# Patient Record
Sex: FEMALE | Race: WHITE | Employment: FULL TIME | URBAN - NONMETROPOLITAN AREA
[De-identification: names, ages, dates, MRNs, and addresses within clinical notes are randomized per-mention and may not be internally consistent; named-entity substitution may affect disease eponyms.]

---

## 2017-01-27 RX ORDER — RANITIDINE 300 MG/1
TABLET ORAL
Qty: 30 TABLET | Refills: 0 | Status: SHIPPED | OUTPATIENT
Start: 2017-01-27 | End: 2017-03-08 | Stop reason: SDUPTHER

## 2017-02-16 ENCOUNTER — INITIAL CONSULT (OUTPATIENT)
Dept: CARDIOLOGY CLINIC | Age: 42
End: 2017-02-16

## 2017-02-16 ENCOUNTER — NURSE ONLY (OUTPATIENT)
Dept: CARDIOLOGY CLINIC | Age: 42
End: 2017-02-16

## 2017-02-16 VITALS
HEIGHT: 64 IN | WEIGHT: 152 LBS | RESPIRATION RATE: 16 BRPM | HEART RATE: 84 BPM | SYSTOLIC BLOOD PRESSURE: 70 MMHG | BODY MASS INDEX: 25.95 KG/M2 | DIASTOLIC BLOOD PRESSURE: 60 MMHG

## 2017-02-16 DIAGNOSIS — R55 SYNCOPE, UNSPECIFIED SYNCOPE TYPE: Primary | ICD-10-CM

## 2017-02-16 DIAGNOSIS — R00.2 HEART PALPITATIONS: ICD-10-CM

## 2017-02-16 DIAGNOSIS — R55 SYNCOPE AND COLLAPSE: ICD-10-CM

## 2017-02-16 DIAGNOSIS — I95.1 ORTHOSTATIC HYPOTENSION: ICD-10-CM

## 2017-02-16 PROCEDURE — 93227 XTRNL ECG REC<48 HR R&I: CPT | Performed by: INTERNAL MEDICINE

## 2017-02-16 PROCEDURE — 93225 XTRNL ECG REC<48 HRS REC: CPT | Performed by: INTERNAL MEDICINE

## 2017-02-16 PROCEDURE — 99204 OFFICE O/P NEW MOD 45 MIN: CPT | Performed by: INTERNAL MEDICINE

## 2017-02-16 PROCEDURE — 93000 ELECTROCARDIOGRAM COMPLETE: CPT | Performed by: INTERNAL MEDICINE

## 2017-02-16 RX ORDER — ESCITALOPRAM OXALATE 10 MG/1
TABLET ORAL
COMMUNITY
Start: 2017-01-30 | End: 2018-01-05 | Stop reason: ALTCHOICE

## 2017-02-16 RX ORDER — MIDODRINE HYDROCHLORIDE 5 MG/1
5 TABLET ORAL 3 TIMES DAILY
Qty: 90 TABLET | Refills: 3 | Status: SHIPPED | OUTPATIENT
Start: 2017-02-16 | End: 2017-02-27 | Stop reason: SINTOL

## 2017-02-16 RX ORDER — ASCORBIC ACID 125 MG
TABLET,CHEWABLE ORAL
COMMUNITY
End: 2017-08-11 | Stop reason: ALTCHOICE

## 2017-02-17 ENCOUNTER — TELEPHONE (OUTPATIENT)
Dept: CARDIOLOGY CLINIC | Age: 42
End: 2017-02-17

## 2017-02-27 ENCOUNTER — TELEPHONE (OUTPATIENT)
Dept: CARDIOLOGY CLINIC | Age: 42
End: 2017-02-27

## 2017-02-27 RX ORDER — FLUDROCORTISONE ACETATE 0.1 MG/1
0.1 TABLET ORAL DAILY
Qty: 30 TABLET | Refills: 3 | Status: SHIPPED | OUTPATIENT
Start: 2017-02-27 | End: 2017-03-29

## 2017-03-01 ENCOUNTER — PROCEDURE VISIT (OUTPATIENT)
Dept: CARDIOLOGY CLINIC | Age: 42
End: 2017-03-01

## 2017-03-01 DIAGNOSIS — R55 SYNCOPE AND COLLAPSE: ICD-10-CM

## 2017-03-01 DIAGNOSIS — I95.1 ORTHOSTATIC HYPOTENSION: ICD-10-CM

## 2017-03-01 DIAGNOSIS — R55 SYNCOPE AND COLLAPSE: Primary | ICD-10-CM

## 2017-03-01 DIAGNOSIS — R00.2 HEART PALPITATIONS: ICD-10-CM

## 2017-03-01 DIAGNOSIS — R55 SYNCOPE, UNSPECIFIED SYNCOPE TYPE: ICD-10-CM

## 2017-03-01 LAB
LV EF: 58 %
LVEF MODALITY: NORMAL

## 2017-03-01 PROCEDURE — 93015 CV STRESS TEST SUPVJ I&R: CPT | Performed by: INTERNAL MEDICINE

## 2017-03-01 PROCEDURE — 93306 TTE W/DOPPLER COMPLETE: CPT | Performed by: INTERNAL MEDICINE

## 2017-03-02 ENCOUNTER — OFFICE VISIT (OUTPATIENT)
Dept: CARDIOLOGY CLINIC | Age: 42
End: 2017-03-02

## 2017-03-02 ENCOUNTER — TELEPHONE (OUTPATIENT)
Dept: CARDIOLOGY CLINIC | Age: 42
End: 2017-03-02

## 2017-03-02 VITALS
OXYGEN SATURATION: 97 % | WEIGHT: 151 LBS | BODY MASS INDEX: 26.75 KG/M2 | HEART RATE: 86 BPM | DIASTOLIC BLOOD PRESSURE: 40 MMHG | HEIGHT: 63 IN | SYSTOLIC BLOOD PRESSURE: 90 MMHG

## 2017-03-02 DIAGNOSIS — Z72.0 TOBACCO ABUSE: ICD-10-CM

## 2017-03-02 DIAGNOSIS — R55 SYNCOPE AND COLLAPSE: ICD-10-CM

## 2017-03-02 DIAGNOSIS — R00.2 HEART PALPITATIONS: ICD-10-CM

## 2017-03-02 DIAGNOSIS — I95.1 ORTHOSTATIC HYPOTENSION: Primary | ICD-10-CM

## 2017-03-02 DIAGNOSIS — Z86.010 HISTORY OF COLON POLYPS: ICD-10-CM

## 2017-03-02 PROCEDURE — 99214 OFFICE O/P EST MOD 30 MIN: CPT | Performed by: INTERNAL MEDICINE

## 2017-03-03 ENCOUNTER — TELEPHONE (OUTPATIENT)
Dept: CARDIOLOGY CLINIC | Age: 42
End: 2017-03-03

## 2017-03-09 RX ORDER — PROMETHAZINE HYDROCHLORIDE 25 MG/1
TABLET ORAL
Qty: 45 TABLET | Refills: 0 | Status: SHIPPED | OUTPATIENT
Start: 2017-03-09 | End: 2017-04-26 | Stop reason: SDUPTHER

## 2017-03-29 LAB
ALBUMIN SERPL-MCNC: 4.3 G/DL
ALP BLD-CCNC: 113 U/L
ALT SERPL-CCNC: 16 U/L
AST SERPL-CCNC: 21 U/L
BASOPHILS ABSOLUTE: NORMAL /ΜL
BASOPHILS RELATIVE PERCENT: NORMAL %
BILIRUB SERPL-MCNC: 0.2 MG/DL (ref 0.1–1.4)
BUN BLDV-MCNC: 11 MG/DL
CALCIUM SERPL-MCNC: NORMAL MG/DL
CHLORIDE BLD-SCNC: 101 MMOL/L
CO2: NORMAL MMOL/L
CREAT SERPL-MCNC: 0.9 MG/DL
EOSINOPHILS ABSOLUTE: NORMAL /ΜL
EOSINOPHILS RELATIVE PERCENT: NORMAL %
GFR CALCULATED: NORMAL
GLUCOSE BLD-MCNC: 87 MG/DL
HCT VFR BLD CALC: 41.1 % (ref 36–46)
HEMOGLOBIN: 13.7 G/DL (ref 12–16)
LYMPHOCYTES ABSOLUTE: NORMAL /ΜL
LYMPHOCYTES RELATIVE PERCENT: NORMAL %
MCH RBC QN AUTO: NORMAL PG
MCHC RBC AUTO-ENTMCNC: NORMAL G/DL
MCV RBC AUTO: NORMAL FL
MONOCYTES ABSOLUTE: NORMAL /ΜL
MONOCYTES RELATIVE PERCENT: NORMAL %
NEUTROPHILS ABSOLUTE: NORMAL /ΜL
NEUTROPHILS RELATIVE PERCENT: NORMAL %
PLATELET # BLD: 198 K/ΜL
PMV BLD AUTO: NORMAL FL
POTASSIUM SERPL-SCNC: 4.1 MMOL/L
RBC # BLD: 4.46 10^6/ΜL
SODIUM BLD-SCNC: 141 MMOL/L
TOTAL PROTEIN: NORMAL
TRIGL SERPL-MCNC: 656 MG/DL
TSH SERPL DL<=0.05 MIU/L-ACNC: 1.02 UIU/ML
WBC # BLD: 11.2 10^3/ML

## 2017-04-05 ENCOUNTER — OFFICE VISIT (OUTPATIENT)
Dept: CARDIOLOGY CLINIC | Age: 42
End: 2017-04-05

## 2017-04-05 VITALS
WEIGHT: 151 LBS | HEART RATE: 112 BPM | DIASTOLIC BLOOD PRESSURE: 60 MMHG | SYSTOLIC BLOOD PRESSURE: 88 MMHG | HEIGHT: 63 IN | BODY MASS INDEX: 26.75 KG/M2 | RESPIRATION RATE: 16 BRPM

## 2017-04-05 DIAGNOSIS — I95.1 ORTHOSTATIC HYPOTENSION: Primary | ICD-10-CM

## 2017-04-05 PROCEDURE — 99213 OFFICE O/P EST LOW 20 MIN: CPT | Performed by: INTERNAL MEDICINE

## 2017-04-05 RX ORDER — PRAVASTATIN SODIUM 40 MG
TABLET ORAL
COMMUNITY
Start: 2017-03-31 | End: 2018-01-05 | Stop reason: ALTCHOICE

## 2017-04-05 RX ORDER — FLUDROCORTISONE ACETATE 0.1 MG/1
0.2 TABLET ORAL DAILY
Qty: 180 TABLET | Refills: 3 | Status: SHIPPED | OUTPATIENT
Start: 2017-04-05 | End: 2019-04-04

## 2017-04-27 RX ORDER — PROMETHAZINE HYDROCHLORIDE 25 MG/1
TABLET ORAL
Qty: 45 TABLET | Refills: 0 | Status: SHIPPED | OUTPATIENT
Start: 2017-04-27 | End: 2017-07-31 | Stop reason: SDUPTHER

## 2017-06-05 RX ORDER — RANITIDINE 300 MG/1
TABLET ORAL
Qty: 30 TABLET | Refills: 1 | Status: SHIPPED | OUTPATIENT
Start: 2017-06-05 | End: 2017-08-21 | Stop reason: SDUPTHER

## 2017-06-05 RX ORDER — ESOMEPRAZOLE MAGNESIUM 40 MG/1
CAPSULE, DELAYED RELEASE ORAL
Qty: 30 CAPSULE | Refills: 3 | Status: SHIPPED | OUTPATIENT
Start: 2017-06-05 | End: 2017-08-15 | Stop reason: SDUPTHER

## 2017-07-31 RX ORDER — PROMETHAZINE HYDROCHLORIDE 25 MG/1
TABLET ORAL
Qty: 45 TABLET | Refills: 0 | Status: SHIPPED | OUTPATIENT
Start: 2017-07-31 | End: 2018-01-05 | Stop reason: ALTCHOICE

## 2017-08-15 ENCOUNTER — TELEPHONE (OUTPATIENT)
Dept: GASTROENTEROLOGY | Age: 42
End: 2017-08-15

## 2017-08-21 RX ORDER — RANITIDINE 300 MG/1
TABLET ORAL
Qty: 30 TABLET | Refills: 0 | Status: SHIPPED | OUTPATIENT
Start: 2017-08-21 | End: 2019-04-04

## 2017-09-14 ENCOUNTER — TELEPHONE (OUTPATIENT)
Dept: GASTROENTEROLOGY | Age: 42
End: 2017-09-14

## 2017-09-18 ENCOUNTER — TELEPHONE (OUTPATIENT)
Dept: GASTROENTEROLOGY | Age: 42
End: 2017-09-18

## 2017-12-06 ENCOUNTER — TELEPHONE (OUTPATIENT)
Dept: GASTROENTEROLOGY | Age: 42
End: 2017-12-06

## 2017-12-07 ENCOUNTER — TELEPHONE (OUTPATIENT)
Dept: GASTROENTEROLOGY | Age: 42
End: 2017-12-07

## 2017-12-07 NOTE — TELEPHONE ENCOUNTER
Called pharmacy and spoke with Damion. I advised her that if the rx is run as OTC ins will cover without PA. She states they had 2 scripts for the Nexium so the non OTC was the one that was requiring PA. She states they ran it as OTC and patient was able to get the medication without issue. No further action needed.

## 2018-01-11 ENCOUNTER — TELEPHONE (OUTPATIENT)
Dept: GASTROENTEROLOGY | Age: 43
End: 2018-01-11

## 2018-01-15 ENCOUNTER — TELEPHONE (OUTPATIENT)
Dept: GASTROENTEROLOGY | Age: 43
End: 2018-01-15

## 2018-01-15 NOTE — TELEPHONE ENCOUNTER
Called patient to reschedule appt that she missed with Tierra Potter this morning at 1040am. LM on  for patient to return call to office.

## 2018-03-02 ENCOUNTER — HOSPITAL ENCOUNTER (OUTPATIENT)
Dept: OTHER | Age: 43
Discharge: HOME | End: 2018-03-02
Attending: CLINIC/CENTER | Admitting: CLINIC/CENTER

## 2018-03-05 LAB
CULTURE: NORMAL
ORGANISM: NORMAL
REPORT STATUS: NORMAL
REQUEST PROBLEM: NORMAL
SPECIMEN: NORMAL
TOTAL COLONY COUNT: NORMAL

## 2019-04-04 ENCOUNTER — OFFICE VISIT (OUTPATIENT)
Dept: FAMILY MEDICINE CLINIC | Age: 44
End: 2019-04-04
Payer: COMMERCIAL

## 2019-04-04 VITALS
HEART RATE: 102 BPM | WEIGHT: 101.2 LBS | DIASTOLIC BLOOD PRESSURE: 60 MMHG | OXYGEN SATURATION: 99 % | SYSTOLIC BLOOD PRESSURE: 88 MMHG | RESPIRATION RATE: 20 BRPM | BODY MASS INDEX: 17.28 KG/M2 | HEIGHT: 64 IN

## 2019-04-04 DIAGNOSIS — Z13.31 POSITIVE DEPRESSION SCREENING: ICD-10-CM

## 2019-04-04 DIAGNOSIS — R44.1 VISUAL HALLUCINATIONS: ICD-10-CM

## 2019-04-04 DIAGNOSIS — R44.0 AUDITORY HALLUCINATION: Primary | ICD-10-CM

## 2019-04-04 DIAGNOSIS — R45.86 MOOD SWINGS: ICD-10-CM

## 2019-04-04 DIAGNOSIS — G25.81 RESTLESS LEGS: ICD-10-CM

## 2019-04-04 DIAGNOSIS — Z72.0 TOBACCO ABUSE: ICD-10-CM

## 2019-04-04 DIAGNOSIS — F41.9 ANXIETY: ICD-10-CM

## 2019-04-04 PROBLEM — I95.1 ORTHOSTATIC HYPOTENSION: Status: RESOLVED | Noted: 2017-02-16 | Resolved: 2019-04-04

## 2019-04-04 PROBLEM — R55 SYNCOPE AND COLLAPSE: Status: RESOLVED | Noted: 2017-02-16 | Resolved: 2019-04-04

## 2019-04-04 PROBLEM — R00.2 HEART PALPITATIONS: Status: RESOLVED | Noted: 2017-02-16 | Resolved: 2019-04-04

## 2019-04-04 PROCEDURE — 96160 PT-FOCUSED HLTH RISK ASSMT: CPT | Performed by: NURSE PRACTITIONER

## 2019-04-04 PROCEDURE — 99203 OFFICE O/P NEW LOW 30 MIN: CPT | Performed by: NURSE PRACTITIONER

## 2019-04-04 PROCEDURE — G8431 POS CLIN DEPRES SCRN F/U DOC: HCPCS | Performed by: NURSE PRACTITIONER

## 2019-04-04 PROCEDURE — G8419 CALC BMI OUT NRM PARAM NOF/U: HCPCS | Performed by: NURSE PRACTITIONER

## 2019-04-04 PROCEDURE — G8427 DOCREV CUR MEDS BY ELIG CLIN: HCPCS | Performed by: NURSE PRACTITIONER

## 2019-04-04 PROCEDURE — 4004F PT TOBACCO SCREEN RCVD TLK: CPT | Performed by: NURSE PRACTITIONER

## 2019-04-04 RX ORDER — MIRTAZAPINE 15 MG/1
15 TABLET, FILM COATED ORAL DAILY
Qty: 30 TABLET | Refills: 0 | Status: SHIPPED | OUTPATIENT
Start: 2019-04-04 | End: 2019-05-21 | Stop reason: SINTOL

## 2019-04-04 RX ORDER — QUETIAPINE 150 MG/1
150 TABLET, FILM COATED, EXTENDED RELEASE ORAL DAILY
Qty: 30 TABLET | Refills: 1 | Status: SHIPPED | OUTPATIENT
Start: 2019-04-04 | End: 2019-05-21 | Stop reason: ALTCHOICE

## 2019-04-04 ASSESSMENT — PATIENT HEALTH QUESTIONNAIRE - PHQ9
9. THOUGHTS THAT YOU WOULD BE BETTER OFF DEAD, OR OF HURTING YOURSELF: 2
2. FEELING DOWN, DEPRESSED OR HOPELESS: 3
SUM OF ALL RESPONSES TO PHQ QUESTIONS 1-9: 20
6. FEELING BAD ABOUT YOURSELF - OR THAT YOU ARE A FAILURE OR HAVE LET YOURSELF OR YOUR FAMILY DOWN: 3
8. MOVING OR SPEAKING SO SLOWLY THAT OTHER PEOPLE COULD HAVE NOTICED. OR THE OPPOSITE, BEING SO FIGETY OR RESTLESS THAT YOU HAVE BEEN MOVING AROUND A LOT MORE THAN USUAL: 3
1. LITTLE INTEREST OR PLEASURE IN DOING THINGS: 3
7. TROUBLE CONCENTRATING ON THINGS, SUCH AS READING THE NEWSPAPER OR WATCHING TELEVISION: 0
10. IF YOU CHECKED OFF ANY PROBLEMS, HOW DIFFICULT HAVE THESE PROBLEMS MADE IT FOR YOU TO DO YOUR WORK, TAKE CARE OF THINGS AT HOME, OR GET ALONG WITH OTHER PEOPLE: 2
SUM OF ALL RESPONSES TO PHQ9 QUESTIONS 1 & 2: 6
3. TROUBLE FALLING OR STAYING ASLEEP: 3
5. POOR APPETITE OR OVEREATING: 0
4. FEELING TIRED OR HAVING LITTLE ENERGY: 3
SUM OF ALL RESPONSES TO PHQ QUESTIONS 1-9: 20

## 2019-04-04 NOTE — PATIENT INSTRUCTIONS
Behavioral Health Resources  1. 11 [facility] ([facility])  [address] [facility]. Norma Witt [address]  [phone]    [address] [facility], [address]  [phone]    2. [facility]  [facility]  [address] [facility]  Harsha Ramsay, [address]  [phone]    3. [address] [facility], [address]  [address]  [facility]  Tish Witt [address]  [phone]      4. [facility]   [address] [facility], [address]      5. [address] [facility]. Taras Negrete [address] [phone]  Offers psychiatric services and counseling    6. [address] [facility]  [phone]  Offers medical & psychiatric services and counseling    7. Child, [address]  Harsha Ramsay, [address] [facility],[address]  Offers psychiatric services and counseling    8.  [address] [address] [place]  [phone]

## 2019-04-04 NOTE — PROGRESS NOTES
SUBJECTIVE:    Sarahann Mcburney  1975  41 y.o.  female      Chief Complaint   Patient presents with   [address] [facility] Patient     41year old in office to establish care    Other     Pt states she needs back on her psych meds and she was seeing Yasmeen Betancur at [facility] and Pt states that she will not go back to her. Pt states she was rude and disrespectful.  Menopause     Pt states she thinks she is going through menopause. HPI     Hearing voices, seeing spirits. States she was being told by therapist that she was making it up. Has been feeling like she was dying by going to the home of a  friend. Has been on seroquel, states prior provider took her off of it. Was taking 400xr  Was on zoloft - made her go crazy. States she tried for two weeks, stopped this. Has been on everything that you can think of. Has some PTSD, has not worked in years. Tobacco abuse  Counseled to stop smoking to improve health and limit risks. Patient advised to call if they wish to have intervention/help to stop smoking. Mood swings  Reports she has mood swings. States this can happen many times in a day. Has periods of down, depressed mood and then other times she is very angry or agitated. Auditory hallucination  Patient reports that she hears voices. She has heard friends talking with her even though they are . She says this is been going on for years. She was told by a prior therapist that they believed she was making it up so she just stopped talking about it. She has been treated for schizoaffective disorder in the past but does not believe that she is schizophrenic. She says she has PTSD. She has had psychiatric and parent normal investigators come to her house to investigate the things that she hears. Visual hallucinations  Recent says that she sees spirits.   She has had hair normal investigator's come to her home to investigate the spirits that she sees and she has been told that everything that she sees and hears is real.  She has seen a counselor and a prior psychiatrist at [facility] but \"just got fed up with them and don't want to go back\". Apparently that counselor told her that they did not believe that she was seeing and hearing the things that she sees. Anxiety  Patient reports that she has severe anxiety. She has been on Zoloft in the past but this \"made her go crazy\". She says that she has been \"on everything you can think of\". She reports that her anxiety is terrible. Is a long list of allergies and medication intolerances due to a history of tardive dyskinesia. Current Outpatient Medications on File Prior to Visit   Medication Sig Dispense Refill    rOPINIRole (REQUIP) 2 MG tablet Take 2 mg by mouth 2 times daily 1mg in the morning & 3mg nightly       No current facility-administered medications on file prior to visit. Review of PMH, PSH, Family Hx, Allergies and updates made as needed. Past Medical History:   Diagnosis Date    Arthritis     OSTEO ARTHITIS    Cholecystenteric fistula     COPD (chronic obstructive pulmonary disease) ([address]     Degenerative disc disease     Fibromyalgia     H/O 24 hour EKG monitoring 02/16/2017      Sinus tach events , no major arrhythmias , follow up in office as routine     H/O echocardiogram 03/01/2017    EF 55-60% Normal LV structure  and systolic function.  H/O exercise stress test 03/01/2017    Normal stress test. Patient has physical deconditioning as she achieved target HR in 2 mins. Recommend to increase PO fluids intake and exercise training.      Osteopenia     Osteoporosis     PTSD (post-traumatic stress disorder)     S/P cholecystectomy 11/22/11    Schizo-affective schizophrenia ([address]     Sjoegren syndrome ([address]     Syncope     Thyroid disease     Venous insufficiency of leg 2012    Vertigo      Past Surgical History:   Procedure Laterality Date    CHOLECYSTECTOMY  11/22/2011 laparoscopic    COLONOSCOPY      DENTAL SURGERY  8/9    ELBOW ARTHROSCOPY      bilateral elbows    ENDOSCOPY, COLON, DIAGNOSTIC      HYSTERECTOMY      OTHER SURGICAL HISTORY      venous ablation, bilateral legs    TUBAL LIGATION       Social History     Tobacco History     Smoking Status  Current Every Day Smoker Smoking Frequency  1 pack/day for 20 years (20 pk yrs) Smoking Tobacco Type  Cigarettes    Smokeless Tobacco Use  Never Used          Alcohol History     Alcohol Use Status  No          Drug Use     Drug Use Status  No Comment  clean for 11 years, past hx of crack use          Sexual Activity     Sexually Active  Not Currently              Allergies   Allergen Reactions    Codeine Itching and Nausea And Vomiting    Imitrex [Sumatriptan] Itching and Nausea And Vomiting     PASSED OUT    Botox [Onabotulinumtoxina] Other (See Comments)     Tardive dyskinesia    Compazine [Prochlorperazine Maleate] Other (See Comments)    Cephalexin Nausea And Vomiting and Other (See Comments)     ABDOMINAL PAIN FOR 2 WEEKS    Magnesium-Containing Compounds Other (See Comments)     Pt sts \"they called it the mags. I start twitching. \"     Meclizine Hcl Other (See Comments)     \"Makes me stutter and twitch. \"    Nsaids Other (See Comments)     Tardive dyskinisia    Pcn [Penicillins] Nausea And Vomiting and Other (See Comments)     HEADACHE    Reglan [Metoclopramide] Other (See Comments)     Tardive dyskinisia    Toradol [Ketorolac Tromethamine] Other (See Comments)     Tremors    Vistaril [Hydroxyzine Hcl] Other (See Comments)     dyskinsia      Zofran [Ondansetron Hcl] Other (See Comments)     Cramping and burning in stomach           PHQ Scores 4/4/2019   PHQ2 Score 6   PHQ9 Score 20     Interpretation of Total Score Depression Severity: 1-4 = Minimal depression, 5-9 = Mild depression, 10-14 = Moderate depression, 15-19 = Moderately severe depression, 20-27 = Severe depression    Review of Systems Constitutional: Negative. Negative for chills, diaphoresis and fever. Respiratory: Negative. Negative for cough, shortness of breath, wheezing and stridor. Cardiovascular: Negative. Negative for chest pain, palpitations and leg swelling. Gastrointestinal: Negative. Negative for diarrhea and vomiting. Endocrine: Negative. Neurological: Positive for tremors. Negative for weakness and headaches. Psychiatric/Behavioral: Positive for agitation, dysphoric mood, hallucinations and sleep disturbance. Negative for suicidal ideas. The patient is nervous/anxious. All other systems reviewed and are negative. OBJECTIVE:    BP 88/60 (Site: Right Upper Arm, Position: Sitting, Cuff Size: Medium Adult)   Pulse 102   Resp 20   Ht 5' 4\" (1.626 m)   Wt 101 lb 3.2 oz (45.9 kg)   LMP 11/18/2004   SpO2 99%   BMI 17.37 kg/m²     Physical Exam   Constitutional: She is oriented to person, place, and time. She appears well-developed and well-nourished. HENT:   Head: Normocephalic and atraumatic. Eyes: Pupils are equal, round, and reactive to light. Neck: Normal range of motion. Neck supple. Cardiovascular: Normal rate, regular rhythm, normal heart sounds and intact distal pulses. No murmur heard. Pulmonary/Chest: Effort normal and breath sounds normal. No respiratory distress. Neurological: She is alert and oriented to person, place, and time. Skin: Skin is warm and dry. Capillary refill takes less than 2 seconds. Psychiatric: She has a normal mood and affect. Her behavior is normal. Judgment and thought content normal.   Vitals reviewed. ASSESSMENT/PLAN:    Problem List     Anxiety     Patient reports that she has severe anxiety. She has been on Zoloft in the past but this \"made her go crazy\". She says that she has been \"on everything you can think of\". She reports that her anxiety is terrible.   Is a long list of allergies and medication intolerances due to a history of tardive dyskinesia. Relevant Medications    LORazepam (ATIVAN) tablet 1 mg (Completed)    LORazepam (ATIVAN) injection 2 mg (Completed)    diazepam (VALIUM) tablet 10 mg (Completed)    LORazepam (ATIVAN) tablet 1 mg (Completed)    LORazepam (ATIVAN) tablet 1 mg (Completed)    LORazepam (ATIVAN) tablet 1 mg (Completed)    QUEtiapine (SEROQUEL XR) 150 MG TB24 extended release tablet    mirtazapine (REMERON) 15 MG tablet    Auditory hallucination - Primary     Patient reports that she hears voices. She has heard friends talking with her even though they are . She says this is been going on for years. She was told by a prior therapist that they believed she was making it up so she just stopped talking about it. She has been treated for schizoaffective disorder in the past but does not believe that she is schizophrenic. She says she has PTSD. She has had psychiatric and parent normal investigators come to her house to investigate the things that she hears. Relevant Medications    QUEtiapine (SEROQUEL XR) 150 MG TB24 extended release tablet    mirtazapine (REMERON) 15 MG tablet    Mood swings     Reports she has mood swings. States this can happen many times in a day. Has periods of down, depressed mood and then other times she is very angry or agitated. Relevant Medications    QUEtiapine (SEROQUEL XR) 150 MG TB24 extended release tablet    mirtazapine (REMERON) 15 MG tablet    Restless legs    Tobacco abuse     Counseled to stop smoking to improve health and limit risks. Patient advised to call if they wish to have intervention/help to stop smoking. Visual hallucinations     Recent says that she sees spirits.   She has had hair normal investigator's come to her home to investigate the spirits that she sees and she has been told that everything that she sees and hears is real.  She has seen a counselor and a prior psychiatrist at [facility] but \"just got fed up with them Tobacco abuse  Counseled to stop smoking to improve health and decrease health risks    5. Positive depression screening  Patient did have a positive depression screening. She was encouraged to find a psychiatrist ASAP. We will get her stable as we can in this office while we are waiting for psychiatry appointment. - Positive Screen for Clinical Depression with a Documented Follow-up Plan     6. Visual hallucinations  As above    7. Restless legs  Patient uses Requip for restless legs. She has been encouraged to continue using this. She is also been encouraged to take a walk, eat a well-balanced diet, drink plenty of fluids and follow up for any concerns      Return in about 6 weeks (around 5/16/2019). Controlled substance monitoring (if applicable to pt. Visit)             (Please note that portions of this note may have beencompleted with a voice recognition program. Efforts were made to edit the dictations but occasionally words are mis-transcribed.)    On the basis of positive PHQ-9 screening (PHQ-9 Total Score: 20), the following plan was implemented: Start the patient back on her medication. She was advised to find a psychiatrist ASAP. Patient will follow-up in 6 week(s) with PCP.

## 2019-04-05 PROBLEM — R45.86 MOOD SWINGS: Status: ACTIVE | Noted: 2019-04-05

## 2019-04-05 PROBLEM — G25.81 RESTLESS LEGS: Status: ACTIVE | Noted: 2019-04-05

## 2019-04-05 PROBLEM — F41.9 ANXIETY: Status: ACTIVE | Noted: 2019-04-05

## 2019-04-05 PROBLEM — R44.0 AUDITORY HALLUCINATION: Status: ACTIVE | Noted: 2019-04-05

## 2019-04-05 PROBLEM — R44.1 VISUAL HALLUCINATIONS: Status: ACTIVE | Noted: 2019-04-05

## 2019-04-05 ASSESSMENT — ENCOUNTER SYMPTOMS
GASTROINTESTINAL NEGATIVE: 1
SHORTNESS OF BREATH: 0
COUGH: 0
DIARRHEA: 0
RESPIRATORY NEGATIVE: 1
VOMITING: 0
WHEEZING: 0
STRIDOR: 0

## 2019-04-05 NOTE — ASSESSMENT & PLAN NOTE
Recent says that she sees spirits. She has had hair normal investigator's come to her home to investigate the spirits that she sees and she has been told that everything that she sees and hears is real.  She has seen a counselor and a prior psychiatrist at [facility] but \"just got fed up with them and don't want to go back\". Apparently that counselor told her that they did not believe that she was seeing and hearing the things that she sees.

## 2019-04-05 NOTE — ASSESSMENT & PLAN NOTE
Patient reports that she hears voices. She has heard friends talking with her even though they are . She says this is been going on for years. She was told by a prior therapist that they believed she was making it up so she just stopped talking about it. She has been treated for schizoaffective disorder in the past but does not believe that she is schizophrenic. She says she has PTSD. She has had psychiatric and parent normal investigators come to her house to investigate the things that she hears.

## 2019-04-05 NOTE — ASSESSMENT & PLAN NOTE
Patient reports that she has severe anxiety. She has been on Zoloft in the past but this \"made her go crazy\". She says that she has been \"on everything you can think of\". She reports that her anxiety is terrible. Is a long list of allergies and medication intolerances due to a history of tardive dyskinesia.

## 2019-05-21 ENCOUNTER — OFFICE VISIT (OUTPATIENT)
Dept: FAMILY MEDICINE CLINIC | Age: 44
End: 2019-05-21
Payer: COMMERCIAL

## 2019-05-21 VITALS
BODY MASS INDEX: 17.54 KG/M2 | RESPIRATION RATE: 20 BRPM | HEART RATE: 85 BPM | WEIGHT: 102.2 LBS | DIASTOLIC BLOOD PRESSURE: 62 MMHG | SYSTOLIC BLOOD PRESSURE: 100 MMHG | OXYGEN SATURATION: 97 %

## 2019-05-21 DIAGNOSIS — F41.9 ANXIETY: Primary | ICD-10-CM

## 2019-05-21 DIAGNOSIS — R45.86 MOOD SWINGS: ICD-10-CM

## 2019-05-21 PROCEDURE — 4004F PT TOBACCO SCREEN RCVD TLK: CPT | Performed by: NURSE PRACTITIONER

## 2019-05-21 PROCEDURE — G8419 CALC BMI OUT NRM PARAM NOF/U: HCPCS | Performed by: NURSE PRACTITIONER

## 2019-05-21 PROCEDURE — 99214 OFFICE O/P EST MOD 30 MIN: CPT | Performed by: NURSE PRACTITIONER

## 2019-05-21 PROCEDURE — G8427 DOCREV CUR MEDS BY ELIG CLIN: HCPCS | Performed by: NURSE PRACTITIONER

## 2019-05-21 RX ORDER — DIVALPROEX SODIUM 125 MG/1
125 TABLET, DELAYED RELEASE ORAL 2 TIMES DAILY
Qty: 60 TABLET | Refills: 0 | Status: SHIPPED | OUTPATIENT
Start: 2019-05-21 | End: 2019-07-08

## 2019-05-21 ASSESSMENT — ENCOUNTER SYMPTOMS
GASTROINTESTINAL NEGATIVE: 1
COUGH: 0
WHEEZING: 0
RESPIRATORY NEGATIVE: 1
SHORTNESS OF BREATH: 0

## 2019-05-21 NOTE — PROGRESS NOTES
SUBJECTIVE:    Octavia Horvath  1975  41 y.o.  female      Chief Complaint   Patient presents with    Other     Pt here for 6 week follow up    Discuss Medications     Pt states she can not take the remeron due to it causing her migraines. Pt states the seroquel makes her sleepy and not want to get up     HPI   States she is 'overly excited' and 'overly' emotional.   States her child is on concerta and wonders if this will help. But then states \"I don't have ADD so I guess it wouldn't help me\". States she like the energy she has but feels like she is too emotional.  Patient states \"I know that I have the problems that I do but I just can't go back to see that person at [facility]\". She does report that she sees spirits and she hears spirits talking to her. She stopped taking the Remeron because it gave her a migraine and she stopped taking the Seroquel because it made her sleep too much. Have advised the patient again today that specialized psychiatric care is extremely important and that it would be to her benefit to find a psychiatrist and follow-up with them as I am not a psychiatric mental health nurse practitioner and she does need specialized care    No problem-specific Assessment & Plan notes found for this encounter. Current Outpatient Medications on File Prior to Visit   Medication Sig Dispense Refill    rOPINIRole (REQUIP) 2 MG tablet Take 2 mg by mouth 2 times daily 1mg in the morning & 3mg nightly       No current facility-administered medications on file prior to visit. Review of PMH, PSH, Family Hx, Allergies and updates made as needed. PHQ Scores 4/4/2019   PHQ2 Score 6   PHQ9 Score 20     Interpretation of Total Score Depression Severity: 1-4 = Minimal depression, 5-9 = Mild depression, 10-14 = Moderate depression, 15-19 = Moderately severe depression, 20-27 = Severe depression    Review of Systems   Constitutional: Negative.   Negative for chills, diaphoresis and

## 2019-06-23 ENCOUNTER — HOSPITAL ENCOUNTER (EMERGENCY)
Age: 44
Discharge: HOME | End: 2019-06-23
Attending: EMERGENCY MEDICINE
Payer: COMMERCIAL

## 2019-06-23 VITALS
DIASTOLIC BLOOD PRESSURE: 92 MMHG | HEART RATE: 130 BPM | WEIGHT: 104 LBS | RESPIRATION RATE: 24 BRPM | HEIGHT: 64 IN | BODY MASS INDEX: 17.75 KG/M2 | OXYGEN SATURATION: 96 % | SYSTOLIC BLOOD PRESSURE: 121 MMHG | TEMPERATURE: 99.3 F

## 2019-06-23 DIAGNOSIS — F41.1 ANXIETY STATE: Primary | ICD-10-CM

## 2019-06-23 PROCEDURE — 6360000002 HC RX W HCPCS: Performed by: EMERGENCY MEDICINE

## 2019-06-23 PROCEDURE — 99282 EMERGENCY DEPT VISIT SF MDM: CPT

## 2019-06-23 PROCEDURE — 96372 THER/PROPH/DIAG INJ SC/IM: CPT

## 2019-06-23 RX ORDER — LORAZEPAM 1 MG/1
1 TABLET ORAL EVERY 8 HOURS PRN
Qty: 10 TABLET | Refills: 0 | Status: SHIPPED | OUTPATIENT
Start: 2019-06-23 | End: 2019-07-03

## 2019-06-23 RX ORDER — LORAZEPAM 2 MG/ML
1 INJECTION INTRAMUSCULAR ONCE
Status: COMPLETED | OUTPATIENT
Start: 2019-06-23 | End: 2019-06-23

## 2019-06-23 RX ADMIN — LORAZEPAM 1 MG: 2 INJECTION INTRAMUSCULAR; INTRAVENOUS at 16:53

## 2019-06-23 ASSESSMENT — PAIN SCALES - GENERAL: PAINLEVEL_OUTOF10: 7

## 2019-06-23 ASSESSMENT — PAIN DESCRIPTION - DESCRIPTORS: DESCRIPTORS: POUNDING

## 2019-06-23 ASSESSMENT — PAIN DESCRIPTION - LOCATION: LOCATION: HEAD

## 2019-06-23 NOTE — ED PROVIDER NOTES
Emergency Department Encounter  Location: [facility] [address] [place] [address]    Patient: Dede Siegel  MRN: [id number]  : 1975  Date of evaluation: 2019  ED Provider: Elva Schmidt DO, FACEP    Chief Complaint:    Anxiety (Pt arrives ambulatory stating she is having a mental breakdown d/t children having medical problems and she feels like she cant handle life and she does not want to feel like this. Pt crying in triage stating she feels like she does not have anyone to support her. Pt states her boyfriend and daughters father left on Thursday and she does not know what she did to make him leave and her daughter wants him.  )    [place]:  Dede Siegel is a 41 y.o. female that presents to the emergency department complaints of \"mental breakdown\". Patient states she is having difficulty handling the situations in her life right now. She states her 59-year-old daughter had breast cancer surgery this week. She states her 66-year-old daughter has an ovarian cyst but she lives in [place]. She needs surgery as well but the patient is unable to get to [place] to support her. She states her fiancé is an  who is gone a lot and is not available for her to lean on. She states she is yelling and cursing at him and her mother and states \"I do not want to be like this\". Patient was under the care of a counselor and psychiatrist through [facility] but has quit going to those appointments. She states the psychiatrist kept adding medicines and adding medicines and adding medicines. She has been off of her medications for quite some time. The patient has been self-medicating with meth. She states she last used on Tuesday. She states this is causing her to feel very angry at herself and is causing her anxiety and itself. He states \"I do not want to feel like this anymore\". Patient denies suicidal ideation because of her kids who need her.     ROS:  At least 10 systems reviewed and otherwise acutely negative except as in the [address] Past Medical History:   Diagnosis Date    Arthritis     OSTEO ARTHITIS    Cholecystenteric fistula     COPD [facility] obstructive [facility] disease) [facility]     Degenerative disc disease     Fibromyalgia     H/O 24 hour EKG monitoring 02/16/2017      Sinus tach events , no major arrhythmias , follow up in office as routine     H/O echocardiogram 03/01/2017    EF 55-60% Normal LV structure  and systolic function.  H/O exercise stress test 03/01/2017    Normal stress test. Patient has physical deconditioning as she achieved target HR in 2 mins. Recommend to increase PO fluids intake and exercise training.  Osteopenia     Osteoporosis     PTSD (post-traumatic stress disorder)     S/P cholecystectomy 11/22/11    Schizo-affective schizophrenia ([facility]     Sjoegren syndrome     Syncope     Thyroid disease     Venous insufficiency of leg 2012    Vertigo      Past Surgical History:   Procedure Laterality Date    CHOLECYSTECTOMY  11/22/2011    laparoscopic    COLONOSCOPY      DENTAL SURGERY  8/9    ELBOW ARTHROSCOPY      bilateral elbows    ENDOSCOPY, COLON, DIAGNOSTIC      HYSTERECTOMY      OTHER SURGICAL HISTORY      venous ablation, bilateral legs    TUBAL LIGATION       History reviewed. No pertinent family history.   Social History     Socioeconomic History    Marital status: Single     Spouse name: Not on file    Number of children: Not on file    Years of education: Not on file    Highest education level: Not on file   Occupational History    Not on file   Social Needs    Financial resource strain: Not on file    Food insecurity:     Worry: Not on file     Inability: Not on file    Transportation needs:     Medical: Not on file     Non-medical: Not on file   Tobacco Use    Smoking status: Current Every Day Smoker     Packs/day: 1.00     Years: 20.00     Pack years: 20.00     Types: Cigarettes    Smokeless tobacco: Never Used   Substance and Sexual Activity    Alcohol use: No    Drug use: Yes     Types: Methamphetamines     Comment: clean for 11 years, past hx of crack use    Sexual activity: Not Currently   Lifestyle    Physical activity:     Days per week: Not on file     Minutes per session: Not on file    Stress: Not on file   Relationships    Social connections:     Talks on phone: Not on file     Gets together: Not on file     Attends Druze service: Not on file     Active member of club or organization: Not on file     Attends meetings of clubs or organizations: Not on file     Relationship status: Not on file    Intimate partner violence:     Fear of current or ex partner: Not on file     Emotionally abused: Not on file     Physically abused: Not on file     Forced sexual activity: Not on file   Other Topics Concern    Not on file   Social History Narrative    Not on file     No current facility-administered medications for this encounter. Current Outpatient Medications   Medication Sig Dispense Refill    LORazepam (ATIVAN) 1 MG tablet Take 1 tablet by mouth every 8 hours as needed for Anxiety for up to 10 days. 10 tablet 0    divalproex (DEPAKOTE) 125 MG DR tablet Take 1 tablet by mouth 2 times daily 60 tablet 0    rOPINIRole (REQUIP) 2 MG tablet Take 2 mg by mouth 2 times daily 1mg in the morning & 3mg nightly       Allergies   Allergen Reactions    Codeine Itching and Nausea And Vomiting    Imitrex [Sumatriptan] Itching and Nausea And Vomiting     PASSED OUT    Botox [Onabotulinumtoxina] Other (See Comments)     Tardive dyskinesia    Compazine [Prochlorperazine Maleate] Other (See Comments)    Cephalexin Nausea And Vomiting and Other (See Comments)     ABDOMINAL PAIN FOR 2 WEEKS    Magnesium-Containing Compounds Other (See Comments)     Pt sts \"they called it the mags. I start twitching. \"     Meclizine Hcl Other (See Comments)     \"Makes me stutter and twitch. \"    Nsaids Other (See Comments)     Tardive dyskinisia    Pcn [Penicillins] Nausea And Vomiting and Other (See Comments)     HEADACHE    Reglan [Metoclopramide] Other (See Comments)     Tardive dyskinisia    Toradol [Ketorolac Tromethamine] Other (See Comments)     Tremors    Vistaril [Hydroxyzine Hcl] Other (See Comments)     dyskinsia      Zofran [Ondansetron Hcl] Other (See Comments)     Cramping and burning in stomach       Nursing Notes Reviewed    Physical Exam:  ED Triage Vitals [06/23/19 1612]   Enc Vitals Group      BP (!) 121/92      Pulse 130      Resp 24      Temp 99.3 °F (37.4 °C)      Temp Source Oral      SpO2 96 %      Weight 104 lb (47.2 kg)      Height 5' 4\" (1.626 m)      Head Circumference       Peak Flow       Pain Score       Pain Loc       Pain Edu? Excl. in [address]? GENERAL APPEARANCE: Awake and alert. Cooperative. No acute distress. Nontoxic in appearance. Very anxious and tearful  HEAD: Normocephalic. Atraumatic. EYES: EOM's grossly intact. Sclera anicteric. ENT: Tolerates saliva. No trismus. NECK: Supple. Trachea midline. CARDIO: RRR. Radial pulse 2+. LUNGS: Respirations unlabored. CTAB. ABDOMEN: Soft. Non-distended. Non-tender. EXTREMITIES: No acute deformities. SKIN: Warm and dry. NEUROLOGICAL: No gross facial drooping. Moves all 4 extremities spontaneously. PSYCHIATRIC: Patient is very anxious and is very tearful and having what seems to be a depressive episode. Labs:  No results found for this visit on 06/23/19. Radiographs (if obtained):  [] The following radiograph was interpreted by myself in the absence of a radiologist:  [x] Radiologist's Report reviewed at time of ED visit:  No orders to display       ED Course and MDM:  Patient was given a milligram Ativan IM here in the emergency department. This is calmed her down slightly. She will be discharged with a prescription for 10 Ativan.   She is encouraged to establish counseling with either [facility] of the other counselors that she has been given contact information from her daughter's . She is discharged in stable condition at this time. Final Impression:  1. Anxiety state      DISPOSITION Discharge - Pending Orders Complete    Patient referred to:  HANNA Morris - CNP  [address] [facility]  [address] [place] [address]  [phone]    Schedule an appointment as soon as possible for a visit in 1 week  For follow up    Discharge medications:  New Prescriptions    LORAZEPAM (ATIVAN) 1 MG TABLET    Take 1 tablet by mouth every 8 hours as needed for Anxiety for up to 10 days.      (Please note that portions of this note may have been completed with a voice recognition program. Efforts were made to edit the dictations but occasionally words are mis-transcribed.)    Chloé Moraes DO, [address] [facility]  Board certified in [place], [address] [place] [address]  06/23/19 0483

## 2019-06-23 NOTE — ED NOTES
Discharge instructions reviewed; patient verbalized understanding; patient transferred from ED via private vehicle by family.       Kiara Spence RN  06/23/19 [id number]

## 2019-07-08 ENCOUNTER — HOSPITAL ENCOUNTER (EMERGENCY)
Age: 44
Discharge: HOME | End: 2019-07-08
Attending: EMERGENCY MEDICINE
Payer: COMMERCIAL

## 2019-07-08 VITALS
DIASTOLIC BLOOD PRESSURE: 52 MMHG | BODY MASS INDEX: 17.75 KG/M2 | TEMPERATURE: 98.5 F | HEIGHT: 64 IN | OXYGEN SATURATION: 98 % | HEART RATE: 85 BPM | SYSTOLIC BLOOD PRESSURE: 89 MMHG | WEIGHT: 104 LBS | RESPIRATION RATE: 16 BRPM

## 2019-07-08 DIAGNOSIS — F15.10 METHAMPHETAMINE ABUSE (HCC): Primary | ICD-10-CM

## 2019-07-08 PROCEDURE — 6360000002 HC RX W HCPCS: Performed by: EMERGENCY MEDICINE

## 2019-07-08 PROCEDURE — 96372 THER/PROPH/DIAG INJ SC/IM: CPT

## 2019-07-08 PROCEDURE — 99285 EMERGENCY DEPT VISIT HI MDM: CPT

## 2019-07-08 RX ORDER — LORAZEPAM 2 MG/ML
1 INJECTION INTRAMUSCULAR ONCE
Status: COMPLETED | OUTPATIENT
Start: 2019-07-08 | End: 2019-07-08

## 2019-07-08 RX ORDER — LORAZEPAM 2 MG/ML
1 INJECTION INTRAMUSCULAR ONCE
Status: DISCONTINUED | OUTPATIENT
Start: 2019-07-08 | End: 2019-07-08

## 2019-07-08 RX ADMIN — LORAZEPAM 1 MG: 2 INJECTION INTRAMUSCULAR at 15:07

## 2019-07-08 ASSESSMENT — PAIN DESCRIPTION - PAIN TYPE: TYPE: ACUTE PAIN

## 2019-07-08 ASSESSMENT — PAIN SCALES - GENERAL: PAINLEVEL_OUTOF10: 8

## 2019-07-08 ASSESSMENT — PAIN DESCRIPTION - LOCATION: LOCATION: HEAD

## 2019-07-08 NOTE — ED NOTES
Bed: 06  Expected date: 7/8/19  Expected time: 2:36 PM  Means of arrival: Martina Jamil  Comments:     Holly Mari RN  07/08/19 2237

## 2019-11-11 ENCOUNTER — OFFICE VISIT (OUTPATIENT)
Dept: FAMILY MEDICINE CLINIC | Age: 44
End: 2019-11-11
Payer: COMMERCIAL

## 2019-11-11 VITALS
RESPIRATION RATE: 18 BRPM | OXYGEN SATURATION: 100 % | SYSTOLIC BLOOD PRESSURE: 100 MMHG | WEIGHT: 107.6 LBS | DIASTOLIC BLOOD PRESSURE: 58 MMHG | TEMPERATURE: 98.4 F | HEART RATE: 88 BPM | BODY MASS INDEX: 18.47 KG/M2

## 2019-11-11 DIAGNOSIS — R45.86 MOOD SWINGS: ICD-10-CM

## 2019-11-11 DIAGNOSIS — J02.9 SORE THROAT: ICD-10-CM

## 2019-11-11 DIAGNOSIS — G25.81 RESTLESS LEGS: ICD-10-CM

## 2019-11-11 DIAGNOSIS — J20.9 ACUTE BRONCHITIS DUE TO INFECTION: Primary | ICD-10-CM

## 2019-11-11 DIAGNOSIS — Z86.69 HISTORY OF MIGRAINE: ICD-10-CM

## 2019-11-11 DIAGNOSIS — Z72.0 TOBACCO ABUSE: ICD-10-CM

## 2019-11-11 DIAGNOSIS — F41.9 ANXIETY: ICD-10-CM

## 2019-11-11 LAB — STREPTOCOCCUS A RNA: NORMAL

## 2019-11-11 PROCEDURE — G8484 FLU IMMUNIZE NO ADMIN: HCPCS | Performed by: PHYSICIAN ASSISTANT

## 2019-11-11 PROCEDURE — 87651 STREP A DNA AMP PROBE: CPT | Performed by: PHYSICIAN ASSISTANT

## 2019-11-11 PROCEDURE — G8419 CALC BMI OUT NRM PARAM NOF/U: HCPCS | Performed by: PHYSICIAN ASSISTANT

## 2019-11-11 PROCEDURE — 4004F PT TOBACCO SCREEN RCVD TLK: CPT | Performed by: PHYSICIAN ASSISTANT

## 2019-11-11 PROCEDURE — G8427 DOCREV CUR MEDS BY ELIG CLIN: HCPCS | Performed by: PHYSICIAN ASSISTANT

## 2019-11-11 PROCEDURE — 99214 OFFICE O/P EST MOD 30 MIN: CPT | Performed by: PHYSICIAN ASSISTANT

## 2019-11-11 RX ORDER — AZITHROMYCIN 250 MG/1
250 TABLET, FILM COATED ORAL SEE ADMIN INSTRUCTIONS
Qty: 6 TABLET | Refills: 0 | Status: SHIPPED | OUTPATIENT
Start: 2019-11-11 | End: 2019-11-16

## 2019-11-11 RX ORDER — METHYLPREDNISOLONE 4 MG/1
TABLET ORAL
Qty: 1 KIT | Refills: 0 | Status: SHIPPED | OUTPATIENT
Start: 2019-11-11 | End: 2019-11-17

## 2019-11-11 RX ORDER — BENZONATATE 100 MG/1
100 CAPSULE ORAL 3 TIMES DAILY PRN
Qty: 30 CAPSULE | Refills: 0 | Status: SHIPPED | OUTPATIENT
Start: 2019-11-11 | End: 2019-11-18

## 2019-11-11 RX ORDER — ROPINIROLE 1 MG/1
1 TABLET, FILM COATED ORAL NIGHTLY
Qty: 30 TABLET | Refills: 5 | Status: SHIPPED | OUTPATIENT
Start: 2019-11-11 | End: 2020-04-16 | Stop reason: SDUPTHER

## 2019-11-11 ASSESSMENT — ENCOUNTER SYMPTOMS
SINUS PRESSURE: 1
SORE THROAT: 1
VOICE CHANGE: 0
TROUBLE SWALLOWING: 0
DIARRHEA: 0
COUGH: 1
ABDOMINAL PAIN: 0
NAUSEA: 0
SHORTNESS OF BREATH: 0
VOMITING: 0
WHEEZING: 1

## 2019-11-11 ASSESSMENT — PATIENT HEALTH QUESTIONNAIRE - PHQ9
2. FEELING DOWN, DEPRESSED OR HOPELESS: 0
1. LITTLE INTEREST OR PLEASURE IN DOING THINGS: 0
SUM OF ALL RESPONSES TO PHQ9 QUESTIONS 1 & 2: 0
SUM OF ALL RESPONSES TO PHQ QUESTIONS 1-9: 0
SUM OF ALL RESPONSES TO PHQ QUESTIONS 1-9: 0

## 2019-12-11 PROBLEM — J02.9 SORE THROAT: Status: RESOLVED | Noted: 2019-11-11 | Resolved: 2019-12-11

## 2020-01-08 ENCOUNTER — OFFICE VISIT (OUTPATIENT)
Dept: FAMILY MEDICINE CLINIC | Age: 45
End: 2020-01-08
Payer: COMMERCIAL

## 2020-01-08 VITALS
HEART RATE: 102 BPM | OXYGEN SATURATION: 96 % | DIASTOLIC BLOOD PRESSURE: 72 MMHG | SYSTOLIC BLOOD PRESSURE: 112 MMHG | WEIGHT: 110.2 LBS | BODY MASS INDEX: 18.92 KG/M2 | TEMPERATURE: 98.9 F | RESPIRATION RATE: 16 BRPM

## 2020-01-08 PROBLEM — J20.9 ACUTE BRONCHITIS DUE TO INFECTION: Status: RESOLVED | Noted: 2019-11-11 | Resolved: 2020-01-08

## 2020-01-08 PROBLEM — F51.01 PRIMARY INSOMNIA: Status: ACTIVE | Noted: 2020-01-08

## 2020-01-08 PROBLEM — M79.7 FIBROMYALGIA: Status: ACTIVE | Noted: 2020-01-08

## 2020-01-08 PROBLEM — G43.109 MIGRAINE WITH AURA AND WITHOUT STATUS MIGRAINOSUS, NOT INTRACTABLE: Status: ACTIVE | Noted: 2020-01-08

## 2020-01-08 PROBLEM — J06.9 VIRAL URI: Status: ACTIVE | Noted: 2020-01-08

## 2020-01-08 LAB
AMPHETAMINE SCREEN, URINE: NORMAL
BARBITURATE SCREEN, URINE: NORMAL
BENZODIAZEPINE SCREEN, URINE: NORMAL
BUPRENORPHINE URINE: NORMAL
COCAINE METABOLITE SCREEN URINE: NORMAL
GABAPENTIN SCREEN, URINE: NORMAL
INFLUENZA A ANTIBODY: NORMAL
INFLUENZA B ANTIBODY: NORMAL
MDMA URINE: NORMAL
METHADONE SCREEN, URINE: NORMAL
METHAMPHETAMINE, URINE: NORMAL
OPIATE SCREEN URINE: NORMAL
OXYCODONE SCREEN URINE: NORMAL
PHENCYCLIDINE SCREEN URINE: NORMAL
PROPOXYPHENE SCREEN, URINE: NORMAL
THC SCREEN, URINE: NORMAL
TRICYCLIC ANTIDEPRESSANTS, UR: NORMAL

## 2020-01-08 PROCEDURE — G8484 FLU IMMUNIZE NO ADMIN: HCPCS | Performed by: PHYSICIAN ASSISTANT

## 2020-01-08 PROCEDURE — 80305 DRUG TEST PRSMV DIR OPT OBS: CPT | Performed by: PHYSICIAN ASSISTANT

## 2020-01-08 PROCEDURE — 4004F PT TOBACCO SCREEN RCVD TLK: CPT | Performed by: PHYSICIAN ASSISTANT

## 2020-01-08 PROCEDURE — 87804 INFLUENZA ASSAY W/OPTIC: CPT | Performed by: PHYSICIAN ASSISTANT

## 2020-01-08 PROCEDURE — 99214 OFFICE O/P EST MOD 30 MIN: CPT | Performed by: PHYSICIAN ASSISTANT

## 2020-01-08 PROCEDURE — G8420 CALC BMI NORM PARAMETERS: HCPCS | Performed by: PHYSICIAN ASSISTANT

## 2020-01-08 PROCEDURE — G8427 DOCREV CUR MEDS BY ELIG CLIN: HCPCS | Performed by: PHYSICIAN ASSISTANT

## 2020-01-08 RX ORDER — PREGABALIN 50 MG/1
50 CAPSULE ORAL 2 TIMES DAILY
Qty: 60 CAPSULE | Refills: 2 | Status: SHIPPED | OUTPATIENT
Start: 2020-01-08 | End: 2020-06-12 | Stop reason: SDUPTHER

## 2020-01-08 RX ORDER — DOXEPIN HYDROCHLORIDE 10 MG/1
10 CAPSULE ORAL NIGHTLY
Qty: 30 CAPSULE | Refills: 3 | Status: SHIPPED
Start: 2020-01-08 | End: 2020-03-24 | Stop reason: SINTOL

## 2020-01-08 RX ORDER — ELETRIPTAN HYDROBROMIDE 20 MG/1
20 TABLET, FILM COATED ORAL
Qty: 9 TABLET | Refills: 5 | Status: SHIPPED | OUTPATIENT
Start: 2020-01-08 | End: 2020-03-19 | Stop reason: SDUPTHER

## 2020-01-08 RX ORDER — DEXTROMETHORPHAN HYDROBROMIDE AND PROMETHAZINE HYDROCHLORIDE 15; 6.25 MG/5ML; MG/5ML
5 SYRUP ORAL 4 TIMES DAILY PRN
Qty: 120 ML | Refills: 0 | Status: SHIPPED | OUTPATIENT
Start: 2020-01-08 | End: 2020-01-13

## 2020-01-08 ASSESSMENT — PATIENT HEALTH QUESTIONNAIRE - PHQ9
1. LITTLE INTEREST OR PLEASURE IN DOING THINGS: 0
SUM OF ALL RESPONSES TO PHQ QUESTIONS 1-9: 0
SUM OF ALL RESPONSES TO PHQ QUESTIONS 1-9: 0
SUM OF ALL RESPONSES TO PHQ9 QUESTIONS 1 & 2: 0
2. FEELING DOWN, DEPRESSED OR HOPELESS: 0

## 2020-01-08 NOTE — LETTER
[facility] [address] [place] [id number]  Phone: [phone]  Fax: [phone]    Farshad Meris        January 8, 2020     Patient: Elizabeth Chen   YOB: 1975   Date of Visit: 1/8/2020       To Whom It May Concern: It is my medical opinion that Angel Ra may return to work on 1/10/2020. If you have any questions or concerns, please don't hesitate to call.     Sincerely,          Haydee Sigala PA-C

## 2020-01-09 ENCOUNTER — HOSPITAL ENCOUNTER (EMERGENCY)
Age: 45
Discharge: HOME | End: 2020-01-09
Attending: EMERGENCY MEDICINE
Payer: COMMERCIAL

## 2020-01-09 ENCOUNTER — HOSPITAL ENCOUNTER (OUTPATIENT)
Age: 45
Discharge: HOME | End: 2020-01-09
Payer: COMMERCIAL

## 2020-01-09 ENCOUNTER — OFFICE VISIT (OUTPATIENT)
Dept: FAMILY MEDICINE CLINIC | Age: 45
End: 2020-01-09
Payer: COMMERCIAL

## 2020-01-09 ENCOUNTER — APPOINTMENT (OUTPATIENT)
Dept: CT IMAGING | Age: 45
End: 2020-01-09
Payer: COMMERCIAL

## 2020-01-09 ENCOUNTER — HOSPITAL ENCOUNTER (OUTPATIENT)
Dept: GENERAL RADIOLOGY | Age: 45
Discharge: HOME | End: 2020-01-09
Payer: COMMERCIAL

## 2020-01-09 VITALS
DIASTOLIC BLOOD PRESSURE: 64 MMHG | HEIGHT: 64 IN | TEMPERATURE: 98.6 F | OXYGEN SATURATION: 96 % | RESPIRATION RATE: 18 BRPM | WEIGHT: 110 LBS | HEART RATE: 87 BPM | BODY MASS INDEX: 18.78 KG/M2 | SYSTOLIC BLOOD PRESSURE: 102 MMHG

## 2020-01-09 VITALS
HEART RATE: 96 BPM | SYSTOLIC BLOOD PRESSURE: 90 MMHG | BODY MASS INDEX: 18.92 KG/M2 | DIASTOLIC BLOOD PRESSURE: 60 MMHG | WEIGHT: 110.2 LBS | RESPIRATION RATE: 16 BRPM | TEMPERATURE: 98.6 F | OXYGEN SATURATION: 97 %

## 2020-01-09 LAB
ALBUMIN SERPL-MCNC: 4.4 GM/DL (ref 3.4–5)
ALP BLD-CCNC: 76 IU/L (ref 40–129)
ALT SERPL-CCNC: 15 U/L (ref 10–40)
ANION GAP SERPL CALCULATED.3IONS-SCNC: 6 MMOL/L (ref 4–16)
AST SERPL-CCNC: 17 IU/L (ref 15–37)
BASOPHILS ABSOLUTE: 0.1 K/CU MM
BASOPHILS RELATIVE PERCENT: 0.7 % (ref 0–1)
BILIRUB SERPL-MCNC: 0.2 MG/DL (ref 0–1)
BILIRUBIN DIRECT: 0.2 MG/DL (ref 0–0.3)
BILIRUBIN, INDIRECT: 0 MG/DL (ref 0–0.7)
BUN BLDV-MCNC: 11 MG/DL (ref 6–23)
CALCIUM SERPL-MCNC: 9.3 MG/DL (ref 8.3–10.6)
CHLORIDE BLD-SCNC: 102 MMOL/L (ref 99–110)
CO2: 34 MMOL/L (ref 21–32)
CREAT SERPL-MCNC: 0.9 MG/DL (ref 0.6–1.1)
DIFFERENTIAL TYPE: ABNORMAL
EKG ATRIAL RATE: 86 BPM
EKG DIAGNOSIS: NORMAL
EKG P AXIS: 77 DEGREES
EKG P-R INTERVAL: 134 MS
EKG Q-T INTERVAL: 380 MS
EKG QRS DURATION: 78 MS
EKG QTC CALCULATION (BAZETT): 454 MS
EKG R AXIS: 87 DEGREES
EKG T AXIS: 51 DEGREES
EKG VENTRICULAR RATE: 86 BPM
EOSINOPHILS ABSOLUTE: 0.2 K/CU MM
EOSINOPHILS RELATIVE PERCENT: 2.2 % (ref 0–3)
GFR AFRICAN AMERICAN: >60 ML/MIN/1.73M2
GFR NON-AFRICAN AMERICAN: >60 ML/MIN/1.73M2
GLUCOSE BLD-MCNC: 86 MG/DL (ref 70–99)
HCT VFR BLD CALC: 41.8 % (ref 37–47)
HEMOGLOBIN: 13.6 GM/DL (ref 12.5–16)
IMMATURE NEUTROPHIL %: 0.3 % (ref 0–0.43)
INFLUENZA VIRUS A RNA: NEGATIVE
INFLUENZA VIRUS B RNA: NEGATIVE
LYMPHOCYTES ABSOLUTE: 2.1 K/CU MM
LYMPHOCYTES RELATIVE PERCENT: 28 % (ref 24–44)
MCH RBC QN AUTO: 31.2 PG (ref 27–31)
MCHC RBC AUTO-ENTMCNC: 32.5 % (ref 32–36)
MCV RBC AUTO: 95.9 FL (ref 78–100)
MONOCYTES ABSOLUTE: 1 K/CU MM
MONOCYTES RELATIVE PERCENT: 13.7 % (ref 0–4)
PDW BLD-RTO: 12.6 % (ref 11.7–14.9)
PLATELET # BLD: 192 K/CU MM (ref 140–440)
PMV BLD AUTO: 11.1 FL (ref 7.5–11.1)
POTASSIUM SERPL-SCNC: 4.2 MMOL/L (ref 3.5–5.1)
RBC # BLD: 4.36 M/CU MM (ref 4.2–5.4)
SEGMENTED NEUTROPHILS ABSOLUTE COUNT: 4 K/CU MM
SEGMENTED NEUTROPHILS RELATIVE PERCENT: 55.1 % (ref 36–66)
SODIUM BLD-SCNC: 142 MMOL/L (ref 135–145)
TOTAL IMMATURE NEUTOROPHIL: 0.02 K/CU MM
TOTAL PROTEIN: 7 GM/DL (ref 6.4–8.2)
TROPONIN T: <0.01 NG/ML
WBC # BLD: 7.3 K/CU MM (ref 4–10.5)

## 2020-01-09 PROCEDURE — 80053 COMPREHEN METABOLIC PANEL: CPT

## 2020-01-09 PROCEDURE — 82248 BILIRUBIN DIRECT: CPT

## 2020-01-09 PROCEDURE — 99213 OFFICE O/P EST LOW 20 MIN: CPT | Performed by: NURSE PRACTITIONER

## 2020-01-09 PROCEDURE — 84484 ASSAY OF TROPONIN QUANT: CPT

## 2020-01-09 PROCEDURE — 6370000000 HC RX 637 (ALT 250 FOR IP): Performed by: EMERGENCY MEDICINE

## 2020-01-09 PROCEDURE — G8420 CALC BMI NORM PARAMETERS: HCPCS | Performed by: NURSE PRACTITIONER

## 2020-01-09 PROCEDURE — 85025 COMPLETE CBC W/AUTO DIFF WBC: CPT

## 2020-01-09 PROCEDURE — 6360000004 HC RX CONTRAST MEDICATION: Performed by: EMERGENCY MEDICINE

## 2020-01-09 PROCEDURE — 93005 ELECTROCARDIOGRAM TRACING: CPT | Performed by: EMERGENCY MEDICINE

## 2020-01-09 PROCEDURE — 99284 EMERGENCY DEPT VISIT MOD MDM: CPT

## 2020-01-09 PROCEDURE — 4004F PT TOBACCO SCREEN RCVD TLK: CPT | Performed by: NURSE PRACTITIONER

## 2020-01-09 PROCEDURE — 2580000003 HC RX 258: Performed by: EMERGENCY MEDICINE

## 2020-01-09 PROCEDURE — 87502 INFLUENZA DNA AMP PROBE: CPT | Performed by: NURSE PRACTITIONER

## 2020-01-09 PROCEDURE — 71046 X-RAY EXAM CHEST 2 VIEWS: CPT

## 2020-01-09 PROCEDURE — 93010 ELECTROCARDIOGRAM REPORT: CPT | Performed by: INTERNAL MEDICINE

## 2020-01-09 PROCEDURE — G8427 DOCREV CUR MEDS BY ELIG CLIN: HCPCS | Performed by: NURSE PRACTITIONER

## 2020-01-09 PROCEDURE — 71275 CT ANGIOGRAPHY CHEST: CPT

## 2020-01-09 PROCEDURE — G8484 FLU IMMUNIZE NO ADMIN: HCPCS | Performed by: NURSE PRACTITIONER

## 2020-01-09 RX ORDER — AZITHROMYCIN 250 MG/1
250 TABLET, FILM COATED ORAL SEE ADMIN INSTRUCTIONS
Qty: 6 TABLET | Refills: 0 | Status: SHIPPED | OUTPATIENT
Start: 2020-01-09 | End: 2020-01-14

## 2020-01-09 RX ORDER — ACETAMINOPHEN 325 MG/1
650 TABLET ORAL ONCE
Status: COMPLETED | OUTPATIENT
Start: 2020-01-09 | End: 2020-01-09

## 2020-01-09 RX ORDER — 0.9 % SODIUM CHLORIDE 0.9 %
1000 INTRAVENOUS SOLUTION INTRAVENOUS ONCE
Status: COMPLETED | OUTPATIENT
Start: 2020-01-09 | End: 2020-01-09

## 2020-01-09 RX ADMIN — SODIUM CHLORIDE 1000 ML: 9 INJECTION, SOLUTION INTRAVENOUS at 10:49

## 2020-01-09 RX ADMIN — ACETAMINOPHEN 650 MG: 325 TABLET ORAL at 10:38

## 2020-01-09 RX ADMIN — IOPAMIDOL 75 ML: 755 INJECTION, SOLUTION INTRAVENOUS at 12:48

## 2020-01-09 ASSESSMENT — ENCOUNTER SYMPTOMS
EYE REDNESS: 0
COUGH: 1
EYE DISCHARGE: 0
VOICE CHANGE: 0
ABDOMINAL PAIN: 0
SINUS PRESSURE: 0
COUGH: 1
VOMITING: 0
NAUSEA: 0
RHINORRHEA: 1
TROUBLE SWALLOWING: 0
SHORTNESS OF BREATH: 1
CHEST TIGHTNESS: 0
EYE PAIN: 0
ABDOMINAL PAIN: 0
RHINORRHEA: 1
VOMITING: 0
DIARRHEA: 0
EYE REDNESS: 0
SINUS PAIN: 0
SINUS PAIN: 1
SORE THROAT: 0
NAUSEA: 1
COUGH: 1
DIARRHEA: 0
VOMITING: 0
BACK PAIN: 0
EYE DISCHARGE: 0
ABDOMINAL PAIN: 0
NAUSEA: 1
EYE PAIN: 0
SORE THROAT: 1
APNEA: 0
SHORTNESS OF BREATH: 1
WHEEZING: 0
CHOKING: 0
CHEST TIGHTNESS: 0
SHORTNESS OF BREATH: 0
CHEST TIGHTNESS: 0
ABDOMINAL DISTENTION: 0
BACK PAIN: 0
RHINORRHEA: 0
SINUS PRESSURE: 1
PHOTOPHOBIA: 0
SINUS PRESSURE: 1
WHEEZING: 0
STRIDOR: 0

## 2020-01-09 ASSESSMENT — PAIN SCALES - GENERAL
PAINLEVEL_OUTOF10: 8
PAINLEVEL_OUTOF10: 7

## 2020-01-09 ASSESSMENT — PAIN DESCRIPTION - LOCATION: LOCATION: GENERALIZED

## 2020-01-09 ASSESSMENT — PAIN DESCRIPTION - PAIN TYPE: TYPE: ACUTE PAIN

## 2020-01-09 NOTE — PROGRESS NOTES
1/9/20    Chief Complaint   Patient presents with    Shortness of Breath     Pt c/o being sob    Generalized Body Aches     Pt c/o whole body hurting and being sore to touch    Shaking     Pt c/o shaking really bad. Kelsie Collins, (1975), is a 45 y.o. female, is here for evaluation of the following medical concerns:    HPI   Shortness of breath/Fever/Body Aches/Chills  She is here today for worsening illness after being seen yesterday in this office. States that her symptoms started Monday with sneezing and coughing, by Tuesday started feeling worse, and yesterday felt sob, body aches, fever, chills with shaking, nausea. States sob and body aches are worse today with upper back pain and patient is moaning during visit. Sinus congestion, blowing out green mucus, and productive cough of yellow/green sputum. Headache has improved since yesterday after taking Relpax at 0300. Last dose of Ibuprofen 0300, last dose of Tylenol 0600. States \"even my skin hurts to touch it\". Denies urinary frequency burning or abnormal vaginal discharge, abdominal pain, vomiting, or diarrhea. Sore throat has improved. Review of Systems   Constitutional: Negative for activity change, appetite change, chills, diaphoresis, fatigue, fever and unexpected weight change. HENT: Positive for sinus pressure, sinus pain, sneezing and sore throat. Negative for congestion, ear discharge, ear pain, postnasal drip and rhinorrhea. Eyes: Negative for photophobia, pain, discharge and redness. Respiratory: Positive for cough and shortness of breath. Negative for chest tightness and wheezing. Cardiovascular: Negative for chest pain, palpitations and leg swelling. Gastrointestinal: Positive for nausea. Negative for abdominal distention, abdominal pain, diarrhea and vomiting. Genitourinary: Negative for difficulty urinating, dysuria, flank pain, frequency, pelvic pain, urgency, vaginal discharge and vaginal pain. kg)       Wt Readings from Last 3 Encounters:   01/09/20 110 lb 3.2 oz (50 kg)   01/08/20 110 lb 3.2 oz (50 kg)   11/11/19 107 lb 9.6 oz (48.8 kg)     Temp Readings from Last 3 Encounters:   01/09/20 98.6 °F (37 °C) (Oral)   01/08/20 98.9 °F (37.2 °C) (Tympanic)   11/11/19 98.4 °F (36.9 °C) (Temporal)     BP Readings from Last 3 Encounters:   01/09/20 90/60   01/08/20 112/72   11/11/19 (!) 100/58     Pulse Readings from Last 3 Encounters:   01/09/20 96   01/08/20 102   11/11/19 88        Results for orders placed or performed in visit on 01/09/20   POCT Influenza A/B DNA   Result Value Ref Range    Influenza virus A RNA Negative     Influenza virus B RNA Negative        Physical Exam  Vitals signs and nursing note reviewed. Constitutional:       General: She is not in acute distress. Appearance: Normal appearance. She is well-developed. She is not ill-appearing, toxic-appearing or diaphoretic. HENT:      Head: Normocephalic and atraumatic. Right Ear: Tympanic membrane, ear canal and external ear normal.      Left Ear: Tympanic membrane, ear canal and external ear normal.      Nose: Congestion and rhinorrhea present. Mouth/Throat:      Mouth: Mucous membranes are moist.      Pharynx: Oropharynx is clear. No oropharyngeal exudate or posterior oropharyngeal erythema. Eyes:      Extraocular Movements: Extraocular movements intact. Conjunctiva/sclera: Conjunctivae normal.      Pupils: Pupils are equal, round, and reactive to light. Neck:      Musculoskeletal: Full passive range of motion without pain, normal range of motion and neck supple. No neck rigidity or muscular tenderness. Thyroid: No thyroid mass or thyromegaly. Trachea: Trachea normal.   Cardiovascular:      Rate and Rhythm: Normal rate and regular rhythm. Pulses: Normal pulses.       Heart sounds: Normal heart sounds, S1 normal and S2 normal.   Pulmonary:      Effort: Pulmonary effort is normal. No accessory muscle usage or respiratory distress. Breath sounds: Normal breath sounds. Decreased air movement present. No stridor. No wheezing, rhonchi or rales. Comments: Decreased air movement secondary to poor effort with inhalation during exam and moaning. Chest:      Chest wall: No tenderness. Abdominal:      General: Abdomen is flat. Bowel sounds are normal. There is no distension. Palpations: Abdomen is soft. There is no mass. Tenderness: There is no tenderness. There is no right CVA tenderness, left CVA tenderness or guarding. Hernia: No hernia is present. Musculoskeletal: Normal range of motion. General: No swelling or tenderness. Right shoulder: She exhibits normal range of motion, no tenderness, no bony tenderness, no swelling, no crepitus, no deformity and no pain. Right lower leg: No edema. Left lower leg: No edema. Lymphadenopathy:      Cervical: No cervical adenopathy. Skin:     General: Skin is warm and dry. Capillary Refill: Capillary refill takes less than 2 seconds. Coloration: Skin is not pale. Findings: No bruising, erythema, lesion or rash. Nails: There is no clubbing. Neurological:      General: No focal deficit present. Mental Status: She is alert and oriented to person, place, and time. Motor: No weakness. Coordination: Coordination normal.      Gait: Gait normal.   Psychiatric:         Mood and Affect: Mood normal.         Speech: Speech normal.         Behavior: Behavior normal.         Thought Content: Thought content normal.         Judgment: Judgment normal.         ASSESSMENT AND PLAN:  1. Bronchitis  No acute distress, resps easy and regular, O2 sats 97% ra. POC Flu swabs re-checked today and remain negative. She is curled in the fetal position in the chair in the exam room.  Due to patients stated worsening shortness of breath, upper back pain, and decreased lung sounds I recommended a chest xray to r/o pneumonia. Education provided on - Encourage clear fluids without caffeine to ensure hydration.  - Salt water gargles for sore throat. - Saline nasal spray, cool mist humidifier, prop head at night for congestion.   - May use spoonfuls of honey to coat throat. - Tylenol or ibuprofen as needed for fever, pain. - Counseled on signs of increased work of breathing.   - RTO if sxs increase or no improvement. - XR CHEST STANDARD (2 VW)  - azithromycin (ZITHROMAX) 250 MG tablet; Take 1 tablet by mouth See Admin Instructions for 5 days 500mg on day 1 followed by 250mg on days 2 - 5  Dispense: 6 tablet; Refill: 0    2. Shortness of breath  Same plan as above. - POCT Influenza A/B DNA  - XR CHEST STANDARD (2 VW)         Return if symptoms worsen or fail to improve.     HANNA Miranda - CNP

## 2020-01-10 LAB
EKG ATRIAL RATE: 66 BPM
EKG DIAGNOSIS: NORMAL
EKG P AXIS: 77 DEGREES
EKG P-R INTERVAL: 142 MS
EKG Q-T INTERVAL: 432 MS
EKG QRS DURATION: 80 MS
EKG QTC CALCULATION (BAZETT): 452 MS
EKG R AXIS: 86 DEGREES
EKG T AXIS: 62 DEGREES
EKG VENTRICULAR RATE: 66 BPM

## 2020-01-10 PROCEDURE — 93010 ELECTROCARDIOGRAM REPORT: CPT | Performed by: INTERNAL MEDICINE

## 2020-01-10 NOTE — ASSESSMENT & PLAN NOTE
Urine drug screen negative today, will resume lyrica but at low dose, Risks/benefits/SE reviewed, pt voices understanding

## 2020-01-10 NOTE — ASSESSMENT & PLAN NOTE
Pt would like to try relpax, Risks/benefits/SE reviewed, pt voices understanding  Pt advised she may have side effects due to previous issues with imitrex but pt states she was mostly nauseated after taking (no itching), after shared decision making utilized, pt will try  Refer to neurology as during discussion of treatment options, pt states she is either allergic to or failed all suggested meds, failed previous life style changes

## 2020-01-16 ENCOUNTER — OFFICE VISIT (OUTPATIENT)
Dept: FAMILY MEDICINE CLINIC | Age: 45
End: 2020-01-16
Payer: COMMERCIAL

## 2020-01-16 VITALS
WEIGHT: 109.6 LBS | HEART RATE: 64 BPM | RESPIRATION RATE: 16 BRPM | TEMPERATURE: 98.9 F | BODY MASS INDEX: 18.81 KG/M2 | SYSTOLIC BLOOD PRESSURE: 110 MMHG | DIASTOLIC BLOOD PRESSURE: 68 MMHG

## 2020-01-16 PROBLEM — J06.9 VIRAL URI: Status: RESOLVED | Noted: 2020-01-08 | Resolved: 2020-01-16

## 2020-01-16 PROCEDURE — G8427 DOCREV CUR MEDS BY ELIG CLIN: HCPCS | Performed by: PHYSICIAN ASSISTANT

## 2020-01-16 PROCEDURE — G8484 FLU IMMUNIZE NO ADMIN: HCPCS | Performed by: PHYSICIAN ASSISTANT

## 2020-01-16 PROCEDURE — 99214 OFFICE O/P EST MOD 30 MIN: CPT | Performed by: PHYSICIAN ASSISTANT

## 2020-01-16 PROCEDURE — 4004F PT TOBACCO SCREEN RCVD TLK: CPT | Performed by: PHYSICIAN ASSISTANT

## 2020-01-16 PROCEDURE — G8420 CALC BMI NORM PARAMETERS: HCPCS | Performed by: PHYSICIAN ASSISTANT

## 2020-01-16 RX ORDER — AZITHROMYCIN 250 MG/1
250 TABLET, FILM COATED ORAL SEE ADMIN INSTRUCTIONS
Qty: 6 TABLET | Refills: 0 | Status: SHIPPED | OUTPATIENT
Start: 2020-01-16 | End: 2020-01-21

## 2020-01-16 RX ORDER — BUTALBITAL, ACETAMINOPHEN AND CAFFEINE 50; 325; 40 MG/1; MG/1; MG/1
1 TABLET ORAL EVERY 6 HOURS PRN
Qty: 20 TABLET | Refills: 1 | Status: SHIPPED | OUTPATIENT
Start: 2020-01-16 | End: 2020-05-09

## 2020-01-16 RX ORDER — FLUTICASONE PROPIONATE 50 MCG
2 SPRAY, SUSPENSION (ML) NASAL DAILY
Qty: 1 BOTTLE | Refills: 2 | Status: SHIPPED | OUTPATIENT
Start: 2020-01-16 | End: 2020-06-12 | Stop reason: ALTCHOICE

## 2020-01-16 RX ORDER — GUAIFENESIN 600 MG/1
600 TABLET, EXTENDED RELEASE ORAL 2 TIMES DAILY
Qty: 30 TABLET | Refills: 0 | Status: SHIPPED | OUTPATIENT
Start: 2020-01-16 | End: 2020-01-31

## 2020-01-16 ASSESSMENT — PATIENT HEALTH QUESTIONNAIRE - PHQ9
SUM OF ALL RESPONSES TO PHQ9 QUESTIONS 1 & 2: 2
SUM OF ALL RESPONSES TO PHQ QUESTIONS 1-9: 2
1. LITTLE INTEREST OR PLEASURE IN DOING THINGS: 1
SUM OF ALL RESPONSES TO PHQ QUESTIONS 1-9: 2
2. FEELING DOWN, DEPRESSED OR HOPELESS: 1

## 2020-01-16 NOTE — PROGRESS NOTES
Genella Pallas  1975  45 y.o.  female    SUBJECTIVE:    Chief Complaint   Patient presents with    Nasal Congestion     x2 weeks    Cough     recently diagnosed with bronchitis, cough x2 weeks, productive of yellowish-green phlegm    Fatigue     x2 weeks    Insomnia     trouble sleeping since being ill    Migraine     migraines every day for last 2 months       HPI   Headaches-chronic migraines, relpax helped initially but has repeated med \"a few times and it's not doing anything now\". Has had current headache \"every day for the last two months, nothing is helping other than this pill I took from my sister-in-law\". Pt states med was fioricet. It helped greatly with pain and pt was able to get some sleep. Pt has had chronic insomnia and recently was  put on doxepin but has had to hold this med due to  ATB use for bronchitis. Pt states doxepin has helped \"a little\". Pt upset and crying today during visit stating she is worn out, tired of being sick, more anxious and depressed-states counselor would like her to start something for her anxiety but doesn't want her on an antidepressant because \"it could make me worse\". Pt requesting xanax or ativan. cough-recurrent bronchitis over last several months. Pt states over last week she has increased cough/chest congestion/green sputum/chest tightness.     PHQ Scores 1/16/2020 1/8/2020 11/11/2019 4/4/2019   PHQ2 Score 2 0 0 6   PHQ9 Score 2 0 0 20     Interpretation of Total Score Depression Severity: 1-4 = Minimal depression, 5-9 = Mild depression, 10-14 = Moderate depression, 15-19 = Moderately severe depression, 20-27 = Severe depression      Current Outpatient Medications on File Prior to Visit   Medication Sig Dispense Refill    eletriptan (RELPAX) 20 MG tablet Take 1 tablet by mouth once as needed for Migraine may repeat in 2 hours if necessary 9 tablet 5    doxepin (SINEQUAN) 10 MG capsule Take 1 capsule by mouth nightly 30 capsule 3    pregabalin on file     Emotionally abused: Not on file     Physically abused: Not on file     Forced sexual activity: Not on file   Other Topics Concern    Not on file   Social History Narrative    Not on file       Review of Systems   Constitutional: Positive for appetite change and chills. Negative for activity change, fever and unexpected weight change. HENT: Positive for congestion, ear pain, sinus pressure and sore throat. Negative for sinus pain. Respiratory: Positive for cough and chest tightness. Negative for shortness of breath and wheezing. Cardiovascular: Negative for chest pain. Gastrointestinal: Negative for abdominal pain, diarrhea, nausea and vomiting. Endocrine: Negative for cold intolerance and heat intolerance. Skin: Negative for rash. Neurological: Positive for tremors and headaches. Negative for dizziness and light-headedness. Hematological: Negative for adenopathy. Psychiatric/Behavioral: Positive for dysphoric mood and sleep disturbance. Negative for suicidal ideas. The patient is nervous/anxious. OBJECTIVE:    /68 (Site: Right Upper Arm, Position: Sitting, Cuff Size: Small Adult)   Pulse 64   Temp 98.9 °F (37.2 °C) (Temporal)   Resp 16   Wt 109 lb 9.6 oz (49.7 kg)   LMP 11/18/2004   BMI 18.81 kg/m²     Physical Exam    ASSESSMENT/PLAN:    Problem List        Circulatory    Migraine with aura and without status migrainosus, not intractable - Primary     Pt did take sister-in-law's fioricet with good relief. Pt advised to never take anyone else's medication but since med helped, PCP will give script for small amount fioricet to use prn only, Risks/benefits/SE reviewed, pt voices understanding  Pt advised PCP most likely will not continue this med long term as it can be abused and pt is requesting multiple controlled meds today during visit.           Relevant Medications    ketorolac (TORADOL) injection 30 mg (Completed)    valproate (DEPACON) 500 mg in dextrose 5 % 100 mL IVPB (Completed)    butorphanol (STADOL) injection 2 mg (Completed)    morphine (PF) injection 4 mg (Completed)    butorphanol (STADOL) injection 1 mg (Completed)    ketorolac (TORADOL) injection 30 mg (Completed)    HYDROcodone-acetaminophen (NORCO) 5-325 MG per tablet 1 tablet (Completed)    ketorolac (TORADOL) injection 60 mg (Completed)    HYDROcodone-acetaminophen (NORCO) 5-325 MG per tablet 2 tablet (Completed)    nalbuphine (NUBAIN) injection 10 mg (Completed)    valproate (DEPACON) 1,000 mg in dextrose 5 % 100 mL IVPB (Completed)    valproate (DEPACON) 1,000 mg in dextrose 5 % 100 mL IVPB (Completed)    acetaminophen (TYLENOL) tablet 650 mg (Completed)    valproate (DEPACON) 1,000 mg in dextrose 5 % 100 mL IVPB (Completed)    acetaminophen (TYLENOL) tablet 650 mg (Completed)    morphine (PF) injection 4 mg (Completed)    oxyCODONE-acetaminophen (PERCOCET) 5-325 MG per tablet 2 tablet (Completed)    oxyCODONE-acetaminophen (PERCOCET) 5-325 MG per tablet 2 tablet (Completed)    acetaminophen (TYLENOL) tablet 1,000 mg (Completed)    nalbuphine (NUBAIN) injection 10 mg (Completed)    nalbuphine (NUBAIN) injection 10 mg (Completed)    acetaminophen (TYLENOL) tablet 1,000 mg (Completed)    nalbuphine (NUBAIN) injection 10 mg (Completed)    butalbital-acetaminophen-caffeine (FIORICET, ESGIC) per tablet 1 tablet (Completed)    pregabalin (LYRICA) capsule 300 mg (Completed)    acetaminophen (TYLENOL) tablet 1,000 mg (Completed)    butalbital-acetaminophen-caffeine (FIORICET, ESGIC) per tablet 2 tablet (Completed)    cyclobenzaprine (FLEXERIL) tablet 10 mg (Completed)    doxepin (SINEQUAN) 10 MG capsule    pregabalin (LYRICA) 50 MG capsule    acetaminophen (TYLENOL) tablet 650 mg (Completed)    butalbital-acetaminophen-caffeine (FIORICET, ESGIC) -40 MG per tablet       Respiratory    Acute bronchitis due to infection     zpack  Rest, fluids, healthy diet.  Follow up if not improving in next few days, sooner if worse              Other    Anxiety     Pt requesting benzodiazepine, declines all other meds. Inst to see if fioricet helps with headache allowing her to get some rest/better sleep, f/u with counselor, PCP willing to start antidepressant but due to risks/SE of chronic benzodiazepine use, it will not be prescribed today         Relevant Medications    LORazepam (ATIVAN) tablet 1 mg (Completed)    LORazepam (ATIVAN) injection 2 mg (Completed)    diazepam (VALIUM) tablet 10 mg (Completed)    LORazepam (ATIVAN) tablet 1 mg (Completed)    LORazepam (ATIVAN) tablet 1 mg (Completed)    LORazepam (ATIVAN) tablet 1 mg (Completed)    LORazepam (ATIVAN) injection 1 mg (Completed)    LORazepam (ATIVAN) injection 1 mg (Completed)    doxepin (SINEQUAN) 10 MG capsule    Fibromyalgia     Improved slightly with lyrica, pt requesting increase in med-will hold for now as she just started this medication and pt is requesting multiple controlled meds today during visit         Relevant Medications    pregabalin (LYRICA) 50 MG capsule    Primary insomnia     Hold doxepin until finished with zpack, then can increase if needed. Pt will call for adjustment if needed                    Return in about 4 months (around 5/16/2020).

## 2020-01-17 ASSESSMENT — ENCOUNTER SYMPTOMS
SHORTNESS OF BREATH: 0
SINUS PRESSURE: 1
NAUSEA: 0
CHEST TIGHTNESS: 1
COUGH: 1
VOMITING: 0
WHEEZING: 0
SINUS PAIN: 0
DIARRHEA: 0
ABDOMINAL PAIN: 0
SORE THROAT: 1

## 2020-01-17 NOTE — ASSESSMENT & PLAN NOTE
Pt did take sister-in-law's fioricet with good relief. Pt advised to never take anyone else's medication but since med helped, PCP will give script for small amount fioricet to use prn only, Risks/benefits/SE reviewed, pt voices understanding  Pt advised PCP most likely will not continue this med long term as it can be abused and pt is requesting multiple controlled meds today during visit.

## 2020-01-17 NOTE — ASSESSMENT & PLAN NOTE
Hold doxepin until finished with zpack, then can increase if needed.  Pt will call for adjustment if needed

## 2020-01-30 ENCOUNTER — TELEPHONE (OUTPATIENT)
Dept: FAMILY MEDICINE CLINIC | Age: 45
End: 2020-01-30

## 2020-01-30 NOTE — TELEPHONE ENCOUNTER
Called and spoke with patient she stated you were going to send her to a new neuro dr in [place] that is next to the hospital. She stated she does not know who her insurance covers I advised patient to call the number on the back of her card or get online and pull up her insurance information.  Patient is suppose to call back tomorrow with the name of the doctors

## 2020-03-19 RX ORDER — ELETRIPTAN HYDROBROMIDE 20 MG/1
20 TABLET, FILM COATED ORAL
Qty: 9 TABLET | Refills: 5 | Status: SHIPPED | OUTPATIENT
Start: 2020-03-19 | End: 2020-05-09

## 2020-03-24 ENCOUNTER — TELEPHONE (OUTPATIENT)
Dept: FAMILY MEDICINE CLINIC | Age: 45
End: 2020-03-24

## 2020-03-24 RX ORDER — ESCITALOPRAM OXALATE 10 MG/1
10 TABLET ORAL DAILY
Qty: 30 TABLET | Refills: 3 | Status: SHIPPED
Start: 2020-03-24 | End: 2020-05-07 | Stop reason: ALTCHOICE

## 2020-03-24 RX ORDER — BUSPIRONE HYDROCHLORIDE 5 MG/1
5 TABLET ORAL 2 TIMES DAILY
Qty: 60 TABLET | Refills: 1 | Status: SHIPPED | OUTPATIENT
Start: 2020-03-24 | End: 2020-04-23

## 2020-04-09 ENCOUNTER — TELEPHONE (OUTPATIENT)
Dept: FAMILY MEDICINE CLINIC | Age: 45
End: 2020-04-09

## 2020-04-09 NOTE — TELEPHONE ENCOUNTER
Called and spoke to patient advised that [facility] does not prescribe pain medications or narcotics over the phone they are not generally indicated for migraine headache. [facility] advised to try excedrin migraine with benadryl as long as she does not have an allergies  to any of the meds. Advised patient if this is the worst headache she has had in her life [facility] recommends she goes to the ER. Patient wanted to come in the office I advised patient I would need to speak with [facility] before I could make her an appointment. [facility] advised me to inform the patient we don't have anything to offer in the office other than toradol she recommends she goes to the ER. Patient declined going to the ER multiple times she wanted to come to the office she stated she needed something to relieve her headache. I advised the patient that the only medication that we had in the office was the [address] stated if she wanted to come in the office she could make an appointment we could try the injection but if her headache is as severe has she is stating she would be better off to go to the er where they can administer other medications that would relieve her headache patient agreed to go to the ER.

## 2020-04-09 NOTE — TELEPHONE ENCOUNTER
Patient called and stated she has had a migraine for 3 days and nothing is helping she believes that the Buspar and Lexapro is what is causing her migraine. She stated she is crying due to the pain.  Please advise

## 2020-04-16 ENCOUNTER — TELEPHONE (OUTPATIENT)
Dept: FAMILY MEDICINE CLINIC | Age: 45
End: 2020-04-16

## 2020-04-16 RX ORDER — ROPINIROLE 2 MG/1
2 TABLET, FILM COATED ORAL NIGHTLY
Qty: 30 TABLET | Refills: 2 | Status: SHIPPED | OUTPATIENT
Start: 2020-04-16 | End: 2020-07-07 | Stop reason: SDUPTHER

## 2020-05-07 ENCOUNTER — VIRTUAL VISIT (OUTPATIENT)
Dept: FAMILY MEDICINE CLINIC | Age: 45
End: 2020-05-07
Payer: COMMERCIAL

## 2020-05-07 ENCOUNTER — TELEPHONE (OUTPATIENT)
Dept: FAMILY MEDICINE CLINIC | Age: 45
End: 2020-05-07

## 2020-05-07 PROBLEM — J20.9 ACUTE BRONCHITIS DUE TO INFECTION: Status: RESOLVED | Noted: 2019-11-11 | Resolved: 2020-05-07

## 2020-05-07 PROCEDURE — G8420 CALC BMI NORM PARAMETERS: HCPCS | Performed by: PHYSICIAN ASSISTANT

## 2020-05-07 PROCEDURE — G8427 DOCREV CUR MEDS BY ELIG CLIN: HCPCS | Performed by: PHYSICIAN ASSISTANT

## 2020-05-07 PROCEDURE — 99214 OFFICE O/P EST MOD 30 MIN: CPT | Performed by: PHYSICIAN ASSISTANT

## 2020-05-07 PROCEDURE — 4004F PT TOBACCO SCREEN RCVD TLK: CPT | Performed by: PHYSICIAN ASSISTANT

## 2020-05-07 RX ORDER — RAMELTEON 8 MG/1
8 TABLET ORAL NIGHTLY PRN
Qty: 30 TABLET | Refills: 2 | Status: SHIPPED
Start: 2020-05-07 | End: 2020-05-09

## 2020-05-07 RX ORDER — LAMOTRIGINE 25 MG/1
TABLET ORAL
Qty: 84 TABLET | Refills: 1 | Status: SHIPPED | OUTPATIENT
Start: 2020-05-07 | End: 2020-05-12 | Stop reason: ALTCHOICE

## 2020-05-07 ASSESSMENT — ENCOUNTER SYMPTOMS
COUGH: 0
ABDOMINAL PAIN: 0

## 2020-05-07 NOTE — PROGRESS NOTES
Nausea And Vomiting     PASSED OUT    Amitriptyline     Botox [Onabotulinumtoxina] Other (See Comments)     Tardive dyskinesia    Compazine [Prochlorperazine Maleate] Other (See Comments)    Meclizine     Cephalexin Nausea And Vomiting and Other (See Comments)     ABDOMINAL PAIN FOR 2 WEEKS    Magnesium-Containing Compounds Other (See Comments)     Pt sts \"they called it the mags. I start twitching. \"     Meclizine Hcl Other (See Comments)     \"Makes me stutter and twitch. \"    Nsaids Other (See Comments)     Tardive dyskinisia    Pcn [Penicillins] Nausea And Vomiting and Other (See Comments)     HEADACHE    Reglan [Metoclopramide] Other (See Comments)     Tardive dyskinisia    Toradol [Ketorolac Tromethamine] Other (See Comments)     Tremors    Vistaril [Hydroxyzine Hcl] Other (See Comments)     dyskinsia      Zofran [Ondansetron Hcl] Other (See Comments)     Cramping and burning in stomach       Past Medical History:   Diagnosis Date    Arthritis     OSTEO ARTHITIS    Cholecystenteric fistula     COPD (chronic obstructive pulmonary disease) ([address]     Degenerative disc disease     Fibromyalgia     H/O 24 hour EKG monitoring 02/16/2017      Sinus tach events , no major arrhythmias , follow up in office as routine     H/O echocardiogram 03/01/2017    EF 55-60% Normal LV structure  and systolic function.  H/O exercise stress test 03/01/2017    Normal stress test. Patient has physical deconditioning as she achieved target HR in 2 mins. Recommend to increase PO fluids intake and exercise training.      Osteopenia     Osteoporosis     PTSD (post-traumatic stress disorder)     S/P cholecystectomy 11/22/11    Schizo-affective schizophrenia ([facility] [address]     Sjoegren syndrome     Syncope     Thyroid disease     Venous insufficiency of leg 2012    Vertigo        Past Surgical History:   Procedure Laterality Date    CHOLECYSTECTOMY  11/22/2011    laparoscopic    COLONOSCOPY     [facility] DENTAL SURGERY  8/9

## 2020-05-09 ENCOUNTER — HOSPITAL ENCOUNTER (EMERGENCY)
Age: 45
Discharge: HOME | End: 2020-05-09
Attending: EMERGENCY MEDICINE
Payer: COMMERCIAL

## 2020-05-09 VITALS
TEMPERATURE: 98.3 F | WEIGHT: 116 LBS | SYSTOLIC BLOOD PRESSURE: 90 MMHG | RESPIRATION RATE: 16 BRPM | BODY MASS INDEX: 19.81 KG/M2 | DIASTOLIC BLOOD PRESSURE: 58 MMHG | OXYGEN SATURATION: 95 % | HEART RATE: 73 BPM | HEIGHT: 64 IN

## 2020-05-09 PROCEDURE — 99282 EMERGENCY DEPT VISIT SF MDM: CPT

## 2020-05-09 PROCEDURE — 6360000002 HC RX W HCPCS: Performed by: EMERGENCY MEDICINE

## 2020-05-09 PROCEDURE — 96372 THER/PROPH/DIAG INJ SC/IM: CPT

## 2020-05-09 RX ORDER — LORAZEPAM 2 MG/ML
2 INJECTION INTRAMUSCULAR ONCE
Status: COMPLETED | OUTPATIENT
Start: 2020-05-09 | End: 2020-05-09

## 2020-05-09 RX ADMIN — LORAZEPAM 2 MG: 2 INJECTION INTRAMUSCULAR; INTRAVENOUS at 11:47

## 2020-05-09 ASSESSMENT — PAIN DESCRIPTION - DESCRIPTORS: DESCRIPTORS: DISCOMFORT

## 2020-05-09 ASSESSMENT — PAIN DESCRIPTION - LOCATION: LOCATION: LEG

## 2020-05-09 ASSESSMENT — PAIN SCALES - GENERAL
PAINLEVEL_OUTOF10: 0
PAINLEVEL_OUTOF10: 2

## 2020-05-09 ASSESSMENT — PAIN DESCRIPTION - ORIENTATION: ORIENTATION: LEFT

## 2020-05-09 ASSESSMENT — PAIN DESCRIPTION - PAIN TYPE: TYPE: ACUTE PAIN

## 2020-05-09 NOTE — ED PROVIDER NOTES
She states she feels well enough to go home. She is instructed not to use the medication that she was started on for sleep and to contact her primary caregiver first thing Monday morning for recheck she is discharged stable condition at this time. Final Impression:  1.  Medication reaction, initial encounter      DISPOSITION Decision To Discharge    Patient referred to:  [facility] [id number]  [phone]    In 2 days  For follow up    Discharge medications:  Discharge Medication List as of 5/9/2020 12:35 PM        (Please note that portions of this note may have been completed with a voice recognition program. Efforts were made to edit the dictations but occasionally words are mis-transcribed.)    Ingrid Juan DO, [facility]  Board certified in [address] On license of [facility] [place], [place]  05/09/20 1819

## 2020-05-11 RX ORDER — TRAZODONE HYDROCHLORIDE 50 MG/1
50 TABLET ORAL NIGHTLY PRN
Qty: 30 TABLET | Refills: 5 | Status: SHIPPED
Start: 2020-05-11 | End: 2020-07-07 | Stop reason: SINTOL

## 2020-05-12 ENCOUNTER — OFFICE VISIT (OUTPATIENT)
Dept: FAMILY MEDICINE CLINIC | Age: 45
End: 2020-05-12
Payer: COMMERCIAL

## 2020-05-12 VITALS
OXYGEN SATURATION: 98 % | WEIGHT: 117 LBS | HEART RATE: 88 BPM | TEMPERATURE: 98.3 F | SYSTOLIC BLOOD PRESSURE: 98 MMHG | RESPIRATION RATE: 20 BRPM | DIASTOLIC BLOOD PRESSURE: 62 MMHG | BODY MASS INDEX: 20.08 KG/M2

## 2020-05-12 PROBLEM — G24.01 TARDIVE DYSKINESIA: Status: ACTIVE | Noted: 2020-05-12

## 2020-05-12 LAB
AMPHETAMINE SCREEN, URINE: NEGATIVE
BARBITURATE SCREEN, URINE: NEGATIVE
BENZODIAZEPINE SCREEN, URINE: NEGATIVE
BUPRENORPHINE URINE: NEGATIVE
COCAINE METABOLITE SCREEN URINE: NEGATIVE
GABAPENTIN SCREEN, URINE: NORMAL
MDMA URINE: NEGATIVE
METHADONE SCREEN, URINE: NEGATIVE
METHAMPHETAMINE, URINE: NEGATIVE
OPIATE SCREEN URINE: NEGATIVE
OXYCODONE SCREEN URINE: NEGATIVE
PHENCYCLIDINE SCREEN URINE: NEGATIVE
PROPOXYPHENE SCREEN, URINE: NORMAL
THC SCREEN, URINE: NEGATIVE
TRICYCLIC ANTIDEPRESSANTS, UR: NORMAL

## 2020-05-12 PROCEDURE — G8427 DOCREV CUR MEDS BY ELIG CLIN: HCPCS | Performed by: NURSE PRACTITIONER

## 2020-05-12 PROCEDURE — G8420 CALC BMI NORM PARAMETERS: HCPCS | Performed by: NURSE PRACTITIONER

## 2020-05-12 PROCEDURE — 4004F PT TOBACCO SCREEN RCVD TLK: CPT | Performed by: NURSE PRACTITIONER

## 2020-05-12 PROCEDURE — 80305 DRUG TEST PRSMV DIR OPT OBS: CPT | Performed by: NURSE PRACTITIONER

## 2020-05-12 PROCEDURE — 99213 OFFICE O/P EST LOW 20 MIN: CPT | Performed by: NURSE PRACTITIONER

## 2020-05-12 RX ORDER — CLONAZEPAM 1 MG/1
1 TABLET ORAL DAILY
Qty: 4 TABLET | Refills: 0 | Status: SHIPPED | OUTPATIENT
Start: 2020-05-12 | End: 2020-05-15 | Stop reason: SDUPTHER

## 2020-05-12 NOTE — PROGRESS NOTES
Post-Discharge Transitional Care Management Services or Hospital Follow Up      [address] [facility] Maria Ines   YOB: 1975    Date of Office Visit:  5/12/2020  Date of Hospital Admission: 5/9/20  Date of Hospital Discharge: 5/9/20  Risk of hospital readmission (high >=14%. Medium >=10%) :Readmission Risk Score: 0      Care management risk score Rising risk (score 2-5) and Complex Care (Scores >=6): 1     Non face to face  following discharge, date last encounter closed (first attempt may have been earlier): *No documented post hospital discharge outreach found in the last 14 days    Call initiated 2 business days of discharge: *No response recorded in the last 14 days    Patient Active Problem List   Diagnosis    History of colon polyps    Tobacco abuse    Auditory hallucination    Anxiety    Mood swings    Visual hallucinations    Restless legs    History of migraine    Migraine with aura and without status migrainosus, not intractable    Primary insomnia    Fibromyalgia    Tardive dyskinesia       Allergies   Allergen Reactions    Codeine Itching and Nausea And Vomiting    Imitrex [Sumatriptan] Itching and Nausea And Vomiting     PASSED OUT    Amitriptyline     Botox [Onabotulinumtoxina] Other (See Comments)     Tardive dyskinesia    Compazine [Prochlorperazine Maleate] Other (See Comments)    Meclizine     Cephalexin Nausea And Vomiting and Other (See Comments)     ABDOMINAL PAIN FOR 2 WEEKS    Magnesium-Containing Compounds Other (See Comments)     Pt sts \"they called it the mags. I start twitching. \"     Meclizine Hcl Other (See Comments)     \"Makes me stutter and twitch. \"    Nsaids Other (See Comments)     Tardive dyskinisia    Pcn [Penicillins] Nausea And Vomiting and Other (See Comments)     HEADACHE    Reglan [Metoclopramide] Other (See Comments)     Tardive dyskinisia    Toradol [Ketorolac Tromethamine] Other (See Comments)     Tremors    Vistaril [Hydroxyzine Hcl] Other (See Comments)     dyskinsia      Zofran [Ondansetron Hcl] Other (See Comments)     Cramping and burning in stomach       Medications listed as ordered at the time of discharge from hospital   Tyson Bennett 14 Medication Instructions BRIANNA:    Printed on:05/12/20 7164   Medication Information                      clonazePAM (KLONOPIN) 1 MG tablet  Take 1 tablet by mouth daily for 4 days. fluticasone (FLONASE) 50 MCG/ACT nasal spray  2 sprays by Each Nostril route daily             pregabalin (LYRICA) 50 MG capsule  Take 1 capsule by mouth 2 times daily for 90 days. rOPINIRole (REQUIP) 2 MG tablet  Take 1 tablet by mouth nightly             traZODone (DESYREL) 50 MG tablet  Take 1 tablet by mouth nightly as needed for Sleep                   Medications marked \"taking\" at this time  Outpatient Medications Marked as Taking for the 5/12/20 encounter (Office Visit) with HANNA Turner CNP   Medication Sig Dispense Refill    clonazePAM (KLONOPIN) 1 MG tablet Take 1 tablet by mouth daily for 4 days. 4 tablet 0    traZODone (DESYREL) 50 MG tablet Take 1 tablet by mouth nightly as needed for Sleep 30 tablet 5    [DISCONTINUED] lamoTRIgine (LAMICTAL) 25 MG tablet One tablet twice a day for two weeks then two tablets bid 84 tablet 1    rOPINIRole (REQUIP) 2 MG tablet Take 1 tablet by mouth nightly 30 tablet 2    fluticasone (FLONASE) 50 MCG/ACT nasal spray 2 sprays by Each Nostril route daily 1 Bottle 2    pregabalin (LYRICA) 50 MG capsule Take 1 capsule by mouth 2 times daily for 90 days.  60 capsule 2        Medications patient taking as of now reconciled against medications ordered at time of hospital discharge: Yes    Chief Complaint   Patient presents with    Follow-Up from Hospital     Pt here for ER follow up for allergic reaction to rozerem       History of Present illness - Follow up of Hospital diagnosis(es): Medication reaction    Inpatient course: Discharge summary

## 2020-05-15 ENCOUNTER — OFFICE VISIT (OUTPATIENT)
Dept: FAMILY MEDICINE CLINIC | Age: 45
End: 2020-05-15
Payer: COMMERCIAL

## 2020-05-15 VITALS
RESPIRATION RATE: 14 BRPM | OXYGEN SATURATION: 97 % | BODY MASS INDEX: 20.01 KG/M2 | TEMPERATURE: 97.8 F | DIASTOLIC BLOOD PRESSURE: 60 MMHG | SYSTOLIC BLOOD PRESSURE: 100 MMHG | WEIGHT: 116.6 LBS | HEART RATE: 89 BPM

## 2020-05-15 PROCEDURE — 96160 PT-FOCUSED HLTH RISK ASSMT: CPT | Performed by: NURSE PRACTITIONER

## 2020-05-15 PROCEDURE — 4004F PT TOBACCO SCREEN RCVD TLK: CPT | Performed by: NURSE PRACTITIONER

## 2020-05-15 PROCEDURE — G8420 CALC BMI NORM PARAMETERS: HCPCS | Performed by: NURSE PRACTITIONER

## 2020-05-15 PROCEDURE — G8431 POS CLIN DEPRES SCRN F/U DOC: HCPCS | Performed by: NURSE PRACTITIONER

## 2020-05-15 PROCEDURE — 99213 OFFICE O/P EST LOW 20 MIN: CPT | Performed by: NURSE PRACTITIONER

## 2020-05-15 PROCEDURE — G8427 DOCREV CUR MEDS BY ELIG CLIN: HCPCS | Performed by: NURSE PRACTITIONER

## 2020-05-15 RX ORDER — CLONAZEPAM 1 MG/1
1 TABLET ORAL DAILY
Qty: 4 TABLET | Refills: 0 | Status: SHIPPED | OUTPATIENT
Start: 2020-05-15 | End: 2020-05-19 | Stop reason: SDUPTHER

## 2020-05-15 ASSESSMENT — PATIENT HEALTH QUESTIONNAIRE - PHQ9
1. LITTLE INTEREST OR PLEASURE IN DOING THINGS: 0
SUM OF ALL RESPONSES TO PHQ9 QUESTIONS 1 & 2: 3
6. FEELING BAD ABOUT YOURSELF - OR THAT YOU ARE A FAILURE OR HAVE LET YOURSELF OR YOUR FAMILY DOWN: 3
SUM OF ALL RESPONSES TO PHQ QUESTIONS 1-9: 12
5. POOR APPETITE OR OVEREATING: 0
8. MOVING OR SPEAKING SO SLOWLY THAT OTHER PEOPLE COULD HAVE NOTICED. OR THE OPPOSITE, BEING SO FIGETY OR RESTLESS THAT YOU HAVE BEEN MOVING AROUND A LOT MORE THAN USUAL: 3
7. TROUBLE CONCENTRATING ON THINGS, SUCH AS READING THE NEWSPAPER OR WATCHING TELEVISION: 3
SUM OF ALL RESPONSES TO PHQ QUESTIONS 1-9: 12
9. THOUGHTS THAT YOU WOULD BE BETTER OFF DEAD, OR OF HURTING YOURSELF: 0
4. FEELING TIRED OR HAVING LITTLE ENERGY: 0
3. TROUBLE FALLING OR STAYING ASLEEP: 0
2. FEELING DOWN, DEPRESSED OR HOPELESS: 3
10. IF YOU CHECKED OFF ANY PROBLEMS, HOW DIFFICULT HAVE THESE PROBLEMS MADE IT FOR YOU TO DO YOUR WORK, TAKE CARE OF THINGS AT HOME, OR GET ALONG WITH OTHER PEOPLE: 1

## 2020-05-15 ASSESSMENT — ENCOUNTER SYMPTOMS: RESPIRATORY NEGATIVE: 1

## 2020-05-15 NOTE — PROGRESS NOTES
Subjective:      Chief Complaint   Patient presents with    Follow-up     patient is here for a 3 day follow up she is feeling a little better       HPI:  Lady Dickinson is a 45 y.o. female who presents today for tardive dyskinesia. She reports improvement in symptoms but is still experiencing involuntary smacking of her lips, uncontrolled tongue movement, stuttering, and spontaneous movement of her trunk and legs. She is planning on going to work today but feels self conscious. Past Medical History:   Diagnosis Date    Arthritis     OSTEO ARTHITIS    Cholecystenteric fistula     COPD ([facility] obstructive [facility] disease) ([facility] [facility]     Degenerative disc disease     Fibromyalgia     H/O 24 hour EKG monitoring 02/16/2017      Sinus tach events , no major arrhythmias , follow up in office as routine     H/O echocardiogram 03/01/2017    EF 55-60% Normal LV structure  and systolic function.  H/O exercise stress test 03/01/2017    Normal stress test. Patient has physical deconditioning as she achieved target HR in 2 mins. Recommend to increase PO fluids intake and exercise training.  Osteopenia     Osteoporosis     PTSD (post-traumatic stress disorder)     S/P cholecystectomy 11/22/11    Schizo-affective schizophrenia ([facility] [address] [facility]     Sjoegren syndrome     Syncope     Thyroid disease     Venous insufficiency of leg 2012    Vertigo         Social History     Tobacco Use    Smoking status: Current Every Day Smoker     Packs/day: 0.50     Years: 20.00     Pack years: 10.00     Types: Cigarettes    Smokeless tobacco: Never Used   Substance Use Topics    Alcohol use: No        Review of Systems   Constitutional: Negative. Respiratory: Negative. Cardiovascular: Negative. Musculoskeletal: Negative.             Objective:      /60 (Site: Right Upper Arm, Position: Sitting, Cuff Size: Medium Adult)   Pulse 89   Temp 97.8 °F (36.6 °C) (Temporal)   Resp 14   Wt 116 lb 9.6 oz (52.9 kg) LMP 11/18/2004   SpO2 97%   BMI 20.01 kg/m²      Physical Exam  Vitals signs reviewed. Constitutional:       Appearance: Normal appearance. HENT:      Head: Normocephalic and atraumatic. Mouth/Throat:      Mouth: Mucous membranes are moist.      Pharynx: Oropharynx is clear. Eyes:      Extraocular Movements: Extraocular movements intact. Conjunctiva/sclera: Conjunctivae normal.      Pupils: Pupils are equal, round, and reactive to light. Neck:      Musculoskeletal: Normal range of motion and neck supple. No neck rigidity or muscular tenderness. Cardiovascular:      Rate and Rhythm: Normal rate and regular rhythm. Heart sounds: Normal heart sounds. Pulmonary:      Effort: Pulmonary effort is normal.      Breath sounds: Normal breath sounds. Musculoskeletal: Normal range of motion. Skin:     General: Skin is warm and dry. Neurological:      Mental Status: She is alert and oriented to person, place, and time. Comments: No facial drooping. Equal movement in all four extremities. Twisting movement of the tongue, smacking movements of the lips, rotary trunk movement, and repeated leg crossing and bouncing. Symptoms are less severe than they were on Monday            Assessment / Plan:      1. Tardive dyskinesia  Will continue Klonopin x4 days. Patient states that she is supposed to start seeing Psychiatry at [address] [place] [address] in [place].   - clonazePAM (KLONOPIN) 1 MG tablet; Take 1 tablet by mouth daily for 4 days. Dispense: 4 tablet; Refill: 0    2.  Positive depression screening  - Positive Screen for Clinical Depression with a Documented Follow-up Plan           HANNA Trevino - CNP

## 2020-05-19 ENCOUNTER — OFFICE VISIT (OUTPATIENT)
Dept: FAMILY MEDICINE CLINIC | Age: 45
End: 2020-05-19
Payer: COMMERCIAL

## 2020-05-19 VITALS
DIASTOLIC BLOOD PRESSURE: 62 MMHG | TEMPERATURE: 96.9 F | WEIGHT: 119.4 LBS | RESPIRATION RATE: 20 BRPM | BODY MASS INDEX: 20.49 KG/M2 | OXYGEN SATURATION: 97 % | SYSTOLIC BLOOD PRESSURE: 90 MMHG | HEART RATE: 85 BPM

## 2020-05-19 PROCEDURE — 99214 OFFICE O/P EST MOD 30 MIN: CPT | Performed by: PHYSICIAN ASSISTANT

## 2020-05-19 PROCEDURE — G8420 CALC BMI NORM PARAMETERS: HCPCS | Performed by: PHYSICIAN ASSISTANT

## 2020-05-19 PROCEDURE — 4004F PT TOBACCO SCREEN RCVD TLK: CPT | Performed by: PHYSICIAN ASSISTANT

## 2020-05-19 PROCEDURE — G8427 DOCREV CUR MEDS BY ELIG CLIN: HCPCS | Performed by: PHYSICIAN ASSISTANT

## 2020-05-19 RX ORDER — CLONAZEPAM 1 MG/1
1 TABLET ORAL DAILY
Qty: 30 TABLET | Refills: 0 | Status: SHIPPED | OUTPATIENT
Start: 2020-05-19 | End: 2020-08-04 | Stop reason: SDUPTHER

## 2020-05-19 ASSESSMENT — ENCOUNTER SYMPTOMS
COUGH: 0
SHORTNESS OF BREATH: 0
ABDOMINAL PAIN: 0

## 2020-06-12 ENCOUNTER — OFFICE VISIT (OUTPATIENT)
Dept: FAMILY MEDICINE CLINIC | Age: 45
End: 2020-06-12
Payer: COMMERCIAL

## 2020-06-12 VITALS
DIASTOLIC BLOOD PRESSURE: 60 MMHG | HEART RATE: 87 BPM | OXYGEN SATURATION: 97 % | WEIGHT: 114 LBS | RESPIRATION RATE: 16 BRPM | SYSTOLIC BLOOD PRESSURE: 90 MMHG | TEMPERATURE: 98.6 F | BODY MASS INDEX: 19.57 KG/M2

## 2020-06-12 PROBLEM — F33.1 MODERATE EPISODE OF RECURRENT MAJOR DEPRESSIVE DISORDER (HCC): Status: ACTIVE | Noted: 2020-06-12

## 2020-06-12 PROCEDURE — G8427 DOCREV CUR MEDS BY ELIG CLIN: HCPCS | Performed by: NURSE PRACTITIONER

## 2020-06-12 PROCEDURE — 99213 OFFICE O/P EST LOW 20 MIN: CPT | Performed by: NURSE PRACTITIONER

## 2020-06-12 PROCEDURE — 4004F PT TOBACCO SCREEN RCVD TLK: CPT | Performed by: NURSE PRACTITIONER

## 2020-06-12 PROCEDURE — G8431 POS CLIN DEPRES SCRN F/U DOC: HCPCS | Performed by: NURSE PRACTITIONER

## 2020-06-12 PROCEDURE — 96160 PT-FOCUSED HLTH RISK ASSMT: CPT | Performed by: NURSE PRACTITIONER

## 2020-06-12 PROCEDURE — G8420 CALC BMI NORM PARAMETERS: HCPCS | Performed by: NURSE PRACTITIONER

## 2020-06-12 RX ORDER — ARIPIPRAZOLE 2 MG/1
2 TABLET ORAL DAILY
Qty: 30 TABLET | Refills: 0 | Status: SHIPPED
Start: 2020-06-12 | End: 2020-06-16 | Stop reason: SINTOL

## 2020-06-12 RX ORDER — PREGABALIN 50 MG/1
50 CAPSULE ORAL 2 TIMES DAILY
Qty: 60 CAPSULE | Refills: 2 | Status: SHIPPED
Start: 2020-06-12 | End: 2020-08-11 | Stop reason: SINTOL

## 2020-06-12 ASSESSMENT — PATIENT HEALTH QUESTIONNAIRE - PHQ9
2. FEELING DOWN, DEPRESSED OR HOPELESS: 3
3. TROUBLE FALLING OR STAYING ASLEEP: 0
7. TROUBLE CONCENTRATING ON THINGS, SUCH AS READING THE NEWSPAPER OR WATCHING TELEVISION: 0
9. THOUGHTS THAT YOU WOULD BE BETTER OFF DEAD, OR OF HURTING YOURSELF: 0
5. POOR APPETITE OR OVEREATING: 0
1. LITTLE INTEREST OR PLEASURE IN DOING THINGS: 3
4. FEELING TIRED OR HAVING LITTLE ENERGY: 0
8. MOVING OR SPEAKING SO SLOWLY THAT OTHER PEOPLE COULD HAVE NOTICED. OR THE OPPOSITE, BEING SO FIGETY OR RESTLESS THAT YOU HAVE BEEN MOVING AROUND A LOT MORE THAN USUAL: 3
10. IF YOU CHECKED OFF ANY PROBLEMS, HOW DIFFICULT HAVE THESE PROBLEMS MADE IT FOR YOU TO DO YOUR WORK, TAKE CARE OF THINGS AT HOME, OR GET ALONG WITH OTHER PEOPLE: 2
SUM OF ALL RESPONSES TO PHQ QUESTIONS 1-9: 12
SUM OF ALL RESPONSES TO PHQ QUESTIONS 1-9: 12
SUM OF ALL RESPONSES TO PHQ9 QUESTIONS 1 & 2: 6
6. FEELING BAD ABOUT YOURSELF - OR THAT YOU ARE A FAILURE OR HAVE LET YOURSELF OR YOUR FAMILY DOWN: 3

## 2020-06-16 ENCOUNTER — VIRTUAL VISIT (OUTPATIENT)
Dept: FAMILY MEDICINE CLINIC | Age: 45
End: 2020-06-16
Payer: COMMERCIAL

## 2020-06-16 ENCOUNTER — TELEPHONE (OUTPATIENT)
Dept: FAMILY MEDICINE CLINIC | Age: 45
End: 2020-06-16

## 2020-06-16 PROBLEM — T50.905A MEDICATION REACTION: Status: ACTIVE | Noted: 2020-06-16

## 2020-06-16 PROBLEM — R11.0 NAUSEA: Status: ACTIVE | Noted: 2020-06-16

## 2020-06-16 LAB
A/G RATIO: 1.9 (ref 1.1–2.2)
ALBUMIN SERPL-MCNC: 4.4 G/DL (ref 3.4–5)
ALP BLD-CCNC: 79 U/L (ref 40–129)
ALT SERPL-CCNC: 11 U/L (ref 10–40)
ANION GAP SERPL CALCULATED.3IONS-SCNC: 11 MMOL/L (ref 3–16)
AST SERPL-CCNC: 18 U/L (ref 15–37)
BASOPHILS ABSOLUTE: 0.1 K/UL (ref 0–0.2)
BASOPHILS RELATIVE PERCENT: 0.8 %
BILIRUB SERPL-MCNC: <0.2 MG/DL (ref 0–1)
BUN BLDV-MCNC: 10 MG/DL (ref 7–20)
CALCIUM SERPL-MCNC: 9.2 MG/DL (ref 8.3–10.6)
CHLORIDE BLD-SCNC: 102 MMOL/L (ref 99–110)
CO2: 26 MMOL/L (ref 21–32)
CREAT SERPL-MCNC: 0.8 MG/DL (ref 0.6–1.1)
EOSINOPHILS ABSOLUTE: 0.1 K/UL (ref 0–0.6)
EOSINOPHILS RELATIVE PERCENT: 1.4 %
GFR AFRICAN AMERICAN: >60
GFR NON-AFRICAN AMERICAN: >60
GLOBULIN: 2.3 G/DL
GLUCOSE BLD-MCNC: 89 MG/DL (ref 70–99)
HCT VFR BLD CALC: 39.9 % (ref 36–48)
HEMOGLOBIN: 13.7 G/DL (ref 12–16)
LYMPHOCYTES ABSOLUTE: 2.9 K/UL (ref 1–5.1)
LYMPHOCYTES RELATIVE PERCENT: 33.8 %
MAGNESIUM: 1.9 MG/DL (ref 1.8–2.4)
MCH RBC QN AUTO: 31.9 PG (ref 26–34)
MCHC RBC AUTO-ENTMCNC: 34.3 G/DL (ref 31–36)
MCV RBC AUTO: 93.1 FL (ref 80–100)
MONOCYTES ABSOLUTE: 0.6 K/UL (ref 0–1.3)
MONOCYTES RELATIVE PERCENT: 7.1 %
NEUTROPHILS ABSOLUTE: 5 K/UL (ref 1.7–7.7)
NEUTROPHILS RELATIVE PERCENT: 56.9 %
PDW BLD-RTO: 13.3 % (ref 12.4–15.4)
PLATELET # BLD: 173 K/UL (ref 135–450)
PMV BLD AUTO: 11 FL (ref 5–10.5)
POTASSIUM SERPL-SCNC: 3.7 MMOL/L (ref 3.5–5.1)
RBC # BLD: 4.29 M/UL (ref 4–5.2)
SODIUM BLD-SCNC: 139 MMOL/L (ref 136–145)
TOTAL CK: 136 U/L (ref 26–192)
TOTAL PROTEIN: 6.7 G/DL (ref 6.4–8.2)
WBC # BLD: 8.7 K/UL (ref 4–11)

## 2020-06-16 PROCEDURE — 4004F PT TOBACCO SCREEN RCVD TLK: CPT | Performed by: NURSE PRACTITIONER

## 2020-06-16 PROCEDURE — G8420 CALC BMI NORM PARAMETERS: HCPCS | Performed by: NURSE PRACTITIONER

## 2020-06-16 PROCEDURE — 99213 OFFICE O/P EST LOW 20 MIN: CPT | Performed by: NURSE PRACTITIONER

## 2020-06-16 PROCEDURE — G8427 DOCREV CUR MEDS BY ELIG CLIN: HCPCS | Performed by: NURSE PRACTITIONER

## 2020-06-16 RX ORDER — PROMETHAZINE HYDROCHLORIDE 12.5 MG/1
12.5 TABLET ORAL 4 TIMES DAILY PRN
Qty: 20 TABLET | Refills: 0 | Status: SHIPPED | OUTPATIENT
Start: 2020-06-16 | End: 2020-06-23

## 2020-06-16 ASSESSMENT — ENCOUNTER SYMPTOMS
ABDOMINAL PAIN: 0
SHORTNESS OF BREATH: 0
COUGH: 0

## 2020-06-16 NOTE — TELEPHONE ENCOUNTER
Patient called and stated the abilify is causing nausea and shaking she stopped the medication and would like to know what she should do next please advise

## 2020-06-16 NOTE — PROGRESS NOTES
03/01/2017    Normal stress test. Patient has physical deconditioning as she achieved target HR in 2 mins. Recommend to increase PO fluids intake and exercise training.  Osteopenia     Osteoporosis     PTSD (post-traumatic stress disorder)     S/P cholecystectomy 11/22/11    [facility]ffective [facility] [address]     Sjoegren syndrome     Syncope     Thyroid disease     Venous insufficiency of leg 2012    Vertigo        PHYSICAL EXAMINATION:  [ INSTRUCTIONS:  \"[x]\" Indicates a positive item  \"[]\" Indicates a negative item  -- DELETE ALL ITEMS NOT EXAMINED]       Constitutional: [x] Appears well-developed and well-nourished [] No apparent distress      [] Abnormal- Pt is tearful  Mental status  [x] Alert and awake  [x] Oriented to person/place/time [x]Able to follow commands      Eyes:  EOM    [x]  Normal  [] Abnormal-  Sclera  [x]  Normal  [] Abnormal -         Discharge []  None visible  [] Abnormal -    HENT:   [x] Normocephalic, atraumatic. [] Abnormal   [x] Mouth/Throat: Mucous membranes are moist.     External Ears [x] Normal  [] Abnormal-     Neck: [x] No visualized mass     Pulmonary/Chest: [x] Respiratory effort normal.  [x] No visualized signs of difficulty breathing or respiratory distress        [] Abnormal-      Musculoskeletal:   [] Normal gait with no signs of ataxia         [x] Normal range of motion of neck        [] Abnormal-       Neurological:        [x] No Facial Asymmetry (Cranial nerve 7 motor function) (limited exam to video visit)          [x] No gaze palsy        [x] Abnormal- rotary trunk movement, and repeated leg crossing and bouncing. Skin:        [x] No significant exanthematous lesions or discoloration noted on facial skin         [] Abnormal-                 ASSESSMENT/PLAN:  1. Adverse effect of drug, initial encounter  Stop Abilify. Will check blood work. Patient has klonopin at home and will resume 1 mg daily to treat symptoms associated with TD.   She is to go

## 2020-06-17 ENCOUNTER — OFFICE VISIT (OUTPATIENT)
Dept: FAMILY MEDICINE CLINIC | Age: 45
End: 2020-06-17
Payer: COMMERCIAL

## 2020-06-17 VITALS
HEART RATE: 87 BPM | BODY MASS INDEX: 19.81 KG/M2 | WEIGHT: 115.4 LBS | RESPIRATION RATE: 14 BRPM | SYSTOLIC BLOOD PRESSURE: 100 MMHG | TEMPERATURE: 98.1 F | DIASTOLIC BLOOD PRESSURE: 80 MMHG | OXYGEN SATURATION: 97 %

## 2020-06-17 PROBLEM — E88.89: Status: ACTIVE | Noted: 2020-06-17

## 2020-06-17 PROBLEM — F60.3 BORDERLINE PERSONALITY DISORDER (HCC): Status: ACTIVE | Noted: 2020-06-17

## 2020-06-17 PROCEDURE — 96160 PT-FOCUSED HLTH RISK ASSMT: CPT | Performed by: NURSE PRACTITIONER

## 2020-06-17 PROCEDURE — 4004F PT TOBACCO SCREEN RCVD TLK: CPT | Performed by: NURSE PRACTITIONER

## 2020-06-17 PROCEDURE — 99213 OFFICE O/P EST LOW 20 MIN: CPT | Performed by: NURSE PRACTITIONER

## 2020-06-17 PROCEDURE — G8420 CALC BMI NORM PARAMETERS: HCPCS | Performed by: NURSE PRACTITIONER

## 2020-06-17 PROCEDURE — G8431 POS CLIN DEPRES SCRN F/U DOC: HCPCS | Performed by: NURSE PRACTITIONER

## 2020-06-17 PROCEDURE — G8427 DOCREV CUR MEDS BY ELIG CLIN: HCPCS | Performed by: NURSE PRACTITIONER

## 2020-06-17 RX ORDER — VILAZODONE HYDROCHLORIDE 10 MG/1
10 TABLET ORAL DAILY
Qty: 30 TABLET | Refills: 0 | Status: SHIPPED | OUTPATIENT
Start: 2020-06-17 | End: 2020-07-07 | Stop reason: ALTCHOICE

## 2020-06-17 ASSESSMENT — PATIENT HEALTH QUESTIONNAIRE - PHQ9
SUM OF ALL RESPONSES TO PHQ9 QUESTIONS 1 & 2: 6
6. FEELING BAD ABOUT YOURSELF - OR THAT YOU ARE A FAILURE OR HAVE LET YOURSELF OR YOUR FAMILY DOWN: 3
SUM OF ALL RESPONSES TO PHQ QUESTIONS 1-9: 12
SUM OF ALL RESPONSES TO PHQ QUESTIONS 1-9: 12
2. FEELING DOWN, DEPRESSED OR HOPELESS: 3
4. FEELING TIRED OR HAVING LITTLE ENERGY: 0
1. LITTLE INTEREST OR PLEASURE IN DOING THINGS: 3
8. MOVING OR SPEAKING SO SLOWLY THAT OTHER PEOPLE COULD HAVE NOTICED. OR THE OPPOSITE, BEING SO FIGETY OR RESTLESS THAT YOU HAVE BEEN MOVING AROUND A LOT MORE THAN USUAL: 3
3. TROUBLE FALLING OR STAYING ASLEEP: 0
10. IF YOU CHECKED OFF ANY PROBLEMS, HOW DIFFICULT HAVE THESE PROBLEMS MADE IT FOR YOU TO DO YOUR WORK, TAKE CARE OF THINGS AT HOME, OR GET ALONG WITH OTHER PEOPLE: 1
9. THOUGHTS THAT YOU WOULD BE BETTER OFF DEAD, OR OF HURTING YOURSELF: 0
5. POOR APPETITE OR OVEREATING: 0
7. TROUBLE CONCENTRATING ON THINGS, SUCH AS READING THE NEWSPAPER OR WATCHING TELEVISION: 0

## 2020-06-17 ASSESSMENT — ENCOUNTER SYMPTOMS
RESPIRATORY NEGATIVE: 1
SHORTNESS OF BREATH: 0
ABDOMINAL PAIN: 0
COUGH: 0

## 2020-06-17 NOTE — PROGRESS NOTES
Documented Follow-up Plan           HANNA Bautista CNP      On the basis of positive PHQ-9 screening (PHQ-9 Total Score: 12), the following plan was implemented: medication prescribed: Viibryd- 10 mg- patient will call for any significant medication side effects or worsening symptoms of depression. Patient will follow-up in 3 week(s) with PCP.

## 2020-06-17 NOTE — PATIENT INSTRUCTIONS
([phone] and [phone] ([phone]). If you or someone you know talks about suicide or about feeling hopeless, get help right away. When should you call for help? [id number] anytime you think you may need emergency care. For example, call if:  · You feel you cannot stop from hurting yourself or someone else. Call your doctor now or seek immediate medical care if:  · You feel much more depressed. · You hear voices. Watch closely for changes in your health, and be sure to contact your doctor if:  · You have a new crisis you can't handle. Follow-up care is a key part of your treatment and safety. Be sure to make and go to all appointments, and call your doctor if you are having problems. It's also a good idea to know your test results and keep a list of the medicines you take. Where can you learn more? Go to https://World EnergypeVigilant Technology.Pearl.com. org and sign in to your Web[employer]oz account. Enter I622 in the UBEnX.com box to learn more about \"Learning About Borderline Personality Disorder. \"     If you do not have an account, please click on the \"Sign Up Now\" link. Current as of: January 31, 2020               Content Version: 12.5  © 3642-7414 Healthwise, Incorporated. Care instructions adapted under license by [facility] ([facility]). If you have questions about a medical condition or this instruction, always ask your healthcare professional. [place] [address] any warranty or liability for your use of this information. Patient Education        Recovering From Depression: Care Instructions  Your Care Instructions     Taking good care of yourself is important as you recover from depression. In time, your symptoms will fade as your treatment takes hold. Do not give up. Instead, focus your energy on getting better. Your mood will improve. It just takes some time.  Focus on things that can help you feel better, such as being with friends and family, eating well, and getting you may be taking for depression, or they may make your depression worse. · Take your medicines exactly as they are prescribed. You may start to feel better within 1 to 3 weeks of taking antidepressant medicine. But it can take as many as 6 to 8 weeks to see more improvement. If you have questions or concerns about your medicines, or if you do not notice any improvement by 3 weeks, talk to your doctor. · If you have any side effects from your medicine, tell your doctor. Antidepressants can make you feel tired, dizzy, or nervous. Some people have dry mouth, constipation, headaches, sexual problems, or diarrhea. Many of these side effects are mild and will go away on their own after you have been taking the medicine for a few weeks. Some may last longer. Talk to your doctor if side effects are bothering you too much. You might be able to try a different medicine. · Get enough sleep. If you have problems sleeping:  ? Go to bed at the same time every night, and get up at the same time every morning. ? Keep your bedroom dark and quiet. ? Do not exercise after 5:00 p.m.  ? Avoid drinks with caffeine after 5:00 p.m. · Avoid sleeping pills unless they are prescribed by the doctor treating your depression. Sleeping pills may make you groggy during the day, and they may interact with other medicine you are taking. · If you have any other illnesses, such as diabetes, heart disease, or high blood pressure, make sure to continue with your treatment. Tell your doctor about all of the medicines you take, including those with or without a prescription. · Keep the numbers for these national suicide hotlines: 0-273-475-TALK (0-631.674.8871) and 6-626-QODXRJI (1-888.545.1969). If you or someone you know talks about suicide or feeling hopeless, get help right away. When should you call for help? [id number] anytime you think you may need emergency care.  For example, call if:  · You feel like hurting yourself or someone else.  · Someone you know has depression and is about to attempt or is attempting suicide. Call your doctor now or seek immediate medical care if:  · You hear voices. · Someone you know has depression and:  ? Starts to give away his or her possessions. ? Uses illegal drugs or drinks alcohol heavily. ? Talks or writes about death, including writing suicide notes or talking about guns, knives, or pills. ? Starts to spend a lot of time alone. ? Acts very aggressively or suddenly appears calm. Watch closely for changes in your health, and be sure to contact your doctor if:  · You do not get better as expected. Where can you learn more? Go to https://Book Buyback.ComputeNext. org and sign in to your [employer] account. Enter L952 in the Jiemai.com box to learn more about \"Recovering From Depression: Care Instructions. \"     If you do not have an account, please click on the \"Sign Up Now\" link. Current as of: January 31, 2020               Content Version: 12.5  © 2006-2020 Healthwise, Incorporated. Care instructions adapted under license by [facility] ([facility]). If you have questions about a medical condition or this instruction, always ask your healthcare professional. [place] [address] any warranty or liability for your use of this information.

## 2020-07-07 ENCOUNTER — OFFICE VISIT (OUTPATIENT)
Dept: FAMILY MEDICINE CLINIC | Age: 45
End: 2020-07-07
Payer: COMMERCIAL

## 2020-07-07 VITALS
BODY MASS INDEX: 19.16 KG/M2 | OXYGEN SATURATION: 97 % | DIASTOLIC BLOOD PRESSURE: 80 MMHG | SYSTOLIC BLOOD PRESSURE: 110 MMHG | RESPIRATION RATE: 16 BRPM | WEIGHT: 111.6 LBS | HEART RATE: 92 BPM | TEMPERATURE: 96.8 F

## 2020-07-07 PROCEDURE — 99213 OFFICE O/P EST LOW 20 MIN: CPT | Performed by: NURSE PRACTITIONER

## 2020-07-07 PROCEDURE — G8427 DOCREV CUR MEDS BY ELIG CLIN: HCPCS | Performed by: NURSE PRACTITIONER

## 2020-07-07 PROCEDURE — 4004F PT TOBACCO SCREEN RCVD TLK: CPT | Performed by: NURSE PRACTITIONER

## 2020-07-07 PROCEDURE — G8420 CALC BMI NORM PARAMETERS: HCPCS | Performed by: NURSE PRACTITIONER

## 2020-07-07 PROCEDURE — 96160 PT-FOCUSED HLTH RISK ASSMT: CPT | Performed by: NURSE PRACTITIONER

## 2020-07-07 RX ORDER — ROPINIROLE 2 MG/1
2 TABLET, FILM COATED ORAL NIGHTLY
Qty: 30 TABLET | Refills: 2 | Status: SHIPPED | OUTPATIENT
Start: 2020-07-07 | End: 2020-10-15 | Stop reason: SDUPTHER

## 2020-07-07 RX ORDER — PRAZOSIN HYDROCHLORIDE 1 MG/1
1 CAPSULE ORAL NIGHTLY
Qty: 30 CAPSULE | Refills: 2 | Status: SHIPPED
Start: 2020-07-07 | End: 2020-08-04 | Stop reason: SINTOL

## 2020-07-07 ASSESSMENT — PATIENT HEALTH QUESTIONNAIRE - PHQ9
3. TROUBLE FALLING OR STAYING ASLEEP: 0
4. FEELING TIRED OR HAVING LITTLE ENERGY: 3
2. FEELING DOWN, DEPRESSED OR HOPELESS: 3
1. LITTLE INTEREST OR PLEASURE IN DOING THINGS: 3
8. MOVING OR SPEAKING SO SLOWLY THAT OTHER PEOPLE COULD HAVE NOTICED. OR THE OPPOSITE, BEING SO FIGETY OR RESTLESS THAT YOU HAVE BEEN MOVING AROUND A LOT MORE THAN USUAL: 3
SUM OF ALL RESPONSES TO PHQ QUESTIONS 1-9: 15
5. POOR APPETITE OR OVEREATING: 0
10. IF YOU CHECKED OFF ANY PROBLEMS, HOW DIFFICULT HAVE THESE PROBLEMS MADE IT FOR YOU TO DO YOUR WORK, TAKE CARE OF THINGS AT HOME, OR GET ALONG WITH OTHER PEOPLE: 2
7. TROUBLE CONCENTRATING ON THINGS, SUCH AS READING THE NEWSPAPER OR WATCHING TELEVISION: 0
6. FEELING BAD ABOUT YOURSELF - OR THAT YOU ARE A FAILURE OR HAVE LET YOURSELF OR YOUR FAMILY DOWN: 3
SUM OF ALL RESPONSES TO PHQ9 QUESTIONS 1 & 2: 6
9. THOUGHTS THAT YOU WOULD BE BETTER OFF DEAD, OR OF HURTING YOURSELF: 0
SUM OF ALL RESPONSES TO PHQ QUESTIONS 1-9: 15

## 2020-07-07 NOTE — PROGRESS NOTES
S/P cholecystectomy 11/22/11    Sch[facility]affective schizophrenia ([facility] [address]     Sjoegren syndrome     Syncope     Thyroid disease     Venous insufficiency of leg 2012    Vertigo         Social History     Tobacco Use    Smoking status: Current Every Day Smoker     Packs/day: 0.50     Years: 20.00     Pack years: 10.00     Types: Cigarettes    Smokeless tobacco: Never Used   Substance Use Topics    Alcohol use: No        Review of Systems   Constitutional: Negative. Psychiatric/Behavioral: Positive for decreased concentration, dysphoric mood and sleep disturbance. Negative for agitation, behavioral problems, confusion, hallucinations, self-injury and suicidal ideas. The patient is nervous/anxious. The patient is not hyperactive. Objective:      /80 (Site: Right Upper Arm, Position: Sitting, Cuff Size: Medium Adult)   Pulse 92   Temp 96.8 °F (36 °C) (Temporal)   Resp 16   Wt 111 lb 9.6 oz (50.6 kg)   LMP 11/18/2004   SpO2 97%   BMI 19.16 kg/m²      Physical Exam  Vitals signs reviewed. Constitutional:       General: She is not in acute distress. Appearance: Normal appearance. Neurological:      General: No focal deficit present. Mental Status: She is alert and oriented to person, place, and time. Deep Tendon Reflexes: Reflexes are normal and symmetric. Psychiatric:         Attention and Perception: Attention and perception normal.         Mood and Affect: Mood is anxious and depressed. Speech: Speech normal.         Behavior: Behavior normal. Behavior is cooperative. Thought Content: Thought content normal.            Assessment / Plan:      1. PTSD (post-traumatic stress disorder)  Recommend that you continue counseling as scheduled. Will stop Trazodone and start prazosin. - prazosin (MINIPRESS) 1 MG capsule; Take 1 capsule by mouth nightly  Dispense: 30 capsule; Refill: 2    2. Moderate episode of recurrent major depressive disorder (HCC)  Stable. Due to SE associated with multiple psychiatric medications will not prescribe any further medications today with the exception of prazosin. Recommended referral to Psychiatry - patient refused. Call if any concerns before your follow up appointment. If you have suicidal thoughts call our office. If you have suicidal thoughts with a plan, go to emergency room immediately. 3. Restless legs  - rOPINIRole (REQUIP) 2 MG tablet; Take 1 tablet by mouth nightly  Dispense: 30 tablet;  Refill: 2          HANNA Longo - CNP

## 2020-07-30 ENCOUNTER — VIRTUAL VISIT (OUTPATIENT)
Dept: FAMILY MEDICINE CLINIC | Age: 45
End: 2020-07-30
Payer: COMMERCIAL

## 2020-07-30 PROCEDURE — G8427 DOCREV CUR MEDS BY ELIG CLIN: HCPCS | Performed by: NURSE PRACTITIONER

## 2020-07-30 PROCEDURE — 99213 OFFICE O/P EST LOW 20 MIN: CPT | Performed by: NURSE PRACTITIONER

## 2020-07-30 RX ORDER — AZITHROMYCIN 250 MG/1
250 TABLET, FILM COATED ORAL SEE ADMIN INSTRUCTIONS
Qty: 6 TABLET | Refills: 0 | Status: SHIPPED | OUTPATIENT
Start: 2020-07-30 | End: 2020-08-04

## 2020-07-30 ASSESSMENT — ENCOUNTER SYMPTOMS
WHEEZING: 0
SINUS PAIN: 0
COUGH: 0
RHINORRHEA: 0
VOMITING: 0
SINUS PRESSURE: 0
DIARRHEA: 0
CONSTIPATION: 0
CHEST TIGHTNESS: 0
SORE THROAT: 1
SHORTNESS OF BREATH: 0
NAUSEA: 0
ABDOMINAL PAIN: 0
TROUBLE SWALLOWING: 0

## 2020-07-30 NOTE — PROGRESS NOTES
2020    TELEHEALTH EVALUATION -- Audio/Visual (During MXNVO-28 public health emergency)    Due to COVID-19 related state of emergency restrictions , as an alternative to an in-person session, the clinical decision was made to utilize a virtual visit to provide services for this patient's visit. These services were provided via [employer] me with the patient in their home while I was located at [address]. Identity was confirmed via patient name and . Verbal consent for use of telehealth was provided to and completed by the patient. HPI:    Lora Cortez (:  1975) has requested an audio/video evaluation for the following concern(s):    Chief Complaint   Patient presents with    Pharyngitis    Fever       Symptom onset one day ago. Complains of sore throat, fever, fatigue, otalgia. Fever tmax 101.0. She feels like her lymph nodes are swollen. Feels she has some white exudate on tonsils. Denies cough, SOB, wheezing, rhinorrhea, congestion, myalgia, headache, n/v/d. No difficulty swallowing. She went back to work in the last week. She has been taking tylenol and ibuprofen with mild relief. [email or web address]    No problem-specific Assessment & Plan notes found for this encounter. Review of Systems   Constitutional: Positive for fatigue and fever. Negative for appetite change, chills and unexpected weight change. HENT: Positive for sore throat. Negative for congestion, postnasal drip, rhinorrhea, sinus pressure, sinus pain and trouble swallowing. Respiratory: Negative for cough, chest tightness, shortness of breath and wheezing. Cardiovascular: Negative for chest pain, palpitations and leg swelling. Gastrointestinal: Negative for abdominal pain, constipation, diarrhea, nausea and vomiting. Musculoskeletal: Negative for arthralgias. Neurological: Negative for weakness, numbness and headaches. Hematological: Negative for adenopathy.        Prior to Visit Medications    Medication Sig Taking? Authorizing Provider   azithromycin (ZITHROMAX) 250 MG tablet Take 1 tablet by mouth See Admin Instructions for 5 days 500mg on day 1 followed by 250mg on days 2 - 5 Yes HANNA Archer CNP   rOPINIRole (REQUIP) 2 MG tablet Take 1 tablet by mouth nightly  HANNA Norton - CNP   prazosin (MINIPRESS) 1 MG capsule Take 1 capsule by mouth nightly  Patient not taking: Reported on 7/30/2020  HANNA Ruiz - CNP   pregabalin (LYRICA) 50 MG capsule Take 1 capsule by mouth 2 times daily for 90 days. HANNA Ruiz - CNP   clonazePAM (KLONOPIN) 1 MG tablet Take 1 tablet by mouth daily for 30 days. Pako Mendoza PA-C       Allergies   Allergen Reactions    Rozerem [Ramelteon] Other (See Comments)     Increased symptoms of TD    Codeine Itching and Nausea And Vomiting    Imitrex [Sumatriptan] Itching and Nausea And Vomiting     PASSED OUT    Abilify [Aripiprazole] Other (See Comments)     Patient reports symptoms of TD    Amitriptyline     Botox [Onabotulinumtoxina] Other (See Comments)     Tardive dyskinesia    Compazine [Prochlorperazine Maleate] Other (See Comments)    Lamictal [Lamotrigine]      Constant movement    Meclizine     Cephalexin Nausea And Vomiting and Other (See Comments)     ABDOMINAL PAIN FOR 2 WEEKS    Magnesium-Containing Compounds Other (See Comments)     Pt sts \"they called it the mags. I start twitching. \"     Meclizine Hcl Other (See Comments)     \"Makes me stutter and twitch. \"    Nsaids Other (See Comments)     Tardive dyskinisia    Pcn [Penicillins] Nausea And Vomiting and Other (See Comments)     HEADACHE    Reglan [Metoclopramide] Other (See Comments)     Tardive dyskinisia    Toradol [Ketorolac Tromethamine] Other (See Comments)     Tremors    Vistaril [Hydroxyzine Hcl] Other (See Comments)     dyskinsia      Zofran [Ondansetron Hcl] Other (See Comments)     Cramping and burning in stomach       Social History     Tobacco Use    Smoking status: Current Every Day Smoker     Packs/day: 0.50     Years: 20.00     Pack years: 10.00     Types: Cigarettes    Smokeless tobacco: Never Used   Substance Use Topics    Alcohol use: No    Drug use: Not Currently     Types: Methamphetamines     Comment: clean for 11 years, past hx of crack use      Past Medical History:   Diagnosis Date    Arthritis     OSTEO ARTHITIS    Cholecystenteric fistula     COPD (chronic obstructive pulmonary disease) (HCC)     Degenerative disc disease     Fibromyalgia     H/O 24 hour EKG monitoring 02/16/2017      Sinus tach events , no major arrhythmias , follow up in office as routine     H/O echocardiogram 03/01/2017    EF 55-60% Normal LV structure  and systolic function.  H/O exercise stress test 03/01/2017    Normal stress test. Patient has physical deconditioning as she achieved target HR in 2 mins. Recommend to increase PO fluids intake and exercise training.  Osteopenia     Osteoporosis     PTSD (post-traumatic stress disorder)     S/P cholecystectomy 11/22/11    Schizo-affective schizophrenia ([facility] [address]     Sjoegren syndrome     Syncope     Thyroid disease     Venous insufficiency of leg 2012    Vertigo      Past Surgical History:   Procedure Laterality Date    CHOLECYSTECTOMY  11/22/2011    laparoscopic    COLONOSCOPY      DENTAL SURGERY  8/9    ELBOW ARTHROSCOPY      bilateral elbows    ENDOSCOPY, COLON, DIAGNOSTIC      HYSTERECTOMY      OTHER SURGICAL HISTORY      venous ablation, bilateral legs    TUBAL LIGATION           PHYSICAL EXAMINATION:    Vital Signs: (As obtained by patient/caregiver or practitioner observation)    Blood pressure-  Heart rate-    Respiratory rate-    Temperature-  Pulse oximetry-     Physical Exam  Constitutional:       Appearance: Normal appearance. She is not ill-appearing. HENT:      Head: Normocephalic and atraumatic.       Right Ear: Hearing and external ear normal.      Left Ear: Hearing and external ear normal.      Mouth/Throat:      Mouth: Mucous membranes are moist.   Eyes:      Extraocular Movements: Extraocular movements intact. Neck:      Musculoskeletal: Normal range of motion. Pulmonary:      Effort: Pulmonary effort is normal.      Comments: No visualized signs of difficulty breathing  Skin:     Comments: No significant exanthematous lesions or discoloration noted on facial skin   Neurological:      Mental Status: She is alert and oriented to person, place, and time. Cranial Nerves: No facial asymmetry. Psychiatric:         Mood and Affect: Mood and affect normal.         Speech: Speech normal.         Behavior: Behavior is cooperative. Thought Content: Thought content normal.         Other pertinent observable physical exam findings-     Due to this being a TeleHealth encounter, evaluation of the following organ systems is limited: Vitals/Constitutional/EENT/Resp/CV/GI//MS/Neuro/Skin/Heme-Lymph-Imm. ASSESSMENT/PLAN:  1. Acute pharyngitis, unspecified etiology  Antibiotic as prescribed. Plenty of rest and fluids. Tylenol or ibuprofen PRN. Warm salt water gargles. Low suspicion for covid, but will keep out of work under [employer] guidelines. Patient verbalized understanding.   - azithromycin (ZITHROMAX) 250 MG tablet; Take 1 tablet by mouth See Admin Instructions for 5 days 500mg on day 1 followed by 250mg on days 2 - 5  Dispense: 6 tablet; Refill: 0      No follow-ups on file. An  electronic signature was used to authenticate this note.     --HANNA Michaels - CNP on 7/30/2020 at 3:10 PM             Pursuant to the emergency declaration under the [facility], [facility] waiver authority and the [employer] Act, this Virtual  Visit was conducted, with patient's consent, to reduce the patient's risk of exposure to COVID-19 and provide continuity of care for an established patient. Services were provided through a video synchronous discussion virtually to substitute for in-person clinic visit.         (Please note that portions of this note may have been completed with a voice recognition program. Efforts were made to edit the dictations but occasionally words aremis-transcribed.)

## 2020-07-30 NOTE — LETTER
[facility] [address] [id number]  Phone: [phone]  Fax: [phone]    HANNA Bryant CNP        July 30, 2020     Patient: Ji Arango   YOB: 1975   Date of Visit: 7/30/2020       To Whom It May Concern: It is my medical opinion that Amos Villagomez may return to work on 8/8/20 if fever free for 24 hours, symptoms improving. .    If you have any questions or concerns, please don't hesitate to call.     Sincerely,          HANNA Bryant CNP

## 2020-08-04 ENCOUNTER — VIRTUAL VISIT (OUTPATIENT)
Dept: FAMILY MEDICINE CLINIC | Age: 45
End: 2020-08-04
Payer: COMMERCIAL

## 2020-08-04 PROCEDURE — G8427 DOCREV CUR MEDS BY ELIG CLIN: HCPCS | Performed by: NURSE PRACTITIONER

## 2020-08-04 PROCEDURE — 99213 OFFICE O/P EST LOW 20 MIN: CPT | Performed by: NURSE PRACTITIONER

## 2020-08-04 RX ORDER — PROMETHAZINE HYDROCHLORIDE 12.5 MG/1
12.5 TABLET ORAL NIGHTLY PRN
Qty: 30 TABLET | Refills: 0 | Status: SHIPPED | OUTPATIENT
Start: 2020-08-04 | End: 2020-09-03

## 2020-08-04 RX ORDER — CLONAZEPAM 1 MG/1
1 TABLET ORAL DAILY
Qty: 30 TABLET | Refills: 0 | Status: SHIPPED | OUTPATIENT
Start: 2020-08-04 | End: 2020-10-09 | Stop reason: ALTCHOICE

## 2020-08-04 NOTE — PROGRESS NOTES
2020    TELEHEALTH EVALUATION -- Audio/Visual (During YZNTS-48 public health emergency)    HPI:    Larry Sheppard (:  1975) has requested an audio/video evaluation for the following concern(s): She never got work letter from [employer] after being seen on 2020. She would like letter emailed to [email or web address]. She is still having difficulty with her employer. She is requesting a letter to permanently take her off of work because symptoms of anxiety and depression are exacerbated by stressors at work. She is also concerned that she may have ADHD. Her mother and daughter have both been recently diagnosed. She endorses difficulty concentrating and staying on task. She thinks Adderall or Concerta would help manage her symptoms. Review of Systems   Constitutional: Negative. HENT: Negative. Cardiovascular: Negative. Skin: Negative. Neurological: Negative. Psychiatric/Behavioral: Positive for decreased concentration and dysphoric mood. Negative for agitation, behavioral problems, confusion, hallucinations, self-injury, sleep disturbance and suicidal ideas. The patient is nervous/anxious. The patient is not hyperactive. Prior to Visit Medications    Medication Sig Taking? Authorizing Provider   clonazePAM (KLONOPIN) 1 MG tablet Take 1 tablet by mouth daily for 30 days. Yes HANNA Cornelius CNP   promethazine (PHENERGAN) 12.5 MG tablet Take 1 tablet by mouth nightly as needed for Nausea Yes HANNA Angeles CNP   rOPINIRole (REQUIP) 2 MG tablet Take 1 tablet by mouth nightly Yes HANNA Cornelius CNP   pregabalin (LYRICA) 50 MG capsule Take 1 capsule by mouth 2 times daily for 90 days.  Yes HANNA Cornelius CNP       Social History     Tobacco Use    Smoking status: Current Every Day Smoker     Packs/day: 0.50     Years: 20.00     Pack years: 10.00     Types: Cigarettes    Smokeless tobacco: Never Used   Substance Use Topics    increase PO fluids intake and exercise training.  Osteopenia     Osteoporosis     PTSD (post-traumatic stress disorder)     S/P cholecystectomy 11/22/11    Schizo-affective schizophrenia ([facility] [address]     Sjoegren syndrome     Syncope     Thyroid disease     Venous insufficiency of leg 2012    Vertigo        PHYSICAL EXAMINATION:  [ INSTRUCTIONS:  \"[x]\" Indicates a positive item  \"[]\" Indicates a negative item  -- DELETE ALL ITEMS NOT EXAMINED]    Constitutional: [x] Appears well-developed and well-nourished [x] No apparent distress      [] Abnormal-   Mental status  [x] Alert and awake  [x] Oriented to person/place/time [x]Able to follow commands      Eyes:  EOM    [x]  Normal  [] Abnormal-  Sclera  [x]  Normal  [] Abnormal -         Discharge [x]  None visible  [] Abnormal -    HENT:   [x] Normocephalic, atraumatic. [] Abnormal   [x] Mouth/Throat: Mucous membranes are moist.     External Ears [x] Normal  [] Abnormal-     Neck: [x] No visualized mass     Pulmonary/Chest: [x] Respiratory effort normal.  [x] No visualized signs of difficulty breathing or respiratory distress        [] Abnormal-      Musculoskeletal:   [] Normal gait with no signs of ataxia         [x] Normal range of motion of neck        [] Abnormal-       Neurological:        [x] No Facial Asymmetry (Cranial nerve 7 motor function) (limited exam to video visit)          [x] No gaze palsy        [] Abnormal-         Skin:        [x] No significant exanthematous lesions or discoloration noted on facial skin         [] Abnormal-            Psychiatric:       [] Normal Affect [x] No Hallucinations        [x] Abnormal- Patient appears anxious and is tearful. No s/s of TD noted today      ASSESSMENT/PLAN:  1. Fatigue, unspecified type  - CBC Auto Differential  - T4, Free  - TSH without Reflex    2. Borderline personality disorder ([facility] [address]  Recommend continuing with therapist as scheduled.      3. Anxiety  Patient aware that I will not be providing her minutes    Services were provided through a video synchronous discussion virtually to substitute for in-person clinic visit. Patient and provider were located at their individual homes. --HANNA Salgado CNP on 8/5/2020 at 8:00 AM    An electronic signature was used to authenticate this note.

## 2020-08-05 LAB
BASOPHILS ABSOLUTE: 0.1 K/UL (ref 0–0.2)
BASOPHILS RELATIVE PERCENT: 0.8 %
EOSINOPHILS ABSOLUTE: 0.3 K/UL (ref 0–0.6)
EOSINOPHILS RELATIVE PERCENT: 2.9 %
HCT VFR BLD CALC: 41.2 % (ref 36–48)
HEMOGLOBIN: 13.6 G/DL (ref 12–16)
LYMPHOCYTES ABSOLUTE: 3.6 K/UL (ref 1–5.1)
LYMPHOCYTES RELATIVE PERCENT: 40.4 %
MCH RBC QN AUTO: 31.2 PG (ref 26–34)
MCHC RBC AUTO-ENTMCNC: 33.1 G/DL (ref 31–36)
MCV RBC AUTO: 94.2 FL (ref 80–100)
MONOCYTES ABSOLUTE: 0.5 K/UL (ref 0–1.3)
MONOCYTES RELATIVE PERCENT: 6.2 %
NEUTROPHILS ABSOLUTE: 4.4 K/UL (ref 1.7–7.7)
NEUTROPHILS RELATIVE PERCENT: 49.7 %
PDW BLD-RTO: 13.1 % (ref 12.4–15.4)
PLATELET # BLD: 183 K/UL (ref 135–450)
PMV BLD AUTO: 10.6 FL (ref 5–10.5)
RBC # BLD: 4.37 M/UL (ref 4–5.2)
WBC # BLD: 8.9 K/UL (ref 4–11)

## 2020-08-06 LAB
T4 FREE: 1.1 NG/DL (ref 0.9–1.8)
TSH SERPL DL<=0.05 MIU/L-ACNC: 1.3 UIU/ML (ref 0.27–4.2)

## 2020-08-11 ENCOUNTER — HOSPITAL ENCOUNTER (OUTPATIENT)
Dept: GENERAL RADIOLOGY | Age: 45
Discharge: HOME | End: 2020-08-11
Payer: COMMERCIAL

## 2020-08-11 ENCOUNTER — OFFICE VISIT (OUTPATIENT)
Dept: FAMILY MEDICINE CLINIC | Age: 45
End: 2020-08-11
Payer: COMMERCIAL

## 2020-08-11 ENCOUNTER — HOSPITAL ENCOUNTER (OUTPATIENT)
Age: 45
Discharge: HOME | End: 2020-08-11
Payer: COMMERCIAL

## 2020-08-11 VITALS
TEMPERATURE: 97.9 F | WEIGHT: 112.8 LBS | SYSTOLIC BLOOD PRESSURE: 80 MMHG | BODY MASS INDEX: 19.36 KG/M2 | DIASTOLIC BLOOD PRESSURE: 40 MMHG | OXYGEN SATURATION: 98 % | RESPIRATION RATE: 16 BRPM | HEART RATE: 78 BPM

## 2020-08-11 PROCEDURE — G8431 POS CLIN DEPRES SCRN F/U DOC: HCPCS | Performed by: NURSE PRACTITIONER

## 2020-08-11 PROCEDURE — G8420 CALC BMI NORM PARAMETERS: HCPCS | Performed by: NURSE PRACTITIONER

## 2020-08-11 PROCEDURE — 71101 X-RAY EXAM UNILAT RIBS/CHEST: CPT

## 2020-08-11 PROCEDURE — G8427 DOCREV CUR MEDS BY ELIG CLIN: HCPCS | Performed by: NURSE PRACTITIONER

## 2020-08-11 PROCEDURE — 4004F PT TOBACCO SCREEN RCVD TLK: CPT | Performed by: NURSE PRACTITIONER

## 2020-08-11 PROCEDURE — 96160 PT-FOCUSED HLTH RISK ASSMT: CPT | Performed by: NURSE PRACTITIONER

## 2020-08-11 PROCEDURE — 99213 OFFICE O/P EST LOW 20 MIN: CPT | Performed by: NURSE PRACTITIONER

## 2020-08-11 ASSESSMENT — PATIENT HEALTH QUESTIONNAIRE - PHQ9
1. LITTLE INTEREST OR PLEASURE IN DOING THINGS: 3
SUM OF ALL RESPONSES TO PHQ QUESTIONS 1-9: 20
SUM OF ALL RESPONSES TO PHQ QUESTIONS 1-9: 20
8. MOVING OR SPEAKING SO SLOWLY THAT OTHER PEOPLE COULD HAVE NOTICED. OR THE OPPOSITE, BEING SO FIGETY OR RESTLESS THAT YOU HAVE BEEN MOVING AROUND A LOT MORE THAN USUAL: 2
7. TROUBLE CONCENTRATING ON THINGS, SUCH AS READING THE NEWSPAPER OR WATCHING TELEVISION: 3
2. FEELING DOWN, DEPRESSED OR HOPELESS: 3
6. FEELING BAD ABOUT YOURSELF - OR THAT YOU ARE A FAILURE OR HAVE LET YOURSELF OR YOUR FAMILY DOWN: 3
4. FEELING TIRED OR HAVING LITTLE ENERGY: 3
3. TROUBLE FALLING OR STAYING ASLEEP: 0
5. POOR APPETITE OR OVEREATING: 3
SUM OF ALL RESPONSES TO PHQ9 QUESTIONS 1 & 2: 6
10. IF YOU CHECKED OFF ANY PROBLEMS, HOW DIFFICULT HAVE THESE PROBLEMS MADE IT FOR YOU TO DO YOUR WORK, TAKE CARE OF THINGS AT HOME, OR GET ALONG WITH OTHER PEOPLE: 2
9. THOUGHTS THAT YOU WOULD BE BETTER OFF DEAD, OR OF HURTING YOURSELF: 0

## 2020-08-11 NOTE — PROGRESS NOTES
Subjective:      Chief Complaint   Patient presents with    Discuss Medications     patient wants to discuss changing her medication    Chest Pain     she believes she bruised her rib pushing a refig.     Cyst     she thinks she has a cyst on the side of her left foot       HPI:  Maria Elena Sparks is a 45 y.o. female who presents today for the following:     Right sided rib pain. She was moving a refrigerator this weekend and felt a pop followed by severe pain. Pain is worse when she takes a deep breath and is located over the 4th and 5th ribs. She denies shortness of breath or cough. She has been taking Tylenol and Ibuprofen with almost no relief. She also has a cyst located over her left MCP joint. Has increased in size over the last two weeks and is painful when she wears shoes. No redness, warmth, numbness or tingling. She would like a referral to psychiatry for medication management of BPD, STEPHANIA and MDD. She is requesting evaluation for ADHD and states that she took her daughter's Concerta over the weekend and felt \"like herself. \"  She denies worsening symptoms of TD. No AVH/SIB/SI/HI. Past Medical History:   Diagnosis Date    Arthritis     OSTEO ARTHITIS    Cholecystenteric fistula     COPD (chronic obstructive pulmonary disease) ([address]     Degenerative disc disease     Fibromyalgia     H/O 24 hour EKG monitoring 02/16/2017      Sinus tach events , no major arrhythmias , follow up in office as routine     H/O echocardiogram 03/01/2017    EF 55-60% Normal LV structure  and systolic function.  H/O exercise stress test 03/01/2017    Normal stress test. Patient has physical deconditioning as she achieved target HR in 2 mins. Recommend to increase PO fluids intake and exercise training.      Osteopenia     Osteoporosis     PTSD (post-traumatic stress disorder)     S/P cholecystectomy 11/22/11    Schizo-affective schizophrenia ([address]     Sjoegren syndrome     Syncope     Thyroid disease     Venous insufficiency of leg 2012    Vertigo         Social History     Tobacco Use    Smoking status: Current Every Day Smoker     Packs/day: 0.50     Years: 20.00     Pack years: 10.00     Types: Cigarettes    Smokeless tobacco: Never Used   Substance Use Topics    Alcohol use: No        Review of Systems   Constitutional: Negative. Musculoskeletal: Positive for arthralgias, joint swelling and myalgias. Neurological: Negative. Psychiatric/Behavioral: Positive for decreased concentration, dysphoric mood and sleep disturbance. Negative for agitation, behavioral problems, confusion, hallucinations, self-injury and suicidal ideas. The patient is nervous/anxious. The patient is not hyperactive. Objective:      BP (!) 80/40 (Site: Right Upper Arm, Position: Sitting, Cuff Size: Medium Adult)   Pulse 78   Temp 97.9 °F (36.6 °C) (Temporal)   Resp 16   Wt 112 lb 12.8 oz (51.2 kg)   LMP 11/18/2004   SpO2 98%   BMI 19.36 kg/m²      Physical Exam  Vitals signs reviewed. Constitutional:       General: She is not in acute distress. Appearance: Normal appearance. She is not ill-appearing. HENT:      Mouth/Throat:      Mouth: Mucous membranes are moist.      Pharynx: Oropharynx is clear. Eyes:      Extraocular Movements: Extraocular movements intact. Conjunctiva/sclera: Conjunctivae normal.      Pupils: Pupils are equal, round, and reactive to light. Neck:      Musculoskeletal: Normal range of motion and neck supple. Cardiovascular:      Rate and Rhythm: Normal rate and regular rhythm. Heart sounds: Normal heart sounds. Pulmonary:      Effort: Pulmonary effort is normal.      Breath sounds: Normal breath sounds. Chest:      Chest wall: Tenderness (tenderness over 4th and 5th right ribs with palpation) present. No mass, deformity or crepitus. Musculoskeletal: Normal range of motion. Feet:    Skin:     General: Skin is warm and dry.    Neurological: Mental Status: She is alert and oriented to person, place, and time. Sensory: No sensory deficit. Motor: No weakness. Deep Tendon Reflexes: Reflexes normal.   Psychiatric:         Attention and Perception: Attention and perception normal. She is attentive. Mood and Affect: Mood and affect normal.         Speech: Speech normal.         Behavior: Behavior normal. Behavior is cooperative. Assessment / Plan:      1. Rib pain on right side  Tylenol or Ibuprofen as needed for pain. May apply heat or ice for 20 minutes several times a day. - XR RIBS RIGHT INCLUDE CHEST (MIN 3 VIEWS); Future    2. Subcutaneous mass of left foot  Recommend loose fitting shoes that do not rub or cause irritation to the cyst.   - Amb External Referral To Podiatry    3. Borderline personality disorder [facility])  Continue counseling as scheduled  - External Referral To Psychiatry    4. Anxiety  Will refer to psychiatry after no response or SE associated with multiple medications. - External Referral To Psychiatry    5. Moderate episode of recurrent major depressive disorder (HCC)  Stable. Call if any concerns before your follow up appointment. If you have suicidal thoughts call our office. If you have suicidal thoughts with a plan, go to emergency room immediately. - External Referral To Psychiatry    6. Tardive dyskinesia  - External Referral To Psychiatry    7. Positive depression screening  - Positive Screen for Clinical Depression with a Documented Follow-up Plan           HANNA Sifuentes - CNP    On the basis of positive PHQ-9 screening (PHQ-9 Total Score: 20), the following plan was implemented: referral to psychiatry provided.

## 2020-09-10 ENCOUNTER — NURSE ONLY (OUTPATIENT)
Dept: CARDIOLOGY CLINIC | Age: 45
End: 2020-09-10
Payer: COMMERCIAL

## 2020-09-10 ENCOUNTER — OFFICE VISIT (OUTPATIENT)
Dept: CARDIOLOGY CLINIC | Age: 45
End: 2020-09-10
Payer: COMMERCIAL

## 2020-09-10 VITALS
HEART RATE: 77 BPM | BODY MASS INDEX: 18.19 KG/M2 | DIASTOLIC BLOOD PRESSURE: 60 MMHG | TEMPERATURE: 97.4 F | RESPIRATION RATE: 16 BRPM | WEIGHT: 106 LBS | SYSTOLIC BLOOD PRESSURE: 94 MMHG

## 2020-09-10 PROCEDURE — G8427 DOCREV CUR MEDS BY ELIG CLIN: HCPCS | Performed by: INTERNAL MEDICINE

## 2020-09-10 PROCEDURE — 99204 OFFICE O/P NEW MOD 45 MIN: CPT | Performed by: INTERNAL MEDICINE

## 2020-09-10 PROCEDURE — 4004F PT TOBACCO SCREEN RCVD TLK: CPT | Performed by: INTERNAL MEDICINE

## 2020-09-10 PROCEDURE — 93000 ELECTROCARDIOGRAM COMPLETE: CPT | Performed by: INTERNAL MEDICINE

## 2020-09-10 PROCEDURE — G8419 CALC BMI OUT NRM PARAM NOF/U: HCPCS | Performed by: INTERNAL MEDICINE

## 2020-09-10 PROCEDURE — 93270 REMOTE 30 DAY ECG REV/REPORT: CPT | Performed by: INTERNAL MEDICINE

## 2020-09-10 RX ORDER — ATOMOXETINE 25 MG/1
25 CAPSULE ORAL DAILY
COMMUNITY
End: 2020-09-24

## 2020-09-10 RX ORDER — TRAZODONE HYDROCHLORIDE 50 MG/1
50 TABLET ORAL NIGHTLY
COMMUNITY
End: 2020-11-23

## 2020-09-10 RX ORDER — MIDODRINE HYDROCHLORIDE 5 MG/1
5 TABLET ORAL 2 TIMES DAILY
Qty: 90 TABLET | Refills: 3 | Status: SHIPPED | OUTPATIENT
Start: 2020-09-10 | End: 2021-06-04

## 2020-09-10 RX ORDER — PROMETHAZINE HYDROCHLORIDE 12.5 MG/1
12.5 TABLET ORAL DAILY PRN
COMMUNITY
End: 2020-10-12 | Stop reason: SDUPTHER

## 2020-09-10 NOTE — PATIENT INSTRUCTIONS
30 days e-cardio monitor placed. SN # . Instructed patient on how to record the event and to call monitoring center at [phone] if any problems arise. Instructed patient to disconnect the lead wires from the electrodes before bathing or showering and reattach them afterwards. Instructed patient that the electrodes should be changed every 3 days or if they no longer adhere to the skin. Patient to mail package after the monitor has ended. Patient verbalized understanding.

## 2020-09-10 NOTE — PROGRESS NOTES
pulmonary disease) ([facility]     Degenerative disc disease     Fibromyalgia     H/O 24 hour EKG monitoring 02/16/2017      Sinus tach events , no major arrhythmias , follow up in office as routine     H/O echocardiogram 03/01/2017    EF 55-60% Normal LV structure  and systolic function.  H/O exercise stress test 03/01/2017    Normal stress test. Patient has physical deconditioning as she achieved target HR in 2 mins. Recommend to increase PO fluids intake and exercise training.  Osteopenia     Osteoporosis     PTSD (post-traumatic stress disorder)     S/P cholecystectomy 11/22/11    Schizo-affective schizophrenia ([facility]     Sjoegren syndrome     Syncope     Thyroid disease     Venous insufficiency of leg 2012    Vertigo      Past Surgical History:   Procedure Laterality Date    CHOLECYSTECTOMY  11/22/2011    laparoscopic    COLONOSCOPY      DENTAL SURGERY  8/9    ELBOW ARTHROSCOPY      bilateral elbows    ENDOSCOPY, COLON, DIAGNOSTIC      HYSTERECTOMY      OTHER SURGICAL HISTORY      venous ablation, bilateral legs    TUBAL LIGATION       Family History   Problem Relation Age of Onset    Other Mother         Neurocardiogenic syncope    Heart Surgery Maternal Grandmother      Social History     Tobacco Use    Smoking status: Current Every Day Smoker     Packs/day: 0.50     Years: 20.00     Pack years: 10.00     Types: Cigarettes    Smokeless tobacco: Never Used   Substance Use Topics    Alcohol use: No        Review of Systems:   · Constitutional: No Fever or Weight Loss   · Eyes: No Decreased Vision  · ENT: No Headaches, Hearing Loss or Vertigo  · Cardiovascular: as per note above   · Respiratory: No cough or wheezing and as per note above.    · Gastrointestinal: No abdominal pain, appetite loss, blood in stools, constipation, diarrhea or heartburn  · Genitourinary: No dysuria, trouble voiding, or hematuria  · Musculoskeletal:  None  · Integumentary: No rash or pruritis  · Neurological: No TIA or stroke symptoms  · Psychiatric: No anxiety or depression  · Endocrine: No malaise, fatigue or temperature intolerance  · Hematologic/Lymphatic: No bleeding problems, blood clots or swollen lymph nodes  · Allergic/Immunologic: No nasal congestion or hives    Objective:      Physical Exam:  BP 94/60 (Site: Left Upper Arm, Position: Sitting, Cuff Size: Medium Adult)   Pulse 77   Temp 97.4 °F (36.3 °C)   Resp 16   Wt 106 lb (48.1 kg)   LMP 11/18/2004   BMI 18.19 kg/m²   Wt Readings from Last 3 Encounters:   09/10/20 106 lb (48.1 kg)   08/11/20 112 lb 12.8 oz (51.2 kg)   07/07/20 111 lb 9.6 oz (50.6 kg)     Body mass index is 18.19 kg/m². Vitals:    09/10/20 1308   BP: 94/60   Pulse: 77   Resp: 16   Temp: 97.4 °F (36.3 °C)        General Appearance:  No distress, conversant  Constitutional:  Well developed, Well nourished, No acute distress, Non-toxic appearance. HENT:  Normocephalic, Atraumatic, Bilateral external ears normal, Oropharynx moist, No oral exudates, Nose normal. Neck- Normal range of motion, No tenderness, Supple, No stridor,no apical-carotid delay  Eyes:  PERRL, EOMI, Conjunctiva normal, No discharge. Respiratory:  Normal breath sounds, No respiratory distress, No wheezing, No chest tenderness. ,no use of accessory muscles,   Cardiovascular: (PMI) apex non displaced,no lifts no thrills,S1 and S2 audible, No added heart sounds, No signs of ankle edema, or volume overload, No evidence of JVD  GI:  Bowel sounds normal, Soft, No tenderness, No masses, No gross visceromegaly   :  No costovertebral angle tenderness   Musculoskeletal:  No edema, no tenderness, no deformities.  Back- no tenderness  Integument:  Well hydrated, no rash   Lymphatic:  No lymphadenopathy noted   Neurologic:  Alert & oriented x 3, CN 2-12 normal, normal motor function, normal sensory function, no focal deficits noted   Psychiatric:  Speech and behavior appropriate       Medical decision making and Data review:  DATA:  Lab Results   Component Value Date    TROPONINT <0.010 01/09/2020     BNP:  No results found for: PROBNP  PT/INR:  No results found for: PTINR  No results found for: LABA1C  Lab Results   Component Value Date    CHOL 355 (H) 03/23/2011    TRIG 656 03/29/2017    HDL 39 (L) 03/23/2011    LDLDIRECT 149 (H) 03/23/2011     Lab Results   Component Value Date    ALT 11 06/16/2020    AST 18 06/16/2020      Echo   Summary   Ejection fraction is visually estimated at 55-60%. No significant valvular abnormalities. Normal LV structure and systolic function. Stress treadmill:   Summary   Normal stress test, patient has phsical deconditioning as she achieved   target heart rate in 2 mins and felt dizzy and felt like passing, will   recommend to increase PO fluids intake and exercise training, avoid cofee   and soda      All labs, medications and tests reviewed by myself including data and history from outside source , patient and available family . Assessment & Plan:      1. Palpitations    2. Tobacco abuse    3. Tardive dyskinesia    4. Fibromyalgia    5. Anxiety         No problem-specific Assessment & Plan notes found for this encounter.     Orthostatic hypotension  She says she feels dizzy when she stands up SBP dropped from 90 to 78 on standing up. She refuses to wear compression stockings says they feel tight  She is failed midodrine in the past but will try it at higher dose now. Heart palpitations  No PVC or PAC on holter nin 2017  . No arrhythmias on Holter. she did not get TSH checked echo shows no wall motion abnormality or valvular disease will place 30-day event monitor    Tobacco abuse  She is trying to cut down and debating vaping. I have encouraged her to quit      Dyslipidemia :  Mirlande will get labwork., check fasting lipid, she did not get labwork , need to repeat lipids    Counseled extensively and medication compliance urged. Various goals were discussed and questions answered. Continue current medications. Appropriate prescriptions are addressed and refills ordered. Questions answered and patient verbalizes understanding. Call for any problems, questions, or concerns. Continue all other medications of all above medical condition listed as is. Return in about 6 weeks (around 10/22/2020).

## 2020-09-24 ENCOUNTER — HOSPITAL ENCOUNTER (EMERGENCY)
Age: 45
Discharge: HOME | End: 2020-09-24
Attending: EMERGENCY MEDICINE
Payer: COMMERCIAL

## 2020-09-24 ENCOUNTER — APPOINTMENT (OUTPATIENT)
Dept: GENERAL RADIOLOGY | Age: 45
End: 2020-09-24
Payer: COMMERCIAL

## 2020-09-24 VITALS
DIASTOLIC BLOOD PRESSURE: 76 MMHG | OXYGEN SATURATION: 99 % | RESPIRATION RATE: 18 BRPM | TEMPERATURE: 98 F | SYSTOLIC BLOOD PRESSURE: 115 MMHG | HEART RATE: 78 BPM | HEIGHT: 64 IN | BODY MASS INDEX: 18.78 KG/M2 | WEIGHT: 110 LBS

## 2020-09-24 PROCEDURE — 96372 THER/PROPH/DIAG INJ SC/IM: CPT

## 2020-09-24 PROCEDURE — 72040 X-RAY EXAM NECK SPINE 2-3 VW: CPT

## 2020-09-24 PROCEDURE — 6370000000 HC RX 637 (ALT 250 FOR IP): Performed by: EMERGENCY MEDICINE

## 2020-09-24 PROCEDURE — 99283 EMERGENCY DEPT VISIT LOW MDM: CPT

## 2020-09-24 PROCEDURE — 6360000002 HC RX W HCPCS: Performed by: EMERGENCY MEDICINE

## 2020-09-24 RX ORDER — DIAZEPAM 5 MG/1
10 TABLET ORAL ONCE
Status: COMPLETED | OUTPATIENT
Start: 2020-09-24 | End: 2020-09-24

## 2020-09-24 RX ORDER — HYDROCODONE BITARTRATE AND ACETAMINOPHEN 5; 325 MG/1; MG/1
1-2 TABLET ORAL EVERY 8 HOURS PRN
Qty: 9 TABLET | Refills: 0 | Status: SHIPPED | OUTPATIENT
Start: 2020-09-24 | End: 2020-09-27

## 2020-09-24 RX ORDER — METHYLPHENIDATE HYDROCHLORIDE 18 MG/1
18 TABLET ORAL EVERY MORNING
COMMUNITY
End: 2021-01-21

## 2020-09-24 RX ORDER — PROMETHAZINE HYDROCHLORIDE 12.5 MG/1
25 TABLET ORAL ONCE
Status: COMPLETED | OUTPATIENT
Start: 2020-09-24 | End: 2020-09-24

## 2020-09-24 RX ORDER — CYCLOBENZAPRINE HCL 10 MG
10 TABLET ORAL 3 TIMES DAILY PRN
Qty: 9 TABLET | Refills: 0 | Status: SHIPPED | OUTPATIENT
Start: 2020-09-24 | End: 2020-09-27

## 2020-09-24 RX ORDER — DEXAMETHASONE SODIUM PHOSPHATE 4 MG/ML
4 INJECTION, SOLUTION INTRA-ARTICULAR; INTRALESIONAL; INTRAMUSCULAR; INTRAVENOUS; SOFT TISSUE ONCE
Status: COMPLETED | OUTPATIENT
Start: 2020-09-24 | End: 2020-09-24

## 2020-09-24 RX ORDER — METHYLPREDNISOLONE 4 MG/1
TABLET ORAL
Qty: 1 KIT | Refills: 0 | Status: SHIPPED | OUTPATIENT
Start: 2020-09-24 | End: 2020-10-09 | Stop reason: ALTCHOICE

## 2020-09-24 RX ADMIN — DEXAMETHASONE SODIUM PHOSPHATE 4 MG: 4 INJECTION, SOLUTION INTRAMUSCULAR; INTRAVENOUS at 19:30

## 2020-09-24 RX ADMIN — PROMETHAZINE HYDROCHLORIDE 25 MG: 12.5 TABLET ORAL at 20:31

## 2020-09-24 RX ADMIN — DIAZEPAM 10 MG: 5 TABLET ORAL at 19:30

## 2020-09-24 RX ADMIN — BUTORPHANOL TARTRATE 1 MG: 2 INJECTION, SOLUTION INTRAMUSCULAR; INTRAVENOUS at 19:30

## 2020-09-24 ASSESSMENT — ENCOUNTER SYMPTOMS
GASTROINTESTINAL NEGATIVE: 1
PHOTOPHOBIA: 0
EYES NEGATIVE: 1
BOWEL INCONTINENCE: 0
VISUAL CHANGE: 0
RESPIRATORY NEGATIVE: 1

## 2020-09-24 ASSESSMENT — PAIN DESCRIPTION - LOCATION: LOCATION: BACK;NECK

## 2020-09-24 ASSESSMENT — PAIN DESCRIPTION - PAIN TYPE: TYPE: ACUTE PAIN

## 2020-09-24 ASSESSMENT — PAIN SCALES - GENERAL: PAINLEVEL_OUTOF10: 10

## 2020-09-24 ASSESSMENT — PAIN DESCRIPTION - ORIENTATION: ORIENTATION: UPPER

## 2020-09-24 ASSESSMENT — PAIN DESCRIPTION - DESCRIPTORS: DESCRIPTORS: ACHING;BURNING;SHARP;STABBING;THROBBING

## 2020-09-25 NOTE — ED PROVIDER NOTES
The history is provided by the patient. Neck Pain   Pain location: neck and back pain which started at 0400 this am when she rolled over in bed. states pain is to her upper back and up into her neck. Quality:  Stiffness and cramping  Pain radiates to:  L shoulder  Pain severity:  Moderate  Pain is:  Same all the time  Onset quality:  Gradual  Timing:  Constant  Progression:  Unchanged  Chronicity:  Recurrent  Context: not fall, not jumping from heights, not lifting a heavy object, not pedestrian accident and not recent injury    Relieved by:  Nothing  Worsened by:  Twisting, position and bending  Ineffective treatments:  NSAIDs  Associated symptoms: no bladder incontinence, no bowel incontinence, no headaches, no photophobia, no tingling, no visual change and no weight loss        Review of Systems   Constitutional: Negative. Negative for weight loss. HENT: Negative. Eyes: Negative. Negative for photophobia. Respiratory: Negative. Cardiovascular: Negative. Gastrointestinal: Negative. Negative for bowel incontinence. Genitourinary: Negative. Negative for bladder incontinence. Musculoskeletal: Positive for neck pain. Skin: Negative. Neurological: Negative. Negative for tingling and headaches. All other systems reviewed and are negative.       Family History   Problem Relation Age of Onset    Other Mother         Neurocardiogenic syncope    Heart Surgery Maternal Grandmother      Social History     Socioeconomic History    Marital status: Single     Spouse name: Not on file    Number of children: Not on file    Years of education: Not on file    Highest education level: Not on file   Occupational History    Not on file   Social Needs    Financial resource strain: Not on file    Food insecurity     Worry: Not on file     Inability: Not on file    Transportation needs     Medical: Not on file     Non-medical: Not on file   Tobacco Use    Smoking status: Current Every Day Smoker Packs/day: 0.50     Years: 20.00     Pack years: 10.00     Types: Cigarettes    Smokeless tobacco: Never Used   Substance and Sexual Activity    Alcohol use: No    Drug use: Not Currently     Types: Methamphetamines     Comment: clean for 11 years, past hx of crack use    Sexual activity: Yes     Partners: Male   Lifestyle    Physical activity     Days per week: Not on file     Minutes per session: Not on file    Stress: Not on file   Relationships    Social connections     Talks on phone: Not on file     Gets together: Not on file     Attends Spiritism service: Not on file     Active member of club or organization: Not on file     Attends meetings of clubs or organizations: Not on file     Relationship status: Not on file    Intimate partner violence     Fear of current or ex partner: Not on file     Emotionally abused: Not on file     Physically abused: Not on file     Forced sexual activity: Not on file   Other Topics Concern    Not on file   Social History Narrative    Not on file     Past Surgical History:   Procedure Laterality Date    CHOLECYSTECTOMY  11/22/2011    laparoscopic    COLONOSCOPY      DENTAL SURGERY  8/9    ELBOW ARTHROSCOPY      bilateral elbows    ENDOSCOPY, COLON, DIAGNOSTIC      HYSTERECTOMY      OTHER SURGICAL HISTORY      venous ablation, bilateral legs    TUBAL LIGATION       Past Medical History:   Diagnosis Date    ADHD     Arthritis     OSTEO ARTHITIS    Cholecystenteric fistula     COPD (chronic obstructive pulmonary disease) ([facility]     Degenerative disc disease     Fibromyalgia     H/O 24 hour EKG monitoring 02/16/2017      Sinus tach events , no major arrhythmias , follow up in office as routine     H/O echocardiogram 03/01/2017    EF 55-60% Normal LV structure  and systolic function.  H/O exercise stress test 03/01/2017    Normal stress test. Patient has physical deconditioning as she achieved target HR in 2 mins.  Recommend to increase PO fluids intake and exercise training.  Osteopenia     Osteoporosis     PTSD (post-traumatic stress disorder)     S/P cholecystectomy 11/22/11    Schizo-affective schizophrenia ([facility]     Sjoegren syndrome     Syncope     Thyroid disease     Venous insufficiency of leg 2012    Vertigo      Allergies   Allergen Reactions    Rozerem [Ramelteon] Other (See Comments)     Increased symptoms of TD    Codeine Itching and Nausea And Vomiting    Imitrex [Sumatriptan] Itching and Nausea And Vomiting     PASSED OUT    Abilify [Aripiprazole] Other (See Comments)     Patient reports symptoms of TD    Amitriptyline     Botox [Onabotulinumtoxina] Other (See Comments)     Tardive dyskinesia    Compazine [Prochlorperazine Maleate] Other (See Comments)    Lamictal [Lamotrigine]      Constant movement    Meclizine     Cephalexin Nausea And Vomiting and Other (See Comments)     ABDOMINAL PAIN FOR 2 WEEKS    Magnesium-Containing Compounds Other (See Comments)     Pt sts \"they called it the mags. I start twitching. \"     Meclizine Hcl Other (See Comments)     \"Makes me stutter and twitch. \"    Nsaids Other (See Comments)     Tardive dyskinisia    Pcn [Penicillins] Nausea And Vomiting and Other (See Comments)     HEADACHE    Reglan [Metoclopramide] Other (See Comments)     Tardive dyskinisia    Toradol [Ketorolac Tromethamine] Other (See Comments)     Tremors    Vistaril [Hydroxyzine Hcl] Other (See Comments)     dyskinsia      Zofran [Ondansetron Hcl] Other (See Comments)     Cramping and burning in stomach     Prior to Admission medications    Medication Sig Start Date End Date Taking? Authorizing Provider   methylphenidate (CONCERTA) 18 MG extended release tablet Take 18 mg by mouth every morning. Yes Historical Provider, MD   HYDROcodone-acetaminophen (NORCO) 5-325 MG per tablet Take 1-2 tablets by mouth every 8 hours as needed for Pain for up to 3 days.  9/24/20 9/27/20 Yes Diya Pierce, DO   cyclobenzaprine (FLEXERIL) 10 MG tablet Take 1 tablet by mouth 3 times daily as needed for Muscle spasms 9/24/20 9/27/20 Yes Vicki Pierce,    methylPREDNISolone (MEDROL, FREDI,) 4 MG tablet Take by mouth. 9/24/20  Yes Radha Montero,    traZODone (DESYREL) 50 MG tablet Take 50 mg by mouth nightly   Yes Historical Provider, MD   midodrine (PROAMATINE) 5 MG tablet Take 1 tablet by mouth 2 times daily 9/10/20  Yes Milana Ortiz MD   clonazePAM (KLONOPIN) 1 MG tablet Take 1 tablet by mouth daily for 30 days. 8/4/20 9/24/20 Yes HANNA Luis CNP   rOPINIRole (REQUIP) 2 MG tablet Take 1 tablet by mouth nightly 7/7/20  Yes HANNA Luis CNP   promethazine (PHENERGAN) 12.5 MG tablet Take 12.5 mg by mouth daily as needed for Nausea    Historical Provider, MD       /76   Pulse 78   Temp 98 °F (36.7 °C) (Oral)   Resp 18   Ht 5' 4\" (1.626 m)   Wt 110 lb (49.9 kg)   LMP 11/18/2004   SpO2 99%   BMI 18.88 kg/m²     Physical Exam  Vitals signs and nursing note reviewed. Constitutional:       Appearance: She is well-developed. HENT:      Head: Normocephalic and atraumatic. Right Ear: External ear normal.      Left Ear: External ear normal.      Nose: Nose normal.   Eyes:      Conjunctiva/sclera: Conjunctivae normal.      Pupils: Pupils are equal, round, and reactive to light. Neck:      Musculoskeletal: Decreased range of motion. Muscular tenderness present. Cardiovascular:      Rate and Rhythm: Normal rate and regular rhythm. Heart sounds: Normal heart sounds. Pulmonary:      Effort: Pulmonary effort is normal.      Breath sounds: Normal breath sounds. Abdominal:      General: Bowel sounds are normal.      Palpations: Abdomen is soft. Skin:     General: Skin is warm and dry. Neurological:      Mental Status: She is alert and oriented to person, place, and time. GCS: GCS eye subscore is 4. GCS verbal subscore is 5. GCS motor subscore is 6.    Psychiatric: Behavior: Behavior normal.         Thought Content: Thought content normal.         Judgment: Judgment normal.         MDM:    No results found for this visit on 09/24/20. XR CERVICAL SPINE (2-3 VIEWS)   Final Result   No acute osseous abnormality on radiographs of the cervical spine. Minimal degenerative sequela mid and lower cervical spine. If pain persists or worsens, then additional evaluation with CT or MRI may be   indicated. Note that CT cervical spine is the study of choice for evaluation   of acute trauma. Supportive care and IM medication in the ER. Follow up PCM  My typical dicussion, presentation,and considerations for this patients' chief complaint, diagnosis, differential diagnosis, medications, medication use,  medication safety and medication interactions have been explained and outlined to this patient for thispatient encounter. I have stressed need for follow up and reexamination for this encounter and or return to the emergency department if any changes or any concern. Final Impression    1.  Acute strain of neck muscle, initial encounter              Radu Thomas DO  09/24/20 [id number]

## 2020-09-29 ENCOUNTER — HOSPITAL ENCOUNTER (EMERGENCY)
Age: 45
Discharge: HOME | End: 2020-09-29
Attending: EMERGENCY MEDICINE
Payer: COMMERCIAL

## 2020-09-29 VITALS
OXYGEN SATURATION: 94 % | DIASTOLIC BLOOD PRESSURE: 67 MMHG | HEIGHT: 64 IN | HEART RATE: 88 BPM | BODY MASS INDEX: 18.78 KG/M2 | WEIGHT: 110 LBS | TEMPERATURE: 98.3 F | RESPIRATION RATE: 1 BRPM | SYSTOLIC BLOOD PRESSURE: 138 MMHG

## 2020-09-29 PROCEDURE — 99283 EMERGENCY DEPT VISIT LOW MDM: CPT

## 2020-09-29 PROCEDURE — 6370000000 HC RX 637 (ALT 250 FOR IP): Performed by: EMERGENCY MEDICINE

## 2020-09-29 RX ORDER — LIDOCAINE 4 G/G
2 PATCH TOPICAL DAILY
Status: DISCONTINUED | OUTPATIENT
Start: 2020-09-29 | End: 2020-09-29 | Stop reason: HOSPADM

## 2020-09-29 RX ORDER — LIDOCAINE 50 MG/G
1 PATCH TOPICAL DAILY
Qty: 10 PATCH | Refills: 0 | Status: SHIPPED | OUTPATIENT
Start: 2020-09-29 | End: 2020-10-09 | Stop reason: ALTCHOICE

## 2020-09-29 RX ORDER — TIZANIDINE 4 MG/1
4 TABLET ORAL 3 TIMES DAILY PRN
Qty: 30 TABLET | Refills: 0 | Status: SHIPPED | OUTPATIENT
Start: 2020-09-29 | End: 2020-10-06

## 2020-09-29 RX ORDER — TIZANIDINE 4 MG/1
4 TABLET ORAL ONCE
Status: COMPLETED | OUTPATIENT
Start: 2020-09-29 | End: 2020-09-29

## 2020-09-29 RX ORDER — ACETAMINOPHEN 500 MG
1000 TABLET ORAL EVERY 6 HOURS PRN
Status: DISCONTINUED | OUTPATIENT
Start: 2020-09-29 | End: 2020-09-29 | Stop reason: HOSPADM

## 2020-09-29 RX ADMIN — ACETAMINOPHEN 1000 MG: 500 TABLET ORAL at 13:01

## 2020-09-29 RX ADMIN — TIZANIDINE 4 MG: 4 TABLET ORAL at 13:01

## 2020-09-29 ASSESSMENT — PAIN DESCRIPTION - FREQUENCY: FREQUENCY: CONTINUOUS

## 2020-09-29 ASSESSMENT — PAIN DESCRIPTION - LOCATION: LOCATION: BACK

## 2020-09-29 ASSESSMENT — PAIN DESCRIPTION - ORIENTATION: ORIENTATION: UPPER;MID

## 2020-09-29 ASSESSMENT — PAIN DESCRIPTION - PAIN TYPE: TYPE: ACUTE PAIN

## 2020-09-29 ASSESSMENT — PAIN SCALES - GENERAL: PAINLEVEL_OUTOF10: 10

## 2020-09-29 NOTE — ED PROVIDER NOTES
COLON, DIAGNOSTIC      HYSTERECTOMY      OTHER SURGICAL HISTORY      venous ablation, bilateral legs    TUBAL LIGATION       Family History   Problem Relation Age of Onset    Other Mother         Neurocardiogenic syncope    Heart Surgery Maternal Grandmother      Social History     Socioeconomic History    Marital status: Single     Spouse name: Not on file    Number of children: Not on file    Years of education: Not on file    Highest education level: Not on file   Occupational History    Not on file   Social Needs    Financial resource strain: Not on file    Food insecurity     Worry: Not on file     Inability: Not on file    Transportation needs     Medical: Not on file     Non-medical: Not on file   Tobacco Use    Smoking status: Current Every Day Smoker     Packs/day: 0.50     Years: 20.00     Pack years: 10.00     Types: Cigarettes    Smokeless tobacco: Never Used   Substance and Sexual Activity    Alcohol use: No    Drug use: Not Currently     Types: Methamphetamines     Comment: clean for 11 years, past hx of crack use    Sexual activity: Yes     Partners: Male   Lifestyle    Physical activity     Days per week: Not on file     Minutes per session: Not on file    Stress: Not on file   Relationships    Social connections     Talks on phone: Not on file     Gets together: Not on file     Attends Latter-day service: Not on file     Active member of club or organization: Not on file     Attends meetings of clubs or organizations: Not on file     Relationship status: Not on file    Intimate partner violence     Fear of current or ex partner: Not on file     Emotionally abused: Not on file     Physically abused: Not on file     Forced sexual activity: Not on file   Other Topics Concern    Not on file   Social History Narrative    Not on file     Current Facility-Administered Medications   Medication Dose Route Frequency Provider Last Rate Last Dose    acetaminophen (TYLENOL) tablet 1,000 Tardive dyskinisia    Pcn [Penicillins] Nausea And Vomiting and Other (See Comments)     HEADACHE    Reglan [Metoclopramide] Other (See Comments)     Tardive dyskinisia    Toradol [Ketorolac Tromethamine] Other (See Comments)     Tremors    Vistaril [Hydroxyzine Hcl] Other (See Comments)     dyskinsia      Zofran [Ondansetron Hcl] Other (See Comments)     Cramping and burning in stomach         ROS:    Review of Systems   Musculoskeletal: Positive for neck pain. All other systems reviewed and are negative. Nursing Notes Reviewed    Physical Exam:  ED Triage Vitals [09/29/20 1240]   Enc Vitals Group      /67      Pulse 88      Resp (!) 1      Temp 98.3 °F (36.8 °C)      Temp Source Oral      SpO2 94 %      Weight 110 lb (49.9 kg)      Height 5' 4\" (1.626 m)      Head Circumference       Peak Flow       Pain Score       Pain Loc       Pain Edu? Excl. in [address]? Physical Exam  Vitals signs and nursing note reviewed. Constitutional:       Appearance: She is well-developed. HENT:      Head: Normocephalic and atraumatic. Eyes:      Pupils: Pupils are equal, round, and reactive to light. Neck:      Musculoskeletal: Neck supple. Decreased range of motion. Muscular tenderness present. No edema, erythema, neck rigidity, crepitus, injury or torticollis. Thyroid: No thyroid mass. Trachea: Trachea normal.   Lymphadenopathy:      Cervical: No cervical adenopathy. Right cervical: No superficial or deep cervical adenopathy. Skin:     General: Skin is warm and dry. Neurological:      Mental Status: She is alert and oriented to person, place, and time. Psychiatric:         Mood and Affect: Mood is anxious. Speech: Speech normal.         Behavior: Behavior is agitated. Thought Content:  Thought content normal.         Cognition and Memory: Cognition normal.         I have reviewed and interpreted all of the currently available lab results from this visit (ifapplicable):  No results found for this visit on 09/29/20. Radiographs (if obtained):  [] The following radiograph wasinterpreted by myself in the absence of a radiologist:   [] Radiologist's Report Reviewed:  No orders to display         EKG (if obtained): (All EKG's are interpreted by myself in the absence of a cardiologist)    Chart review shows recent radiographs:  Xr Cervical Spine (2-3 Views)    Result Date: 9/24/2020  EXAMINATION: 3  XRAY VIEWS OF THE CERVICAL SPINE 9/24/2020 7:13 pm COMPARISON: Cervical spine series 09/20/2013 HISTORY: ORDERING SYSTEM PROVIDED HISTORY: pain. TECHNOLOGIST PROVIDED HISTORY: Reason for exam:-> pain. Reason for Exam: neck pain while sleeping, nki Acuity: Acute Type of Exam: Initial Relevant Medical/Surgical History: neck pain while sleeping, nki FINDINGS: 7 typical cervical vertebra present maintain normal height and alignment. Bony spinal canal and paravertebral soft tissues appear unremarkable. Minimal degenerative sequela is noted C4-5 and C5-6. The disc spaces and posterior elements appear otherwise well maintained throughout. The uncovertebral joints appear normally aligned on AP view. The atlantoaxial articulation appears normally aligned. No discrete odontoid fracture is evident. No acute osseous abnormality on radiographs of the cervical spine. Minimal degenerative sequela mid and lower cervical spine. If pain persists or worsens, then additional evaluation with CT or MRI may be indicated. Note that CT cervical spine is the study of choice for evaluation of acute trauma. MDM:      Patient has chronic myofascial pain to her cervical and upper T-spine region. I recommend continue massage chiropractic care heating pads for comfort.   She can try any of the topical over-the-counter medicines I recommended Lidoderm in the ED a short course of Zanaflex risk-benefit alternatives reviewed    My typical dicussion, presentation, and considerations for this patients' chief complaint, diagnosis, differential diagnosis, medications, medication use, medication safety and medication interactions have been explained and outlined to this patient for this patient encounter. I have stressed need for follow up and reexamination for this encounter and or return to the emergency department if any changes or any concern. I have discussed the findings of today's workup with the patient and present family members and have addressed their questions and concerns. Important warning signs as well as new or worsening symptoms which would necessitate immediate return to the ED were discussed. The plan is to discharge from the ED at this time, and the patient is in stable condition. The patient acknowledged understanding is agreeable with this plan. The patient and/or family and I have discussed the diagnosis and risks, and we agree with discharging home to follow-up with their primary care, specialist or referral doctor. Questions addressed. Disposition and follow-up agreed upon. Specific discharge instructions explained. We have discussed the symptoms which are most concerning that necessitate immediate return. We also discussed returning to the Emergency Department immediately if new or worsening symptoms occur. Clinical Impression:  1. Chronic neck pain      Disposition referral (if applicable):  HANNA Boudreaux - CNP  [address]  [phone]    Schedule an appointment as soon as possible for a visit   If symptoms worsen    Disposition medications (if applicable):  New Prescriptions    LIDOCAINE (LIDODERM) 5 %    Place 1 patch onto the skin daily for 10 days 12 hours on, 12 hours off.    TIZANIDINE (ZANAFLEX) 4 MG TABLET    Take 1 tablet by mouth 3 times daily as needed (neck pain / spasms)           Anaya Montague DO, FACEP      Comment: Please note this report has been produced using speech recognition software and [employer] errors related to that system including errors in grammar, punctuation, and spelling, as well as words and phrases that may be inappropriate. If there are any questions or concerns please feel free to contact thedictating provider for clarification.         Shahrzad Peterson,   09/29/20 [id number]

## 2020-09-29 NOTE — ED NOTES
Discharge instructions reviewed; patient verbalized understanding; patient transferred from ED via private vehicle by family.       Aysha Sood RN  09/29/20 1350

## 2020-09-30 ENCOUNTER — TELEPHONE (OUTPATIENT)
Dept: CARDIOLOGY CLINIC | Age: 45
End: 2020-09-30

## 2020-09-30 NOTE — TELEPHONE ENCOUNTER
PT CALLED STATING THE MONITOR TABS WHERE BREAKING HER OUT. SHE STATED THAT SHE HAD WORN IT FOR 3 1/2 DAYS OUT OF 30. SHE WANTED TO KNOW IF SHE SHOULD PUT IT BACK ON. I ADVISED HER THAT SHE SHOULD CALL [facility] AND GET THE TABS FOR SENSITIVE SKIN AND MAYBE PUT IT BACK ON AND TRY, BUT THAT IT WAS ENTIRELY UP TO HER.

## 2020-10-09 ENCOUNTER — OFFICE VISIT (OUTPATIENT)
Dept: FAMILY MEDICINE CLINIC | Age: 45
End: 2020-10-09
Payer: COMMERCIAL

## 2020-10-09 VITALS
WEIGHT: 102.8 LBS | TEMPERATURE: 97.2 F | SYSTOLIC BLOOD PRESSURE: 100 MMHG | RESPIRATION RATE: 16 BRPM | OXYGEN SATURATION: 97 % | DIASTOLIC BLOOD PRESSURE: 60 MMHG | HEART RATE: 84 BPM | BODY MASS INDEX: 17.65 KG/M2

## 2020-10-09 PROCEDURE — 99213 OFFICE O/P EST LOW 20 MIN: CPT | Performed by: NURSE PRACTITIONER

## 2020-10-09 PROCEDURE — G8484 FLU IMMUNIZE NO ADMIN: HCPCS | Performed by: NURSE PRACTITIONER

## 2020-10-09 PROCEDURE — G8419 CALC BMI OUT NRM PARAM NOF/U: HCPCS | Performed by: NURSE PRACTITIONER

## 2020-10-09 PROCEDURE — G8427 DOCREV CUR MEDS BY ELIG CLIN: HCPCS | Performed by: NURSE PRACTITIONER

## 2020-10-09 PROCEDURE — 4004F PT TOBACCO SCREEN RCVD TLK: CPT | Performed by: NURSE PRACTITIONER

## 2020-10-09 RX ORDER — MIDODRINE HYDROCHLORIDE 5 MG/1
5 TABLET ORAL 2 TIMES DAILY
Qty: 90 TABLET | Refills: 3 | Status: CANCELLED | OUTPATIENT
Start: 2020-10-09

## 2020-10-09 RX ORDER — METHOCARBAMOL 500 MG/1
500 TABLET, FILM COATED ORAL 4 TIMES DAILY
Qty: 28 TABLET | Refills: 0 | Status: SHIPPED | OUTPATIENT
Start: 2020-10-09 | End: 2020-10-16

## 2020-10-09 ASSESSMENT — PATIENT HEALTH QUESTIONNAIRE - PHQ9
SUM OF ALL RESPONSES TO PHQ QUESTIONS 1-9: 0
SUM OF ALL RESPONSES TO PHQ9 QUESTIONS 1 & 2: 0
SUM OF ALL RESPONSES TO PHQ QUESTIONS 1-9: 0
2. FEELING DOWN, DEPRESSED OR HOPELESS: 0
1. LITTLE INTEREST OR PLEASURE IN DOING THINGS: 0

## 2020-10-09 NOTE — PROGRESS NOTES
Subjective:      Chief Complaint   Patient presents with    Follow-Up from Hospital     patient is here to follow up after an ER visit on the 24 and 29 of september she is feeling better        HPI:  Eppie Goltz is a 39 y.o. female who presents today for ED follow up. She was seen on 09/24/2020 and 09/29/2020 with complaints of neck pain. Pain is chronic but she is experiencing acute symptoms after rolling over in bed. X-rays of c-spine were completed on 09/24/2020 and showed no acute osseous abnormality on radiographs of the cervical spine. Minimal degenerative sequela mid and lower cervical spine were noted. She was given for Lidoderm patches and Zanaflex prior to being discharged home    Pain has not improved. She continues to have pain and stiffness on the right side or her neck radiating down into her trapezius. She has reactions to multiple medications - she states that she isn't able to take NSAID's, steroids and states that applying heat, Flexeril and Zanaflex do not help. Going to the chiropractor helps but she isn't able to afford it right now. Past Medical History:   Diagnosis Date    ADHD     Arthritis     OSTEO ARTHITIS    Cholecystenteric fistula     COPD (chronic obstructive pulmonary disease) (HCC)     Degenerative disc disease     Fibromyalgia     H/O 24 hour EKG monitoring 02/16/2017      Sinus tach events , no major arrhythmias , follow up in office as routine     H/O echocardiogram 03/01/2017    EF 55-60% Normal LV structure  and systolic function.  H/O exercise stress test 03/01/2017    Normal stress test. Patient has physical deconditioning as she achieved target HR in 2 mins. Recommend to increase PO fluids intake and exercise training.      Osteopenia     Osteoporosis     PTSD (post-traumatic stress disorder)     S/P cholecystectomy 11/22/11    Schizo-affective schizophrenia ([facility] [address]     Sjoegren syndrome     Syncope     Thyroid disease     Venous insufficiency of leg 2012    Vertigo         Social History     Tobacco Use    Smoking status: Current Every Day Smoker     Packs/day: 0.50     Years: 20.00     Pack years: 10.00     Types: Cigarettes    Smokeless tobacco: Never Used   Substance Use Topics    Alcohol use: No        Review of Systems   Constitutional: Negative. Cardiovascular: Negative. Musculoskeletal: Positive for myalgias, neck pain and neck stiffness. Skin: Negative. Neurological: Negative. Objective:      /60 (Site: Left Upper Arm, Position: Sitting, Cuff Size: Medium Adult)   Pulse 84   Temp 97.2 °F (36.2 °C) (Temporal)   Resp 16   Wt 102 lb 12.8 oz (46.6 kg)   LMP 11/18/2004   SpO2 97%   BMI 17.65 kg/m²      Physical Exam  Vitals signs reviewed. Constitutional:       Appearance: Normal appearance. HENT:      Mouth/Throat:      Mouth: Mucous membranes are moist.      Pharynx: Oropharynx is clear. Eyes:      Extraocular Movements: Extraocular movements intact. Conjunctiva/sclera: Conjunctivae normal.      Pupils: Pupils are equal, round, and reactive to light. Neck:      Musculoskeletal: Neck supple. Decreased range of motion. Pain with movement and muscular tenderness present. Thyroid: No thyromegaly. Trachea: Trachea normal.        Comments: Muscle spasm  Cardiovascular:      Rate and Rhythm: Regular rhythm. Heart sounds: Normal heart sounds. Pulmonary:      Effort: Pulmonary effort is normal. No respiratory distress. Breath sounds: Normal breath sounds. No wheezing, rhonchi or rales. Skin:     General: Skin is warm and dry. Neurological:      Mental Status: She is oriented to person, place, and time. Sensory: No sensory deficit. Motor: No weakness. Deep Tendon Reflexes: Reflexes normal.            Assessment / Plan:      1. Neck pain  Apply heat for 20 minutes once an hour throughout the day, gentle stretches as discussed.    - methocarbamol (ROBAXIN) 500 MG tablet; Take 1 tablet by mouth 4 times daily for 7 days  Dispense: 28 tablet;  Refill: 0          HANNA Sanford - CNP

## 2020-10-12 RX ORDER — ROPINIROLE 2 MG/1
2 TABLET, FILM COATED ORAL NIGHTLY
Qty: 30 TABLET | Refills: 2 | Status: CANCELLED | OUTPATIENT
Start: 2020-10-12

## 2020-10-12 RX ORDER — PROMETHAZINE HYDROCHLORIDE 12.5 MG/1
12.5 TABLET ORAL EVERY 8 HOURS PRN
Qty: 21 TABLET | Refills: 0 | Status: SHIPPED | OUTPATIENT
Start: 2020-10-12 | End: 2020-10-19

## 2020-10-15 RX ORDER — ROPINIROLE 2 MG/1
2 TABLET, FILM COATED ORAL NIGHTLY
Qty: 30 TABLET | Refills: 2 | Status: SHIPPED | OUTPATIENT
Start: 2020-10-15 | End: 2021-01-18

## 2020-10-27 ENCOUNTER — TELEPHONE (OUTPATIENT)
Dept: CARDIOLOGY CLINIC | Age: 45
End: 2020-10-27

## 2020-10-27 PROCEDURE — 93272 ECG/REVIEW INTERPRET ONLY: CPT | Performed by: INTERNAL MEDICINE

## 2020-11-23 RX ORDER — TRAZODONE HYDROCHLORIDE 50 MG/1
TABLET ORAL
Qty: 30 TABLET | Refills: 4 | Status: SHIPPED | OUTPATIENT
Start: 2020-11-23 | End: 2021-04-27 | Stop reason: SDUPTHER

## 2021-01-15 DIAGNOSIS — G25.81 RESTLESS LEGS: ICD-10-CM

## 2021-01-18 RX ORDER — ROPINIROLE 2 MG/1
TABLET, FILM COATED ORAL
Qty: 30 TABLET | Refills: 1 | Status: SHIPPED | OUTPATIENT
Start: 2021-01-18 | End: 2021-03-18

## 2021-01-21 ENCOUNTER — VIRTUAL VISIT (OUTPATIENT)
Dept: FAMILY MEDICINE CLINIC | Age: 46
End: 2021-01-21
Payer: COMMERCIAL

## 2021-01-21 DIAGNOSIS — G43.711 INTRACTABLE CHRONIC MIGRAINE WITHOUT AURA AND WITH STATUS MIGRAINOSUS: Primary | ICD-10-CM

## 2021-01-21 DIAGNOSIS — G24.01 TARDIVE DYSKINESIA: ICD-10-CM

## 2021-01-21 PROCEDURE — 99213 OFFICE O/P EST LOW 20 MIN: CPT | Performed by: NURSE PRACTITIONER

## 2021-01-21 PROCEDURE — G8427 DOCREV CUR MEDS BY ELIG CLIN: HCPCS | Performed by: NURSE PRACTITIONER

## 2021-01-21 RX ORDER — CLONAZEPAM 0.5 MG/1
0.5 TABLET ORAL DAILY PRN
Qty: 5 TABLET | Refills: 0 | Status: SHIPPED | OUTPATIENT
Start: 2021-01-21 | End: 2021-06-15 | Stop reason: ALTCHOICE

## 2021-01-21 RX ORDER — FREMANEZUMAB-VFRM 225 MG/1.5ML
1.5 INJECTION SUBCUTANEOUS
Qty: 1 PEN | Refills: 5 | Status: SHIPPED | OUTPATIENT
Start: 2021-01-21 | End: 2021-02-20

## 2021-01-21 ASSESSMENT — ENCOUNTER SYMPTOMS
SINUS PRESSURE: 0
PHOTOPHOBIA: 1
CHEST TIGHTNESS: 0
ABDOMINAL PAIN: 0
SINUS PAIN: 0
SHORTNESS OF BREATH: 0
TROUBLE SWALLOWING: 0
SORE THROAT: 0
NAUSEA: 1

## 2021-01-21 NOTE — PROGRESS NOTES
2021    TELEHEALTH EVALUATION -- Audio/Visual (During WBZQR-48 public health emergency)    HPI:    Jaclyn Natarajan (:  1975) has requested an audio/video evaluation for the following concern(s):    The patient  presents with complaints of migraines x2 months. She has been taking Fioricet, Benadryl, Excedrin Migraine, Tylenol and Ibuprofen with no relief. She reports nausea, photophobia,  blurred vision, lightheaded/dizzy. She is extremely irritable. No weakness, numbness or tingling . States that headache starts frontally and radiates to her neck and shoulders. She states that this is similar to previous migraines. She reports that she was prescribed Imitrex 20 years ago and passed out. She reports allergies to amitriptyline, topamax, botox,  toradol and other NSAIDs, magnesium, compazine, zofran, reglan. She is concerned that valproate will worsen symptoms of TD. She reports due to the stress related to persistent migraine symptoms of TD are more pronounced. She is requesting Klonopin to help manage symptoms until her headaches are better controlled. Review of Systems   Constitutional: Positive for fatigue. Negative for activity change, appetite change, chills, diaphoresis, fever and unexpected weight change. HENT: Negative for congestion, sinus pressure, sinus pain, sore throat and trouble swallowing. Eyes: Positive for photophobia and visual disturbance. Respiratory: Negative for chest tightness and shortness of breath. Cardiovascular: Negative for chest pain, palpitations and leg swelling. Gastrointestinal: Positive for nausea. Negative for abdominal pain. Musculoskeletal: Positive for myalgias. Neurological: Positive for facial asymmetry and light-headedness. Psychiatric/Behavioral: Positive for agitation. The patient is nervous/anxious. Prior to Visit Medications    Medication Sig Taking?  Authorizing Provider Galcanezumab-gnlm 120 MG/ML SOAJ Inject 240 mg into the skin once for 1 dose Yes [facility] HANNA Oh CNP   clonazePAM (KLONOPIN) 0.5 MG tablet Take 1 tablet by mouth daily as needed (TD) for up to 5 days. Yes Hattie Domingo, APRN - CNP   Galcanezumab-gnlm 120 MG/ML SOAJ Inject 120 mg into the skin every 30 days Yes [facility] HANNA Oh CNP   rOPINIRole (REQUIP) 2 MG tablet TAKE ONE TABLET BY MOUTH ONCE NIGHTLY Yes [facility] HANNA Oh CNP   traZODone (DESYREL) 50 MG tablet TAKE ONE TABLET BY MOUTH ONCE NIGHTLY AS NEEDED FOR SLEEP Yes [facility] APR[facility] - CNP   midodrine (PROAMATINE) 5 MG tablet Take 1 tablet by mouth 2 times daily Yes Bibi Kaminski MD       Social History     Tobacco Use    Smoking status: Current Every Day Smoker     Packs/day: 0.50     Years: 20.00     Pack years: 10.00     Types: Cigarettes    Smokeless tobacco: Never Used   Substance Use Topics    Alcohol use: No    Drug use: Not Currently     Types: Methamphetamines     Comment: clean for 11 years, past hx of crack use        Allergies   Allergen Reactions    Rozerem [Ramelteon] Other (See Comments)     Increased symptoms of TD    Codeine Itching and Nausea And Vomiting    Imitrex [Sumatriptan] Itching and Nausea And Vomiting     PASSED OUT    Abilify [Aripiprazole] Other (See Comments)     Patient reports symptoms of TD    Amitriptyline     Botox [Onabotulinumtoxina] Other (See Comments)     Tardive dyskinesia    Compazine [Prochlorperazine Maleate] Other (See Comments)    Lamictal [Lamotrigine]      Constant movement    Meclizine     Topamax [Topiramate]      Made TD worse    Cephalexin Nausea And Vomiting and Other (See Comments)     ABDOMINAL PAIN FOR 2 WEEKS    Magnesium-Containing Compounds Other (See Comments)     Pt sts \"they called it the mags. I start twitching. \"     Meclizine Hcl Other (See Comments)     \"Makes me stutter and twitch. \"    Nsaids Other (See Comments)     Tardive dyskinisia  Pcn [Penicillins] Nausea And Vomiting and Other (See Comments)     HEADACHE    Reglan [Metoclopramide] Other (See Comments)     Tardive dyskinisia    Toradol [Ketorolac Tromethamine] Other (See Comments)     Tremors    Vistaril [Hydroxyzine Hcl] Other (See Comments)     dyskinsia      Zofran [Ondansetron Hcl] Other (See Comments)     Cramping and burning in stomach   ,   Past Medical History:   Diagnosis Date    ADHD     Arthritis     OSTEO ARTHITIS    Cholecystenteric fistula     COPD (chronic obstructive pulmonary disease) (HCC)     Degenerative disc disease     Fibromyalgia     H/O 24 hour EKG monitoring 02/16/2017      Sinus tach events , no major arrhythmias , follow up in office as routine     H/O echocardiogram 03/01/2017    EF 55-60% Normal LV structure  and systolic function.  H/O exercise stress test 03/01/2017    Normal stress test. Patient has physical deconditioning as she achieved target HR in 2 mins. Recommend to increase PO fluids intake and exercise training.  History of cardiac monitoring 09/21/2020    Normal 30-day event monitor with average heart rate of 63 lowest heart rate of 57 highest heart rate of 82 patient reported episodes of dizziness which did not correlate with any arrhythmia.   There were no episodes of tachycardia uneventful monitor however average heart rate and even the highest heart rate is on the lower side     Osteopenia     Osteoporosis     PTSD (post-traumatic stress disorder)     S/P cholecystectomy 11/22/11    Schizo-affective schizophrenia ([facility] [address]     Sjoegren syndrome     Syncope     Thyroid disease     Venous insufficiency of leg 2012    Vertigo        PHYSICAL EXAMINATION:  [ INSTRUCTIONS:  \"[x]\" Indicates a positive item  \"[]\" Indicates a negative item  -- DELETE ALL ITEMS NOT EXAMINED]  -   Constitutional: [x] Appears well-developed and well-nourished [x] No apparent distress      [] Abnormal-   Mental status [x] Alert and awake  [x] Oriented to person/place/time [x]Able to follow commands      Eyes:  EOM    [x]  Normal  [] Abnormal-  Sclera  [x]  Normal  [] Abnormal -         Discharge [x]  None visible  [] Abnormal -    HENT:   [x] Normocephalic, atraumatic. [] Abnormal   [x] Mouth/Throat: Mucous membranes are moist.     External Ears [x] Normal  [] Abnormal-     Neck: [x] No visualized mass     Pulmonary/Chest: [x] Respiratory effort normal.  [x] No visualized signs of difficulty breathing or respiratory distress        [] Abnormal-      Musculoskeletal:   [] Normal gait with no signs of ataxia         [x] Normal range of motion of neck        [] Abnormal-       Neurological:        [x] No Facial Asymmetry (Cranial nerve 7 motor function) (limited exam to video visit)          [x] No gaze palsy        [] Abnormal-         Skin:        [x] No significant exanthematous lesions or discoloration noted on facial skin         [] Abnormal-            Psychiatric:       [x] Normal Affect [x] No Hallucinations        [] Abnormal-         ASSESSMENT/PLAN:  1. Intractable chronic migraine without aura and with status migrainosus  Will prescribe Emgality as preventative migraine treatment due to allergies and potential SE associated with other migraine prevention medication. Some headaches are triggered by certain foods or things that you do. Recommend that patient keep a headache calendar and write down every time she has a headache and what she ate and did before it started. This may help us identify if there is anything she should avoid eating or doing. Some headache triggers include: stress, skipping meals or not eating enough, having too little or too much caffeine, not getting enough sleep, drinking alcohol, or eating certain foods such as red wine, aged cheese, and hot dogs. Patient encouraged to drink plenty of water and have her vision checked regularly. She was encouraged to go to the ED for new or worsening symptoms.   - Galcanezumab-gnlm 120 MG/ML SOAJ; Inject 240 mg into the skin once for 1 dose  Dispense: 1 pen; Refill: 0  - Galcanezumab-gnlm 120 MG/ML SOAJ; Inject 120 mg into the skin every 30 days  Dispense: 1 pen; Refill: 5    2. Tardive dyskinesia  - clonazePAM (KLONOPIN) 0.5 MG tablet; Take 1 tablet by mouth daily as needed (TD) for up to 5 days. Dispense: 5 tablet; Refill: 0    Controlled Substance Monitoring:    Acute and Chronic Pain Monitoring:   RX Monitoring 1/21/2021   Periodic Controlled Substance Monitoring Possible medication side effects, risk of tolerance/dependence & alternative treatments discussed. ;No signs of potential drug abuse or diversion identified. ;Assessed functional status. Return if symptoms worsen or fail to improve. Elmer Hester is a 39 y.o. female being evaluated by a Virtual Visit (video visit) encounter to address concerns as mentioned above. A caregiver was present when appropriate. Due to this being a TeleHealth encounter (During RJWED-44 public health emergency), evaluation of the following organ systems was limited: Vitals/Constitutional/EENT/Resp/CV/GI//MS/Neuro/Skin/Heme-Lymph-Imm. Pursuant to the emergency declaration under the [address] [place] [address], [address] [place] [address] authority and the [employer] Act, this Virtual Visit was conducted with patient's (and/or legal guardian's) consent, to reduce the patient's risk of exposure to COVID-19 and provide necessary medical care. The patient (and/or legal guardian) has also been advised to contact this office for worsening conditions or problems, and seek emergency medical treatment and/or call 911 if deemed necessary.      Patient identification was verified at the start of the visit: Yes    Total time spent on this encounter: 20 minutes Services were provided through a video synchronous discussion virtually to substitute for in-person clinic visit. Patient and provider were located at their individual homes. THIS VISIT WAS COMPLETED VIRTUALLY USING [employer]. ME    --HANNA Cerda CNP on 1/21/2021 at 1:54 PM    An electronic signature was used to authenticate this note.

## 2021-02-04 DIAGNOSIS — G43.711 INTRACTABLE CHRONIC MIGRAINE WITHOUT AURA AND WITH STATUS MIGRAINOSUS: Primary | ICD-10-CM

## 2021-02-05 ENCOUNTER — HOSPITAL ENCOUNTER (EMERGENCY)
Age: 46
Discharge: HOME | End: 2021-02-05
Attending: EMERGENCY MEDICINE
Payer: COMMERCIAL

## 2021-02-05 VITALS
HEIGHT: 64 IN | RESPIRATION RATE: 16 BRPM | OXYGEN SATURATION: 96 % | SYSTOLIC BLOOD PRESSURE: 98 MMHG | HEART RATE: 83 BPM | BODY MASS INDEX: 20.14 KG/M2 | TEMPERATURE: 99.1 F | WEIGHT: 118 LBS | DIASTOLIC BLOOD PRESSURE: 71 MMHG

## 2021-02-05 DIAGNOSIS — R19.7 NAUSEA VOMITING AND DIARRHEA: Primary | ICD-10-CM

## 2021-02-05 DIAGNOSIS — R11.2 NAUSEA VOMITING AND DIARRHEA: Primary | ICD-10-CM

## 2021-02-05 LAB
ALBUMIN SERPL-MCNC: 4.6 GM/DL (ref 3.4–5)
ALP BLD-CCNC: 122 IU/L (ref 40–129)
ALT SERPL-CCNC: 64 U/L (ref 10–40)
ANION GAP SERPL CALCULATED.3IONS-SCNC: 17 MMOL/L (ref 4–16)
AST SERPL-CCNC: 40 IU/L (ref 15–37)
BACTERIA: ABNORMAL /HPF
BASOPHILS ABSOLUTE: 0 K/CU MM
BASOPHILS RELATIVE PERCENT: 0.5 % (ref 0–1)
BILIRUB SERPL-MCNC: 0.4 MG/DL (ref 0–1)
BILIRUBIN URINE: NEGATIVE MG/DL
BLOOD, URINE: ABNORMAL
BUN BLDV-MCNC: 15 MG/DL (ref 6–23)
CALCIUM SERPL-MCNC: 9.7 MG/DL (ref 8.3–10.6)
CAST TYPE: ABNORMAL /HPF
CHLORIDE BLD-SCNC: 100 MMOL/L (ref 99–110)
CLARITY: CLEAR
CO2: 24 MMOL/L (ref 21–32)
COLOR: YELLOW
CREAT SERPL-MCNC: 0.9 MG/DL (ref 0.6–1.1)
CRYSTAL TYPE: NEGATIVE /HPF
DIFFERENTIAL TYPE: ABNORMAL
EOSINOPHILS ABSOLUTE: 0.1 K/CU MM
EOSINOPHILS RELATIVE PERCENT: 0.9 % (ref 0–3)
EPITHELIAL CELLS, UA: 4 /HPF
GFR AFRICAN AMERICAN: >60 ML/MIN/1.73M2
GFR NON-AFRICAN AMERICAN: >60 ML/MIN/1.73M2
GLUCOSE BLD-MCNC: 91 MG/DL (ref 70–99)
GLUCOSE, URINE: NEGATIVE MG/DL
HCT VFR BLD CALC: 47.6 % (ref 37–47)
HEMOGLOBIN: 15.5 GM/DL (ref 12.5–16)
IMMATURE NEUTROPHIL %: 0.3 % (ref 0–0.43)
KETONES, URINE: NEGATIVE MG/DL
LEUKOCYTE ESTERASE, URINE: NEGATIVE
LIPASE: 24 IU/L (ref 13–60)
LYMPHOCYTES ABSOLUTE: 1.6 K/CU MM
LYMPHOCYTES RELATIVE PERCENT: 24.5 % (ref 24–44)
MCH RBC QN AUTO: 30.5 PG (ref 27–31)
MCHC RBC AUTO-ENTMCNC: 32.6 % (ref 32–36)
MCV RBC AUTO: 93.7 FL (ref 78–100)
MONOCYTES ABSOLUTE: 0.7 K/CU MM
MONOCYTES RELATIVE PERCENT: 10.4 % (ref 0–4)
NITRITE URINE, QUANTITATIVE: NEGATIVE
PDW BLD-RTO: 12.1 % (ref 11.7–14.9)
PH, URINE: 6 (ref 5–8)
PLATELET # BLD: 185 K/CU MM (ref 140–440)
PMV BLD AUTO: 11.3 FL (ref 7.5–11.1)
POTASSIUM SERPL-SCNC: 3.7 MMOL/L (ref 3.5–5.1)
PROTEIN UA: NEGATIVE MG/DL
RBC # BLD: 5.08 M/CU MM (ref 4.2–5.4)
RBC URINE: 6 /HPF (ref 0–6)
SEGMENTED NEUTROPHILS ABSOLUTE COUNT: 4.2 K/CU MM
SEGMENTED NEUTROPHILS RELATIVE PERCENT: 63.4 % (ref 36–66)
SODIUM BLD-SCNC: 141 MMOL/L (ref 135–145)
SPECIFIC GRAVITY UA: 1.02 (ref 1–1.03)
TOTAL IMMATURE NEUTOROPHIL: 0.02 K/CU MM
TOTAL PROTEIN: 7.6 GM/DL (ref 6.4–8.2)
UROBILINOGEN, URINE: 0.2 MG/DL (ref 0.2–1)
WBC # BLD: 6.5 K/CU MM (ref 4–10.5)
WBC UA: 2 /HPF (ref 0–5)

## 2021-02-05 PROCEDURE — 85025 COMPLETE CBC W/AUTO DIFF WBC: CPT

## 2021-02-05 PROCEDURE — 80053 COMPREHEN METABOLIC PANEL: CPT

## 2021-02-05 PROCEDURE — 83690 ASSAY OF LIPASE: CPT

## 2021-02-05 PROCEDURE — 81001 URINALYSIS AUTO W/SCOPE: CPT

## 2021-02-05 PROCEDURE — 6360000002 HC RX W HCPCS: Performed by: EMERGENCY MEDICINE

## 2021-02-05 PROCEDURE — 96375 TX/PRO/DX INJ NEW DRUG ADDON: CPT

## 2021-02-05 PROCEDURE — 6370000000 HC RX 637 (ALT 250 FOR IP): Performed by: EMERGENCY MEDICINE

## 2021-02-05 PROCEDURE — 93010 ELECTROCARDIOGRAM REPORT: CPT | Performed by: INTERNAL MEDICINE

## 2021-02-05 PROCEDURE — C9113 INJ PANTOPRAZOLE SODIUM, VIA: HCPCS | Performed by: EMERGENCY MEDICINE

## 2021-02-05 PROCEDURE — 93005 ELECTROCARDIOGRAM TRACING: CPT | Performed by: EMERGENCY MEDICINE

## 2021-02-05 PROCEDURE — 96374 THER/PROPH/DIAG INJ IV PUSH: CPT

## 2021-02-05 PROCEDURE — 2580000003 HC RX 258: Performed by: EMERGENCY MEDICINE

## 2021-02-05 PROCEDURE — 99283 EMERGENCY DEPT VISIT LOW MDM: CPT

## 2021-02-05 RX ORDER — PANTOPRAZOLE SODIUM 40 MG/10ML
40 INJECTION, POWDER, LYOPHILIZED, FOR SOLUTION INTRAVENOUS ONCE
Status: COMPLETED | OUTPATIENT
Start: 2021-02-05 | End: 2021-02-05

## 2021-02-05 RX ORDER — DIPHENHYDRAMINE HYDROCHLORIDE 50 MG/ML
50 INJECTION INTRAMUSCULAR; INTRAVENOUS ONCE
Status: COMPLETED | OUTPATIENT
Start: 2021-02-05 | End: 2021-02-05

## 2021-02-05 RX ORDER — DEXAMETHASONE SODIUM PHOSPHATE 10 MG/ML
10 INJECTION, SOLUTION INTRAMUSCULAR; INTRAVENOUS ONCE
Status: COMPLETED | OUTPATIENT
Start: 2021-02-05 | End: 2021-02-05

## 2021-02-05 RX ORDER — DICYCLOMINE HCL 20 MG
20 TABLET ORAL 4 TIMES DAILY
Qty: 30 TABLET | Refills: 0 | Status: SHIPPED | OUTPATIENT
Start: 2021-02-05 | End: 2021-06-08

## 2021-02-05 RX ORDER — DICYCLOMINE HYDROCHLORIDE 10 MG/1
20 CAPSULE ORAL ONCE
Status: COMPLETED | OUTPATIENT
Start: 2021-02-05 | End: 2021-02-05

## 2021-02-05 RX ORDER — 0.9 % SODIUM CHLORIDE 0.9 %
1000 INTRAVENOUS SOLUTION INTRAVENOUS ONCE
Status: COMPLETED | OUTPATIENT
Start: 2021-02-05 | End: 2021-02-05

## 2021-02-05 RX ORDER — LOPERAMIDE HYDROCHLORIDE 2 MG/1
2 CAPSULE ORAL 4 TIMES DAILY PRN
Qty: 20 CAPSULE | Refills: 0 | Status: SHIPPED | OUTPATIENT
Start: 2021-02-05 | End: 2021-02-15

## 2021-02-05 RX ORDER — LOPERAMIDE HYDROCHLORIDE 2 MG/1
2 CAPSULE ORAL ONCE
Status: COMPLETED | OUTPATIENT
Start: 2021-02-05 | End: 2021-02-05

## 2021-02-05 RX ADMIN — DEXAMETHASONE SODIUM PHOSPHATE 10 MG: 10 INJECTION, SOLUTION INTRAMUSCULAR; INTRAVENOUS at 12:22

## 2021-02-05 RX ADMIN — LOPERAMIDE HYDROCHLORIDE 2 MG: 2 CAPSULE ORAL at 12:22

## 2021-02-05 RX ADMIN — SODIUM CHLORIDE 1000 ML: 9 INJECTION, SOLUTION INTRAVENOUS at 12:22

## 2021-02-05 RX ADMIN — DICYCLOMINE HYDROCHLORIDE 20 MG: 10 CAPSULE ORAL at 12:22

## 2021-02-05 RX ADMIN — DIPHENHYDRAMINE HYDROCHLORIDE 50 MG: 50 INJECTION, SOLUTION INTRAMUSCULAR; INTRAVENOUS at 12:22

## 2021-02-05 RX ADMIN — PANTOPRAZOLE SODIUM 40 MG: 40 INJECTION, POWDER, FOR SOLUTION INTRAVENOUS at 12:22

## 2021-02-05 ASSESSMENT — PAIN SCALES - GENERAL: PAINLEVEL_OUTOF10: 6

## 2021-02-05 ASSESSMENT — PAIN DESCRIPTION - PAIN TYPE: TYPE: ACUTE PAIN

## 2021-02-05 NOTE — ED PROVIDER NOTES
Triage Chief Complaint:   Diarrhea (states yesterday tried to lay down for a nap and body felt heavy and had pain to lower back. states then began with diarrhea and vomiting) and Emesis (states started yesterday)      [place]:  Domonique Carias is a 39 y.o. female that presents to the emergency department stating she took a nap yesterday when she woke up states she started having nausea, vomiting, diarrhea. Patient has a history of chronic neck pain and back pain and states these both her throat. Denies any fevers or chills. Denies any urinary symptoms. Denies any trauma. States she did eat as soon abundant prior to all of this starting. No sick contacts. States burning and cramping in her epigastrium. . States the vomiting has stopped early this morning but continues to have some diarrhea. Past Medical History:   Diagnosis Date    ADHD     Arthritis     OSTEO ARTHITIS    Cholecystenteric fistula     COPD (chronic obstructive pulmonary disease) (HCC)     Degenerative disc disease     Fibromyalgia     H/O 24 hour EKG monitoring 02/16/2017      Sinus tach events , no major arrhythmias , follow up in office as routine     H/O echocardiogram 03/01/2017    EF 55-60% Normal LV structure  and systolic function.  H/O exercise stress test 03/01/2017    Normal stress test. Patient has physical deconditioning as she achieved target HR in 2 mins. Recommend to increase PO fluids intake and exercise training.  Headache     History of cardiac monitoring 09/21/2020    Normal 30-day event monitor with average heart rate of 63 lowest heart rate of 57 highest heart rate of 82 patient reported episodes of dizziness which did not correlate with any arrhythmia.   There were no episodes of tachycardia uneventful monitor however average heart rate and even the highest heart rate is on the lower side     Osteopenia     Osteoporosis     PTSD (post-traumatic stress disorder)     S/P cholecystectomy 11/22/11    [facility]     Sjoegren syndrome     Syncope     Venous insufficiency of leg 2012    Vertigo      Past Surgical History:   Procedure Laterality Date    CHOLECYSTECTOMY  11/22/2011    laparoscopic    COLONOSCOPY      DENTAL SURGERY  8/9    ELBOW ARTHROSCOPY      bilateral elbows    ENDOSCOPY, COLON, DIAGNOSTIC      HYSTERECTOMY      OTHER SURGICAL HISTORY      venous ablation, bilateral legs    TUBAL LIGATION       Family History   Problem Relation Age of Onset    Other Mother         Neurocardiogenic syncope    Heart Surgery Maternal Grandmother      Social History     Socioeconomic History    Marital status: Single     Spouse name: Not on file    Number of children: Not on file    Years of education: Not on file    Highest education level: Not on file   Occupational History    Not on file   Social Needs    Financial resource strain: Not on file    Food insecurity     Worry: Not on file     Inability: Not on file    Transportation needs     Medical: Not on file     Non-medical: Not on file   Tobacco Use    Smoking status: Current Every Day Smoker     Packs/day: 0.50     Years: 20.00     Pack years: 10.00     Types: Cigarettes    Smokeless tobacco: Never Used   Substance and Sexual Activity    Alcohol use: No    Drug use: Not Currently     Types: Methamphetamines     Comment: clean for 11 years, past hx of crack use    Sexual activity: Yes     Partners: Male   Lifestyle    Physical activity     Days per week: Not on file     Minutes per session: Not on file    Stress: Not on file   Relationships    Social connections     Talks on phone: Not on file     Gets together: Not on file     Attends Nondenominational service: Not on file     Active member of club or organization: Not on file     Attends meetings of clubs or organizations: Not on file     Relationship status: Not on file    Intimate partner violence     Fear of current or ex partner: Not on file     Emotionally Nausea And Vomiting and Other (See Comments)     HEADACHE    Reglan [Metoclopramide] Other (See Comments)     Tardive dyskinisia    Toradol [Ketorolac Tromethamine] Other (See Comments)     Tremors    Vistaril [Hydroxyzine Hcl] Other (See Comments)     dyskinsia      Zofran [Ondansetron Hcl] Other (See Comments)     Cramping and burning in stomach     Nursing Notes Reviewed    ROS:  At least 10 systems reviewed and otherwise negative except as in the [address] Physical Exam:  ED Triage Vitals   Enc Vitals Group      BP       Pulse       Resp       Temp       Temp src       SpO2       Weight       Height       Head Circumference       Peak Flow       Pain Score       Pain Loc       Pain Edu? Excl. in [address]? My pulse oximetry interpretation is which is within the normal range    GENERAL APPEARANCE: Awake and alert. Cooperative. No acute distress. HEAD: Normocephalic. Atraumatic. EYES: EOM's grossly intact. Sclera anicteric. ENT: Mucous membranes are moist. Tolerates saliva. No trismus. NECK: Supple. No meningismus. Trachea midline. HEART: RRR. Radial pulses 2+. LUNGS: Respirations unlabored. CTAB. No wheezing or stridor. Speaks in full sentences. ABDOMEN: Soft. Non-tender. No guarding or rebound. Normal bowel sounds. EXTREMITIES: No acute deformities. SKIN: Warm and dry. NEUROLOGICAL: No gross facial drooping. Moves all 4 extremities spontaneously. Patient is awake, alert, oriented x4. Speaks in full sentences. PSYCHIATRIC: Normal mood.     I have reviewed and interpreted all of the currently available lab results from this visit (if applicable):  Results for orders placed or performed during the hospital encounter of 02/05/21   CBC auto diff   Result Value Ref Range    WBC 6.5 4.0 - 10.5 K/CU MM    RBC 5.08 4.2 - 5.4 M/CU MM    Hemoglobin 15.5 12.5 - 16.0 GM/DL    Hematocrit 47.6 (H) 37 - 47 %    MCV 93.7 78 - 100 FL    MCH 30.5 27 - 31 PG    MCHC 32.6 32.0 - 36.0 %    RDW 12.1 11.7 - 14.9 % Platelets 879 234 - 168 K/CU MM    MPV 11.3 (H) 7.5 - 11.1 FL    Differential Type AUTOMATED DIFFERENTIAL     Segs Relative 63.4 36 - 66 %    Lymphocytes % 24.5 24 - 44 %    Monocytes % 10.4 (H) 0 - 4 %    Eosinophils % 0.9 0 - 3 %    Basophils % 0.5 0 - 1 %    Segs Absolute 4.2 K/CU MM    Lymphocytes Absolute 1.6 K/CU MM    Monocytes Absolute 0.7 K/CU MM    Eosinophils Absolute 0.1 K/CU MM    Basophils Absolute 0.0 K/CU MM    Immature Neutrophil % 0.3 0 - 0.43 %    Total Immature Neutrophil 0.02 K/CU MM   Comprehensive Metabolic Panel w/ Reflex to MG   Result Value Ref Range    Sodium 141 135 - 145 MMOL/L    Potassium 3.7 3.5 - 5.1 MMOL/L    Chloride 100 99 - 110 mMol/L    CO2 24 21 - 32 MMOL/L    BUN 15 6 - 23 MG/DL    CREATININE 0.9 0.6 - 1.1 MG/DL    Glucose 91 70 - 99 MG/DL    Calcium 9.7 8.3 - 10.6 MG/DL    Albumin 4.6 3.4 - 5.0 GM/DL    Total Protein 7.6 6.4 - 8.2 GM/DL    Total Bilirubin 0.4 0.0 - 1.0 MG/DL    ALT 64 (H) 10 - 40 U/L    AST 40 (H) 15 - 37 IU/L    Alkaline Phosphatase 122 40 - 129 IU/L    GFR Non-African American >60 >60 mL/min/1.73m2    GFR African American >60 >60 mL/min/1.73m2    Anion Gap 17 (H) 4 - 16   Lipase   Result Value Ref Range    Lipase 24 13 - 60 IU/L   Urinalysis with microscopic   Result Value Ref Range    Color, UA YELLOW YELLOW    Clarity, UA CLEAR CLEAR    Glucose, Urine NEGATIVE NEGATIVE MG/DL    Bilirubin Urine NEGATIVE NEGATIVE MG/DL    Ketones, Urine NEGATIVE NEGATIVE MG/DL    Specific Gravity, UA 1.025 1.001 - 1.035    Blood, Urine MODERATE NUMBER OR AMOUNT OBSERVED (A) NEGATIVE    pH, Urine 6.0 5.0 - 8.0    Protein, UA NEGATIVE NEGATIVE MG/DL    Urobilinogen, Urine 0.2 0.2 - 1.0 MG/DL    Nitrite Urine, Quantitative NEGATIVE NEGATIVE    Leukocyte Esterase, Urine NEGATIVE NEGATIVE    RBC, UA 6 0 - 6 /HPF    WBC, UA 2 0 - 5 /HPF    Epithelial Cells, UA 4 /HPF    Cast Type NO CAST FORMS SEEN NO CAST FORMS SEEN /HPF    Bacteria, UA RARE (A) NEGATIVE /HPF    Crystal Type NEGATIVE NEGATIVE /HPF   EKG 12 Lead   Result Value Ref Range    Ventricular Rate 82 BPM    Atrial Rate 82 BPM    P-R Interval 136 ms    QRS Duration 80 ms    Q-T Interval 378 ms    QTc Calculation (Bazett) 441 ms    P Axis 79 degrees    R Axis 85 degrees    T Axis 33 degrees    Diagnosis       Normal sinus rhythm  Possible Left atrial enlargement  T wave abnormality, consider lateral ischemia  Abnormal ECG  When compared with ECG of 09-JAN-2020 13:12,  T wave inversion now evident in Inferior leads  T wave inversion now evident in Lateral leads          Radiographs:  [] Radiologist's Wet Read Report Reviewed:     [] Discussed with Radiologist:     [] The following radiograph was interpreted by myself in the absence of a radiologist:     EKG: (All EKG's are interpreted by myself in the absence of a cardiologist)  The Ekg interpreted by me shows  normal sinus rhythm with a rate of 82  Axis is   Normal  QTc is  normal  Intervals and Durations are unremarkable. ST Segments: depression in  v5, v6, II, III and aVf  No significant change from prior EKG dated 9-      MDM:  Vitals - BP is 98/71, afebrile, heart rate in 80s, sats normal..  Patient has a long list of allergies so started on Benadryl, fluids, Protonix, steroids IV as well as Imodium and Bentyl p.o. EKG shows no acute changes. CBC shows a white count of 6.5. Hemoglobin of 15.5. Electrolytes are normal.  Lipase normal. Urinalysis of nitrites, negative leuks, 6 RBCs, 2 WBCs. Patient's vital signs stable. Tolerating p.o. Will discharge with Bentyl and Imodium. Follow-up PCP. James Nagel Clinical Impression:  1.  Nausea vomiting and diarrhea        Disposition Vitals:  [unfilled], [unfilled], [unfilled], [unfilled]    Disposition referral (if applicable):  HANNA Casillas - KEN[facility] [address]  [phone]            Disposition medications (if applicable):  New Prescriptions    DICYCLOMINE (BENTYL) 20 MG TABLET

## 2021-02-05 NOTE — ED NOTES
Discharge instructions reviewed; patient verbalized understanding; patient transferred from ED via private vehicle by family.       Daniela Huston RN  02/05/21 [id number]

## 2021-02-08 LAB
EKG ATRIAL RATE: 82 BPM
EKG DIAGNOSIS: NORMAL
EKG P AXIS: 79 DEGREES
EKG P-R INTERVAL: 136 MS
EKG Q-T INTERVAL: 378 MS
EKG QRS DURATION: 80 MS
EKG QTC CALCULATION (BAZETT): 441 MS
EKG R AXIS: 85 DEGREES
EKG T AXIS: 33 DEGREES
EKG VENTRICULAR RATE: 82 BPM

## 2021-03-16 DIAGNOSIS — G25.81 RESTLESS LEGS: ICD-10-CM

## 2021-03-18 RX ORDER — ROPINIROLE 2 MG/1
TABLET, FILM COATED ORAL
Qty: 30 TABLET | Refills: 0 | Status: SHIPPED | OUTPATIENT
Start: 2021-03-18 | End: 2021-04-19 | Stop reason: SDUPTHER

## 2021-04-19 DIAGNOSIS — G25.81 RESTLESS LEGS: ICD-10-CM

## 2021-04-19 RX ORDER — ROPINIROLE 2 MG/1
2 TABLET, FILM COATED ORAL NIGHTLY
Qty: 30 TABLET | Refills: 2 | Status: SHIPPED | OUTPATIENT
Start: 2021-04-19 | End: 2021-07-20

## 2021-04-27 RX ORDER — TRAZODONE HYDROCHLORIDE 50 MG/1
50 TABLET ORAL NIGHTLY
Qty: 30 TABLET | Refills: 5 | Status: SHIPPED
Start: 2021-04-27 | End: 2021-07-27 | Stop reason: DRUGHIGH

## 2021-06-04 ENCOUNTER — HOSPITAL ENCOUNTER (EMERGENCY)
Age: 46
Discharge: HOME | End: 2021-06-04
Attending: EMERGENCY MEDICINE
Payer: COMMERCIAL

## 2021-06-04 VITALS
OXYGEN SATURATION: 97 % | BODY MASS INDEX: 19.12 KG/M2 | WEIGHT: 112 LBS | RESPIRATION RATE: 21 BRPM | TEMPERATURE: 98.4 F | DIASTOLIC BLOOD PRESSURE: 82 MMHG | SYSTOLIC BLOOD PRESSURE: 133 MMHG | HEIGHT: 64 IN | HEART RATE: 70 BPM

## 2021-06-04 DIAGNOSIS — S06.0X0A CONCUSSION WITHOUT LOSS OF CONSCIOUSNESS, INITIAL ENCOUNTER: Primary | ICD-10-CM

## 2021-06-04 DIAGNOSIS — S00.03XA CONTUSION OF SCALP, INITIAL ENCOUNTER: ICD-10-CM

## 2021-06-04 DIAGNOSIS — S16.1XXA NECK STRAIN, INITIAL ENCOUNTER: ICD-10-CM

## 2021-06-04 PROCEDURE — 99285 EMERGENCY DEPT VISIT HI MDM: CPT

## 2021-06-04 RX ORDER — LANOLIN ALCOHOL/MO/W.PET/CERES
1000 CREAM (GRAM) TOPICAL DAILY
COMMUNITY

## 2021-06-04 RX ORDER — PREGABALIN 50 MG/1
50 CAPSULE ORAL EVERY EVENING
COMMUNITY
End: 2021-06-29 | Stop reason: SDUPTHER

## 2021-06-04 RX ORDER — DIVALPROEX SODIUM 250 MG/1
250 TABLET, EXTENDED RELEASE ORAL NIGHTLY
COMMUNITY
End: 2021-06-08

## 2021-06-04 ASSESSMENT — PAIN DESCRIPTION - ORIENTATION: ORIENTATION: POSTERIOR

## 2021-06-04 ASSESSMENT — PAIN DESCRIPTION - ONSET: ONSET: SUDDEN

## 2021-06-04 ASSESSMENT — PAIN DESCRIPTION - DESCRIPTORS: DESCRIPTORS: HEADACHE

## 2021-06-04 ASSESSMENT — PAIN DESCRIPTION - PROGRESSION: CLINICAL_PROGRESSION: GRADUALLY WORSENING

## 2021-06-04 ASSESSMENT — PAIN DESCRIPTION - FREQUENCY: FREQUENCY: CONTINUOUS

## 2021-06-04 ASSESSMENT — PAIN - FUNCTIONAL ASSESSMENT: PAIN_FUNCTIONAL_ASSESSMENT: ACTIVITIES ARE NOT PREVENTED

## 2021-06-04 ASSESSMENT — PAIN SCALES - GENERAL: PAINLEVEL_OUTOF10: 8

## 2021-06-04 ASSESSMENT — PAIN DESCRIPTION - PAIN TYPE: TYPE: ACUTE PAIN

## 2021-06-04 ASSESSMENT — PAIN DESCRIPTION - LOCATION: LOCATION: HEAD;NECK

## 2021-06-05 NOTE — ED PROVIDER NOTES
Emergency Department Encounter    Patient: Pankaj West  MRN: [id number]  : 1975  Date of Evaluation: 2021  ED Provider:  Savanna De La Cruz MD    Triage Chief Complaint:   Fall and Head Injury    [place]:  Pankaj West is a 39 y.o. female that presents status post fall. Patient reports that her dog jumped up on her knocking her backwards. Patient reports she was standing up at the time. She fell backwards and hit her head on the couch. She reports headache and neck pain at this point in time. She denies any loss of consciousness. No nausea or vomiting. She is not on any blood thinners. No bleeding. No difficulty walking. She denies any other injuries. No upper or lower extremity pain. No visual changes, dizziness, lightheadedness. No chest pain or abdominal pain. Shortness of breath. ROS - see HPI, below listed is current ROS at time of my eval:  General:  no weakness  Eyes:  No recent vison changes  ENT:  No sore throat, no nasal congestion, no hearing changes  Cardiovascular:  No chest pain  Respiratory:  No shortness of breath  Gastrointestinal:  No pain, no nausea, no vomiting  Musculoskeletal:  No muscle pain, no joint pain  Skin:  No rash, No wounds  Neurologic:  No speech problems, no headache, no extremity numbness, no extremity tingling, no extremity weakness  Genitourinary: no hematuria  Extremities:  no edema, no pain    Past Medical History:   Diagnosis Date    ADHD     Arthritis     OSTEO ARTHITIS    Cholecystenteric fistula     COPD (chronic obstructive pulmonary disease) (HCC)     Degenerative disc disease     Fibromyalgia     H/O 24 hour EKG monitoring 2017      Sinus tach events , no major arrhythmias , follow up in office as routine     H/O echocardiogram 2017    EF 55-60% Normal LV structure  and systolic function.      H/O exercise stress test 2017    Normal stress test. Patient has physical deconditioning as she achieved target HR in 2 mins. Recommend to increase PO fluids intake and exercise training.  Headache     History of cardiac monitoring 09/21/2020    Normal 30-day event monitor with average heart rate of 63 lowest heart rate of 57 highest heart rate of 82 patient reported episodes of dizziness which did not correlate with any arrhythmia.   There were no episodes of tachycardia uneventful monitor however average heart rate and even the highest heart rate is on the lower side     Osteopenia     Osteoporosis     PTSD (post-traumatic stress disorder)     S/P cholecystectomy 11/22/11    Schizo-affective schizophrenia ([facility] [address]     Sjoegren syndrome     Syncope     Venous insufficiency of leg 2012    Vertigo      Past Surgical History:   Procedure Laterality Date    CHOLECYSTECTOMY  11/22/2011    laparoscopic    COLONOSCOPY      DENTAL SURGERY  8/9    ELBOW ARTHROSCOPY      bilateral elbows    ENDOSCOPY, COLON, DIAGNOSTIC      HYSTERECTOMY      OTHER SURGICAL HISTORY      venous ablation, bilateral legs    TUBAL LIGATION       Family History   Problem Relation Age of Onset    Other Mother         Neurocardiogenic syncope    Heart Surgery Maternal Grandmother      Social History     Socioeconomic History    Marital status: Single     Spouse name: Not on file    Number of children: Not on file    Years of education: Not on file    Highest education level: Not on file   Occupational History    Not on file   Tobacco Use    Smoking status: Current Every Day Smoker     Packs/day: 0.50     Years: 20.00     Pack years: 10.00     Types: Cigarettes    Smokeless tobacco: Never Used   Vaping Use    Vaping Use: Former    Substances: Always   Substance and Sexual Activity    Alcohol use: No    Drug use: Not Currently     Types: Methamphetamines     Comment: clean for 11 years, past hx of crack use    Sexual activity: Yes     Partners: Male   Other Topics Concern    Not on file   Social History Narrative    Not on file Social Determinants of Health     Financial Resource Strain:     Difficulty of Paying Living Expenses:    Food Insecurity:     Worried About Running Out of Food in the Last Year:     [address] in the Last Year:    Transportation Needs:     Lack of Transportation (Medical):  Lack of Transportation (Non-Medical):    Physical Activity:     Days of Exercise per Week:     Minutes of Exercise per Session:    Stress:     Feeling of Stress :    Social Connections:     Frequency of Communication with Friends and Family:     Frequency of Social Gatherings with Friends and Family:     Attends Baptism Services:     Active Member of Clubs or Organizations:     Attends Club or Organization Meetings:     Marital Status:    Intimate Partner Violence:     Fear of Current or Ex-Partner:     Emotionally Abused:     Physically Abused:     Sexually Abused:      No current facility-administered medications for this encounter. Current Outpatient Medications   Medication Sig Dispense Refill    pregabalin (LYRICA) 50 MG capsule Take 50 mg by mouth every evening.  divalproex (DEPAKOTE ER) 250 MG extended release tablet Take 250 mg by mouth nightly      vitamin B-12 (CYANOCOBALAMIN) 1000 MCG tablet Take 1,000 mcg by mouth daily      traZODone (DESYREL) 50 MG tablet Take 1 tablet by mouth nightly 30 tablet 5    rOPINIRole (REQUIP) 2 MG tablet Take 1 tablet by mouth nightly 30 tablet 2    dicyclomine (BENTYL) 20 MG tablet Take 1 tablet by mouth 4 times daily 30 tablet 0    clonazePAM (KLONOPIN) 0.5 MG tablet Take 1 tablet by mouth daily as needed (TD) for up to 5 days.  5 tablet 0     Allergies   Allergen Reactions    Rozerem [Ramelteon] Other (See Comments)     Increased symptoms of TD    Codeine Itching and Nausea And Vomiting    Imitrex [Sumatriptan] Itching and Nausea And Vomiting     PASSED OUT    Abilify [Aripiprazole] Other (See Comments)     Patient reports symptoms of TD    Amitriptyline     Botox [Onabotulinumtoxina] Other (See Comments)     Tardive dyskinesia    Compazine [Prochlorperazine Maleate] Other (See Comments)    Lamictal [Lamotrigine]      Constant movement    Meclizine     Topamax [Topiramate]      Made TD worse    Cephalexin Nausea And Vomiting and Other (See Comments)     ABDOMINAL PAIN FOR 2 WEEKS    Magnesium-Containing Compounds Other (See Comments)     Pt sts \"they called it the mags. I start twitching. \"     Meclizine Hcl Other (See Comments)     \"Makes me stutter and twitch. \"    Nsaids Other (See Comments)     Tardive dyskinisia    Pcn [Penicillins] Nausea And Vomiting and Other (See Comments)     HEADACHE    Reglan [Metoclopramide] Other (See Comments)     Tardive dyskinisia    Toradol [Ketorolac Tromethamine] Other (See Comments)     Tremors    Vistaril [Hydroxyzine Hcl] Other (See Comments)     dyskinsia      Zofran [Ondansetron Hcl] Other (See Comments)     Cramping and burning in stomach       Nursing Notes Reviewed    Physical Exam:  Triage VS:    ED Triage Vitals [06/04/21 2207]   Enc Vitals Group      /82      Pulse 70      Resp 21      Temp 98.4 °F (36.9 °C)      Temp Source Oral      SpO2 97 %      Weight 112 lb (50.8 kg)      Height 5' 4\" (1.626 m)      Head Circumference       Peak Flow       Pain Score       Pain Loc       Pain Edu? Excl. in [address]? My pulse ox interpretation is - normal    Primary exam:  Airway: Intact. Speaking in normal voice. Breathing: Spontaneous. Equal chest rise and breath sounds. Circulation: Heart RRR. Pulses 2+. Secondary exam:   GENERAL APPEARANCE: Awake and alert. Cooperative. No acute distress. HEAD: Normocephalic. Atraumatic. No depressed skull fractures. EYES: EOM's grossly intact. Sclera anicteric. No [place] Eyes. ENT: Oral mucosa moist, Tolerates saliva. No Peterson sign. NECK: Trachea midline, no obvious masses  CHEST/LUNGS: Non-tender. Lungs clear to auscultation bilaterally. Respirations nonlabored. HEART: Regular rate and rhythm. ABDOMEN: Soft. Non-distended. Non-tender. No guarding or rebound. Normal bowel sounds. BACK:  No cervical thoracic or lumbar midline tenderness to palpation, step-offs or deformities. Mild cervical paraspinal muscle tenderness to palpation from occiput to T2. Edema or ecchymoses. No overlying rash. EXTREMITIES: Upper and lower extremities have no acute deformities and they are non-tender to palpation. Good ROM. SKIN: Warm and dry. No acute wounds  NEUROLOGICAL: Alert and oriented. No gross facial drooping. Strength 5/5 in upper and lower extremities Light touch sensation intact throughout. Perfusion:  pulses intact and equal in all extremities      I have reviewed and interpreted all of the currently available lab results from this visit (if applicable):  No results found for this visit on 06/04/21. Radiographs (if obtained):  Radiologist's Report Reviewed:  No results found. MDM:  24-year-old female status post fall with signs and symptoms consistent with mild concussion, scalp contusion and neck strain. Patient has had a large amount of radiation of her lifetime. I do not feel that imaging would be beneficial at this point in time. Strict ED return precautions given. Discussed symptomatic care. SHe was discharged in good condition with stable vitals. Clinical Impression:  1. Concussion without loss of consciousness, initial encounter    2. Contusion of scalp, initial encounter    3. Neck strain, initial encounter      Disposition referral (if applicable):  No follow-up provider specified. Disposition medications (if applicable):  New Prescriptions    No medications on file     You were evaluated in the emergency department after a closed head injury/fall. You most likely do have a mild concussion.   I do not feel imaging would be beneficial at this point time especially given the fact that you have had such a large amount of

## 2021-06-05 NOTE — ED TRIAGE NOTES
Pt arrival via walk in. Pt reports her dog knocked her down tonight when it got excited, pt reports hitting head on back of couch, c/o headache and neck pain at this time. Pt denies LOC, is not on blood thinners. Pt alert and oriented x4, wheelchair to triage, skin pale warm dry, respirations even and unlabored.

## 2021-06-08 ENCOUNTER — OFFICE VISIT (OUTPATIENT)
Dept: FAMILY MEDICINE CLINIC | Age: 46
End: 2021-06-08
Payer: COMMERCIAL

## 2021-06-08 VITALS
BODY MASS INDEX: 19.4 KG/M2 | RESPIRATION RATE: 14 BRPM | WEIGHT: 113 LBS | OXYGEN SATURATION: 97 % | DIASTOLIC BLOOD PRESSURE: 58 MMHG | TEMPERATURE: 98.3 F | HEART RATE: 65 BPM | SYSTOLIC BLOOD PRESSURE: 100 MMHG

## 2021-06-08 DIAGNOSIS — S16.1XXD STRAIN OF NECK MUSCLE, SUBSEQUENT ENCOUNTER: Primary | ICD-10-CM

## 2021-06-08 PROCEDURE — 99213 OFFICE O/P EST LOW 20 MIN: CPT | Performed by: NURSE PRACTITIONER

## 2021-06-08 PROCEDURE — G8427 DOCREV CUR MEDS BY ELIG CLIN: HCPCS | Performed by: NURSE PRACTITIONER

## 2021-06-08 PROCEDURE — 4004F PT TOBACCO SCREEN RCVD TLK: CPT | Performed by: NURSE PRACTITIONER

## 2021-06-08 PROCEDURE — G8420 CALC BMI NORM PARAMETERS: HCPCS | Performed by: NURSE PRACTITIONER

## 2021-06-08 RX ORDER — CYCLOBENZAPRINE HCL 5 MG
5 TABLET ORAL 2 TIMES DAILY PRN
Qty: 10 TABLET | Refills: 0 | Status: SHIPPED | OUTPATIENT
Start: 2021-06-08 | End: 2021-06-18

## 2021-06-08 RX ORDER — DIVALPROEX SODIUM 250 MG/1
TABLET, DELAYED RELEASE ORAL
COMMUNITY
Start: 2021-04-08 | End: 2021-06-18

## 2021-06-08 SDOH — ECONOMIC STABILITY: FOOD INSECURITY: WITHIN THE PAST 12 MONTHS, THE FOOD YOU BOUGHT JUST DIDN'T LAST AND YOU DIDN'T HAVE MONEY TO GET MORE.: SOMETIMES TRUE

## 2021-06-08 SDOH — ECONOMIC STABILITY: FOOD INSECURITY: WITHIN THE PAST 12 MONTHS, YOU WORRIED THAT YOUR FOOD WOULD RUN OUT BEFORE YOU GOT MONEY TO BUY MORE.: SOMETIMES TRUE

## 2021-06-08 ASSESSMENT — PATIENT HEALTH QUESTIONNAIRE - PHQ9
SUM OF ALL RESPONSES TO PHQ QUESTIONS 1-9: 11
10. IF YOU CHECKED OFF ANY PROBLEMS, HOW DIFFICULT HAVE THESE PROBLEMS MADE IT FOR YOU TO DO YOUR WORK, TAKE CARE OF THINGS AT HOME, OR GET ALONG WITH OTHER PEOPLE: 1
SUM OF ALL RESPONSES TO PHQ QUESTIONS 1-9: 11
6. FEELING BAD ABOUT YOURSELF - OR THAT YOU ARE A FAILURE OR HAVE LET YOURSELF OR YOUR FAMILY DOWN: 0
9. THOUGHTS THAT YOU WOULD BE BETTER OFF DEAD, OR OF HURTING YOURSELF: 0
7. TROUBLE CONCENTRATING ON THINGS, SUCH AS READING THE NEWSPAPER OR WATCHING TELEVISION: 1
4. FEELING TIRED OR HAVING LITTLE ENERGY: 2
5. POOR APPETITE OR OVEREATING: 2
3. TROUBLE FALLING OR STAYING ASLEEP: 3
8. MOVING OR SPEAKING SO SLOWLY THAT OTHER PEOPLE COULD HAVE NOTICED. OR THE OPPOSITE, BEING SO FIGETY OR RESTLESS THAT YOU HAVE BEEN MOVING AROUND A LOT MORE THAN USUAL: 0
2. FEELING DOWN, DEPRESSED OR HOPELESS: 1
SUM OF ALL RESPONSES TO PHQ QUESTIONS 1-9: 11
1. LITTLE INTEREST OR PLEASURE IN DOING THINGS: 2
SUM OF ALL RESPONSES TO PHQ9 QUESTIONS 1 & 2: 3

## 2021-06-08 ASSESSMENT — COLUMBIA-SUICIDE SEVERITY RATING SCALE - C-SSRS
2. HAVE YOU ACTUALLY HAD ANY THOUGHTS OF KILLING YOURSELF?: NO
6. HAVE YOU EVER DONE ANYTHING, STARTED TO DO ANYTHING, OR PREPARED TO DO ANYTHING TO END YOUR LIFE?: NO
5. HAVE YOU STARTED TO WORK OUT OR WORKED OUT THE DETAILS OF HOW TO KILL YOURSELF? DO YOU INTEND TO CARRY OUT THIS PLAN?: NO
1. WITHIN THE PAST MONTH, HAVE YOU WISHED YOU WERE DEAD OR WISHED YOU COULD GO TO SLEEP AND NOT WAKE UP?: NO
4. HAVE YOU HAD THESE THOUGHTS AND HAD SOME INTENTION OF ACTING ON THEM?: NO
3. HAVE YOU BEEN THINKING ABOUT HOW YOU MIGHT KILL YOURSELF?: NO
7. DID THIS OCCUR IN THE LAST THREE MONTHS: NO

## 2021-06-08 ASSESSMENT — SOCIAL DETERMINANTS OF HEALTH (SDOH): HOW HARD IS IT FOR YOU TO PAY FOR THE VERY BASICS LIKE FOOD, HOUSING, MEDICAL CARE, AND HEATING?: NOT HARD AT ALL

## 2021-06-08 NOTE — PROGRESS NOTES
Subjective:      Chief Complaint   Patient presents with    Follow-up     Patient hit head on bottom of couch after sons dog jumped on her and she fell.  Back Pain     Patient is now having pain in her upper back       HPI:  Nini Coy is a 39 y.o. female who presents today for ED follow up. She presented to [facility] ED on 06/04/21 after being knocked over by her dog. She reports that she fell backwards and hit the back of her head on the couch. She did not lose consciousness. She was treated for a neck strain while in the ED and discharged home with instructions to take tylenol as needed for pain. She continues to have pain and tightness at the base of her neck and her upper back. She has some decreased ROM in her c-spine due to pain. She denies numbness, tingling or weakness of upper extremities. She has been taking Tylenol with no relief. Past Medical History:   Diagnosis Date    ADHD     Arthritis     OSTEO ARTHITIS    Cholecystenteric fistula     COPD (chronic obstructive pulmonary disease) (HCC)     Degenerative disc disease     Fibromyalgia     H/O 24 hour EKG monitoring 02/16/2017      Sinus tach events , no major arrhythmias , follow up in office as routine     H/O echocardiogram 03/01/2017    EF 55-60% Normal LV structure  and systolic function.  H/O exercise stress test 03/01/2017    Normal stress test. Patient has physical deconditioning as she achieved target HR in 2 mins. Recommend to increase PO fluids intake and exercise training.  Headache     History of cardiac monitoring 09/21/2020    Normal 30-day event monitor with average heart rate of 63 lowest heart rate of 57 highest heart rate of 82 patient reported episodes of dizziness which did not correlate with any arrhythmia.   There were no episodes of tachycardia uneventful monitor however average heart rate and even the highest heart rate is on the lower side     Osteopenia     Osteoporosis     PTSD (post-traumatic stress disorder)     S/P cholecystectomy 11/22/11    [facility]     Sjoegren syndrome     Syncope     Venous insufficiency of leg 2012    Vertigo         Social History     Tobacco Use    Smoking status: Current Every Day Smoker     Packs/day: 0.50     Years: 20.00     Pack years: 10.00     Types: Cigarettes    Smokeless tobacco: Never Used   Substance Use Topics    Alcohol use: No        Review of Systems   Constitutional: Negative for activity change, appetite change, chills, fever and unexpected weight change. Eyes: Negative for visual disturbance. Respiratory: Negative for chest tightness and shortness of breath. Cardiovascular: Negative for chest pain and leg swelling. Musculoskeletal: Positive for neck pain and neck stiffness. Neurological: Negative for dizziness, weakness, numbness and headaches. Objective:      BP (!) 100/58 (Site: Right Upper Arm, Position: Sitting, Cuff Size: Medium Adult)   Pulse 65   Temp 98.3 °F (36.8 °C) (Temporal)   Resp 14   Wt 113 lb (51.3 kg)   LMP 11/18/2004   SpO2 97%   BMI 19.40 kg/m²      Physical Exam  Vitals reviewed. Constitutional:       General: She is not in acute distress. Appearance: Normal appearance. HENT:      Head: Normocephalic and atraumatic. Right Ear: External ear normal.      Left Ear: External ear normal.      Mouth/Throat:      Mouth: Mucous membranes are moist.      Pharynx: Oropharynx is clear. Eyes:      Extraocular Movements: Extraocular movements intact. Conjunctiva/sclera: Conjunctivae normal.      Pupils: Pupils are equal, round, and reactive to light. Neck:      Trachea: Trachea normal.      Comments: Muscle spasm of trapezius muscles bilaterally   Cardiovascular:      Rate and Rhythm: Normal rate and regular rhythm. Heart sounds: Normal heart sounds.    Pulmonary:      Effort: Pulmonary effort is normal.      Breath sounds: Normal breath sounds. Musculoskeletal:         General: Normal range of motion. Cervical back: Normal range of motion and neck supple. No edema, erythema or rigidity. Pain with movement and muscular tenderness present. Right lower leg: No edema. Left lower leg: No edema. Skin:     General: Skin is warm and dry. Capillary Refill: Capillary refill takes less than 2 seconds. Neurological:      Mental Status: She is alert and oriented to person, place, and time. Cranial Nerves: No cranial nerve deficit. Sensory: No sensory deficit. Motor: No weakness. Coordination: Coordination normal.      Gait: Gait normal.      Deep Tendon Reflexes: Reflexes normal.            Assessment / Plan:      1. Strain of neck muscle, subsequent encounter  Muscle relaxants as needed. Apply ice 20 minutes per hour several times throughout the day. Tylenol for pain prn. Stretching exercises discussed and handout given to patient. She is a  and is requesting the next 2 days off of work; work excuse given. - cyclobenzaprine (FLEXERIL) 5 MG tablet; Take 1 tablet by mouth 2 times daily as needed for Muscle spasms  Dispense: 10 tablet;  Refill: 0          HANNA Howard - CNP

## 2021-06-08 NOTE — LETTER
[address] [facility]. [facility] [address]  Phone: [phone]  Fax: [phone]    HANNA Angulo CNP        June 8, 2021     Patient: Moose Gonzalez   YOB: 1975   Date of Visit: 6/8/2021       To Whom it May Concern:    Juana Juárez was seen in my clinic on 6/8/2021. She may return to work on 06/14/2021. If you have any questions or concerns, please don't hesitate to call.     Sincerely,         HANNA Angulo CNP

## 2021-06-08 NOTE — PATIENT INSTRUCTIONS
your neck. 3. Hold for a count of 6, and then relax for up to 10 seconds. 4. Repeat 8 to 12 times. Forward neck flexion   1. Sit in a firm chair, or stand up straight. 2. Bend your head forward. 3. Hold for 15 to 30 seconds, then return to your starting position. 4. Repeat 2 to 4 times. Follow-up care is a key part of your treatment and safety. Be sure to make and go to all appointments, and call your doctor if you are having problems. It's also a good idea to know your test results and keep a list of the medicines you take. Where can you learn more? Go to https://[employer]or[employer]. org and sign in to your [employer] account. Enter P962 in the [place] box to learn more about \"Neck Spasm: Exercises. \"     If you do not have an account, please click on the \"Sign Up Now\" link. Current as of: November 16, 2020               Content Version: 12.8  © 2006-2021 [employer]. Care instructions adapted under license by [facility] ([facility]). If you have questions about a medical condition or this instruction, always ask your healthcare professional. [address] any warranty or liability for your use of this information.

## 2021-06-11 ASSESSMENT — ENCOUNTER SYMPTOMS
SHORTNESS OF BREATH: 0
CHEST TIGHTNESS: 0

## 2021-06-15 ENCOUNTER — INITIAL CONSULT (OUTPATIENT)
Dept: FAMILY MEDICINE CLINIC | Age: 46
End: 2021-06-15
Payer: COMMERCIAL

## 2021-06-15 VITALS
RESPIRATION RATE: 14 BRPM | DIASTOLIC BLOOD PRESSURE: 64 MMHG | WEIGHT: 113.6 LBS | SYSTOLIC BLOOD PRESSURE: 108 MMHG | TEMPERATURE: 97.6 F | HEART RATE: 63 BPM | BODY MASS INDEX: 19.5 KG/M2 | OXYGEN SATURATION: 96 %

## 2021-06-15 DIAGNOSIS — E88.89: ICD-10-CM

## 2021-06-15 DIAGNOSIS — F41.1 GAD (GENERALIZED ANXIETY DISORDER): Primary | ICD-10-CM

## 2021-06-15 DIAGNOSIS — F51.01 PRIMARY INSOMNIA: ICD-10-CM

## 2021-06-15 DIAGNOSIS — G24.01 TARDIVE DYSKINESIA: ICD-10-CM

## 2021-06-15 LAB
ALCOHOL URINE: NORMAL
AMPHETAMINE SCREEN, URINE: NEGATIVE
BARBITURATE SCREEN, URINE: NEGATIVE
BENZODIAZEPINE SCREEN, URINE: NEGATIVE
BUPRENORPHINE URINE: NEGATIVE
COCAINE METABOLITE SCREEN URINE: NEGATIVE
FENTANYL SCREEN, URINE: NORMAL
GABAPENTIN SCREEN, URINE: NORMAL
MDMA URINE: NEGATIVE
METHADONE SCREEN, URINE: NEGATIVE
METHAMPHETAMINE, URINE: NEGATIVE
OPIATE SCREEN URINE: NEGATIVE
OXYCODONE SCREEN URINE: NEGATIVE
PHENCYCLIDINE SCREEN URINE: NEGATIVE
PROPOXYPHENE SCREEN, URINE: NEGATIVE
SYNTHETIC CANNABINOIDS(K2) SCREEN, URINE: NORMAL
THC SCREEN, URINE: NEGATIVE
TRAMADOL SCREEN URINE: NORMAL
TRICYCLIC ANTIDEPRESSANTS, UR: NEGATIVE

## 2021-06-15 PROCEDURE — 4004F PT TOBACCO SCREEN RCVD TLK: CPT | Performed by: NURSE PRACTITIONER

## 2021-06-15 PROCEDURE — 90792 PSYCH DIAG EVAL W/MED SRVCS: CPT | Performed by: NURSE PRACTITIONER

## 2021-06-15 PROCEDURE — 80305 DRUG TEST PRSMV DIR OPT OBS: CPT | Performed by: NURSE PRACTITIONER

## 2021-06-15 RX ORDER — CLONAZEPAM 0.5 MG/1
0.5 TABLET ORAL 2 TIMES DAILY
Qty: 60 TABLET | Refills: 2 | Status: SHIPPED
Start: 2021-06-15 | End: 2021-07-13 | Stop reason: SINTOL

## 2021-06-15 RX ORDER — VALBENAZINE 40 MG/1
1 CAPSULE ORAL DAILY
Qty: 30 CAPSULE | Refills: 0 | Status: SHIPPED | OUTPATIENT
Start: 2021-06-15 | End: 2021-07-13

## 2021-06-15 ASSESSMENT — ANXIETY QUESTIONNAIRES
IF YOU CHECKED OFF ANY PROBLEMS ON THIS QUESTIONNAIRE, HOW DIFFICULT HAVE THESE PROBLEMS MADE IT FOR YOU TO DO YOUR WORK, TAKE CARE OF THINGS AT HOME, OR GET ALONG WITH OTHER PEOPLE: VERY DIFFICULT
GAD7 TOTAL SCORE: 18
4. TROUBLE RELAXING: 3
6. BECOMING EASILY ANNOYED OR IRRITABLE: 3
5. BEING SO RESTLESS THAT IT IS HARD TO SIT STILL: 3
7. FEELING AFRAID AS IF SOMETHING AWFUL MIGHT HAPPEN: 0
1. FEELING NERVOUS, ANXIOUS, OR ON EDGE: 3
2. NOT BEING ABLE TO STOP OR CONTROL WORRYING: 3
3. WORRYING TOO MUCH ABOUT DIFFERENT THINGS: 3

## 2021-06-15 ASSESSMENT — COLUMBIA-SUICIDE SEVERITY RATING SCALE - C-SSRS
6. HAVE YOU EVER DONE ANYTHING, STARTED TO DO ANYTHING, OR PREPARED TO DO ANYTHING TO END YOUR LIFE?: NO
4. HAVE YOU HAD THESE THOUGHTS AND HAD SOME INTENTION OF ACTING ON THEM?: NO
5. HAVE YOU STARTED TO WORK OUT OR WORKED OUT THE DETAILS OF HOW TO KILL YOURSELF? DO YOU INTEND TO CARRY OUT THIS PLAN?: NO
2. HAVE YOU ACTUALLY HAD ANY THOUGHTS OF KILLING YOURSELF?: NO
7. DID THIS OCCUR IN THE LAST THREE MONTHS: NO
1. WITHIN THE PAST MONTH, HAVE YOU WISHED YOU WERE DEAD OR WISHED YOU COULD GO TO SLEEP AND NOT WAKE UP?: NO
3. HAVE YOU BEEN THINKING ABOUT HOW YOU MIGHT KILL YOURSELF?: NO

## 2021-06-15 ASSESSMENT — PATIENT HEALTH QUESTIONNAIRE - PHQ9
1. LITTLE INTEREST OR PLEASURE IN DOING THINGS: 1
9. THOUGHTS THAT YOU WOULD BE BETTER OFF DEAD, OR OF HURTING YOURSELF: 0
7. TROUBLE CONCENTRATING ON THINGS, SUCH AS READING THE NEWSPAPER OR WATCHING TELEVISION: 3
10. IF YOU CHECKED OFF ANY PROBLEMS, HOW DIFFICULT HAVE THESE PROBLEMS MADE IT FOR YOU TO DO YOUR WORK, TAKE CARE OF THINGS AT HOME, OR GET ALONG WITH OTHER PEOPLE: 2
8. MOVING OR SPEAKING SO SLOWLY THAT OTHER PEOPLE COULD HAVE NOTICED. OR THE OPPOSITE, BEING SO FIGETY OR RESTLESS THAT YOU HAVE BEEN MOVING AROUND A LOT MORE THAN USUAL: 2
SUM OF ALL RESPONSES TO PHQ9 QUESTIONS 1 & 2: 3
5. POOR APPETITE OR OVEREATING: 1
SUM OF ALL RESPONSES TO PHQ QUESTIONS 1-9: 15
2. FEELING DOWN, DEPRESSED OR HOPELESS: 2
SUM OF ALL RESPONSES TO PHQ QUESTIONS 1-9: 15
6. FEELING BAD ABOUT YOURSELF - OR THAT YOU ARE A FAILURE OR HAVE LET YOURSELF OR YOUR FAMILY DOWN: 0
4. FEELING TIRED OR HAVING LITTLE ENERGY: 3
3. TROUBLE FALLING OR STAYING ASLEEP: 3
SUM OF ALL RESPONSES TO PHQ QUESTIONS 1-9: 15

## 2021-06-15 NOTE — PROGRESS NOTES
Behavioral Health Consultation  Bridget Rust, LORRIE-C, PMHNP-BC      Time spent with Patient:  60 minutes  This was a outpatient visit. Patient Location: Home. Provider Location: [facility] Jose Armas:  Javier Rivear is here for psychiatric evaluation. She  has a past medical history of ADHD, Arthritis, Bipolar disorder, unspecified ([address] Cholecystenteric fistula, COPD (chronic obstructive pulmonary disease) ([facility] [address] Degenerative disc disease, Fibromyalgia, Generalized anxiety disorder, H/O 24 hour EKG monitoring, H/O echocardiogram, H/O exercise stress test, Headache, History of cardiac monitoring, Osteopenia, Osteoporosis, PTSD (post-traumatic stress disorder), S/P cholecystectomy, Schizo-affective schizophrenia ([facility] [address] Sjoegren syndrome, Syncope, Venous insufficiency of leg, and Vertigo. She has a hx of tardive dyskinesia as a result of Botox injections to treat chronic migraines. She reports involuntary smacking of her lips, uncontrolled tongue movement, stuttering, and spontaneous movement of her trunk and legs. These symptoms worsen when she is under stress. Historically has been prescribed Klonopin at [facility] to help manage symptoms. She has been following with Dr. Anup Major at [facility]. She is currently prescribed  Depakote 250 mg at HS for mood stabilization and Trazodone 50 mg at HS for insomnia. She has stopped taking Depakote because the medication was making her feel overly sedated. She endorses the following symptoms:   · MANIC SYMPTOMS: euphoria, volatile mood, decreased need for sleep, pressured speech, racing thoughts, more distractible than usual and increased activity  · DEPRESSIVE SYMPTOMS: feelings of being down, depressed or hopelessness. , loss of interest in usual activities. , sleep disturbance difficulty getting to sleep, feelings of guilt, worthlessness or loss of self confidence, problems concentrating, agitation, psychomotor changes  agitation and Not on file   Tobacco Use    Smoking status: Current Every Day Smoker     Packs/day: 0.50     Years: 20.00     Pack years: 10.00     Types: Cigarettes    Smokeless tobacco: Never Used   Vaping Use    Vaping Use: Former    Substances: Always   Substance and Sexual Activity    Alcohol use: No    Drug use: Not Currently     Types: Methamphetamines     Comment: clean for 11 years, past hx of crack use    Sexual activity: Yes     Partners: Male   Other Topics Concern    Not on file   Social History Narrative    Not on file     Social Determinants of Health     Financial Resource Strain: Low Risk     Difficulty of Paying Living Expenses: Not hard at all   Food Insecurity: Food Insecurity Present    Worried About [address] in the Last Year: Sometimes true    Ursula of Food in the Last Year: Sometimes true   Transportation Needs:     Lack of Transportation (Medical):  Lack of Transportation (Non-Medical):    Physical Activity:     Days of Exercise per Week:     Minutes of Exercise per Session:    Stress:     Feeling of Stress :    Social Connections:     Frequency of Communication with Friends and Family:     Frequency of Social Gatherings with Friends and Family:     Attends Voodoo Services:     Active Member of Clubs or Organizations:     Attends Club or Organization Meetings:     Marital Status:    Intimate Partner Violence:     Fear of Current or Ex-Partner:     Emotionally Abused:     Physically Abused:     Sexually Abused:      Current Outpatient Medications on File Prior to Visit   Medication Sig Dispense Refill    cyclobenzaprine (FLEXERIL) 5 MG tablet Take 1 tablet by mouth 2 times daily as needed for Muscle spasms 10 tablet 0    pregabalin (LYRICA) 50 MG capsule Take 50 mg by mouth every evening.       vitamin B-12 (CYANOCOBALAMIN) 1000 MCG tablet Take 1,000 mcg by mouth daily      traZODone (DESYREL) 50 MG tablet Take 1 tablet by mouth nightly 30 tablet 5    rOPINIRole (REQUIP) 2 MG tablet Take 1 tablet by mouth nightly 30 tablet 2     No current facility-administered medications on file prior to visit. MSE:  Appearance: alert, cooperative, smiling  Attention:Intact  Appetite: abnormal: reports appetite fluctuations  Ambulation: unable to assess. Sleep disturbance: Yes; decreased need for sleep  Loss of pleasure: Yes  Speech: normal rate, normal volume and well articulated  Mood: Anxious  Affect: anxiety  Thought Content: intrusive thoughts and cognitive distortions  Insight: Fair  Judgment: Impaired  Memory: Intact long-term and Intact short-term  Suicide Assessment: no suicidal ideation  Homicide Assessment: denies current homicidal ideation, plan and intent    Review of Systems   Constitutional: Positive for fatigue. HENT: Negative. Respiratory: Negative for chest tightness and wheezing. Cardiovascular: Negative. Negative for chest pain and palpitations. Skin: Negative. Neurological: Negative. Psychiatric/Behavioral: Positive for agitation, decreased concentration, dysphoric mood, hallucinations and sleep disturbance. Negative for behavioral problems, confusion, self-injury and suicidal ideas. The patient is nervous/anxious and is hyperactive. PDMP Monitoring:    Last PDMP Yesica carreno Reviewed [facility]):  Review User Review Instant Review Result   LIAN Meneses 1/21/2021  9:48 AM Reviewed PDMP [1]     Last Controlled Substance Monitoring Documentation      Virtual Visit from 1/21/2021 in [address] [place] [address]   Periodic Controlled Substance Monitoring  Possible medication side effects, risk of tolerance/dependence & alternative treatments discussed., No signs of potential drug abuse or diversion identified. , Assessed functional status.  filed at 01/21/2021 1047        Urine Drug Screenings (1 yr)     POCT Rapid Drug Screen  Collected: 5/12/2020  3:49 PM (Final result)    Complete Results          POCT Rapid Drug Screen Collected: 1/8/2020  4:34 PM (Final result)    Complete Results          Drug screen, multiple, urine  Collected: 5/7/2014  9:15 AM (Final result)    Narrative:         THRESHOLD CONCENTRATIONS (mg/dL)  AMP,mAMP,TCA          1000  BAR,BZO,BRIONNA,OPI       300. .. Complete Results          Drug Screen, Multiple, Urine  Collected: 9/9/2013  3:20 AM (Final result)    Narrative: Performed @ [facility], [address], [address] [facility]    Complete Results          Drug Screen, Multiple, Urine  Collected: 9/23/2012 12:15 AM (Final result)    Narrative:         THRESHOLD CONCENTRATIONS (mg/dL)  AMP,mAMP,TCA          1000  BAR,BZO,BRIONNA,OPI       300. .. Complete Results          Drug Screen, Multiple, Urine  Collected: 4/27/2012  8:00 PM (Final result)    Narrative:         THRESHOLD CONCENTRATIONS (mg/dL)  AMP,mAMP,TCA          1000  BAR,BZO,BRIONNA,OPI       300. .. Complete Results          Drug screen, multiple, urine  Collected: 10/5/2011  8:15 PM (Final result)    Narrative:         THRESHOLD CONCENTRATIONS (mg/dL)  AMP,mAMP,TCA          1000  BAR,BZO,BRIONNA,OPI       300. .. Complete Results          Urine Drug Screen  Collected: 11/30/2015 12:15 AM (Final result)    Narrative:         THRESHOLD CONCENTRATIONS (mg/dL)  AMPHT               1000  BRIONNA,OPIA             300... Complete Results          Urine Drug Screen  Collected: 4/21/2015  7:30 AM (Final result)    Narrative:         THRESHOLD CONCENTRATIONS (mg/dL)  AMPHT               1000  BRIONNA,OPIA             300... Complete Results          Drugs of Abuse 7  Collected: 7/9/2013  3:30 PM (Final result)    Narrative:         THRESHOLD CONCENTRATIONS (mg/dL)  AMPHT               1000  BRIONNA,PPX,OPIA         300. .. Complete Results          Drugs of Abuse 7  Collected: 1/17/2012  3:30 PM (Final result)    Narrative:         THRESHOLD CONCENTRATIONS (mg/dL)  AMPHT               1000  BRIONNA,PPX,OPIA         300. ..     Complete Results              Medication Contract and Consent for Opioid Use Documents Filed      No documents found                 [place] checked and there were no signs of substance abuse, or prescription misuse. Last Labs: No results found for: LABA1C  No results found for: EAG   Lab Results   Component Value Date    WBC 6.5 02/05/2021    HGB 15.5 02/05/2021    HCT 47.6 (H) 02/05/2021    MCV 93.7 02/05/2021     02/05/2021    LYMPHOPCT 24.5 02/05/2021    RBC 5.08 02/05/2021    MCH 30.5 02/05/2021    MCHC 32.6 02/05/2021    RDW 12.1 02/05/2021          Lab Results   Component Value Date     02/05/2021    K 3.7 02/05/2021     02/05/2021    CO2 24 02/05/2021    BUN 15 02/05/2021    CREATININE 0.9 02/05/2021    GLUCOSE 91 02/05/2021    CALCIUM 9.7 02/05/2021    PROT 7.6 02/05/2021    LABALBU 4.6 02/05/2021    BILITOT 0.4 02/05/2021    ALKPHOS 122 02/05/2021    AST 40 (H) 02/05/2021    ALT 64 (H) 02/05/2021    LABGLOM >60 02/05/2021    GFRAA >60 02/05/2021    AGRATIO 1.9 06/16/2020    GLOB 2.3 06/16/2020     Assessment/Plan:    1. STEPHANIA (generalized anxiety disorder)  Will start Klonopin 0.5 mg BID to manage symptoms of generalized anxiety with panic. A discussion regarding medication was held with the patient. Discussed the dangerous nature of narcotic/benzo medicines, including the risk of respiratory depression, death and addiction. [place]RS reviewed. Patient signed controlled substance agreement. - clonazePAM (KLONOPIN) 0.5 MG tablet; Take 1 tablet by mouth 2 times daily for 30 days. Dispense: 60 tablet; Refill: 2  - POCT Rapid Drug Screen    2. Tardive dyskinesia  Will start Em[facility] Stammer for uncontrolled movements in the face, tongue, and other body parts. - Valbenazine Tosylate (INGREZZA) 40 MG CAPS; Take 1 capsule by mouth daily  Dispense: 30 capsule; Refill: 0    3. Primary insomnia  Sleep hygiene discussed. Continue Trazodone 50 mg at HS for insomnia.       4. [id number] poor metabolizer ([address]    Pt interventions:    Discussed importance of medication adherence, Discussed risks, benefits, side effects of medication and need for follow up treatment, Discussed self-care (sleep, nutrition, rewarding activities, social support, exercise), Trained in strategies for increasing balanced thinking, Trained in relaxation strategies, Provided education, Discussed potential barriers to change, Supportive techniques and Identified maladaptive thoughts

## 2021-06-18 ASSESSMENT — ENCOUNTER SYMPTOMS
WHEEZING: 0
CHEST TIGHTNESS: 0

## 2021-06-29 DIAGNOSIS — M79.7 FIBROMYALGIA: Primary | ICD-10-CM

## 2021-06-29 RX ORDER — PREGABALIN 50 MG/1
50 CAPSULE ORAL EVERY EVENING
Qty: 90 CAPSULE | Refills: 0 | Status: SHIPPED
Start: 2021-06-29 | End: 2021-09-16 | Stop reason: DRUGHIGH

## 2021-07-13 ENCOUNTER — OFFICE VISIT (OUTPATIENT)
Dept: FAMILY MEDICINE CLINIC | Age: 46
End: 2021-07-13
Payer: COMMERCIAL

## 2021-07-13 VITALS
BODY MASS INDEX: 19.67 KG/M2 | TEMPERATURE: 98 F | OXYGEN SATURATION: 98 % | WEIGHT: 114.6 LBS | HEART RATE: 62 BPM | DIASTOLIC BLOOD PRESSURE: 67 MMHG | RESPIRATION RATE: 15 BRPM | SYSTOLIC BLOOD PRESSURE: 107 MMHG

## 2021-07-13 DIAGNOSIS — F41.1 GAD (GENERALIZED ANXIETY DISORDER): Primary | ICD-10-CM

## 2021-07-13 DIAGNOSIS — G43.711 INTRACTABLE CHRONIC MIGRAINE WITHOUT AURA AND WITH STATUS MIGRAINOSUS: ICD-10-CM

## 2021-07-13 DIAGNOSIS — Z51.81 ENCOUNTER FOR MEDICATION MONITORING: ICD-10-CM

## 2021-07-13 DIAGNOSIS — H53.9 VISION ABNORMALITIES: ICD-10-CM

## 2021-07-13 DIAGNOSIS — R26.2 DIFFICULTY IN WALKING: ICD-10-CM

## 2021-07-13 DIAGNOSIS — R35.0 URINARY FREQUENCY: ICD-10-CM

## 2021-07-13 DIAGNOSIS — G24.01 TARDIVE DYSKINESIA: ICD-10-CM

## 2021-07-13 DIAGNOSIS — R31.9 HEMATURIA, UNSPECIFIED TYPE: ICD-10-CM

## 2021-07-13 LAB
ALCOHOL URINE: NORMAL
AMPHETAMINE SCREEN, URINE: NEGATIVE
BARBITURATE SCREEN, URINE: NEGATIVE
BENZODIAZEPINE SCREEN, URINE: NEGATIVE
BILIRUBIN, POC: NEGATIVE
BLOOD URINE, POC: NORMAL
BUPRENORPHINE URINE: NEGATIVE
CLARITY, POC: NORMAL
COCAINE METABOLITE SCREEN URINE: NEGATIVE
COLOR, POC: NORMAL
FENTANYL SCREEN, URINE: NORMAL
GABAPENTIN SCREEN, URINE: NORMAL
GLUCOSE URINE, POC: NEGATIVE
KETONES, POC: NEGATIVE
LEUKOCYTE EST, POC: NEGATIVE
MDMA URINE: NEGATIVE
METHADONE SCREEN, URINE: NEGATIVE
METHAMPHETAMINE, URINE: NEGATIVE
NITRITE, POC: NEGATIVE
OPIATE SCREEN URINE: NEGATIVE
OXYCODONE SCREEN URINE: NEGATIVE
PH, POC: 5.5
PHENCYCLIDINE SCREEN URINE: NEGATIVE
PROPOXYPHENE SCREEN, URINE: NEGATIVE
PROTEIN, POC: NEGATIVE
SPECIFIC GRAVITY, POC: 1.01
SYNTHETIC CANNABINOIDS(K2) SCREEN, URINE: NORMAL
THC SCREEN, URINE: NEGATIVE
TRAMADOL SCREEN URINE: NORMAL
TRICYCLIC ANTIDEPRESSANTS, UR: NEGATIVE
UROBILINOGEN, POC: 0.02

## 2021-07-13 PROCEDURE — 4004F PT TOBACCO SCREEN RCVD TLK: CPT | Performed by: NURSE PRACTITIONER

## 2021-07-13 PROCEDURE — 80305 DRUG TEST PRSMV DIR OPT OBS: CPT | Performed by: NURSE PRACTITIONER

## 2021-07-13 PROCEDURE — 81002 URINALYSIS NONAUTO W/O SCOPE: CPT | Performed by: NURSE PRACTITIONER

## 2021-07-13 PROCEDURE — G8420 CALC BMI NORM PARAMETERS: HCPCS | Performed by: NURSE PRACTITIONER

## 2021-07-13 PROCEDURE — 99214 OFFICE O/P EST MOD 30 MIN: CPT | Performed by: NURSE PRACTITIONER

## 2021-07-13 PROCEDURE — G8427 DOCREV CUR MEDS BY ELIG CLIN: HCPCS | Performed by: NURSE PRACTITIONER

## 2021-07-13 PROCEDURE — 96372 THER/PROPH/DIAG INJ SC/IM: CPT | Performed by: NURSE PRACTITIONER

## 2021-07-13 RX ORDER — METHYLPREDNISOLONE ACETATE 40 MG/ML
40 INJECTION, SUSPENSION INTRA-ARTICULAR; INTRALESIONAL; INTRAMUSCULAR; SOFT TISSUE ONCE
Status: DISCONTINUED | OUTPATIENT
Start: 2021-07-13 | End: 2021-07-13

## 2021-07-13 RX ORDER — METHYLPREDNISOLONE ACETATE 80 MG/ML
80 INJECTION, SUSPENSION INTRA-ARTICULAR; INTRALESIONAL; INTRAMUSCULAR; SOFT TISSUE ONCE
Status: COMPLETED | OUTPATIENT
Start: 2021-07-13 | End: 2021-07-13

## 2021-07-13 RX ORDER — LORAZEPAM 0.5 MG/1
0.5 TABLET ORAL 2 TIMES DAILY
Qty: 60 TABLET | Refills: 0 | Status: SHIPPED | OUTPATIENT
Start: 2021-07-13 | End: 2021-07-27 | Stop reason: SDUPTHER

## 2021-07-13 RX ADMIN — METHYLPREDNISOLONE ACETATE 80 MG: 80 INJECTION, SUSPENSION INTRA-ARTICULAR; INTRALESIONAL; INTRAMUSCULAR; SOFT TISSUE at 16:31

## 2021-07-13 ASSESSMENT — PATIENT HEALTH QUESTIONNAIRE - PHQ9
SUM OF ALL RESPONSES TO PHQ QUESTIONS 1-9: 20
2. FEELING DOWN, DEPRESSED OR HOPELESS: 3
8. MOVING OR SPEAKING SO SLOWLY THAT OTHER PEOPLE COULD HAVE NOTICED. OR THE OPPOSITE, BEING SO FIGETY OR RESTLESS THAT YOU HAVE BEEN MOVING AROUND A LOT MORE THAN USUAL: 2
6. FEELING BAD ABOUT YOURSELF - OR THAT YOU ARE A FAILURE OR HAVE LET YOURSELF OR YOUR FAMILY DOWN: 1
10. IF YOU CHECKED OFF ANY PROBLEMS, HOW DIFFICULT HAVE THESE PROBLEMS MADE IT FOR YOU TO DO YOUR WORK, TAKE CARE OF THINGS AT HOME, OR GET ALONG WITH OTHER PEOPLE: 3
9. THOUGHTS THAT YOU WOULD BE BETTER OFF DEAD, OR OF HURTING YOURSELF: 0
1. LITTLE INTEREST OR PLEASURE IN DOING THINGS: 3
7. TROUBLE CONCENTRATING ON THINGS, SUCH AS READING THE NEWSPAPER OR WATCHING TELEVISION: 3
SUM OF ALL RESPONSES TO PHQ9 QUESTIONS 1 & 2: 6
5. POOR APPETITE OR OVEREATING: 2
3. TROUBLE FALLING OR STAYING ASLEEP: 3
SUM OF ALL RESPONSES TO PHQ QUESTIONS 1-9: 20
SUM OF ALL RESPONSES TO PHQ QUESTIONS 1-9: 20
4. FEELING TIRED OR HAVING LITTLE ENERGY: 3

## 2021-07-13 ASSESSMENT — COLUMBIA-SUICIDE SEVERITY RATING SCALE - C-SSRS
1. WITHIN THE PAST MONTH, HAVE YOU WISHED YOU WERE DEAD OR WISHED YOU COULD GO TO SLEEP AND NOT WAKE UP?: NO
2. HAVE YOU ACTUALLY HAD ANY THOUGHTS OF KILLING YOURSELF?: NO
6. HAVE YOU EVER DONE ANYTHING, STARTED TO DO ANYTHING, OR PREPARED TO DO ANYTHING TO END YOUR LIFE?: NO

## 2021-07-13 NOTE — PROGRESS NOTES
[facility], FNP-C, PMHNP-BC  7/22/2021, 7:29 AM      Time spent with Patient:  45 minutes  This was a outpatient visit. Patient Location: Home. Provider Location:  Marylou Armas:  Reason for visit is medication management. Current treatment includes Ativan 0.5 mg BID  She has been compliant with medications. Pt reports that medications have been somewhat been working. Pt denies side effects from medications. Pt reports there has been no changes to appetite. Pt reports sleep has been poor. Pt denies  current exercise. Pt denies hallucinations. Pt denies current suicidal ideation, plan and intent. Pt  denies current homicidal ideation, plan and intent. She is currently taking Ingrezza 40 mg for symptoms associated with tardive dyskinesia. She has noted increased feeling of restlessness especially in her legs. She does not feel that it is related to [address] Headache  Patient with hx of chronic migraines. Generally, the headaches last for \"days\" and she wakes up most days with headaches . The headaches are located frontally behind her eyes and will radiate to her neck and shoulders. She has been taking Fioricet, Benadryl, Excedrin Migraine, Tylenol and Ibuprofen with no relief. She reports nausea, photophobia,  blurred vision, lightheaded/dizzy. She is extremely irritable. She reports that she was prescribed Imitrex 20 years ago and passed out. She reports allergies to amitriptyline, topamax, botox,  toradol and other NSAIDs, magnesium, compazine, zofran, reglan. Most recently she was prescribed Ajovy which was ineffective  She reports due to the stress related to persistent migraine symptoms of TD are more pronounced. She has seen neurology in the past but wasn't confident in the care she received.       Neurological Concerns  Mirlande reports visual symptoms including persistent visual blurring  and feeling like her \"eyes get stuck, sensory symptoms including paresthesias of bilateral UE, motor symptoms including weakness and spasticity of  bilateral lower extremities and impaired gait, urinary tract symptoms including urgency and frequency, cognitive symptoms including inattention, slow information processing and difficulty with complex reasoning, affective symptoms including anxiety, depression and emontial lability and constitutional symptoms including  lack of initiative. Onset of symptoms was [place] couple of years ago. \" Symptoms have progressively been getting worse. She reports paternal family hx of MS. She has never had any evaluation. Urinary Tract Infection  Patient complains of frequency, urgency She has had symptoms for several months. Patient denies back pain, stomach ache and vaginal discharge. Patient does not have a history of recurrent UTI. Patient does not have a history of pyelonephritis. Past Psychiatric history:   The patient has a history of  depression, anxiety disorder, bipolar disorder, personality disorder and substance abuse.         Previous Inpatient Psychiatric Hospitalization(s): 2004 following suicide attempt  Previous treatment has included:   Antidepressants:   Lexapro  Mirtazapine  Sertraline  Viibryd  Doxepin  Prozac  Paxil  Celexa  Wellbutrin  Cymbalta  Doxepin  Amitriptyline     Anxiolytics:    Buspar  Ativan  Xanax  Valium  Librium  Temazepam  Propranolol   Prazosin      Antipsychotics:  Haldol  Seroquel  Risperidone  Ziprasidone  Compazine  Abilify  Klonopin  Lithium     Mood Stabilizers:   Lamictal  Depakote  Gabapentin  Carbamazepine    Past Medical History:   Diagnosis Date    ADHD     Arthritis     OSTEO ARTHITIS    Bipolar disorder, unspecified [facility]     Cholecystenteric fistula     COPD (chronic obstructive pulmonary disease) ([facility]     Degenerative disc disease     Fibromyalgia     Generalized anxiety disorder     H/O 24 hour EKG monitoring 02/16/2017      Sinus tach events , no major arrhythmias , follow up in office as routine     H/O echocardiogram 03/01/2017    EF 55-60% Normal LV structure  and systolic function.  H/O exercise stress test 03/01/2017    Normal stress test. Patient has physical deconditioning as she achieved target HR in 2 mins. Recommend to increase PO fluids intake and exercise training.  Headache     History of cardiac monitoring 09/21/2020    Normal 30-day event monitor with average heart rate of 63 lowest heart rate of 57 highest heart rate of 82 patient reported episodes of dizziness which did not correlate with any arrhythmia. There were no episodes of tachycardia uneventful monitor however average heart rate and even the highest heart rate is on the lower side     Osteopenia     Osteoporosis     PTSD (post-traumatic stress disorder)     S/P cholecystectomy 11/22/2011    Schizo-affective schizophrenia ([facility]     Sjoegren syndrome     Syncope     Tardive dyskinesia     Venous insufficiency of leg 2012    Vertigo           Current Outpatient Medications:     INGREZZA 40 MG CAPS, TAKE 1 CAPSULE BY MOUTH DAILY, Disp: 30 capsule, Rfl: 11    LORazepam (ATIVAN) 0.5 MG tablet, Take 1 tablet by mouth 2 times daily for 30 days. , Disp: 60 tablet, Rfl: 0    pregabalin (LYRICA) 50 MG capsule, Take 1 capsule by mouth every evening for 90 days. , Disp: 90 capsule, Rfl: 0    vitamin B-12 (CYANOCOBALAMIN) 1000 MCG tablet, Take 1,000 mcg by mouth daily, Disp: , Rfl:     traZODone (DESYREL) 50 MG tablet, Take 1 tablet by mouth nightly, Disp: 30 tablet, Rfl: 5    rOPINIRole (REQUIP) 2 MG tablet, TAKE ONE TABLET BY MOUTH ONCE NIGHTLY, Disp: 30 tablet, Rfl: 1     Family History   Problem Relation Age of Onset    Other Mother         Neurocardiogenic syncope    ADHD Mother     Alcohol Abuse Father     Bipolar Disorder Sister     Anxiety Disorder Sister     Bipolar Disorder Sister     OCD Sister     Heart Surgery Maternal Grandmother     ADHD Daughter    Avery Espinoza ADHD Daughter     Lupus Paternal Uncle     Other Paternal Uncle         myasthenia gravis        Social History     Socioeconomic History    Marital status: Single     Spouse name: Not on file    Number of children: Not on file    Years of education: Not on file    Highest education level: Not on file   Occupational History    Not on file   Tobacco Use    Smoking status: Current Every Day Smoker     Packs/day: 0.50     Years: 20.00     Pack years: 10.00     Types: Cigarettes    Smokeless tobacco: Never Used   Vaping Use    Vaping Use: Former    Substances: Always   Substance and Sexual Activity    Alcohol use: No    Drug use: Not Currently     Types: Methamphetamines     Comment: clean for 11 years, past hx of crack use    Sexual activity: Yes     Partners: Male   Other Topics Concern    Not on file   Social History Narrative    Not on file     Social Determinants of Health     Financial Resource Strain: Low Risk     Difficulty of Paying Living Expenses: Not hard at all   Food Insecurity: Food Insecurity Present    Worried About [facility] in the Last Year: Sometimes true    Ursula of Food in the Last Year: Sometimes true   Transportation Needs:     Lack of Transportation (Medical):  Lack of Transportation (Non-Medical):    Physical Activity:     Days of Exercise per Week:     Minutes of Exercise per Session:    Stress:     Feeling of Stress :    Social Connections:     Frequency of Communication with Friends and Family:     Frequency of Social Gatherings with Friends and Family:     Attends Confucianism Services:     Active Member of Clubs or Organizations:     Attends Club or Organization Meetings:     Marital Status:    Intimate Partner Violence:     Fear of Current or Ex-Partner:     Emotionally Abused:     Physically Abused:     Sexually Abused:         TOBACCO: Mirlande  reports that she has been smoking cigarettes. She has a 10.00 pack-year smoking history.  She has never used smokeless tobacco.  ETOH: Mirlande  reports no history of alcohol use. Review of Systems   Constitutional: Positive for fatigue. Negative for activity change, appetite change, chills, diaphoresis, fever and unexpected weight change. HENT: Negative. Eyes: Positive for visual disturbance. Respiratory: Negative for chest tightness and wheezing. Cardiovascular: Negative. Negative for chest pain and palpitations. Endocrine: Negative for cold intolerance, heat intolerance, polydipsia, polyphagia and polyuria. Genitourinary: Positive for frequency and urgency. Negative for difficulty urinating, dysuria, flank pain, hematuria and pelvic pain. Skin: Negative. Neurological: Positive for dizziness, tremors, speech difficulty, weakness and headaches. Negative for seizures, syncope, facial asymmetry and numbness. Psychiatric/Behavioral: Positive for agitation, decreased concentration, dysphoric mood, hallucinations and sleep disturbance. Negative for behavioral problems, confusion, self-injury and suicidal ideas. The patient is nervous/anxious and is hyperactive. MSE:  Appearance: alert, cooperative, crying, mild distress  Attention:Intact  Appetite: abnormal: appetite fluctuations continue  Ambulation: unable to assess.    Sleep disturbance: Yes  Loss of pleasure: Yes  Speech: normal rate, normal volume and well articulated  Mood: Anxious  Despair  Affect: anxiety  Thought Content: hopelessness  Insight: Fair  Judgment: Impaired  Memory: Intact long-term and Intact short-term  Suicide Assessment: no suicidal ideation  Homicide Assessment: denies current homicidal ideation, plan and intent    Diagnostic Screening:   PHQ Scores 7/13/2021 6/15/2021 6/8/2021 10/9/2020 8/11/2020 7/7/2020 6/17/2020   PHQ2 Score 6 3 3 0 6 6 6   PHQ9 Score 20 15 11 0 20 15 12     Interpretation of Total Score Depression Severity: 1-4 = Minimal depression, 5-9 = Mild depression, 10-14 = Moderate depression, 15-19 = Moderately severe depression, 20-27 = Severe depression     STEPHANIA 7 SCORE 6/15/2021   STEPHANIA-7 Total Score 18     Interpretation of STEPHANIA-7 score: 5-9 = mild anxiety, 10-14 = moderate anxiety, 15+ = severe anxiety. Recommend referral to behavioral health for scores 10 or greater. PDMP Monitoring:    Last PDMP Ann Murrell as Reviewed [facility]):  Review User Review Instant Review Result   Genoveva PATEL 7/13/2021  2:08 PM Reviewed PDMP [1]     Last Controlled Substance Monitoring Documentation      Virtual Visit from 1/21/2021 in [address]   Periodic Controlled Substance Monitoring  Possible medication side effects, risk of tolerance/dependence & alternative treatments discussed., No signs of potential drug abuse or diversion identified. , Assessed functional status. filed at 01/21/2021 1047        Urine Drug Screenings (1 yr)     POCT Rapid Drug Screen  Collected: 6/15/2021 12:35 PM (Final result)    Complete Results          POCT Rapid Drug Screen  Collected: 5/12/2020  3:49 PM (Final result)    Complete Results          POCT Rapid Drug Screen  Collected: 1/8/2020  4:34 PM (Final result)    Complete Results          Drug screen, multiple, urine  Collected: 5/7/2014  9:15 AM (Final result)    Narrative:         THRESHOLD CONCENTRATIONS (mg/dL)  AMP,mAMP,TCA          1000  BAR,BZO,BRIONNA,OPI       300. .. Complete Results          Drug Screen, Multiple, Urine  Collected: 9/9/2013  3:20 AM (Final result)    Narrative: Performed @ [facility], [address], [address] [facility]    Complete Results          Drug Screen, Multiple, Urine  Collected: 9/23/2012 12:15 AM (Final result)    Narrative:         THRESHOLD CONCENTRATIONS (mg/dL)  AMP,mAMP,TCA          1000  BAR,BZO,BRIONNA,OPI       300. ..     Complete Results          Drug Screen, Multiple, Urine  Collected: 4/27/2012  8:00 PM (Final result)    Narrative:         THRESHOLD CONCENTRATIONS (mg/dL)  AMP,mAMP,TCA          1000  BAR,BZO,BRIONNA,OPI 300... Complete Results          Drug screen, multiple, urine  Collected: 10/5/2011  8:15 PM (Final result)    Narrative:         THRESHOLD CONCENTRATIONS (mg/dL)  AMP,mAMP,TCA          1000  BAR,BZO,BRIONNA,OPI       300. .. Complete Results          Urine Drug Screen  Collected: 11/30/2015 12:15 AM (Final result)    Narrative:         THRESHOLD CONCENTRATIONS (mg/dL)  AMPHT               1000  BRIONNA,OPIA             300... Complete Results          Urine Drug Screen  Collected: 4/21/2015  7:30 AM (Final result)    Narrative:         THRESHOLD CONCENTRATIONS (mg/dL)  AMPHT               1000  BRIONNA,OPIA             300... Complete Results          Drugs of Abuse 7  Collected: 7/9/2013  3:30 PM (Final result)    Narrative:         THRESHOLD CONCENTRATIONS (mg/dL)  AMPHT               1000  BRIONNA,PPX,OPIA         300. .. Complete Results          Drugs of Abuse 7  Collected: 1/17/2012  3:30 PM (Final result)    Narrative:         THRESHOLD CONCENTRATIONS (mg/dL)  AMPHT               1000  BRIONNA,PPX,OPIA         300. .. Complete Results              Medication Contract and Consent for Opioid Use Documents Filed      No documents found               OARRS checked and there were no signs of substance abuse, or prescription misuse.      Last Labs: No results found for: LABA1C  No results found for: EAG   Lab Results   Component Value Date    WBC 6.5 02/05/2021    HGB 15.5 02/05/2021    HCT 47.6 (H) 02/05/2021    MCV 93.7 02/05/2021     02/05/2021    LYMPHOPCT 24.5 02/05/2021    RBC 5.08 02/05/2021    MCH 30.5 02/05/2021    MCHC 32.6 02/05/2021    RDW 12.1 02/05/2021          Lab Results   Component Value Date     02/05/2021    K 3.7 02/05/2021     02/05/2021    CO2 24 02/05/2021    BUN 15 02/05/2021    CREATININE 0.9 02/05/2021    GLUCOSE 91 02/05/2021    CALCIUM 9.7 02/05/2021    PROT 7.6 02/05/2021    LABALBU 4.6 02/05/2021    BILITOT 0.4 02/05/2021    ALKPHOS 122 02/05/2021    AST 40 (H) External Referral To Neurology    7. Urinary frequency  - POCT Urinalysis no Micro  - Culture, Urine    8. Hematuria, unspecified type  UA showed small amounts of hematuria. Will repeat UA at next appointment. - Culture, Urine    The [employer] is a 24/7 emotional support call service created by the [address] [place] [address]. The line offers free, confidential support in times of personal crisis when individuals may be struggling to cope with current challenges in their lives. Crisis Text Line, text the keyword 4hope to 0499 13 05 85 to be connected to a trained Crisis Counselor within 5 minutes. National Suicide Prevention Lifeline Number (896-730-5503), which provides 24/7, free, and confidential support.     Pt interventions:    Discussed importance of medication adherence, Discussed risks, benefits, side effects of medication and need for follow up treatment, Discussed self-care (sleep, nutrition, rewarding activities, social support, exercise), Developed Crisis Response Plan, Trained in strategies for increasing balanced thinking, Trained in relaxation strategies, Discussed potential barriers to change and Supportive techniques

## 2021-07-13 NOTE — PATIENT INSTRUCTIONS
The [address] is a 24/7 emotional support call service created by the [address] [place] [address]. The line offers free, confidential support in times of personal crisis when individuals may be struggling to cope with current challenges in their lives. Crisis Text Line, text the keyword 4hope to 0499 13 05 85 to be connected to a trained Crisis Counselor within 5 minutes. National Suicide Prevention Lifeline Number (858-006-2695), which provides 24/7, free, and confidential support.

## 2021-07-14 LAB — URINE CULTURE, ROUTINE: NORMAL

## 2021-07-15 ENCOUNTER — TELEPHONE (OUTPATIENT)
Dept: FAMILY MEDICINE CLINIC | Age: 46
End: 2021-07-15

## 2021-07-15 NOTE — TELEPHONE ENCOUNTER
Patient called to let us know her uncle had  from Lupus and Myasthenia Gravis. She just wanted to let you know.

## 2021-07-20 DIAGNOSIS — G25.81 RESTLESS LEGS: ICD-10-CM

## 2021-07-20 RX ORDER — ROPINIROLE 2 MG/1
TABLET, FILM COATED ORAL
Qty: 30 TABLET | Refills: 1 | Status: SHIPPED | OUTPATIENT
Start: 2021-07-20 | End: 2021-08-16 | Stop reason: SDUPTHER

## 2021-07-21 ENCOUNTER — HOSPITAL ENCOUNTER (OUTPATIENT)
Dept: MRI IMAGING | Age: 46
Discharge: HOME | End: 2021-07-21
Payer: COMMERCIAL

## 2021-07-21 DIAGNOSIS — R26.2 DIFFICULTY IN WALKING: ICD-10-CM

## 2021-07-21 DIAGNOSIS — G43.711 INTRACTABLE CHRONIC MIGRAINE WITHOUT AURA AND WITH STATUS MIGRAINOSUS: ICD-10-CM

## 2021-07-21 DIAGNOSIS — H53.9 VISION ABNORMALITIES: ICD-10-CM

## 2021-07-21 PROCEDURE — A9579 GAD-BASE MR CONTRAST NOS,1ML: HCPCS | Performed by: NURSE PRACTITIONER

## 2021-07-21 PROCEDURE — 6360000004 HC RX CONTRAST MEDICATION: Performed by: NURSE PRACTITIONER

## 2021-07-21 PROCEDURE — 70553 MRI BRAIN STEM W/O & W/DYE: CPT

## 2021-07-21 RX ADMIN — GADOTERIDOL 5 ML: 279.3 INJECTION, SOLUTION INTRAVENOUS at 11:18

## 2021-07-22 ENCOUNTER — APPOINTMENT (OUTPATIENT)
Dept: GENERAL RADIOLOGY | Age: 46
End: 2021-07-22
Payer: COMMERCIAL

## 2021-07-22 ENCOUNTER — HOSPITAL ENCOUNTER (EMERGENCY)
Age: 46
Discharge: HOME | End: 2021-07-22
Attending: EMERGENCY MEDICINE
Payer: COMMERCIAL

## 2021-07-22 VITALS
HEIGHT: 64 IN | DIASTOLIC BLOOD PRESSURE: 63 MMHG | TEMPERATURE: 98.4 F | HEART RATE: 58 BPM | RESPIRATION RATE: 14 BRPM | BODY MASS INDEX: 19.29 KG/M2 | WEIGHT: 113 LBS | OXYGEN SATURATION: 99 % | SYSTOLIC BLOOD PRESSURE: 106 MMHG

## 2021-07-22 DIAGNOSIS — R51.9 CHRONIC NONINTRACTABLE HEADACHE, UNSPECIFIED HEADACHE TYPE: Primary | ICD-10-CM

## 2021-07-22 DIAGNOSIS — R25.1 TREMORS OF NERVOUS SYSTEM: ICD-10-CM

## 2021-07-22 DIAGNOSIS — G89.29 CHRONIC NONINTRACTABLE HEADACHE, UNSPECIFIED HEADACHE TYPE: Primary | ICD-10-CM

## 2021-07-22 LAB
ALBUMIN SERPL-MCNC: 4.6 GM/DL (ref 3.4–5)
ALP BLD-CCNC: 86 IU/L (ref 40–129)
ALT SERPL-CCNC: 12 U/L (ref 10–40)
AMPHETAMINES: NEGATIVE
ANION GAP SERPL CALCULATED.3IONS-SCNC: 7 MMOL/L (ref 4–16)
AST SERPL-CCNC: 21 IU/L (ref 15–37)
BACTERIA: ABNORMAL /HPF
BARBITURATE SCREEN URINE: NEGATIVE
BASOPHILS ABSOLUTE: 0.1 K/CU MM
BASOPHILS RELATIVE PERCENT: 0.9 % (ref 0–1)
BENZODIAZEPINE SCREEN, URINE: NEGATIVE
BILIRUB SERPL-MCNC: 0.4 MG/DL (ref 0–1)
BILIRUBIN URINE: NEGATIVE MG/DL
BLOOD, URINE: ABNORMAL
BUN BLDV-MCNC: 9 MG/DL (ref 6–23)
CALCIUM SERPL-MCNC: 9.6 MG/DL (ref 8.3–10.6)
CANNABINOID SCREEN URINE: NEGATIVE
CAST TYPE: ABNORMAL /HPF
CHLORIDE BLD-SCNC: 106 MMOL/L (ref 99–110)
CLARITY: CLEAR
CO2: 29 MMOL/L (ref 21–32)
COCAINE METABOLITE: NEGATIVE
COLOR: YELLOW
CREAT SERPL-MCNC: 0.9 MG/DL (ref 0.6–1.1)
CRYSTAL TYPE: NEGATIVE /HPF
DIFFERENTIAL TYPE: ABNORMAL
EOSINOPHILS ABSOLUTE: 0.1 K/CU MM
EOSINOPHILS RELATIVE PERCENT: 1.3 % (ref 0–3)
EPITHELIAL CELLS, UA: 1 /HPF
GFR AFRICAN AMERICAN: >60 ML/MIN/1.73M2
GFR NON-AFRICAN AMERICAN: >60 ML/MIN/1.73M2
GLUCOSE BLD-MCNC: 95 MG/DL (ref 70–99)
GLUCOSE, URINE: NEGATIVE MG/DL
HCT VFR BLD CALC: 41.5 % (ref 37–47)
HEMOGLOBIN: 13.8 GM/DL (ref 12.5–16)
IMMATURE NEUTROPHIL %: 0.2 % (ref 0–0.43)
KETONES, URINE: NEGATIVE MG/DL
LEUKOCYTE ESTERASE, URINE: NEGATIVE
LYMPHOCYTES ABSOLUTE: 2.5 K/CU MM
LYMPHOCYTES RELATIVE PERCENT: 30.9 % (ref 24–44)
MCH RBC QN AUTO: 30.9 PG (ref 27–31)
MCHC RBC AUTO-ENTMCNC: 33.3 % (ref 32–36)
MCV RBC AUTO: 93 FL (ref 78–100)
MONOCYTES ABSOLUTE: 0.5 K/CU MM
MONOCYTES RELATIVE PERCENT: 6.5 % (ref 0–4)
NITRITE URINE, QUANTITATIVE: NEGATIVE
OPIATES, URINE: NEGATIVE
OXYCODONE: NEGATIVE
PDW BLD-RTO: 12.6 % (ref 11.7–14.9)
PH, URINE: 7 (ref 5–8)
PHENCYCLIDINE, URINE: NEGATIVE
PLATELET # BLD: 180 K/CU MM (ref 140–440)
PMV BLD AUTO: 11.4 FL (ref 7.5–11.1)
POTASSIUM SERPL-SCNC: 3.7 MMOL/L (ref 3.5–5.1)
PROTEIN UA: NEGATIVE MG/DL
RBC # BLD: 4.46 M/CU MM (ref 4.2–5.4)
RBC URINE: 1 /HPF (ref 0–6)
SEGMENTED NEUTROPHILS ABSOLUTE COUNT: 4.9 K/CU MM
SEGMENTED NEUTROPHILS RELATIVE PERCENT: 60.2 % (ref 36–66)
SODIUM BLD-SCNC: 142 MMOL/L (ref 135–145)
SPECIFIC GRAVITY UA: 1.01 (ref 1–1.03)
TOTAL IMMATURE NEUTOROPHIL: 0.02 K/CU MM
TOTAL PROTEIN: 7 GM/DL (ref 6.4–8.2)
UROBILINOGEN, URINE: 0.2 MG/DL (ref 0.2–1)
WBC # BLD: 8.2 K/CU MM (ref 4–10.5)
WBC UA: 1 /HPF (ref 0–5)

## 2021-07-22 PROCEDURE — 6360000002 HC RX W HCPCS: Performed by: EMERGENCY MEDICINE

## 2021-07-22 PROCEDURE — 2580000003 HC RX 258: Performed by: EMERGENCY MEDICINE

## 2021-07-22 PROCEDURE — 71045 X-RAY EXAM CHEST 1 VIEW: CPT

## 2021-07-22 PROCEDURE — 81001 URINALYSIS AUTO W/SCOPE: CPT

## 2021-07-22 PROCEDURE — 99283 EMERGENCY DEPT VISIT LOW MDM: CPT

## 2021-07-22 PROCEDURE — 96374 THER/PROPH/DIAG INJ IV PUSH: CPT

## 2021-07-22 PROCEDURE — 80307 DRUG TEST PRSMV CHEM ANLYZR: CPT

## 2021-07-22 PROCEDURE — 36415 COLL VENOUS BLD VENIPUNCTURE: CPT

## 2021-07-22 PROCEDURE — 85025 COMPLETE CBC W/AUTO DIFF WBC: CPT

## 2021-07-22 PROCEDURE — 93005 ELECTROCARDIOGRAM TRACING: CPT | Performed by: EMERGENCY MEDICINE

## 2021-07-22 PROCEDURE — 80053 COMPREHEN METABOLIC PANEL: CPT

## 2021-07-22 PROCEDURE — 6370000000 HC RX 637 (ALT 250 FOR IP): Performed by: EMERGENCY MEDICINE

## 2021-07-22 PROCEDURE — 96375 TX/PRO/DX INJ NEW DRUG ADDON: CPT

## 2021-07-22 RX ORDER — 0.9 % SODIUM CHLORIDE 0.9 %
1000 INTRAVENOUS SOLUTION INTRAVENOUS ONCE
Status: COMPLETED | OUTPATIENT
Start: 2021-07-22 | End: 2021-07-22

## 2021-07-22 RX ORDER — DIPHENHYDRAMINE HYDROCHLORIDE 50 MG/ML
25 INJECTION INTRAMUSCULAR; INTRAVENOUS ONCE
Status: COMPLETED | OUTPATIENT
Start: 2021-07-22 | End: 2021-07-22

## 2021-07-22 RX ORDER — ACETAMINOPHEN 500 MG
1000 TABLET ORAL ONCE
Status: COMPLETED | OUTPATIENT
Start: 2021-07-22 | End: 2021-07-22

## 2021-07-22 RX ADMIN — SODIUM CHLORIDE 1000 ML: 9 INJECTION, SOLUTION INTRAVENOUS at 15:30

## 2021-07-22 RX ADMIN — DIPHENHYDRAMINE HYDROCHLORIDE 25 MG: 50 INJECTION INTRAMUSCULAR; INTRAVENOUS at 15:29

## 2021-07-22 RX ADMIN — BUTORPHANOL TARTRATE 1 MG: 2 INJECTION, SOLUTION INTRAMUSCULAR; INTRAVENOUS at 17:01

## 2021-07-22 RX ADMIN — ACETAMINOPHEN 1000 MG: 500 TABLET ORAL at 15:21

## 2021-07-22 ASSESSMENT — PAIN DESCRIPTION - LOCATION: LOCATION: GENERALIZED

## 2021-07-22 ASSESSMENT — PAIN SCALES - GENERAL
PAINLEVEL_OUTOF10: 8
PAINLEVEL_OUTOF10: 8

## 2021-07-22 ASSESSMENT — ENCOUNTER SYMPTOMS
WHEEZING: 0
CHEST TIGHTNESS: 0

## 2021-07-22 NOTE — ED TRIAGE NOTES
Patient arrived to the ED vis private vehicle by herself with multiple complaints. This nurse escorted patient to room 1 for triage, patient needed assistance getting out of the wheelchair and into bed \"because Im too weak. \" Patient c/o back pain, migraine, dizziness, \"passing out feeling,\" \"loosing feeling in my legs,\" diarrhea and inability to eat for the past year. Patient stated \"I just cant tell you everything. I dont feel right and I havent felt right for awhile. \" Throughout triage patient has further complaints as in chills and cough. Patient had MRI of the brain completed this week (results negative), was offered a follow up appointment with PCP but declined \"because 0720 is too early. \" Patient laying on left side in bed with eyes closed during triage but appropriately answering questions.

## 2021-07-22 NOTE — ED NOTES
Discharge instructions reviewed; patient verbalized understanding; patient transferred from ED via private vehicle by family.       Shun Rowell RN  07/22/21 [id number]

## 2021-07-23 ENCOUNTER — TELEPHONE (OUTPATIENT)
Dept: FAMILY MEDICINE CLINIC | Age: 46
End: 2021-07-23

## 2021-07-23 DIAGNOSIS — R31.21 ASYMPTOMATIC MICROSCOPIC HEMATURIA: Primary | ICD-10-CM

## 2021-07-23 LAB
EKG ATRIAL RATE: 62 BPM
EKG DIAGNOSIS: NORMAL
EKG P AXIS: 82 DEGREES
EKG P-R INTERVAL: 146 MS
EKG Q-T INTERVAL: 410 MS
EKG QRS DURATION: 86 MS
EKG QTC CALCULATION (BAZETT): 416 MS
EKG R AXIS: 81 DEGREES
EKG T AXIS: 60 DEGREES
EKG VENTRICULAR RATE: 62 BPM

## 2021-07-23 PROCEDURE — 93010 ELECTROCARDIOGRAM REPORT: CPT | Performed by: INTERNAL MEDICINE

## 2021-07-23 NOTE — TELEPHONE ENCOUNTER
She has trace blood in her urine when she was here for her last appointment also. I was planning on repeating her UA on Tuesday when she was here for her appointment. I will go ahead and place a referral to Urology today for further evaluation.

## 2021-07-27 ENCOUNTER — OFFICE VISIT (OUTPATIENT)
Dept: FAMILY MEDICINE CLINIC | Age: 46
End: 2021-07-27
Payer: COMMERCIAL

## 2021-07-27 VITALS
SYSTOLIC BLOOD PRESSURE: 94 MMHG | BODY MASS INDEX: 19.47 KG/M2 | WEIGHT: 113.4 LBS | DIASTOLIC BLOOD PRESSURE: 55 MMHG | OXYGEN SATURATION: 98 % | RESPIRATION RATE: 15 BRPM | TEMPERATURE: 97.3 F | HEART RATE: 66 BPM

## 2021-07-27 DIAGNOSIS — F51.01 PRIMARY INSOMNIA: ICD-10-CM

## 2021-07-27 DIAGNOSIS — F41.1 GAD (GENERALIZED ANXIETY DISORDER): Primary | ICD-10-CM

## 2021-07-27 DIAGNOSIS — G24.01 TARDIVE DYSKINESIA: ICD-10-CM

## 2021-07-27 DIAGNOSIS — R31.9 HEMATURIA, UNSPECIFIED TYPE: ICD-10-CM

## 2021-07-27 LAB
BILIRUBIN, POC: NEGATIVE
BLOOD URINE, POC: NORMAL
CLARITY, POC: NORMAL
COLOR, POC: NORMAL
GLUCOSE URINE, POC: NEGATIVE
KETONES, POC: NEGATIVE
LEUKOCYTE EST, POC: NEGATIVE
NITRITE, POC: NEGATIVE
PH, POC: 5.5
PROTEIN, POC: NEGATIVE
SPECIFIC GRAVITY, POC: 1.01
UROBILINOGEN, POC: 0.2

## 2021-07-27 PROCEDURE — G8427 DOCREV CUR MEDS BY ELIG CLIN: HCPCS | Performed by: NURSE PRACTITIONER

## 2021-07-27 PROCEDURE — 81002 URINALYSIS NONAUTO W/O SCOPE: CPT | Performed by: NURSE PRACTITIONER

## 2021-07-27 PROCEDURE — 99214 OFFICE O/P EST MOD 30 MIN: CPT | Performed by: NURSE PRACTITIONER

## 2021-07-27 PROCEDURE — 4004F PT TOBACCO SCREEN RCVD TLK: CPT | Performed by: NURSE PRACTITIONER

## 2021-07-27 PROCEDURE — G8420 CALC BMI NORM PARAMETERS: HCPCS | Performed by: NURSE PRACTITIONER

## 2021-07-27 RX ORDER — VALBENAZINE 80 MG/1
80 CAPSULE ORAL DAILY
Qty: 30 CAPSULE | Refills: 0 | Status: SHIPPED | OUTPATIENT
Start: 2021-07-27 | End: 2021-08-16 | Stop reason: SDUPTHER

## 2021-07-27 RX ORDER — TRAZODONE HYDROCHLORIDE 100 MG/1
100 TABLET ORAL NIGHTLY PRN
Qty: 30 TABLET | Refills: 0 | Status: SHIPPED | OUTPATIENT
Start: 2021-07-27 | End: 2021-08-16 | Stop reason: SDUPTHER

## 2021-07-27 RX ORDER — LORAZEPAM 0.5 MG/1
0.5 TABLET ORAL 2 TIMES DAILY
Qty: 14 TABLET | Refills: 0 | Status: SHIPPED | OUTPATIENT
Start: 2021-08-13 | End: 2021-08-16 | Stop reason: SDUPTHER

## 2021-07-27 ASSESSMENT — ANXIETY QUESTIONNAIRES
4. TROUBLE RELAXING: 3
IF YOU CHECKED OFF ANY PROBLEMS ON THIS QUESTIONNAIRE, HOW DIFFICULT HAVE THESE PROBLEMS MADE IT FOR YOU TO DO YOUR WORK, TAKE CARE OF THINGS AT HOME, OR GET ALONG WITH OTHER PEOPLE: VERY DIFFICULT
1. FEELING NERVOUS, ANXIOUS, OR ON EDGE: 1
GAD7 TOTAL SCORE: 14
6. BECOMING EASILY ANNOYED OR IRRITABLE: 3
7. FEELING AFRAID AS IF SOMETHING AWFUL MIGHT HAPPEN: 1
2. NOT BEING ABLE TO STOP OR CONTROL WORRYING: 1
5. BEING SO RESTLESS THAT IT IS HARD TO SIT STILL: 3
3. WORRYING TOO MUCH ABOUT DIFFERENT THINGS: 2

## 2021-07-27 ASSESSMENT — PATIENT HEALTH QUESTIONNAIRE - PHQ9
3. TROUBLE FALLING OR STAYING ASLEEP: 2
10. IF YOU CHECKED OFF ANY PROBLEMS, HOW DIFFICULT HAVE THESE PROBLEMS MADE IT FOR YOU TO DO YOUR WORK, TAKE CARE OF THINGS AT HOME, OR GET ALONG WITH OTHER PEOPLE: 2
7. TROUBLE CONCENTRATING ON THINGS, SUCH AS READING THE NEWSPAPER OR WATCHING TELEVISION: 2
9. THOUGHTS THAT YOU WOULD BE BETTER OFF DEAD, OR OF HURTING YOURSELF: 0
5. POOR APPETITE OR OVEREATING: 1
8. MOVING OR SPEAKING SO SLOWLY THAT OTHER PEOPLE COULD HAVE NOTICED. OR THE OPPOSITE, BEING SO FIGETY OR RESTLESS THAT YOU HAVE BEEN MOVING AROUND A LOT MORE THAN USUAL: 2
2. FEELING DOWN, DEPRESSED OR HOPELESS: 2
SUM OF ALL RESPONSES TO PHQ9 QUESTIONS 1 & 2: 4
SUM OF ALL RESPONSES TO PHQ QUESTIONS 1-9: 14
SUM OF ALL RESPONSES TO PHQ QUESTIONS 1-9: 14
1. LITTLE INTEREST OR PLEASURE IN DOING THINGS: 2
SUM OF ALL RESPONSES TO PHQ QUESTIONS 1-9: 14
4. FEELING TIRED OR HAVING LITTLE ENERGY: 2
6. FEELING BAD ABOUT YOURSELF - OR THAT YOU ARE A FAILURE OR HAVE LET YOURSELF OR YOUR FAMILY DOWN: 1

## 2021-07-27 NOTE — PROGRESS NOTES
Behavioral Health Follow-Up  Rustam Allen, FNP-C, PMHNP-BC      Time spent with Patient:  30 minutes  This was a outpatient visit. Patient Location: Home. Provider Location:  Marylou Armas:  Reason for visit is medication management. Current treatment includes Ativan 0.5 mg BID for anxiety and Ingrezza 40 mg daily for tardive dyskinesia. She has been compliant with medications. Pt reports that medications have somewhat been been working. Pt denies side effects from medications. Pt reports there has been no changes to appetite. Pt reports sleep has been \"shitty. \"  Pt denies  current exercise. Pt denies hallucinations. Pt denies current suicidal ideation, plan and intent. Pt  denies current homicidal ideation, plan and intent. Past Psychiatric history:   The patient has a history of bipolar disorder, unspecified, depression, anxiety disorder and personality disorder. Current treatment includes Anti-depressant: Trazodone 50 mg at HS. Patient denies SE from current treatment. She has a hx of suicide attempt in 2004 after overdosing on pills. She reports inpatient hospitalization for 7 days following the attempt and was diagnosed with bipolar disorder. She was prescribed lithium for 1 year but stopped taking the medication because it made her feel worse. She then started seeing Dr. Danial Stovall at [facility] and was diagnosed with schizoaffective disorder. She was tried on multiple medications with no improvement in symptoms.     Previous treatment has included:     Antidepressants:   Lexapro  Mirtazapine  Sertraline  Viibryd  Doxepin  Prozac  Paxil  Celexa  Wellbutrin  Cymbalta  Doxepin  Amitriptyline     Anxiolytics:    Buspar  Ativan  Xanax  Valium  Librium  Temazepam  Propranolol   Prazosin      Antipsychotics:  Haldol  Seroquel  Risperidone  Ziprasidone  Compazine  Abilify  Klonopin  Lithium     Mood Stabilizers: Lamictal  Depakote  Gabapentin  Carbamazepine    Asking about Nurtec  Dr. Paris Valdez - chart review    Past Medical History:   Diagnosis Date    ADHD     Arthritis     OSTEO ARTHITIS    B[facility] disorder, unspecified ([facility]     Cholecystenteric fistula     [facility]     Degenerative disc disease     Fibromyalgia     Generalized anxiety disorder     H/O 24 hour EKG monitoring 02/16/2017      Sinus tach events , no major arrhythmias , follow up in office as routine     H/O echocardiogram 03/01/2017    EF 55-60% Normal LV structure  and systolic function.  H/O exercise stress test 03/01/2017    Normal stress test. Patient has physical deconditioning as she achieved target HR in 2 mins. Recommend to increase PO fluids intake and exercise training.  Headache     History of cardiac monitoring 09/21/2020    Normal 30-day event monitor with average heart rate of 63 lowest heart rate of 57 highest heart rate of 82 patient reported episodes of dizziness which did not correlate with any arrhythmia. There were no episodes of tachycardia uneventful monitor however average heart rate and even the highest heart rate is on the lower side     Osteopenia     Osteoporosis     PTSD (post-traumatic stress disorder)     S/P cholecystectomy 11/22/2011    [facility]     Sjoegren syndrome     Syncope     Tardive dyskinesia     Venous insufficiency of leg 2012    Vertigo           Current Outpatient Medications:     Valbenazine Tosylate (INGREZZA) 80 MG CAPS, Take 80 mg by mouth daily, Disp: 30 capsule, Rfl: 0    traZODone (DESYREL) 100 MG tablet, Take 1 tablet by mouth nightly as needed for Sleep, Disp: 30 tablet, Rfl: 0    [START ON 8/13/2021] LORazepam (ATIVAN) 0.5 MG tablet, Take 1 tablet by mouth 2 times daily for 7 days. , Disp: 14 tablet, Rfl: 0    rOPINIRole (REQUIP) 2 MG tablet, TAKE ONE TABLET BY MOUTH ONCE NIGHTLY, Disp: 30 tablet, Rfl: 1   pregabalin (LYRICA) 50 MG capsule, Take 1 capsule by mouth every evening for 90 days. , Disp: 90 capsule, Rfl: 0    vitamin B-12 (CYANOCOBALAMIN) 1000 MCG tablet, Take 1,000 mcg by mouth daily, Disp: , Rfl:      Family History   Problem Relation Age of Onset    Other Mother         Neurocardiogenic syncope    ADHD Mother     Alcohol Abuse Father     Bipolar Disorder Sister     Anxiety Disorder Sister     Bipolar Disorder Sister     OCD Sister     Heart Surgery Maternal Grandmother     ADHD Daughter     ADHD Daughter     Lupus Paternal Uncle     Other Paternal Uncle         myasthenia gravis        Social History     Socioeconomic History    Marital status: Single     Spouse name: Not on file    Number of children: Not on file    Years of education: Not on file    Highest education level: Not on file   Occupational History    Not on file   Tobacco Use    Smoking status: Current Every Day Smoker     Packs/day: 0.50     Years: 20.00     Pack years: 10.00     Types: Cigarettes    Smokeless tobacco: Never Used   Vaping Use    Vaping Use: Former    Substances: Always   Substance and Sexual Activity    Alcohol use: No    Drug use: Not Currently     Types: Methamphetamines     Comment: clean for 11 years, past hx of crack use    Sexual activity: Yes     Partners: Male   Other Topics Concern    Not on file   Social History Narrative    Not on file     Social Determinants of Health     Financial Resource Strain: Low Risk     Difficulty of Paying Living Expenses: Not hard at all   Food Insecurity: Food Insecurity Present    Worried About [facility] in the Last Year: Sometimes true    Ursula of Food in the Last Year: Sometimes true   Transportation Needs:     Lack of Transportation (Medical):      Lack of Transportation (Non-Medical):    Physical Activity:     Days of Exercise per Week:     Minutes of Exercise per Session:    Stress:     Feeling of Stress :    Social Connections:     Frequency of Communication with Friends and Family:     Frequency of Social Gatherings with Friends and Family:     Attends Mandaen Services:     Active Member of Clubs or Organizations:     Attends Club or Organization Meetings:     Marital Status:    Intimate Partner Violence:     Fear of Current or Ex-Partner:     Emotionally Abused:     Physically Abused:     Sexually Abused:         TOBACCO: Mirlande  reports that she has been smoking cigarettes. She has a 10.00 pack-year smoking history. She has never used smokeless tobacco.  ETOH: Mirlande  reports no history of alcohol use. Review of Systems   Constitutional: Positive for fatigue. Negative for activity change, appetite change, chills, diaphoresis, fever and unexpected weight change. HENT: Negative. Eyes: Positive for visual disturbance. Respiratory: Negative for chest tightness and wheezing. Cardiovascular: Negative. Negative for chest pain and palpitations. Endocrine: Negative for cold intolerance, heat intolerance, polydipsia, polyphagia and polyuria. Genitourinary: Positive for frequency and urgency. Negative for difficulty urinating, dysuria, flank pain, hematuria and pelvic pain. Skin: Negative. Neurological: Positive for dizziness, tremors, speech difficulty, weakness and headaches. Negative for seizures, syncope, facial asymmetry and numbness. Psychiatric/Behavioral: Positive for agitation, decreased concentration, dysphoric mood, hallucinations and sleep disturbance. Negative for behavioral problems, confusion, self-injury and suicidal ideas. The patient is nervous/anxious and is hyperactive. MSE:  Appearance: alert, cooperative, smiling  Attention:Intact  Appetite: abnormal: reports appetite fluctuations  Ambulation: unable to assess.    Sleep disturbance: Yes; decreased need for sleep  Loss of pleasure: Yes  Speech: normal rate, normal volume and well articulated  Mood: Anxious  Affect: anxiety  Thought Content: intrusive thoughts and cognitive distortions  Insight: Fair  Judgment: Impaired  Memory: Intact long-term and Intact short-term  Suicide Assessment: no suicidal ideation  Homicide Assessment: denies current homicidal ideation, plan and intent    Diagnostic Screening:   PHQ Scores 7/27/2021 7/13/2021 6/15/2021 6/8/2021 10/9/2020 8/11/2020 7/7/2020   PHQ2 Score 4 6 3 3 0 6 6   PHQ9 Score 14 20 15 11 0 20 15     Interpretation of Total Score Depression Severity: 1-4 = Minimal depression, 5-9 = Mild depression, 10-14 = Moderate depression, 15-19 = Moderately severe depression, 20-27 = Severe depression     STEPHANIA 7 SCORE 7/27/2021 6/15/2021   STEPHANIA-7 Total Score 14 18     Interpretation of STEPHANIA-7 score: 5-9 = mild anxiety, 10-14 = moderate anxiety, 15+ = severe anxiety. Recommend referral to behavioral health for scores 10 or greater. PDMP Monitoring:    Last PDMP Tobias Wong as Reviewed [facility]):  Review User Review Instant Review Result   Kasie PATEL 7/13/2021  2:08 PM Reviewed PDMP [1]     Last Controlled Substance Monitoring Documentation      Virtual Visit from 1/21/2021 in [address]   Periodic Controlled Substance Monitoring  Possible medication side effects, risk of tolerance/dependence & alternative treatments discussed., No signs of potential drug abuse or diversion identified. , Assessed functional status.  filed at 01/21/2021 1047        Urine Drug Screenings (1 yr)     POCT Rapid Drug Screen  Collected: 7/13/2021  2:00 PM (Final result)    Complete Results          POCT Rapid Drug Screen  Collected: 6/15/2021 12:35 PM (Final result)    Complete Results          POCT Rapid Drug Screen  Collected: 5/12/2020  3:49 PM (Final result)    Complete Results          POCT Rapid Drug Screen  Collected: 1/8/2020  4:34 PM (Final result)    Complete Results          Drug screen, multiple, urine  Collected: 5/7/2014  9:15 AM (Final result)    Narrative:         THRESHOLD CONCENTRATIONS (mg/dL)  AMP,mAMP,TCA          1000  BAR,BZO,BRIONNA,OPI       300. .. Complete Results          Drug Screen, Multiple, Urine  Collected: 9/9/2013  3:20 AM (Final result)    Narrative: Performed @ [facility], [address], [address] [facility]    Complete Results          Drug Screen, Multiple, Urine  Collected: 9/23/2012 12:15 AM (Final result)    Narrative:         THRESHOLD CONCENTRATIONS (mg/dL)  AMP,mAMP,TCA          1000  BAR,BZO,BRIONNA,OPI       300. .. Complete Results          Drug Screen, Multiple, Urine  Collected: 4/27/2012  8:00 PM (Final result)    Narrative:         THRESHOLD CONCENTRATIONS (mg/dL)  AMP,mAMP,TCA          1000  BAR,BZO,BRIONNA,OPI       300. .. Complete Results          Drug screen, multiple, urine  Collected: 10/5/2011  8:15 PM (Final result)    Narrative:         THRESHOLD CONCENTRATIONS (mg/dL)  AMP,mAMP,TCA          1000  BAR,BZO,BRIONNA,OPI       300. .. Complete Results          Urine Drug Screen  Collected: 7/22/2021  3:33 PM (Final result)    Complete Results          Urine Drug Screen  Collected: 11/30/2015 12:15 AM (Final result)    Narrative:         THRESHOLD CONCENTRATIONS (mg/dL)  AMPHT               1000  BRIONNA,OPIA             300... Complete Results          Urine Drug Screen  Collected: 4/21/2015  7:30 AM (Final result)    Narrative:         THRESHOLD CONCENTRATIONS (mg/dL)  AMPHT               1000  BRIONNA,OPIA             300... Complete Results          Drugs of Abuse 7  Collected: 7/9/2013  3:30 PM (Final result)    Narrative:         THRESHOLD CONCENTRATIONS (mg/dL)  AMPHT               1000  BRIONNA,PPX,OPIA         300. .. Complete Results          Drugs of Abuse 7  Collected: 1/17/2012  3:30 PM (Final result)    Narrative:         THRESHOLD CONCENTRATIONS (mg/dL)  AMPHT               1000  BRIONNA,PPX,OPIA         300. ..     Complete Results              Medication Contract and Consent for Opioid Use Documents Filed      No documents found                 OARRS checked and there were no signs of substance abuse, or prescription misuse. Last Labs: No results found for: LABA1C  No results found for: EAG   Lab Results   Component Value Date    WBC 8.2 07/22/2021    HGB 13.8 07/22/2021    HCT 41.5 07/22/2021    MCV 93.0 07/22/2021     07/22/2021    LYMPHOPCT 30.9 07/22/2021    RBC 4.46 07/22/2021    MCH 30.9 07/22/2021    MCHC 33.3 07/22/2021    RDW 12.6 07/22/2021          Lab Results   Component Value Date     07/22/2021    K 3.7 07/22/2021     07/22/2021    CO2 29 07/22/2021    BUN 9 07/22/2021    CREATININE 0.9 07/22/2021    GLUCOSE 95 07/22/2021    CALCIUM 9.6 07/22/2021    PROT 7.0 07/22/2021    LABALBU 4.6 07/22/2021    BILITOT 0.4 07/22/2021    ALKPHOS 86 07/22/2021    AST 21 07/22/2021    ALT 12 07/22/2021    LABGLOM >60 07/22/2021    GFRAA >60 07/22/2021    AGRATIO 1.9 06/16/2020    GLOB 2.3 57/09/6260       Metabolic monitoring is being done by PCP. Assessment/Plan    1. STEPHANIA (generalized anxiety disorder)  A discussion regarding medication was held with the patient. Discussed the dangerous nature of narcotic/benzo medicines, including the risk of respiratory depression, death and addiction. OARRS reviewed. Behavioral counseling recommended. - traZODone (DESYREL) 100 MG tablet; Take 1 tablet by mouth nightly as needed for Sleep  Dispense: 30 tablet; Refill: 0  - LORazepam (ATIVAN) 0.5 MG tablet; Take 1 tablet by mouth 2 times daily for 7 days. Dispense: 14 tablet; Refill: 0    2. Primary insomnia  Will increase Trazodone to 100 mg at HS for insomnia. Sleep hygiene reinforced. - traZODone (DESYREL) 100 MG tablet; Take 1 tablet by mouth nightly as needed for Sleep  Dispense: 30 tablet; Refill: 0    3. Tardive dyskinesia  Will increase ingrezza to 80 mg daily to achieve full therapeutic effect. - Valbenazine Tosylate (INGREZZA) 80 MG CAPS; Take 80 mg by mouth daily  Dispense: 30 capsule; Refill: 0    4.  Hematuria, unspecified

## 2021-08-04 ENCOUNTER — APPOINTMENT (OUTPATIENT)
Dept: CT IMAGING | Age: 46
End: 2021-08-04
Payer: COMMERCIAL

## 2021-08-04 ENCOUNTER — HOSPITAL ENCOUNTER (EMERGENCY)
Age: 46
Discharge: HOME | End: 2021-08-04
Attending: EMERGENCY MEDICINE
Payer: COMMERCIAL

## 2021-08-04 VITALS
HEIGHT: 64 IN | RESPIRATION RATE: 16 BRPM | BODY MASS INDEX: 19.63 KG/M2 | OXYGEN SATURATION: 98 % | HEART RATE: 57 BPM | DIASTOLIC BLOOD PRESSURE: 80 MMHG | SYSTOLIC BLOOD PRESSURE: 117 MMHG | TEMPERATURE: 97.1 F | WEIGHT: 115 LBS

## 2021-08-04 DIAGNOSIS — N02.9 IDIOPATHIC HEMATURIA, UNSPECIFIED WHETHER GLOMERULAR MORPHOLOGIC CHANGES PRESENT: Primary | ICD-10-CM

## 2021-08-04 LAB
BACTERIA: NEGATIVE /HPF
BILIRUBIN URINE: NEGATIVE MG/DL
BLOOD, URINE: ABNORMAL
CAST TYPE: ABNORMAL /HPF
CLARITY: ABNORMAL
COLOR: YELLOW
CRYSTAL TYPE: ABNORMAL /HPF
EPITHELIAL CELLS, UA: ABNORMAL /HPF
GLUCOSE, URINE: NEGATIVE MG/DL
KETONES, URINE: NEGATIVE MG/DL
LEUKOCYTE ESTERASE, URINE: NEGATIVE
MUCUS: NEGATIVE HPF
NITRITE URINE, QUANTITATIVE: NEGATIVE
PH, URINE: 6 (ref 5–8)
PROTEIN UA: NEGATIVE MG/DL
RBC URINE: ABNORMAL /HPF (ref 0–6)
SPECIFIC GRAVITY UA: 1.02 (ref 1–1.03)
UROBILINOGEN, URINE: 0.2 MG/DL (ref 0.2–1)
VOLUME, (UVOL): 12 ML (ref 10–12)
WBC UA: ABNORMAL /HPF (ref 0–5)

## 2021-08-04 PROCEDURE — 74176 CT ABD & PELVIS W/O CONTRAST: CPT

## 2021-08-04 PROCEDURE — 81001 URINALYSIS AUTO W/SCOPE: CPT

## 2021-08-04 PROCEDURE — 99284 EMERGENCY DEPT VISIT MOD MDM: CPT

## 2021-08-04 ASSESSMENT — ENCOUNTER SYMPTOMS
FACIAL SWELLING: 0
NAUSEA: 0
EYES NEGATIVE: 1
GASTROINTESTINAL NEGATIVE: 1
RESPIRATORY NEGATIVE: 1
ABDOMINAL PAIN: 0

## 2021-08-04 ASSESSMENT — PAIN DESCRIPTION - ORIENTATION: ORIENTATION: LOWER

## 2021-08-04 ASSESSMENT — PAIN DESCRIPTION - DESCRIPTORS: DESCRIPTORS: SHARP;DULL

## 2021-08-04 ASSESSMENT — PAIN DESCRIPTION - LOCATION: LOCATION: BACK

## 2021-08-04 ASSESSMENT — PAIN SCALES - GENERAL: PAINLEVEL_OUTOF10: 6

## 2021-08-05 ASSESSMENT — ENCOUNTER SYMPTOMS
CHEST TIGHTNESS: 0
WHEEZING: 0

## 2021-08-05 NOTE — ED TRIAGE NOTES
Arrived ambulatory to room 6 for triage. Tolerated without difficulty. Bed in lowest position. Call light given.

## 2021-08-05 NOTE — ED PROVIDER NOTES
Substances: Always   Substance and Sexual Activity    Alcohol use: No    Drug use: Not Currently     Types: Methamphetamines     Comment: clean for 11 years, past hx of crack use    Sexual activity: Yes     Partners: Male   Other Topics Concern    Not on file   Social History Narrative    Not on file     Social Determinants of Health     Financial Resource Strain: Low Risk     Difficulty of Paying Living Expenses: Not hard at all   Food Insecurity: Food Insecurity Present    Worried About [address] in the Last Year: Sometimes true    Ursula of Food in the Last Year: Sometimes true   Transportation Needs:     Lack of Transportation (Medical):      Lack of Transportation (Non-Medical):    Physical Activity:     Days of Exercise per Week:     Minutes of Exercise per Session:    Stress:     Feeling of Stress :    Social Connections:     Frequency of Communication with Friends and Family:     Frequency of Social Gatherings with Friends and Family:     Attends Adventist Services:     Active Member of Clubs or Organizations:     Attends Club or Organization Meetings:     Marital Status:    Intimate Partner Violence:     Fear of Current or Ex-Partner:     Emotionally Abused:     Physically Abused:     Sexually Abused:      Past Surgical History:   Procedure Laterality Date    CHOLECYSTECTOMY  11/22/2011    laparoscopic    COLONOSCOPY      DENTAL SURGERY  8/9    ELBOW ARTHROSCOPY      bilateral elbows    ENDOSCOPY, COLON, DIAGNOSTIC      HYSTERECTOMY      OTHER SURGICAL HISTORY      venous ablation, bilateral legs    TUBAL LIGATION       Past Medical History:   Diagnosis Date    ADHD     Arthritis     OSTEO ARTHITIS    B[address] disorder, unspecified ([address]     Cholecystenteric fistula     COPD (chronic obstructive pulmonary disease) ([address]     Degenerative disc disease     Fibromyalgia     Generalized anxiety disorder     H/O 24 hour EKG monitoring 02/16/2017      Sinus tach events , no major arrhythmias , follow up in office as routine     H/O echocardiogram 03/01/2017    EF 55-60% Normal LV structure  and systolic function.  H/O exercise stress test 03/01/2017    Normal stress test. Patient has physical deconditioning as she achieved target HR in 2 mins. Recommend to increase PO fluids intake and exercise training.  Headache     History of cardiac monitoring 09/21/2020    Normal 30-day event monitor with average heart rate of 63 lowest heart rate of 57 highest heart rate of 82 patient reported episodes of dizziness which did not correlate with any arrhythmia. There were no episodes of tachycardia uneventful monitor however average heart rate and even the highest heart rate is on the lower side     Osteopenia     Osteoporosis     PTSD (post-traumatic stress disorder)     S/P cholecystectomy 11/22/2011    Schizo-affective schizophrenia ([facility]     Sjoegren syndrome     Syncope     Tardive dyskinesia     Venous insufficiency of leg 2012    Vertigo      Allergies   Allergen Reactions    Rozerem [Ramelteon] Other (See Comments)     Increased symptoms of TD    Codeine Itching and Nausea And Vomiting    Imitrex [Sumatriptan] Itching and Nausea And Vomiting     PASSED OUT    Abilify [Aripiprazole] Other (See Comments)     Patient reports symptoms of TD    Amitriptyline     Botox [Onabotulinumtoxina] Other (See Comments)     Tardive dyskinesia    Compazine [Prochlorperazine Maleate] Other (See Comments)    Lamictal [Lamotrigine]      Constant movement    Meclizine     Topamax [Topiramate]      Made TD worse    Cephalexin Nausea And Vomiting and Other (See Comments)     ABDOMINAL PAIN FOR 2 WEEKS    Magnesium-Containing Compounds Other (See Comments)     Pt sts \"they called it the mags. I start twitching. \"     Meclizine Hcl Other (See Comments)     \"Makes me stutter and twitch. \"    Nsaids Other (See Comments)     Tardive dyskinisia    Pcn [Penicillins] Nausea And Vomiting and Other (See Comments)     HEADACHE    Reglan [Metoclopramide] Other (See Comments)     Tardive dyskinisia    Toradol [Ketorolac Tromethamine] Other (See Comments)     Tremors    Vistaril [Hydroxyzine Hcl] Other (See Comments)     dyskinsia      Zofran [Ondansetron Hcl] Other (See Comments)     Cramping and burning in stomach     Prior to Admission medications    Medication Sig Start Date End Date Taking? Authorizing Provider   Valbenazine Tosylate [facility]) 80 MG CAPS Take 80 mg by mouth daily 7/27/21  Yes HANNA Robb CNP   traZODone (DESYREL) 100 MG tablet Take 1 tablet by mouth nightly as needed for Sleep 7/27/21  Yes HANNA Robb CNP   LORazepam (ATIVAN) 0.5 MG tablet Take 1 tablet by mouth 2 times daily for 7 days. 8/13/21 8/20/21 Yes HANNA Robb CNP   rOPINIRole (REQUIP) 2 MG tablet TAKE ONE TABLET BY MOUTH ONCE NIGHTLY 7/20/21  Yes HANNA Robb CNP   pregabalin (LYRICA) 50 MG capsule Take 1 capsule by mouth every evening for 90 days. 6/29/21 9/27/21 Yes HANNA Luna CNP   vitamin B-12 (CYANOCOBALAMIN) 1000 MCG tablet Take 1,000 mcg by mouth daily   Yes Historical Provider, MD       /80   Pulse 57   Temp 97.1 °F (36.2 °C) (Oral)   Resp 16   Ht 5' 4\" (1.626 m)   Wt 115 lb (52.2 kg)   LMP 11/18/2004   SpO2 98%   BMI 19.74 kg/m²     Physical Exam  Vitals and nursing note reviewed. Constitutional:       Appearance: She is well-developed. HENT:      Head: Normocephalic and atraumatic. Right Ear: External ear normal.      Left Ear: External ear normal.      Nose: Nose normal.   Eyes:      Conjunctiva/sclera: Conjunctivae normal.      Pupils: Pupils are equal, round, and reactive to light. Cardiovascular:      Rate and Rhythm: Normal rate and regular rhythm. Heart sounds: Normal heart sounds. Pulmonary:      Effort: Pulmonary effort is normal.      Breath sounds: Normal breath sounds.    Abdominal: General: Bowel sounds are normal. There is no distension. Palpations: Abdomen is soft. Tenderness: There is no right CVA tenderness, left CVA tenderness, guarding or rebound. Musculoskeletal:         General: Normal range of motion. Cervical back: Normal range of motion and neck supple. Skin:     General: Skin is warm and dry. Neurological:      Mental Status: She is alert and oriented to person, place, and time. GCS: GCS eye subscore is 4. GCS verbal subscore is 5. GCS motor subscore is 6. Psychiatric:         Behavior: Behavior normal.         Thought Content: Thought content normal.         Judgment: Judgment normal.         MDM:    Labs Reviewed   URINALYSIS - Abnormal; Notable for the following components:       Result Value    Clarity, UA SLIGHTLY HAZY (*)     Blood, Urine 2+ (*)     All other components within normal limits       CT ABDOMEN PELVIS WO CONTRAST   Final Result   1. No acute abdominopelvic findings identified within limits of this   noncontrast exam.   2. No obstructive uropathy evident. 3. No bowel obstruction identified. PAtient was just seen for a workup end of July. These complaints were primarily neurological in nature. The patient hematuria is not a new finding. She has not follow up with urology or nephrology. Her CT shows no stone and her UA only shows blood. Patient advised she can tylenol for pain. She does not require antibiotics and I once again referred her to urology for work up and her pcm  My typical dicussion, presentation,and considerations for this patients' chief complaint, diagnosis, and differential diagnosis have been considered and discussed. I have stressed need for follow up and reexamination for this encounter. .        Final Impression    1.  Idiopathic hematuria, unspecified whether glomerular morphologic changes present              [facility] Amy Lutz DO  08/04/21 [id number]

## 2021-08-05 NOTE — ED NOTES
Discharge instructions reviewed and pt acknowledges understanding. Ambulatory at discharge.      Lexi Ray RN  08/04/21 [id number]

## 2021-08-12 ENCOUNTER — HOSPITAL ENCOUNTER (EMERGENCY)
Age: 46
Discharge: HOME | End: 2021-08-12
Attending: EMERGENCY MEDICINE
Payer: COMMERCIAL

## 2021-08-12 VITALS
RESPIRATION RATE: 17 BRPM | HEIGHT: 64 IN | HEART RATE: 71 BPM | BODY MASS INDEX: 19.81 KG/M2 | SYSTOLIC BLOOD PRESSURE: 102 MMHG | OXYGEN SATURATION: 97 % | WEIGHT: 116 LBS | TEMPERATURE: 98.6 F | DIASTOLIC BLOOD PRESSURE: 62 MMHG

## 2021-08-12 DIAGNOSIS — G89.29 CHRONIC ABDOMINAL PAIN: ICD-10-CM

## 2021-08-12 DIAGNOSIS — R10.9 CHRONIC ABDOMINAL PAIN: ICD-10-CM

## 2021-08-12 DIAGNOSIS — R31.9 HEMATURIA, UNSPECIFIED TYPE: Primary | ICD-10-CM

## 2021-08-12 LAB
BACTERIA: ABNORMAL /HPF
BILIRUBIN URINE: NEGATIVE MG/DL
BLOOD, URINE: ABNORMAL
CAST TYPE: ABNORMAL /HPF
CLARITY: CLEAR
COLOR: YELLOW
CRYSTAL TYPE: NEGATIVE /HPF
EPITHELIAL CELLS, UA: 3 /HPF
GLUCOSE, URINE: NEGATIVE MG/DL
KETONES, URINE: NEGATIVE MG/DL
LEUKOCYTE ESTERASE, URINE: NEGATIVE
NITRITE URINE, QUANTITATIVE: NEGATIVE
PH, URINE: 6 (ref 5–8)
PROTEIN UA: NEGATIVE MG/DL
RBC URINE: 2 /HPF (ref 0–6)
SPECIFIC GRAVITY UA: 1.02 (ref 1–1.03)
UROBILINOGEN, URINE: 0.2 MG/DL (ref 0.2–1)
WBC UA: 1 /HPF (ref 0–5)

## 2021-08-12 PROCEDURE — 99283 EMERGENCY DEPT VISIT LOW MDM: CPT

## 2021-08-12 PROCEDURE — 6370000000 HC RX 637 (ALT 250 FOR IP): Performed by: EMERGENCY MEDICINE

## 2021-08-12 PROCEDURE — 81001 URINALYSIS AUTO W/SCOPE: CPT

## 2021-08-12 PROCEDURE — 87086 URINE CULTURE/COLONY COUNT: CPT

## 2021-08-12 PROCEDURE — 96372 THER/PROPH/DIAG INJ SC/IM: CPT

## 2021-08-12 PROCEDURE — 6360000002 HC RX W HCPCS: Performed by: EMERGENCY MEDICINE

## 2021-08-12 RX ORDER — PROMETHAZINE HYDROCHLORIDE 25 MG/ML
25 INJECTION, SOLUTION INTRAMUSCULAR; INTRAVENOUS ONCE
Status: COMPLETED | OUTPATIENT
Start: 2021-08-12 | End: 2021-08-12

## 2021-08-12 RX ORDER — LIDOCAINE 50 MG/G
1 PATCH TOPICAL DAILY
Qty: 30 PATCH | Refills: 0 | Status: SHIPPED | OUTPATIENT
Start: 2021-08-12 | End: 2021-08-16 | Stop reason: ALTCHOICE

## 2021-08-12 RX ORDER — DICYCLOMINE HYDROCHLORIDE 10 MG/1
10 CAPSULE ORAL 3 TIMES DAILY
Qty: 15 CAPSULE | Refills: 0 | Status: SHIPPED | OUTPATIENT
Start: 2021-08-12 | End: 2021-08-16 | Stop reason: ALTCHOICE

## 2021-08-12 RX ORDER — IBUPROFEN 600 MG/1
600 TABLET ORAL ONCE
Status: COMPLETED | OUTPATIENT
Start: 2021-08-12 | End: 2021-08-12

## 2021-08-12 RX ORDER — LIDOCAINE 4 G/G
1 PATCH TOPICAL ONCE
Status: DISCONTINUED | OUTPATIENT
Start: 2021-08-12 | End: 2021-08-12 | Stop reason: HOSPADM

## 2021-08-12 RX ADMIN — HYOSCYAMINE SULFATE 125 MCG: 0.12 TABLET ORAL; SUBLINGUAL at 13:28

## 2021-08-12 RX ADMIN — IBUPROFEN 600 MG: 600 TABLET, FILM COATED ORAL at 13:23

## 2021-08-12 RX ADMIN — PROMETHAZINE HYDROCHLORIDE 25 MG: 25 INJECTION INTRAMUSCULAR; INTRAVENOUS at 13:23

## 2021-08-12 ASSESSMENT — PAIN DESCRIPTION - PROGRESSION: CLINICAL_PROGRESSION: GRADUALLY WORSENING

## 2021-08-12 ASSESSMENT — PAIN SCALES - GENERAL
PAINLEVEL_OUTOF10: 6
PAINLEVEL_OUTOF10: 6

## 2021-08-12 ASSESSMENT — PAIN DESCRIPTION - LOCATION: LOCATION: ABDOMEN;HEAD

## 2021-08-12 ASSESSMENT — PAIN DESCRIPTION - ONSET: ONSET: ON-GOING

## 2021-08-12 ASSESSMENT — PAIN DESCRIPTION - DIRECTION: RADIATING_TOWARDS: BACK

## 2021-08-12 ASSESSMENT — PAIN DESCRIPTION - PAIN TYPE: TYPE: ACUTE PAIN

## 2021-08-12 ASSESSMENT — PAIN DESCRIPTION - DESCRIPTORS: DESCRIPTORS: THROBBING;SHARP

## 2021-08-12 ASSESSMENT — PAIN DESCRIPTION - FREQUENCY: FREQUENCY: CONTINUOUS

## 2021-08-12 NOTE — ED NOTES
Discharge instructions and Rx given. Verbalizes understanding. Ambulates without difficulty to private vehicle.       Last Jones RN  08/12/21 [id number]

## 2021-08-12 NOTE — ED TRIAGE NOTES
Pt from home, lower abd pain intermittent for 5-6wks. Last dx with hematuria, continuing. Supposed to follow up with urology but can't get in yet.  A&Ox4

## 2021-08-12 NOTE — ED PROVIDER NOTES
Arthritis     OSTEO ARTHITIS    Bipolar disorder, unspecified ([address]     Cholecystenteric fistula     COPD (chronic obstructive pulmonary disease) ([address]     Degenerative disc disease     Fibromyalgia     Generalized anxiety disorder     H/O 24 hour EKG monitoring 02/16/2017      Sinus tach events , no major arrhythmias , follow up in office as routine     H/O echocardiogram 03/01/2017    EF 55-60% Normal LV structure  and systolic function.  H/O exercise stress test 03/01/2017    Normal stress test. Patient has physical deconditioning as she achieved target HR in 2 mins. Recommend to increase PO fluids intake and exercise training.  Headache     History of cardiac monitoring 09/21/2020    Normal 30-day event monitor with average heart rate of 63 lowest heart rate of 57 highest heart rate of 82 patient reported episodes of dizziness which did not correlate with any arrhythmia.   There were no episodes of tachycardia uneventful monitor however average heart rate and even the highest heart rate is on the lower side     Osteopenia     Osteoporosis     PTSD (post-traumatic stress disorder)     S/P cholecystectomy 11/22/2011    Schizo-affective schizophrenia ([address]     Sjoegren syndrome     Syncope     Tardive dyskinesia     Venous insufficiency of leg 2012    Vertigo      Past Surgical History:   Procedure Laterality Date    CHOLECYSTECTOMY  11/22/2011    laparoscopic    COLONOSCOPY      DENTAL SURGERY  8/9    ELBOW ARTHROSCOPY      bilateral elbows    ENDOSCOPY, COLON, DIAGNOSTIC      HYSTERECTOMY      OTHER SURGICAL HISTORY      venous ablation, bilateral legs    TUBAL LIGATION       Family History   Problem Relation Age of Onset    Other Mother         Neurocardiogenic syncope    ADHD Mother     Alcohol Abuse Father     Bipolar Disorder Sister     Anxiety Disorder Sister     Bipolar Disorder Sister     OCD Sister     Heart Surgery Maternal Guillaume Hutton ADHD Daughter    [facility] ADHD Daughter     Lupus Paternal Uncle     Other Paternal Uncle         myasthenia gravis     Social History     Socioeconomic History    Marital status: Single     Spouse name: Not on file    Number of children: Not on file    Years of education: Not on file    Highest education level: Not on file   Occupational History    Not on file   Tobacco Use    Smoking status: Current Every Day Smoker     Packs/day: 0.50     Years: 20.00     Pack years: 10.00     Types: Cigarettes    Smokeless tobacco: Never Used   Vaping Use    Vaping Use: Former    Substances: Always   Substance and Sexual Activity    Alcohol use: No    Drug use: Not Currently     Types: Methamphetamines     Comment: clean for 11 years, past hx of crack use    Sexual activity: Yes     Partners: Male   Other Topics Concern    Not on file   Social History Narrative    Not on file     Social Determinants of Health     Financial Resource Strain: Low Risk     Difficulty of Paying Living Expenses: Not hard at all   Food Insecurity: Food Insecurity Present    Worried About [facility] in the Last Year: Sometimes true    Ursula of Food in the Last Year: Sometimes true   Transportation Needs:     Lack of Transportation (Medical):  Lack of Transportation (Non-Medical):    Physical Activity:     Days of Exercise per Week:     Minutes of Exercise per Session:    Stress:     Feeling of Stress :    Social Connections:     Frequency of Communication with Friends and Family:     Frequency of Social Gatherings with Friends and Family:     Attends [employer] Services:     Active Member of Clubs or Organizations:     Attends Club or Organization Meetings:     Marital Status:    Intimate Partner Violence:     Fear of Current or Ex-Partner:     Emotionally Abused:     Physically Abused:     Sexually Abused:      No current facility-administered medications for this encounter.      Current Outpatient Medications   Medication Sig Dispense Refill    dicyclomine (BENTYL) 10 MG capsule Take 1 capsule by mouth 3 times daily As needed for abdominal pain 15 capsule 0    lidocaine (LIDODERM) 5 % Place 1 patch onto the skin daily 12 hours on, 12 hours off. 30 patch 0    Valbenazine Tosylate (INGREZZA) 80 MG CAPS Take 80 mg by mouth daily 30 capsule 0    traZODone (DESYREL) 100 MG tablet Take 1 tablet by mouth nightly as needed for Sleep 30 tablet 0    LORazepam (ATIVAN) 0.5 MG tablet Take 1 tablet by mouth 2 times daily for 7 days. 14 tablet 0    rOPINIRole (REQUIP) 2 MG tablet TAKE ONE TABLET BY MOUTH ONCE NIGHTLY 30 tablet 1    pregabalin (LYRICA) 50 MG capsule Take 1 capsule by mouth every evening for 90 days. 90 capsule 0    vitamin B-12 (CYANOCOBALAMIN) 1000 MCG tablet Take 1,000 mcg by mouth daily       Allergies   Allergen Reactions    Rozerem [Ramelteon] Other (See Comments)     Increased symptoms of TD    Codeine Itching and Nausea And Vomiting    Imitrex [Sumatriptan] Itching and Nausea And Vomiting     PASSED OUT    Abilify [Aripiprazole] Other (See Comments)     Patient reports symptoms of TD    Amitriptyline     Botox [Onabotulinumtoxina] Other (See Comments)     Tardive dyskinesia    Compazine [Prochlorperazine Maleate] Other (See Comments)    Lamictal [Lamotrigine]      Constant movement    Meclizine     Topamax [Topiramate]      Made TD worse    Cephalexin Nausea And Vomiting and Other (See Comments)     ABDOMINAL PAIN FOR 2 WEEKS    Magnesium-Containing Compounds Other (See Comments)     Pt sts \"they called it the mags. I start twitching. \"     Meclizine Hcl Other (See Comments)     \"Makes me stutter and twitch. \"    Nsaids Other (See Comments)     Tardive dyskinisia    Pcn [Penicillins] Nausea And Vomiting and Other (See Comments)     HEADACHE    Reglan [Metoclopramide] Other (See Comments)     Tardive dyskinisia    Toradol [Ketorolac Tromethamine] Other (See Comments)     Tremors    Vistaril [Hydroxyzine Hcl] Other (See Comments)     dyskinsia      Zofran [Ondansetron Hcl] Other (See Comments)     Cramping and burning in stomach       Nursing Notes Reviewed     Physical Exam:   ED Triage Vitals [08/12/21 1218]   Enc Vitals Group      /62      Pulse 71      Resp 17      Temp 98.6 °F (37 °C)      Temp Source Oral      SpO2 97 %      Weight 116 lb (52.6 kg)      Height 5' 4\" (1.626 m)      Head Circumference       Peak Flow       Pain Score       Pain Loc       Pain Edu? Excl. in [address]? /62   Pulse 71   Temp 98.6 °F (37 °C) (Oral)   Resp 17   Ht 5' 4\" (1.626 m)   Wt 116 lb (52.6 kg)   LMP 11/18/2004   SpO2 97%   BMI 19.91 kg/m²   My pulse ox interpretation is - normal  Physical Exam  Vitals and nursing note reviewed. Constitutional:       General: She is not in acute distress. Appearance: She is well-developed. She is not toxic-appearing or diaphoretic. Comments: Appears uncomfortable     HENT:      Head: Normocephalic and atraumatic. Eyes:      General:         Right eye: No discharge. Left eye: No discharge. Extraocular Movements: Extraocular movements intact. Conjunctiva/sclera: Conjunctivae normal.      Pupils: Pupils are equal, round, and reactive to light. Neck:      Meningeal: Brudzinski's sign and Kernig's sign absent. Cardiovascular:      Rate and Rhythm: Normal rate and regular rhythm. Pulmonary:      Effort: Pulmonary effort is normal. No respiratory distress. Breath sounds: Normal breath sounds. Abdominal:      General: There is no distension. Palpations: Abdomen is soft. Tenderness: There is abdominal tenderness (minimal suprapubic tenderness to palpation, no peritoneal signs. ). There is no right CVA tenderness, left CVA tenderness, guarding or rebound. Musculoskeletal:         General: No swelling or signs of injury. Normal range of motion. Cervical back: Normal range of motion.  No spinous process tenderness or muscular tenderness. Back:    Skin:     General: Skin is warm and dry. Neurological:      General: No focal deficit present. Mental Status: She is alert. Cranial Nerves: No cranial nerve deficit. Sensory: No sensory deficit. Motor: No weakness.       Gait: Gait normal.   Psychiatric:         Mood and Affect: Mood normal.         Behavior: Behavior normal.         I have reviewed and interpreted all of the currently available lab results from this visit (if applicable):  Results for orders placed or performed during the hospital encounter of 08/12/21   Culture, Urine    Specimen: Urine, clean catch   Result Value Ref Range    Specimen URINE CLEAN CATCH     Special Requests NONE     Culture Final Report No growth at 18 to 36 hours    Urinalysis   Result Value Ref Range    Color, UA YELLOW YELLOW    Clarity, UA CLEAR CLEAR    Glucose, Urine NEGATIVE NEGATIVE MG/DL    Bilirubin Urine NEGATIVE NEGATIVE MG/DL    Ketones, Urine NEGATIVE NEGATIVE MG/DL    Specific Gravity, UA 1.025 1.001 - 1.035    Blood, Urine MODERATE (A) NEGATIVE    pH, Urine 6.0 5.0 - 8.0    Protein, UA NEGATIVE NEGATIVE MG/DL    Urobilinogen, Urine 0.2 0.2 - 1.0 MG/DL    Nitrite Urine, Quantitative NEGATIVE NEGATIVE    Leukocyte Esterase, Urine NEGATIVE NEGATIVE    RBC, UA 2 0 - 6 /HPF    WBC, UA 1 0 - 5 /HPF    Epithelial Cells, UA 3 /HPF    Cast Type NO CAST FORMS SEEN NO CAST FORMS SEEN /HPF    Bacteria, UA RARE (A) NEGATIVE /HPF    Crystal Type NEGATIVE NEGATIVE /HPF      Radiographs (if obtained):  [] The following radiograph was interpreted by myself in the absence of a radiologist:  [x]Radiologist's Report Reviewed:  No orders to display         EKG (if obtained): (All EKG's are interpreted by myself in the absence of a cardiologist)    MDM:  Differential diagnoses considered include but are not limited to acute on chronic abdominal pain, urinary tract infection, pyelonephritis, muscle strain, muscle spasm, low suspicion for renal colic. Patient arrives appearing to feel unwell today but with normal vital signs and in no acute distress. Urinalysis was obtained which does show some blood in it but has no nitrites or leukocytes and rare bacteria. I do not suspect a urinary tract infection. Will send it for culture. Patient was here last week and had a CT scan done of her abdomen which showed no acute intra-abdominal abnormalities. I do not suspect renal colic is causing her symptoms. She had the same symptoms at the time of the CT scan and there is nothing acute. Her abdominal exam shows minimal tenderness, I do not suspect an emergent intra-abdominal cause. Patient given Levsin, Phenergan and ibuprofen in the emergency department. No episodes of emesis in the emergency department even prior to this medication. Tolerating p.o. without difficulty. I recommended she follow-up closely with her primary care physician and keep the appointment with urology next week. I explained that if she continues to have symptoms she may need to be seen by gastroenterologist.  We will discharge her home in stable condition. She was given strict return precautions. Plan of care explained to patient. Concerning signs and symptoms warranting a return visit to the Emergency Department were explained in detail. All questions and concerns were addressed to the patient's satisfaction. Patient understood and agreed with plan. I did don appropriate PPE (including face mask, protective eye ware/safety glasses and gloves), as recommended by the health facility/national standard best practice, during my bedside interactions with the patient. The likelihood of other entities in the differential is insufficient to justify any further testing for them. This was explained to the patient. The patient was advised that persistent or worsening symptoms would requirefurther evaluation. Clinical Impression:  1.  Hematuria, unspecified

## 2021-08-13 LAB
CULTURE: NORMAL
Lab: NORMAL
SPECIMEN: NORMAL

## 2021-08-16 ENCOUNTER — OFFICE VISIT (OUTPATIENT)
Dept: FAMILY MEDICINE CLINIC | Age: 46
End: 2021-08-16
Payer: COMMERCIAL

## 2021-08-16 VITALS
DIASTOLIC BLOOD PRESSURE: 54 MMHG | BODY MASS INDEX: 19.33 KG/M2 | HEART RATE: 55 BPM | SYSTOLIC BLOOD PRESSURE: 96 MMHG | WEIGHT: 112.6 LBS | OXYGEN SATURATION: 98 %

## 2021-08-16 DIAGNOSIS — R10.2 SUPRAPUBIC PAIN, ACUTE: ICD-10-CM

## 2021-08-16 DIAGNOSIS — F41.1 GAD (GENERALIZED ANXIETY DISORDER): Primary | ICD-10-CM

## 2021-08-16 DIAGNOSIS — Z51.81 ENCOUNTER FOR MEDICATION MONITORING: ICD-10-CM

## 2021-08-16 DIAGNOSIS — R30.0 DYSURIA: ICD-10-CM

## 2021-08-16 DIAGNOSIS — G43.711 INTRACTABLE CHRONIC MIGRAINE WITHOUT AURA AND WITH STATUS MIGRAINOSUS: ICD-10-CM

## 2021-08-16 DIAGNOSIS — Z13.31 POSITIVE DEPRESSION SCREENING: ICD-10-CM

## 2021-08-16 DIAGNOSIS — F51.01 PRIMARY INSOMNIA: ICD-10-CM

## 2021-08-16 DIAGNOSIS — G25.81 RESTLESS LEGS: ICD-10-CM

## 2021-08-16 DIAGNOSIS — G24.01 TARDIVE DYSKINESIA: ICD-10-CM

## 2021-08-16 LAB
ALCOHOL URINE: NORMAL
AMPHETAMINE SCREEN, URINE: NEGATIVE
BARBITURATE SCREEN, URINE: NEGATIVE
BENZODIAZEPINE SCREEN, URINE: POSITIVE
BILIRUBIN, POC: NEGATIVE
BLOOD URINE, POC: NORMAL
BUPRENORPHINE URINE: NEGATIVE
CLARITY, POC: NORMAL
COCAINE METABOLITE SCREEN URINE: NEGATIVE
COLOR, POC: NORMAL
FENTANYL SCREEN, URINE: NORMAL
GABAPENTIN SCREEN, URINE: NORMAL
GLUCOSE URINE, POC: NEGATIVE
KETONES, POC: NEGATIVE
LEUKOCYTE EST, POC: NEGATIVE
MDMA URINE: NEGATIVE
METHADONE SCREEN, URINE: NEGATIVE
METHAMPHETAMINE, URINE: NEGATIVE
NITRITE, POC: NEGATIVE
OPIATE SCREEN URINE: NEGATIVE
OXYCODONE SCREEN URINE: NEGATIVE
PH, POC: 5.5
PHENCYCLIDINE SCREEN URINE: NEGATIVE
PROPOXYPHENE SCREEN, URINE: NEGATIVE
PROTEIN, POC: NEGATIVE
SPECIFIC GRAVITY, POC: 1.02
SYNTHETIC CANNABINOIDS(K2) SCREEN, URINE: NORMAL
THC SCREEN, URINE: NEGATIVE
TRAMADOL SCREEN URINE: NORMAL
TRICYCLIC ANTIDEPRESSANTS, UR: NEGATIVE
UROBILINOGEN, POC: 0.2

## 2021-08-16 PROCEDURE — G8420 CALC BMI NORM PARAMETERS: HCPCS | Performed by: NURSE PRACTITIONER

## 2021-08-16 PROCEDURE — G8427 DOCREV CUR MEDS BY ELIG CLIN: HCPCS | Performed by: NURSE PRACTITIONER

## 2021-08-16 PROCEDURE — 81002 URINALYSIS NONAUTO W/O SCOPE: CPT | Performed by: NURSE PRACTITIONER

## 2021-08-16 PROCEDURE — G8431 POS CLIN DEPRES SCRN F/U DOC: HCPCS | Performed by: NURSE PRACTITIONER

## 2021-08-16 PROCEDURE — 80305 DRUG TEST PRSMV DIR OPT OBS: CPT | Performed by: NURSE PRACTITIONER

## 2021-08-16 PROCEDURE — 99214 OFFICE O/P EST MOD 30 MIN: CPT | Performed by: NURSE PRACTITIONER

## 2021-08-16 PROCEDURE — 4004F PT TOBACCO SCREEN RCVD TLK: CPT | Performed by: NURSE PRACTITIONER

## 2021-08-16 RX ORDER — LORAZEPAM 0.5 MG/1
0.5 TABLET ORAL 3 TIMES DAILY
Qty: 90 TABLET | Refills: 0 | Status: SHIPPED | OUTPATIENT
Start: 2021-08-16 | End: 2021-09-15

## 2021-08-16 RX ORDER — TRAZODONE HYDROCHLORIDE 100 MG/1
100 TABLET ORAL NIGHTLY PRN
Qty: 30 TABLET | Refills: 2 | Status: SHIPPED | OUTPATIENT
Start: 2021-08-16 | End: 2021-11-23 | Stop reason: SDUPTHER

## 2021-08-16 RX ORDER — ROPINIROLE 2 MG/1
TABLET, FILM COATED ORAL
Qty: 30 TABLET | Refills: 2 | Status: SHIPPED
Start: 2021-08-16 | End: 2021-12-03

## 2021-08-16 RX ORDER — VALBENAZINE 80 MG/1
80 CAPSULE ORAL DAILY
Qty: 30 CAPSULE | Refills: 5 | Status: SHIPPED | OUTPATIENT
Start: 2021-08-16 | End: 2022-02-24

## 2021-08-16 RX ORDER — PHENAZOPYRIDINE HYDROCHLORIDE 200 MG/1
200 TABLET, FILM COATED ORAL 3 TIMES DAILY PRN
Qty: 9 TABLET | Refills: 0 | Status: SHIPPED | OUTPATIENT
Start: 2021-08-16 | End: 2021-08-19

## 2021-08-16 ASSESSMENT — PATIENT HEALTH QUESTIONNAIRE - PHQ9
1. LITTLE INTEREST OR PLEASURE IN DOING THINGS: 2
5. POOR APPETITE OR OVEREATING: 2
SUM OF ALL RESPONSES TO PHQ9 QUESTIONS 1 & 2: 4
4. FEELING TIRED OR HAVING LITTLE ENERGY: 2
6. FEELING BAD ABOUT YOURSELF - OR THAT YOU ARE A FAILURE OR HAVE LET YOURSELF OR YOUR FAMILY DOWN: 2
10. IF YOU CHECKED OFF ANY PROBLEMS, HOW DIFFICULT HAVE THESE PROBLEMS MADE IT FOR YOU TO DO YOUR WORK, TAKE CARE OF THINGS AT HOME, OR GET ALONG WITH OTHER PEOPLE: 1
8. MOVING OR SPEAKING SO SLOWLY THAT OTHER PEOPLE COULD HAVE NOTICED. OR THE OPPOSITE, BEING SO FIGETY OR RESTLESS THAT YOU HAVE BEEN MOVING AROUND A LOT MORE THAN USUAL: 1
2. FEELING DOWN, DEPRESSED OR HOPELESS: 2
3. TROUBLE FALLING OR STAYING ASLEEP: 1
7. TROUBLE CONCENTRATING ON THINGS, SUCH AS READING THE NEWSPAPER OR WATCHING TELEVISION: 1
SUM OF ALL RESPONSES TO PHQ QUESTIONS 1-9: 13
9. THOUGHTS THAT YOU WOULD BE BETTER OFF DEAD, OR OF HURTING YOURSELF: 0

## 2021-08-16 ASSESSMENT — ANXIETY QUESTIONNAIRES
1. FEELING NERVOUS, ANXIOUS, OR ON EDGE: 1
IF YOU CHECKED OFF ANY PROBLEMS ON THIS QUESTIONNAIRE, HOW DIFFICULT HAVE THESE PROBLEMS MADE IT FOR YOU TO DO YOUR WORK, TAKE CARE OF THINGS AT HOME, OR GET ALONG WITH OTHER PEOPLE: VERY DIFFICULT
4. TROUBLE RELAXING: 3
6. BECOMING EASILY ANNOYED OR IRRITABLE: 3
3. WORRYING TOO MUCH ABOUT DIFFERENT THINGS: 2
5. BEING SO RESTLESS THAT IT IS HARD TO SIT STILL: 3
2. NOT BEING ABLE TO STOP OR CONTROL WORRYING: 1
7. FEELING AFRAID AS IF SOMETHING AWFUL MIGHT HAPPEN: 1
GAD7 TOTAL SCORE: 14

## 2021-08-16 ASSESSMENT — ENCOUNTER SYMPTOMS
EYE REDNESS: 0
NAUSEA: 1
SHORTNESS OF BREATH: 0
DIARRHEA: 0
VOMITING: 1
ABDOMINAL PAIN: 1
SORE THROAT: 0
COUGH: 0
RHINORRHEA: 0
BACK PAIN: 1
CONSTIPATION: 0

## 2021-08-16 ASSESSMENT — COLUMBIA-SUICIDE SEVERITY RATING SCALE - C-SSRS
1. WITHIN THE PAST MONTH, HAVE YOU WISHED YOU WERE DEAD OR WISHED YOU COULD GO TO SLEEP AND NOT WAKE UP?: NO
6. HAVE YOU EVER DONE ANYTHING, STARTED TO DO ANYTHING, OR PREPARED TO DO ANYTHING TO END YOUR LIFE?: NO
2. HAVE YOU ACTUALLY HAD ANY THOUGHTS OF KILLING YOURSELF?: NO

## 2021-08-16 NOTE — PROGRESS NOTES
Subjective:      Chief Complaint   Patient presents with    Follow-up     Patient presents today for a follow up.  Anxiety    Migraine       HPI:  Elia Cutler is a 39 y.o. female who presents today for the following:     Anxiety  Pt is here to follow up for STEPHANIA. Current treatment includes Ativan 0.5 mg BID for anxiety. She has been compliant with medications. Pt reports that medications have \"somewhat\" been been working. Pt denies side effects from medications. Pt reports there has been no changes to appetite. Pt reports sleep has been \"shitty. \"  She is prescribed Trazodone 100 mg at HS but is not taking each night. Pt denies current exercise. She is not seeing a therapist.  Pt denies hallucinations. Pt denies current suicidal ideation, plan and intent. Pt  denies current homicidal ideation, plan and intent. Tardive Dyskinesia  Symptoms well controlled with Ingrezza 80 mg daily. Urinary Symptoms  Patient complains of dysuria, frequency, urgency, hesitancy, incomplete bladder emptying, suprapubic pressure She has had symptoms for 1 month. Hematuria was noted during previous appointment and referral placed to Urology. She has an appointment later this week. . Patient denies back pain and vaginal discharge. Chronic Migraine  The patient  presents with complaints of worsening migraines. She has been taking Fioricet, Benadryl, Excedrin Migraine, Tylenol and Ibuprofen with no relief. She reports nausea, photophobia,  blurred vision, lightheaded/dizzy. She is extremely irritable. No weakness, numbness or tingling . States that headache starts frontally and radiates to her neck and shoulders. She states that this is similar to previous migraines. She reports that she was prescribed Imitrex 20 years ago and passed out. She reports allergies to amitriptyline, topamax, botox,  toradol and other NSAIDs, magnesium, compazine, zofran, reglan.  Pt has failed multiple preventative therapies (suprapubic). Negative for constipation, diarrhea, nausea and vomiting. Genitourinary: Positive for difficulty urinating, frequency and urgency. Negative for dysuria and hematuria. Skin: Negative. Neurological: Positive for headaches. Negative for dizziness, seizures, syncope, facial asymmetry, speech difficulty, light-headedness and numbness. Psychiatric/Behavioral: Positive for agitation, decreased concentration, dysphoric mood, hallucinations and sleep disturbance. Negative for behavioral problems, confusion, self-injury and suicidal ideas. The patient is nervous/anxious and is hyperactive. Objective:      BP (!) 96/54 (Site: Left Upper Arm, Position: Sitting, Cuff Size: Medium Adult)   Pulse 55   Wt 112 lb 9.6 oz (51.1 kg)   LMP 11/18/2004   SpO2 98%   BMI 19.33 kg/m²      Physical Exam  Vitals reviewed. Constitutional:       General: She is not in acute distress. Appearance: Normal appearance. HENT:      Head: Normocephalic and atraumatic. Right Ear: External ear normal.      Left Ear: External ear normal.      Mouth/Throat:      Mouth: Mucous membranes are moist.      Pharynx: Oropharynx is clear. Eyes:      Extraocular Movements: Extraocular movements intact. Conjunctiva/sclera: Conjunctivae normal.      Pupils: Pupils are equal, round, and reactive to light. Neck:      Trachea: Trachea normal.      Comments: Muscle spasm of trapezius muscles bilaterally   Cardiovascular:      Rate and Rhythm: Normal rate and regular rhythm. Heart sounds: Normal heart sounds. Pulmonary:      Effort: Pulmonary effort is normal.      Breath sounds: Normal breath sounds. Abdominal:      General: Bowel sounds are normal. There is no distension. Palpations: Abdomen is soft. Tenderness: There is abdominal tenderness in the suprapubic area. There is no right CVA tenderness or left CVA tenderness. Musculoskeletal:         General: Normal range of motion.       Cervical back: Normal range of motion and neck supple. No edema, erythema or rigidity. Pain with movement and muscular tenderness present. Right lower leg: No edema. Left lower leg: No edema. Skin:     General: Skin is warm and dry. Capillary Refill: Capillary refill takes less than 2 seconds. Neurological:      Mental Status: She is alert and oriented to person, place, and time. Cranial Nerves: No cranial nerve deficit. Sensory: No sensory deficit. Motor: No weakness. Coordination: Coordination normal.      Gait: Gait normal.      Deep Tendon Reflexes: Reflexes normal.   Psychiatric:         Attention and Perception: Attention normal.         Mood and Affect: Mood is anxious. Affect is tearful. Speech: Speech is rapid and pressured. Behavior: Behavior is agitated. Behavior is cooperative. Thought Content: Thought content is not paranoid or delusional. Thought content does not include homicidal or suicidal ideation. Thought content does not include homicidal or suicidal plan. Assessment / Plan:      1. STEPHANIA (generalized anxiety disorder)  Will increase Ativan 0.5 mg TID for STEPHANIA. Continue Trazodone 100 mg at HS for sleep this may also help with anxiety. Patient to call if any concerns or SI before her follow up appointment. If she develops suicidal thoughts with a plan, she is instructed to go to emergency room immediately. Behavioral counseling recommended. - traZODone (DESYREL) 100 MG tablet; Take 1 tablet by mouth nightly as needed for Sleep  Dispense: 30 tablet; Refill: 2  - LORazepam (ATIVAN) 0.5 MG tablet; Take 1 tablet by mouth 3 times daily for 30 days. Dispense: 90 tablet; Refill: 0    2. Positive depression screening  - Positive Screen for Clinical Depression with a Documented Follow-up Plan     3. Encounter for medication monitoring  - POCT Rapid Drug Screen    4. Primary insomnia  Continue current medication.   Sleep hygiene reinforced. - traZODone (DESYREL) 100 MG tablet; Take 1 tablet by mouth nightly as needed for Sleep  Dispense: 30 tablet; Refill: 2    5. Tardive dyskinesia  Continue current medication.   - Valbenazine Tosylate (INGREZZA) 80 MG CAPS; Take 80 mg by mouth daily  Dispense: 30 capsule; Refill: 5    6. Intractable chronic migraine without aura and with status migrainosus  Will start Nurtec. Some headaches are triggered by certain foods or things that you do. Recommend that you keep a headache calendar and write down every time you have a headache and what you ate and did before it started. This may help us identify if there is anything you should avoid eating or doing. Some headache triggers include: stress, skipping meals or not eating enough, having too little or too much caffeine, not getting enough sleep, drinking alcohol, or eating certain foods such as red wine, aged cheese, and hot dogs. Make sure you are drinking plenty of water and have your vision checked regularly  - Rimegepant Sulfate 75 MG TBDP; Take 1 tablet by mouth every other day  Dispense: 15 tablet; Refill: 1    7. Restless legs  Medication refilled. - rOPINIRole (REQUIP) 2 MG tablet; TAKE ONE TABLET BY MOUTH ONCE NIGHTLY  Dispense: 30 tablet; Refill: 2    8. Suprapubic pain, acute  UA shows trace hematuria. Follow up with Urology as scheduled. - POCT Urinalysis no Micro    9. Dysuria  UA shows trace hematuria. Follow up with Urology as scheduled. Pyridium for pain. - POCT Urinalysis no Micro  - phenazopyridine (PYRIDIUM) 200 MG tablet; Take 1 tablet by mouth 3 times daily as needed for Pain  Dispense: 9 tablet;  Refill: 0    Time Spent: 30 minutes        Marisol Keenan, APRN - CNP

## 2021-08-23 ENCOUNTER — OFFICE VISIT (OUTPATIENT)
Dept: INTERNAL MEDICINE CLINIC | Age: 46
End: 2021-08-23
Payer: COMMERCIAL

## 2021-08-23 VITALS — OXYGEN SATURATION: 98 % | HEART RATE: 98 BPM | RESPIRATION RATE: 16 BRPM | TEMPERATURE: 98.6 F

## 2021-08-23 DIAGNOSIS — R68.89 FLU-LIKE SYMPTOMS: Primary | ICD-10-CM

## 2021-08-23 DIAGNOSIS — R05.9 COUGH: ICD-10-CM

## 2021-08-23 PROCEDURE — 87804 INFLUENZA ASSAY W/OPTIC: CPT | Performed by: NURSE PRACTITIONER

## 2021-08-23 PROCEDURE — 99213 OFFICE O/P EST LOW 20 MIN: CPT | Performed by: NURSE PRACTITIONER

## 2021-08-23 PROCEDURE — G8428 CUR MEDS NOT DOCUMENT: HCPCS | Performed by: NURSE PRACTITIONER

## 2021-08-23 PROCEDURE — 4004F PT TOBACCO SCREEN RCVD TLK: CPT | Performed by: NURSE PRACTITIONER

## 2021-08-23 PROCEDURE — G8420 CALC BMI NORM PARAMETERS: HCPCS | Performed by: NURSE PRACTITIONER

## 2021-08-23 RX ORDER — BENZONATATE 100 MG/1
100-200 CAPSULE ORAL 3 TIMES DAILY PRN
Qty: 24 CAPSULE | Refills: 0 | Status: SHIPPED | OUTPATIENT
Start: 2021-08-23 | End: 2021-08-27

## 2021-08-23 NOTE — PATIENT INSTRUCTIONS
Your COVID 19 test can take 1-5 days for the results to come back. We ask that you make a Mychart page and view your test results this way. You will need to Self quarantine until you know your results. Increase fluids and rest  Saline nasal spray as needed for nasal congestion  Warm salt gargles as needed for throat discomfort  Monitor temperature twice a day  Tylenol as needed for fevers and/or discomfort. Big deep breaths periodically throughout the day  Regular Mucinex over the counter as needed for chest congestion  If symptoms worsen -Go to the ER. Follow up with your primary care provider      To Whom it May Concern:    Micheal Smith was tested for COVID-19 8/23/2021. He/she must stay home until test results are back. If test is positive, he/she must quarantine for a total of 10 days starting from day one of symptom onset. He/she must also be fever-free for 24 hours at that time, and also have improvement in symptoms. We do not recommend retesting as patients may continue to test positive for months even though no longer contagious. It is suggested you call [employer] or  [employer] with any questions regarding quarantine timeframe/return to work/school details. Steps to help prevent the spread of COVID-19 if you are sick  SOURCE - https://ulises-duncan.info/. html     Stay home except to get medical care   ; Stay home: People who are mildly ill with COVID-19 are able to isolate at home during their illness. You should restrict activities outside your home, except for getting medical care.   ; Avoid public areas: Do not go to work, school, or public areas.   ; Avoid public transportation: Avoid using public transportation, ride-sharing, or taxis.  ; Separate yourself from other people and animals in your home   ; Stay away from others: As much as possible, you should stay in a specific room and away from other people in your home.  Also, you should use a separate bathroom, if available.   ; Limit contact with pets & animals: You should restrict contact with pets and other animals while you are sick with COVID-19, just like you would around other people. Although there have not been reports of pets or other animals becoming sick with COVID-19, it is still recommended that people sick with COVID-19 limit contact with animals until more information is known about the virus. ; When possible, have another member of your household care for your animals while you are sick. If you are sick with COVID-19, avoid contact with your pet, including petting, snuggling, being kissed or licked, and sharing food. If you must care for your pet or be around animals while you are sick, wash your hands before and after you interact with pets and wear a facemask. See COVID-19 and Animals for more information. Other considerations   The ill person should eat/be fed in their room if possible. Non-disposable  items used should be handled with gloves and washed with hot water or in a . Clean hands after handling used  items.  If possible, dedicate a lined trash can for the ill person. Use gloves when removing garbage bags, handling, and disposing of trash. Wash hands after handling or disposing of trash.  Consider consulting with your local health department about trash disposal guidance if available. Information for Household Members and Caregivers of Someone who is Sick   Call ahead before visiting your doctor   Call ahead: If you have a medical appointment, call the healthcare provider and tell them that you have or may have COVID-19. This will help the healthcare provider's office take steps to keep other people from getting infected or exposed. Wear a facemask if you are sick   ;  If you are sick: You should wear a facemask when you are around other people (e.g., sharing a room or vehicle) or pets and before you enter a healthcare provider's office. ; If you are caring for others: If the person who is sick is not able to wear a facemask (for example, because it causes trouble breathing), then people who live with the person who is sick should not stay in the same room with them, or they should wear a facemask if they enter a room with the person who is sick. Cover your coughs and sneezes   ; Cover: Cover your mouth and nose with a tissue when you cough or sneeze.   ; Dispose: Throw used tissues in a lined trash can.   ; Wash hands: Immediately wash your hands with soap and water for at least 20 seconds or, if soap and water are not available, clean your hands with an alcohol-based hand  that contains at least 60% alcohol. Clean your hands often   ; Wash hands: Wash your hands often with soap and water for at least 20 seconds, especially after blowing your nose, coughing, or sneezing; going to the bathroom; and before eating or preparing food.   ; Hand : If soap and water are not readily available, use an alcohol-based hand  with at least 60% alcohol, covering all surfaces of your hands and rubbing them together until they feel dry.   ; Soap and water: Soap and water are the best option if hands are visibly dirty.   ; Avoid touching: Avoid touching your eyes, nose, and mouth with unwashed hands. Handwashing Tips   ; Wet your hands with clean, running water (warm or cold), turn off the tap, and apply soap.  ; Lather your hands by rubbing them together with the soap. Lather the backs of your hands, between your fingers, and under your nails. ; Scrub your hands for at least 20 seconds. Need a timer? Hum the [place] from beginning to end twice.  ; Rinse your hands well under clean, running water.  ; Dry your hands using a clean towel or air dry them. Avoid sharing personal household items   ; Do not share:  You should not share dishes, drinking glasses, cups, eating utensils, towels, or bedding with other people or pets in your home.   ; Wash thoroughly after use: After using these items, they should be washed thoroughly with soap and water. Clean all high-touch surfaces everyday   ; Clean and disinfect: Practice routine cleaning of high touch surfaces.  ; High touch surfaces include counters, tabletops, doorknobs, bathroom fixtures, toilets, phones, keyboards, tablets, and bedside tables.  ; Disinfect areas with bodily fluids: Also, clean any surfaces that may have blood, stool, or body fluids on them.   ; Household : Use a household cleaning spray or wipe, according to the label instructions. Labels contain instructions for safe and effective use of the cleaning product including precautions you should take when applying the product, such as wearing gloves and making sure you have good ventilation during use of the product. Monitor your symptoms   Seek medical attention: Seek prompt medical attention if your illness is worsening     (e.g., difficulty breathing).   ; Call your doctor: Before seeking care, call your healthcare provider and tell them that you have, or are being evaluated for, COVID-19.   ; Wear a facemask when sick: Put on a facemask before you enter the facility. These steps will help the healthcare provider's office to keep other people in the office or waiting room from getting infected or exposed. ; Alert health department: Ask your healthcare provider to call the local or state health department. Persons who are placed under active monitoring or facilitated self-monitoring should follow instructions provided by their local health department or occupational health professionals, as appropriate.  ; Call 911 if you have a medical emergency: If you have a medical emergency and need to call 911, notify the dispatch personnel that you have, or are being evaluated for COVID-19. If possible, put on a facemask before emergency medical services arrive.

## 2021-08-23 NOTE — PROGRESS NOTES
8/23/2021    HPI:  Chief complaint and history of present illness as per medical assistant/nurse documented today in the Flu/COVID-19 clinic. She is being seen today through the [facility]. PCP:  Airam Lamar CNP      Flu-like symptoms:  She c/o a 12 hour history of fever, chills, body aches, cough productive of yellow sputum, sinus congestion and drainage, sore throat, tolerable headache and intermittent sob. Denies change in taste or smell, abdominal pain, n/v/d. She has not received her COVID-19 No distress noted while in office. MEDICATIONS:  Prior to Visit Medications    Medication Sig Taking? Authorizing Provider   benzonatate (TESSALON) 100 MG capsule Take 1-2 capsules by mouth 3 times daily as needed for Cough Yes HANNA Ley CNP   rOPINIRole (REQUIP) 2 MG tablet TAKE ONE TABLET BY MOUTH ONCE NIGHTLY  HANNA Palacios CNP   Valbenazine Tosylate (INGREZZA) 80 MG CAPS Take 80 mg by mouth daily  HANNA Palacios CNP   traZODone (DESYREL) 100 MG tablet Take 1 tablet by mouth nightly as needed for Sleep  HANNA Zimmer CNP   LORazepam (ATIVAN) 0.5 MG tablet Take 1 tablet by mouth 3 times daily for 30 days. HANNA Zimmer CNP   Rimegepant Sulfate 75 MG TBDP Take 1 tablet by mouth every other day  HANNA Zimmer CNP   pregabalin (LYRICA) 50 MG capsule Take 1 capsule by mouth every evening for 90 days.   HANNA Zimmer CNP   vitamin B-12 (CYANOCOBALAMIN) 1000 MCG tablet Take 1,000 mcg by mouth daily  Historical Provider, MD       Allergies   Allergen Reactions    Rozerem [Ramelteon] Other (See Comments)     Increased symptoms of TD    Codeine Itching and Nausea And Vomiting    Imitrex [Sumatriptan] Itching and Nausea And Vomiting     PASSED OUT    Abilify [Aripiprazole] Other (See Comments)     Patient reports symptoms of TD    Amitriptyline     Botox [Onabotulinumtoxina] Other (See Comments)     Tardive dyskinesia    Compazine [Prochlorperazine Maleate] Other (See Comments)    Lamictal [Lamotrigine]      Constant movement    Meclizine     Topamax [Topiramate]      Made TD worse    Cephalexin Nausea And Vomiting and Other (See Comments)     ABDOMINAL PAIN FOR 2 WEEKS    Magnesium-Containing Compounds Other (See Comments)     Pt sts \"they called it the mags. I start twitching. \"     Meclizine Hcl Other (See Comments)     \"Makes me stutter and twitch. \"    Nsaids Other (See Comments)     Tardive dyskinisia    Pcn [Penicillins] Nausea And Vomiting and Other (See Comments)     HEADACHE    Reglan [Metoclopramide] Other (See Comments)     Tardive dyskinisia    Toradol [Ketorolac Tromethamine] Other (See Comments)     Tremors    Vistaril [Hydroxyzine Hcl] Other (See Comments)     dyskinsia      Zofran [Ondansetron Hcl] Other (See Comments)     Cramping and burning in stomach   ,   Past Medical History:   Diagnosis Date    ADHD     Arthritis     OSTEO ARTHITIS    Bipolar disorder, unspecified ([address]     Cholecystenteric fistula     COPD (chronic obstructive pulmonary disease) ([address]     Degenerative disc disease     Fibromyalgia     Generalized anxiety disorder     H/O 24 hour EKG monitoring 02/16/2017      Sinus tach events , no major arrhythmias , follow up in office as routine     H/O echocardiogram 03/01/2017    EF 55-60% Normal LV structure  and systolic function.  H/O exercise stress test 03/01/2017    Normal stress test. Patient has physical deconditioning as she achieved target HR in 2 mins. Recommend to increase PO fluids intake and exercise training.  Headache     History of cardiac monitoring 09/21/2020    Normal 30-day event monitor with average heart rate of 63 lowest heart rate of 57 highest heart rate of 82 patient reported episodes of dizziness which did not correlate with any arrhythmia.   There were no episodes of tachycardia uneventful monitor however average heart rate and even the highest range of motion. Cervical back: Full passive range of motion without pain, normal range of motion and neck supple. No rigidity or tenderness. No muscular tenderness. Lymphadenopathy:      Cervical: No cervical adenopathy. Skin:     General: Skin is warm and dry. Coloration: Skin is not pale. Findings: No erythema or rash. Nails: There is no clubbing. Neurological:      Mental Status: She is alert and oriented to person, place, and time. Psychiatric:         Mood and Affect: Mood normal.         Speech: Speech normal.         Behavior: Behavior normal.         Thought Content: Thought content normal.         Judgment: Judgment normal.         ASSESSMENT/PLAN:  1. Flu-like symptoms  POC  Flu negative in office. Education provided on monoclonal antibody outpatient treatment if she would be positive for COVID. Verbalizes understanding.     - Encourage clear fluids without caffeine to ensure hydration.  - Smaller, more frequent meals    - Saline nasal spray, cool mist humidifier, prop head at night for congestion.   - Tylenol as needed for fever, pain. - Counseled on signs of increased work of breathing to seek emergency treatment  - Follow-up with PCP as needed. - POCT Influenza A/B    2. Cough  - Tessalon Pearls   - Covid-19 Ambulatory      FOLLOW-UP:  Return if symptoms worsen or fail to improve.     In addition to other information, the printed after visit summary provided to the patient includes:  [x] COVID-19 Self care instructions  [x] COVID-19 General patient information

## 2021-08-24 ENCOUNTER — TELEPHONE (OUTPATIENT)
Dept: INTERNAL MEDICINE CLINIC | Age: 46
End: 2021-08-24

## 2021-08-24 LAB — SARS-COV-2: NOT DETECTED

## 2021-08-25 ENCOUNTER — TELEPHONE (OUTPATIENT)
Dept: INTERNAL MEDICINE CLINIC | Age: 46
End: 2021-08-25

## 2021-08-26 ASSESSMENT — ENCOUNTER SYMPTOMS
CHEST TIGHTNESS: 0
DIARRHEA: 0
NAUSEA: 0
CONSTIPATION: 0
ABDOMINAL PAIN: 1
WHEEZING: 0
VOMITING: 0

## 2021-08-30 ENCOUNTER — PATIENT MESSAGE (OUTPATIENT)
Dept: FAMILY MEDICINE CLINIC | Age: 46
End: 2021-08-30

## 2021-08-30 ENCOUNTER — VIRTUAL VISIT (OUTPATIENT)
Dept: FAMILY MEDICINE CLINIC | Age: 46
End: 2021-08-30
Payer: COMMERCIAL

## 2021-08-30 DIAGNOSIS — J40 BRONCHITIS: Primary | ICD-10-CM

## 2021-08-30 DIAGNOSIS — J01.40 ACUTE NON-RECURRENT PANSINUSITIS: ICD-10-CM

## 2021-08-30 PROCEDURE — G8427 DOCREV CUR MEDS BY ELIG CLIN: HCPCS | Performed by: NURSE PRACTITIONER

## 2021-08-30 PROCEDURE — 99213 OFFICE O/P EST LOW 20 MIN: CPT | Performed by: NURSE PRACTITIONER

## 2021-08-30 RX ORDER — DOXYCYCLINE HYCLATE 100 MG
100 TABLET ORAL 2 TIMES DAILY
Qty: 14 TABLET | Refills: 0 | Status: SHIPPED | OUTPATIENT
Start: 2021-08-30 | End: 2021-09-06

## 2021-08-30 RX ORDER — ALBUTEROL SULFATE 90 UG/1
2 AEROSOL, METERED RESPIRATORY (INHALATION) 4 TIMES DAILY PRN
Qty: 1 INHALER | Refills: 0 | Status: SHIPPED | OUTPATIENT
Start: 2021-08-30 | End: 2022-01-27

## 2021-08-30 NOTE — PROGRESS NOTES
2021    TELEHEALTH EVALUATION -- Audio/Visual (During EFQGW-24 public health emergency)    HPI:    Jeff Sloan (:  1975) has requested an audio/video evaluation for the following concern(s):    Upper Respiratory Infection  Patient complains of symptoms of a URI. Symptoms include fatigue, chills, diaphoresis, headache, congestion, wheezing and produtive cough with yellow sputum. Onset of symptoms was 8 days ago, gradually worsening since that time. Evaluation to date: COVID, flu negative 2021. Treatment to date: Dayquil, Robitussin, Tesslon and Mucinxe with no improvement in symptoms. Cystoscopy 2021    Review of Systems    Prior to Visit Medications    Medication Sig Taking? Authorizing Provider   doxycycline hyclate (VIBRA-TABS) 100 MG tablet Take 1 tablet by mouth 2 times daily for 7 days Yes HANNA Solorzano CNP   Pseudoephedrine-DM-GG 60- MG TABS Take 1 tablet by mouth every 6 hours as needed (nasal congestion/cough) Yes HANNA Avalos CNP   albuterol sulfate HFA (VENTOLIN HFA) 108 (90 Base) MCG/ACT inhaler Inhale 2 puffs into the lungs 4 times daily as needed for Wheezing or Shortness of Breath Yes HANNA Solorzano CNP   rOPINIRole (REQUIP) 2 MG tablet TAKE ONE TABLET BY MOUTH ONCE NIGHTLY Yes HANNA Solorzano CNP   Valbenazine Tosylate (INGREZZA) 80 MG CAPS Take 80 mg by mouth daily Yes HANNA rGigsby CNP   traZODone (DESYREL) 100 MG tablet Take 1 tablet by mouth nightly as needed for Sleep Yes HANNA Avalos CNP   LORazepam (ATIVAN) 0.5 MG tablet Take 1 tablet by mouth 3 times daily for 30 days. Yes HANNA Avalos CNP   Rimegepant Sulfate 75 MG TBDP Take 1 tablet by mouth every other day Yes HANNA Avalos CNP   pregabalin (LYRICA) 50 MG capsule Take 1 capsule by mouth every evening for 90 days.  Yes HANNA Avalos CNP   vitamin B-12 (CYANOCOBALAMIN) 1000 MCG tablet Take 1,000 mcg by mouth daily Yes Historical Provider, MD       Social History     Tobacco Use    Smoking status: Current Every Day Smoker     Packs/day: 0.50     Years: 20.00     Pack years: 10.00     Types: Cigarettes    Smokeless tobacco: Never Used   Vaping Use    Vaping Use: Former    Substances: Always   Substance Use Topics    Alcohol use: No    Drug use: Not Currently     Types: Methamphetamines     Comment: clean for 11 years, past hx of crack use        Allergies   Allergen Reactions    Rozerem [Ramelteon] Other (See Comments)     Increased symptoms of TD    Codeine Itching and Nausea And Vomiting    Imitrex [Sumatriptan] Itching and Nausea And Vomiting     PASSED OUT    Abilify [Aripiprazole] Other (See Comments)     Patient reports symptoms of TD    Amitriptyline     Botox [Onabotulinumtoxina] Other (See Comments)     Tardive dyskinesia    Compazine [Prochlorperazine Maleate] Other (See Comments)    Lamictal [Lamotrigine]      Constant movement    Meclizine     Topamax [Topiramate]      Made TD worse    Cephalexin Nausea And Vomiting and Other (See Comments)     ABDOMINAL PAIN FOR 2 WEEKS    Magnesium-Containing Compounds Other (See Comments)     Pt sts \"they called it the mags. I start twitching. \"     Meclizine Hcl Other (See Comments)     \"Makes me stutter and twitch. \"    Nsaids Other (See Comments)     Tardive dyskinisia    Pcn [Penicillins] Nausea And Vomiting and Other (See Comments)     HEADACHE    Reglan [Metoclopramide] Other (See Comments)     Tardive dyskinisia    Toradol [Ketorolac Tromethamine] Other (See Comments)     Tremors    Vistaril [Hydroxyzine Hcl] Other (See Comments)     dyskinsia      Zofran [Ondansetron Hcl] Other (See Comments)     Cramping and burning in stomach   ,   Past Medical History:   Diagnosis Date    ADHD     Arthritis     OSTEO ARTHITIS    [facility] disorder, unspecified [address]     Cholecystenteric fistula     COPD (chronic obstructive pulmonary disease) ([facility]     Degenerative disc disease     Fibromyalgia     Generalized anxiety disorder     H/O 24 hour EKG monitoring 02/16/2017      Sinus tach events , no major arrhythmias , follow up in office as routine     H/O echocardiogram 03/01/2017    EF 55-60% Normal LV structure  and systolic function.  H/O exercise stress test 03/01/2017    Normal stress test. Patient has physical deconditioning as she achieved target HR in 2 mins. Recommend to increase PO fluids intake and exercise training.  Headache     History of cardiac monitoring 09/21/2020    Normal 30-day event monitor with average heart rate of 63 lowest heart rate of 57 highest heart rate of 82 patient reported episodes of dizziness which did not correlate with any arrhythmia. There were no episodes of tachycardia uneventful monitor however average heart rate and even the highest heart rate is on the lower side     Osteopenia     Osteoporosis     PTSD (post-traumatic stress disorder)     S/P cholecystectomy 11/22/2011    Schizo-affective schizophrenia [facility]     Sjoegren syndrome     Syncope     Tardive dyskinesia     Venous insufficiency of leg 2012    Vertigo        PHYSICAL EXAMINATION:  [ INSTRUCTIONS:  \"[x]\" Indicates a positive item  \"[]\" Indicates a     Constitutional: [x] Appears well-developed and well-nourished [x] No apparent distress      [] Abnormal-   Mental status  [x] Alert and awake  [x] Oriented to person/place/time [x]Able to follow commands      Eyes:  EOM    [x]  Normal  [] Abnormal-  Sclera  [x]  Normal  [] Abnormal -         Discharge [x]  None visible  [] Abnormal -    HENT:   [x] Normocephalic, atraumatic.   [x] Abnormal - tenderness with palpation over frontal and maxillary sinuses [x] Mouth/Throat: Mucous membranes are moist.     External Ears [x] Normal  [] Abnormal-     Neck: [x] No visualized mass     Pulmonary/Chest: [x] Respiratory effort normal.  [x] No visualized signs of difficulty breathing or respiratory distress        [] Abnormal-      Musculoskeletal:   [] Normal gait with no signs of ataxia         [x] Normal range of motion of neck        [] Abnormal-           Skin:        [x] No significant exanthematous lesions or discoloration noted on facial skin         [] Abnormal-       ASSESSMENT/PLAN:  1. Bronchitis  Capmist prn for symptomatic care, RICH for shortness or breath/wheezing. Return for new or worsening symptoms. - albuterol sulfate HFA (VENTOLIN HFA) 108 (90 Base) MCG/ACT inhaler; Inhale 2 puffs into the lungs 4 times daily as needed for Wheezing or Shortness of Breath  Dispense: 1 Inhaler; Refill: 0    2. Acute non-recurrent pansinusitis  Breathe warm, moist air from a steamy shower, a hot bath, or a sink filled with hot water. Avoid cold, dry air. Using a humidifier in your home may help. Follow the directions for cleaning the machine. Use saline (saltwater) nasal washes to help keep your nasal passages open and wash out mucus and bacteria. You can buy saline nose drops at a grocery store or drugstore. Insert the tip into your nostril, and squeeze gently. Rometta Mering your nose. Put a hot, wet towel or a warm gel pack on your face 3 or 4 times a day for 5 to 10 minutes each time. Try a decongestant nasal spray like oxymetazoline (Afrin). Do not use it for more than 3 days in a row. Using it for more than 3 days can make your congestion worse.  - doxycycline hyclate (VIBRA-TABS) 100 MG tablet; Take 1 tablet by mouth 2 times daily for 7 days  Dispense: 14 tablet; Refill: 0  - Pseudoephedrine-DM-GG 60- MG TABS; Take 1 tablet by mouth every 6 hours as needed (nasal congestion/cough)  Dispense: 28 tablet; Refill: 0      Return in about 2 weeks (around 9/13/2021) for Anxiety. [place], was evaluated through a synchronous (real-time) audio-video encounter. The patient (or guardian if applicable) is aware that this is a billable service.  Verbal consent to proceed has been obtained within the past 12 months. The visit was conducted pursuant to the emergency declaration under the [address] [place] [address], [address] [place] [address] and the [employer] and [place] [employer] Act. Patient identification was verified, and a caregiver was present when appropriate. The patient was located in a state where the provider was credentialed to provide care. Total time spent on this encounter: 20 minutes    --HANNA Pfeiffer CNP on 8/30/2021 at 1:41 PM    An electronic signature was used to authenticate this note.

## 2021-08-30 NOTE — TELEPHONE ENCOUNTER
From: Carmen Bridges  To: HANNA Rouse - CNP  Sent: 8/30/2021 10:35 AM EDT  Subject: Non-Urgent Medical Question    I need to do an evisit today because I am still sick

## 2021-09-02 ENCOUNTER — HOSPITAL ENCOUNTER (EMERGENCY)
Age: 46
Discharge: HOME | End: 2021-09-02
Attending: EMERGENCY MEDICINE
Payer: COMMERCIAL

## 2021-09-02 ENCOUNTER — APPOINTMENT (OUTPATIENT)
Dept: CT IMAGING | Age: 46
End: 2021-09-02
Payer: COMMERCIAL
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
## 2021-09-02 DIAGNOSIS — S42.032A DISPLACED FRACTURE OF LATERAL END OF LEFT CLAVICLE, INITIAL ENCOUNTER FOR CLOSED FRACTURE: ICD-10-CM

## 2021-09-02 LAB
ANION GAP SERPL CALCULATED.3IONS-SCNC: 12 MMOL/L (ref 4–16)
BASOPHILS ABSOLUTE: 0.1 K/CU MM
BASOPHILS RELATIVE PERCENT: 0.6 % (ref 0–1)
BUN BLDV-MCNC: 9 MG/DL (ref 6–23)
CALCIUM SERPL-MCNC: 9.5 MG/DL (ref 8.3–10.6)
CHLORIDE BLD-SCNC: 101 MMOL/L (ref 99–110)
CO2: 25 MMOL/L (ref 21–32)
CREAT SERPL-MCNC: 0.9 MG/DL (ref 0.6–1.1)
DIFFERENTIAL TYPE: ABNORMAL
EOSINOPHILS ABSOLUTE: 0.2 K/CU MM
EOSINOPHILS RELATIVE PERCENT: 1.4 % (ref 0–3)
GFR AFRICAN AMERICAN: >60 ML/MIN/1.73M2
GFR NON-AFRICAN AMERICAN: >60 ML/MIN/1.73M2
GLUCOSE BLD-MCNC: 115 MG/DL (ref 70–99)
HCT VFR BLD CALC: 41.9 % (ref 37–47)
HEMOGLOBIN: 13.8 GM/DL (ref 12.5–16)
IMMATURE NEUTROPHIL %: 0.4 % (ref 0–0.43)
LYMPHOCYTES ABSOLUTE: 3.9 K/CU MM
LYMPHOCYTES RELATIVE PERCENT: 33.9 % (ref 24–44)
MCH RBC QN AUTO: 30.9 PG (ref 27–31)
MCHC RBC AUTO-ENTMCNC: 32.9 % (ref 32–36)
MCV RBC AUTO: 93.7 FL (ref 78–100)
MONOCYTES ABSOLUTE: 0.6 K/CU MM
MONOCYTES RELATIVE PERCENT: 5.3 % (ref 0–4)
PDW BLD-RTO: 12.4 % (ref 11.7–14.9)
PLATELET # BLD: 206 K/CU MM (ref 140–440)
PMV BLD AUTO: 11.1 FL (ref 7.5–11.1)
POTASSIUM SERPL-SCNC: 4.1 MMOL/L (ref 3.5–5.1)
RBC # BLD: 4.47 M/CU MM (ref 4.2–5.4)
SEGMENTED NEUTROPHILS ABSOLUTE COUNT: 6.7 K/CU MM
SEGMENTED NEUTROPHILS RELATIVE PERCENT: 58.4 % (ref 36–66)
SODIUM BLD-SCNC: 138 MMOL/L (ref 135–145)
TOTAL IMMATURE NEUTOROPHIL: 0.05 K/CU MM
WBC # BLD: 11.4 K/CU MM (ref 4–10.5)

## 2021-09-02 PROCEDURE — 74177 CT ABD & PELVIS W/CONTRAST: CPT

## 2021-09-02 PROCEDURE — 96375 TX/PRO/DX INJ NEW DRUG ADDON: CPT

## 2021-09-02 PROCEDURE — 99285 EMERGENCY DEPT VISIT HI MDM: CPT

## 2021-09-02 PROCEDURE — 6360000002 HC RX W HCPCS: Performed by: EMERGENCY MEDICINE

## 2021-09-02 PROCEDURE — 80048 BASIC METABOLIC PNL TOTAL CA: CPT

## 2021-09-02 PROCEDURE — 70450 CT HEAD/BRAIN W/O DYE: CPT

## 2021-09-02 PROCEDURE — 85025 COMPLETE CBC W/AUTO DIFF WBC: CPT

## 2021-09-02 PROCEDURE — 71260 CT THORAX DX C+: CPT

## 2021-09-02 PROCEDURE — 96376 TX/PRO/DX INJ SAME DRUG ADON: CPT

## 2021-09-02 PROCEDURE — 96374 THER/PROPH/DIAG INJ IV PUSH: CPT

## 2021-09-02 PROCEDURE — 72125 CT NECK SPINE W/O DYE: CPT

## 2021-09-02 PROCEDURE — 6360000004 HC RX CONTRAST MEDICATION: Performed by: EMERGENCY MEDICINE

## 2021-09-02 PROCEDURE — 6370000000 HC RX 637 (ALT 250 FOR IP): Performed by: EMERGENCY MEDICINE

## 2021-09-02 RX ORDER — DROPERIDOL 2.5 MG/ML
1.25 INJECTION, SOLUTION INTRAMUSCULAR; INTRAVENOUS ONCE
Status: COMPLETED | OUTPATIENT
Start: 2021-09-02 | End: 2021-09-02

## 2021-09-02 RX ORDER — DIPHENHYDRAMINE HYDROCHLORIDE 50 MG/ML
25 INJECTION INTRAMUSCULAR; INTRAVENOUS ONCE
Status: COMPLETED | OUTPATIENT
Start: 2021-09-02 | End: 2021-09-02

## 2021-09-02 RX ORDER — OXYCODONE HYDROCHLORIDE 5 MG/1
5 TABLET ORAL ONCE
Status: COMPLETED | OUTPATIENT
Start: 2021-09-02 | End: 2021-09-02

## 2021-09-02 RX ORDER — OXYCODONE HYDROCHLORIDE 5 MG/1
5 TABLET ORAL EVERY 6 HOURS PRN
Qty: 12 TABLET | Refills: 0 | Status: SHIPPED | OUTPATIENT
Start: 2021-09-02 | End: 2021-09-05

## 2021-09-02 RX ADMIN — DROPERIDOL 1.25 MG: 2.5 INJECTION, SOLUTION INTRAMUSCULAR; INTRAVENOUS at 20:41

## 2021-09-02 RX ADMIN — HYDROMORPHONE HYDROCHLORIDE 0.5 MG: 1 INJECTION, SOLUTION INTRAMUSCULAR; INTRAVENOUS; SUBCUTANEOUS at 18:54

## 2021-09-02 RX ADMIN — HYDROMORPHONE HYDROCHLORIDE 0.5 MG: 1 INJECTION, SOLUTION INTRAMUSCULAR; INTRAVENOUS; SUBCUTANEOUS at 19:41

## 2021-09-02 RX ADMIN — IOPAMIDOL 100 ML: 755 INJECTION, SOLUTION INTRAVENOUS at 20:38

## 2021-09-02 RX ADMIN — OXYCODONE HYDROCHLORIDE 5 MG: 5 TABLET ORAL at 22:01

## 2021-09-02 RX ADMIN — DIPHENHYDRAMINE HYDROCHLORIDE 25 MG: 50 INJECTION INTRAMUSCULAR; INTRAVENOUS at 20:32

## 2021-09-02 ASSESSMENT — PAIN SCALES - GENERAL
PAINLEVEL_OUTOF10: 7
PAINLEVEL_OUTOF10: 5
PAINLEVEL_OUTOF10: 10
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
## 2021-09-03 VITALS
RESPIRATION RATE: 16 BRPM | SYSTOLIC BLOOD PRESSURE: 107 MMHG | DIASTOLIC BLOOD PRESSURE: 74 MMHG | HEART RATE: 65 BPM | WEIGHT: 110 LBS | BODY MASS INDEX: 18.78 KG/M2 | OXYGEN SATURATION: 96 % | HEIGHT: 64 IN | TEMPERATURE: 98.8 F

## 2021-09-08 ENCOUNTER — APPOINTMENT (OUTPATIENT)
Dept: CT IMAGING | Age: 46
End: 2021-09-08
Payer: COMMERCIAL

## 2021-09-08 ENCOUNTER — HOSPITAL ENCOUNTER (EMERGENCY)
Age: 46
Discharge: HOME | End: 2021-09-08
Attending: EMERGENCY MEDICINE
Payer: COMMERCIAL

## 2021-09-08 VITALS
DIASTOLIC BLOOD PRESSURE: 84 MMHG | HEART RATE: 66 BPM | SYSTOLIC BLOOD PRESSURE: 147 MMHG | TEMPERATURE: 97.2 F | HEIGHT: 64 IN | WEIGHT: 115 LBS | RESPIRATION RATE: 16 BRPM | OXYGEN SATURATION: 97 % | BODY MASS INDEX: 19.63 KG/M2

## 2021-09-08 DIAGNOSIS — S42.002A CLOSED DISPLACED FRACTURE OF LEFT CLAVICLE, UNSPECIFIED PART OF CLAVICLE, INITIAL ENCOUNTER: Primary | ICD-10-CM

## 2021-09-08 PROCEDURE — 71250 CT THORAX DX C-: CPT

## 2021-09-08 PROCEDURE — 96375 TX/PRO/DX INJ NEW DRUG ADDON: CPT

## 2021-09-08 PROCEDURE — 6360000002 HC RX W HCPCS: Performed by: EMERGENCY MEDICINE

## 2021-09-08 PROCEDURE — 96374 THER/PROPH/DIAG INJ IV PUSH: CPT

## 2021-09-08 PROCEDURE — 99283 EMERGENCY DEPT VISIT LOW MDM: CPT

## 2021-09-08 RX ORDER — CYCLOBENZAPRINE HCL 10 MG
10 TABLET ORAL 3 TIMES DAILY PRN
Qty: 30 TABLET | Refills: 0 | Status: SHIPPED | OUTPATIENT
Start: 2021-09-08 | End: 2021-09-18

## 2021-09-08 RX ORDER — OXYCODONE HYDROCHLORIDE AND ACETAMINOPHEN 5; 325 MG/1; MG/1
1 TABLET ORAL EVERY 6 HOURS PRN
Qty: 20 TABLET | Refills: 0 | Status: SHIPPED | OUTPATIENT
Start: 2021-09-08 | End: 2021-09-14

## 2021-09-08 RX ORDER — DIPHENHYDRAMINE HYDROCHLORIDE 50 MG/ML
25 INJECTION INTRAMUSCULAR; INTRAVENOUS ONCE
Status: COMPLETED | OUTPATIENT
Start: 2021-09-08 | End: 2021-09-08

## 2021-09-08 RX ADMIN — HYDROMORPHONE HYDROCHLORIDE 0.5 MG: 1 INJECTION, SOLUTION INTRAMUSCULAR; INTRAVENOUS; SUBCUTANEOUS at 17:16

## 2021-09-08 RX ADMIN — DIPHENHYDRAMINE HYDROCHLORIDE 25 MG: 50 INJECTION INTRAMUSCULAR; INTRAVENOUS at 17:18

## 2021-09-08 ASSESSMENT — PAIN DESCRIPTION - PAIN TYPE
TYPE: ACUTE PAIN
TYPE: ACUTE PAIN

## 2021-09-08 ASSESSMENT — PAIN DESCRIPTION - FREQUENCY: FREQUENCY: CONTINUOUS

## 2021-09-08 ASSESSMENT — PAIN SCALES - GENERAL
PAINLEVEL_OUTOF10: 10
PAINLEVEL_OUTOF10: 4
PAINLEVEL_OUTOF10: 7

## 2021-09-08 ASSESSMENT — PAIN DESCRIPTION - LOCATION: LOCATION: SHOULDER

## 2021-09-08 ASSESSMENT — PAIN DESCRIPTION - DESCRIPTORS: DESCRIPTORS: SHARP;BURNING

## 2021-09-08 NOTE — ED PROVIDER NOTES
wheezing  Gastrointestinal:  No pain, no nausea, no vomiting, no diarrhea  Musculoskeletal:  No muscle pain, no joint pain  Skin:  No rash, no pruritis, no easy bruising  Neurologic:  No speech problems, no headache, no extremity numbness, no extremity tingling, no extremity weakness  Psychiatric:  No anxiety  Genitourinary:  No dysuria, no hematuria  Endocrine:  No unexpected weight gain, no unexpected weight loss  Extremities:  no edema, no pain    Past Medical History:   Diagnosis Date    ADHD     Arthritis     OSTEO ARTHITIS    Bipolar disorder, unspecified ([address]     Cholecystenteric fistula     COPD (chronic obstructive pulmonary disease) ([address]     Degenerative disc disease     Fibromyalgia     Generalized anxiety disorder     H/O 24 hour EKG monitoring 02/16/2017      Sinus tach events , no major arrhythmias , follow up in office as routine     H/O echocardiogram 03/01/2017    EF 55-60% Normal LV structure  and systolic function.  H/O exercise stress test 03/01/2017    Normal stress test. Patient has physical deconditioning as she achieved target HR in 2 mins. Recommend to increase PO fluids intake and exercise training.  Headache     History of cardiac monitoring 09/21/2020    Normal 30-day event monitor with average heart rate of 63 lowest heart rate of 57 highest heart rate of 82 patient reported episodes of dizziness which did not correlate with any arrhythmia.   There were no episodes of tachycardia uneventful monitor however average heart rate and even the highest heart rate is on the lower side     Osteopenia     Osteoporosis     PTSD (post-traumatic stress disorder)     S/P cholecystectomy 11/22/2011    Schizo-affective schizophrenia ([address]     Sjoegren syndrome     Syncope     Tardive dyskinesia     Venous insufficiency of leg 2012    Vertigo      Past Surgical History:   Procedure Laterality Date    CHOLECYSTECTOMY  11/22/2011    laparoscopic    COLONOSCOPY     [address] [place] [address] SURGERY  8/9    ELBOW ARTHROSCOPY      bilateral elbows    ENDOSCOPY, COLON, DIAGNOSTIC      HYSTERECTOMY      OTHER SURGICAL HISTORY      venous ablation, bilateral legs    TUBAL LIGATION       Family History   Problem Relation Age of Onset    Other Mother         Neurocardiogenic syncope    ADHD Mother     Alcohol Abuse Father     Bipolar Disorder Sister     Anxiety Disorder Sister     Bipolar Disorder Sister     OCD Sister     Heart Surgery Maternal Grandmother     ADHD Daughter     ADHD Daughter     Lupus Paternal Uncle     Other Paternal Uncle         myasthenia gravis     Social History     Socioeconomic History    Marital status: Single     Spouse name: Not on file    Number of children: Not on file    Years of education: Not on file    Highest education level: Not on file   Occupational History    Not on file   Tobacco Use    Smoking status: Current Every Day Smoker     Packs/day: 0.50     Years: 20.00     Pack years: 10.00     Types: Cigarettes    Smokeless tobacco: Never Used   Vaping Use    Vaping Use: Former    Substances: Always   Substance and Sexual Activity    Alcohol use: No    Drug use: Not Currently     Types: Methamphetamines     Comment: clean for 11 years, past hx of crack use    Sexual activity: Yes     Partners: Male   Other Topics Concern    Not on file   Social History Narrative    Not on file     Social Determinants of Health     Financial Resource Strain: Low Risk     Difficulty of Paying Living Expenses: Not hard at all   Food Insecurity: Food Insecurity Present    Worried About [facility] in the Last Year: Sometimes true    Ursula of Food in the Last Year: Sometimes true   Transportation Needs:     Lack of Transportation (Medical):      Lack of Transportation (Non-Medical):    Physical Activity:     Days of Exercise per Week:     Minutes of Exercise per Session:    Stress:     Feeling of Stress :    Social Connections:     Frequency of Communication with Friends and Family:     Frequency of Social Gatherings with Friends and Family:     Attends Evangelical Services:     Active Member of Clubs or Organizations:     Attends Club or Organization Meetings:     Marital Status:    Intimate Partner Violence:     Fear of Current or Ex-Partner:     Emotionally Abused:     Physically Abused:     Sexually Abused:      No current facility-administered medications for this encounter. Current Outpatient Medications   Medication Sig Dispense Refill    cyclobenzaprine (FLEXERIL) 10 MG tablet Take 1 tablet by mouth 3 times daily as needed for Muscle spasms 30 tablet 0    oxyCODONE-acetaminophen (PERCOCET) 5-325 MG per tablet Take 1 tablet by mouth every 6 hours as needed for Pain for up to 6 days. 20 tablet 0    albuterol sulfate HFA (VENTOLIN HFA) 108 (90 Base) MCG/ACT inhaler Inhale 2 puffs into the lungs 4 times daily as needed for Wheezing or Shortness of Breath 1 Inhaler 0    rOPINIRole (REQUIP) 2 MG tablet TAKE ONE TABLET BY MOUTH ONCE NIGHTLY 30 tablet 2    Valbenazine Tosylate (INGREZZA) 80 MG CAPS Take 80 mg by mouth daily 30 capsule 5    traZODone (DESYREL) 100 MG tablet Take 1 tablet by mouth nightly as needed for Sleep 30 tablet 2    LORazepam (ATIVAN) 0.5 MG tablet Take 1 tablet by mouth 3 times daily for 30 days. 90 tablet 0    Rimegepant Sulfate 75 MG TBDP Take 1 tablet by mouth every other day 15 tablet 1    pregabalin (LYRICA) 50 MG capsule Take 1 capsule by mouth every evening for 90 days.  90 capsule 0    vitamin B-12 (CYANOCOBALAMIN) 1000 MCG tablet Take 1,000 mcg by mouth daily       Allergies   Allergen Reactions    Rozerem [Ramelteon] Other (See Comments)     Increased symptoms of TD    Codeine Itching and Nausea And Vomiting    Imitrex [Sumatriptan] Itching and Nausea And Vomiting     PASSED OUT    Abilify [Aripiprazole] Other (See Comments)     Patient reports symptoms of TD    Amitriptyline     Botox [Onabotulinumtoxina] Other (See Comments)     Tardive dyskinesia    Compazine [Prochlorperazine Maleate] Other (See Comments)    Lamictal [Lamotrigine]      Constant movement    Meclizine     Topamax [Topiramate]      Made TD worse    Cephalexin Nausea And Vomiting and Other (See Comments)     ABDOMINAL PAIN FOR 2 WEEKS    Magnesium-Containing Compounds Other (See Comments)     Pt sts \"they called it the mags. I start twitching. \"     Meclizine Hcl Other (See Comments)     \"Makes me stutter and twitch. \"    Nsaids Other (See Comments)     Tardive dyskinisia    Pcn [Penicillins] Nausea And Vomiting and Other (See Comments)     HEADACHE    Reglan [Metoclopramide] Other (See Comments)     Tardive dyskinisia    Toradol [Ketorolac Tromethamine] Other (See Comments)     Tremors    Vistaril [Hydroxyzine Hcl] Other (See Comments)     dyskinsia      Zofran [Ondansetron Hcl] Other (See Comments)     Cramping and burning in stomach       Nursing Notes Reviewed    Physical Exam:  ED Triage Vitals [09/08/21 1249]   Enc Vitals Group      /62      Pulse 73      Resp 18      Temp 97.2 °F (36.2 °C)      Temp Source Oral      SpO2 95 %      Weight 115 lb (52.2 kg)      Height 5' 4\" (1.626 m)      Head Circumference       Peak Flow       Pain Score       Pain Loc       Pain Edu? Excl. in [address]? GENERAL APPEARANCE: Awake and alert. Cooperative. No acute distress. Nontoxic in appearance. She is seated on the bed with her arm in a sling. HEAD: Normocephalic. Patient has bruising to her left forehead region  EYES: EOM's grossly intact. Sclera anicteric. Pupils are equally reactive to light  ENT: Tolerates saliva. No trismus. NECK: Supple. Trachea midline. CARDIO: RRR. Radial pulse 2+. There is tenderness palpation of her left lateral chest wall. LUNGS: Respirations unlabored. CTAB. Decreased breath sounds but no wheezes rales or rhonchi are heard. ABDOMEN: Soft. Non-distended. Non-tender. [id number]  [phone]    Schedule an appointment as soon as possible for a visit in 3 days  For follow up    Lefty De La Cruz MD  [address] [place] [address], [facility] [id number] [place] [address]    Schedule an appointment as soon as possible for a visit in 3 days  For follow up    Discharge medications:  New Prescriptions    CYCLOBENZAPRINE (FLEXERIL) 10 MG TABLET    Take 1 tablet by mouth 3 times daily as needed for Muscle spasms    OXYCODONE-ACETAMINOPHEN (PERCOCET) 5-325 MG PER TABLET    Take 1 tablet by mouth every 6 hours as needed for Pain for up to 6 days.      (Please note that portions of this note may have been completed with a voice recognition program. Efforts were made to edit the dictations but occasionally words are mis-transcribed.)    Raul Manzo DO, [facility]  Board certified in [address] [place] [address]  09/08/21 15-A [address]

## 2021-09-14 ENCOUNTER — OFFICE VISIT (OUTPATIENT)
Dept: ORTHOPEDIC SURGERY | Age: 46
End: 2021-09-14
Payer: COMMERCIAL

## 2021-09-14 VITALS
BODY MASS INDEX: 19.29 KG/M2 | RESPIRATION RATE: 16 BRPM | HEART RATE: 62 BPM | WEIGHT: 113 LBS | HEIGHT: 64 IN | OXYGEN SATURATION: 99 %

## 2021-09-14 DIAGNOSIS — S42.022A CLOSED DISPLACED FRACTURE OF SHAFT OF LEFT CLAVICLE, INITIAL ENCOUNTER: Primary | ICD-10-CM

## 2021-09-14 PROCEDURE — 23500 CLTX CLAVICULAR FX W/O MNPJ: CPT | Performed by: ORTHOPAEDIC SURGERY

## 2021-09-14 PROCEDURE — 99203 OFFICE O/P NEW LOW 30 MIN: CPT | Performed by: ORTHOPAEDIC SURGERY

## 2021-09-14 PROCEDURE — 4004F PT TOBACCO SCREEN RCVD TLK: CPT | Performed by: ORTHOPAEDIC SURGERY

## 2021-09-14 PROCEDURE — G8428 CUR MEDS NOT DOCUMENT: HCPCS | Performed by: ORTHOPAEDIC SURGERY

## 2021-09-14 PROCEDURE — G8420 CALC BMI NORM PARAMETERS: HCPCS | Performed by: ORTHOPAEDIC SURGERY
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
## 2021-09-14 NOTE — PATIENT INSTRUCTIONS
May continue to take prescribed pain reliever and muscle relaxer as needed  Rest, ice, and elevate as needed  Remain in sling, may remove for comfort  Follow up in 2 weeks
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
## 2021-09-16 ENCOUNTER — VIRTUAL VISIT (OUTPATIENT)
Dept: FAMILY MEDICINE CLINIC | Age: 46
End: 2021-09-16
Payer: COMMERCIAL

## 2021-09-16 DIAGNOSIS — S42.002P: ICD-10-CM

## 2021-09-16 DIAGNOSIS — F41.1 GAD (GENERALIZED ANXIETY DISORDER): Primary | ICD-10-CM

## 2021-09-16 PROCEDURE — 99213 OFFICE O/P EST LOW 20 MIN: CPT | Performed by: NURSE PRACTITIONER

## 2021-09-16 PROCEDURE — G8428 CUR MEDS NOT DOCUMENT: HCPCS | Performed by: NURSE PRACTITIONER

## 2021-09-16 RX ORDER — PREGABALIN 50 MG/1
50 CAPSULE ORAL 3 TIMES DAILY
Qty: 90 CAPSULE | Refills: 2 | Status: SHIPPED | OUTPATIENT
Start: 2021-09-16 | End: 2022-03-25 | Stop reason: SDUPTHER

## 2021-09-16 RX ORDER — LORAZEPAM 0.5 MG/1
0.5 TABLET ORAL 3 TIMES DAILY
Qty: 90 TABLET | Refills: 2 | Status: SHIPPED | OUTPATIENT
Start: 2021-09-16 | End: 2021-10-16

## 2021-09-16 RX ORDER — LORAZEPAM 0.5 MG/1
0.5 TABLET ORAL 3 TIMES DAILY
COMMUNITY
End: 2021-09-16 | Stop reason: SDUPTHER

## 2021-09-16 ASSESSMENT — ENCOUNTER SYMPTOMS
CONSTIPATION: 0
VOMITING: 0
CHEST TIGHTNESS: 0
ABDOMINAL PAIN: 1
WHEEZING: 0
DIARRHEA: 0
NAUSEA: 0
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
## 2021-09-19 PROBLEM — S42.022A CLOSED DISPLACED FRACTURE OF SHAFT OF LEFT CLAVICLE: Status: ACTIVE | Noted: 2021-09-19

## 2021-09-19 ASSESSMENT — ENCOUNTER SYMPTOMS
SHORTNESS OF BREATH: 0
EYE PAIN: 0
VOMITING: 0
COLOR CHANGE: 0
WHEEZING: 0
CHEST TIGHTNESS: 0
EYE REDNESS: 0

## 2021-09-28 ENCOUNTER — OFFICE VISIT (OUTPATIENT)
Dept: ORTHOPEDIC SURGERY | Age: 46
End: 2021-09-28

## 2021-09-28 VITALS
BODY MASS INDEX: 19.29 KG/M2 | RESPIRATION RATE: 16 BRPM | OXYGEN SATURATION: 97 % | WEIGHT: 113 LBS | HEART RATE: 70 BPM | HEIGHT: 64 IN

## 2021-09-28 DIAGNOSIS — S42.022D CLOSED DISPLACED FRACTURE OF SHAFT OF LEFT CLAVICLE WITH ROUTINE HEALING, SUBSEQUENT ENCOUNTER: Primary | ICD-10-CM

## 2021-09-28 PROCEDURE — 99024 POSTOP FOLLOW-UP VISIT: CPT | Performed by: ORTHOPAEDIC SURGERY

## 2021-09-28 NOTE — PROGRESS NOTES
9/28/2021   Chief Complaint   Patient presents with    Fracture     left clavicle DOI 9/2/21        History of Present Illness:                             Eleonora Nagy is a 42 y.o. female returns today for follow-up of her left clavicle fracture. She has done well in her sling. She feels that her shoulder has improved range of motion. She has been able do light activities. She feels that her clavicle is healing well with no symptoms of instability. Patient returns to the office for a follow up on her left clavicle fracture today from September 2, 2021 with complaints of sharp pain within the clavicle region and rates her pain today at a 5/10. She contributes her discomfort today to inability of being able to get her sling back on yesterday by herself without assistance. Overall, she is slowly starting to progress with improved ROM, but continues to have weakness within the extremity. She has been working on at home therapy exercises as tolerated and wishes to discuss possibly discontinuing the sling. Medical History  Patient's medications, allergies, past medical, surgical, social and family histories were reviewed and updated as appropriate. Review of Systems                                            Examination:  General Exam:  Vitals: Pulse 70   Resp 16   Ht 5' 4\" (1.626 m)   Wt 113 lb (51.3 kg)   LMP 11/18/2004   SpO2 97%   BMI 19.40 kg/m²    Physical Exam     Left upper extremity:  Persistent but improved tenderness to palpation and swelling overlying the fracture site of the midshaft of the clavicle. Good alignment of the shoulder girdle. No tenderness to palpation of the proximal humerus. Good active and passive range of motion of the shoulder elbow wrist and hand. No instability the fracture site with active and passive range of motion of the shoulder.     Diagnostic testing:  X-rays reviewed in office, I independently reviewed the films in the office today:     XR Clavicle Left    Result Date: 9/28/2021  2 views of the left clavicle show a stable alignment of the mildly displaced oblique midshaft fracture with mild superior displacement of the medial fragment. There is mild interval healing present along the fracture site especially seen inferiorly. Normal alignment of the shoulder girdle. No significant shortening through the fracture site. Office Procedures:  No orders of the defined types were placed in this encounter. Assessment and Plan  1. Left midshaft clavicle fracture, healing    We reviewed the x-ray results and the need her fracture is in maintained alignment with evidence of early healing present. I recommended continued use of her sling as needed for protection. She can remove it at rest for gentle light activities of daily living and for gentle range of motion as pain allows. Follow-up in 1 month for repeat x-rays and we will proceed with strengthening activities at that time.         Electronically signed by Pavel Johnson MD on 9/28/2021 at 4:48 PM

## 2021-09-28 NOTE — PATIENT INSTRUCTIONS
Continue to avoid any heavy lifting pushing or pulling of the left arm  Continue range of motion exercises as instructed. Ice and elevate as needed. Tylenol or Motrin for pain.   Follow up in one month

## 2021-09-28 NOTE — PROGRESS NOTES
Patient returns to the office for a follow up on her left clavicle fracture today from September 2, 2021 with complaints of sharp pain within the clavicle region and rates her pain today at a 5/10. She contributes her discomfort today to inability of being able to get her sling back on yesterday by herself without assistance. Overall, she is slowly starting to progress with improved ROM, but continues to have weakness within the extremity. She has been working on at home therapy exercises as tolerated and wishes to discuss possibly discontinuing the sling.

## 2021-10-26 ENCOUNTER — PATIENT MESSAGE (OUTPATIENT)
Dept: FAMILY MEDICINE CLINIC | Age: 46
End: 2021-10-26

## 2021-10-26 DIAGNOSIS — G24.01 TARDIVE DYSKINESIA: ICD-10-CM

## 2021-10-26 DIAGNOSIS — G25.81 RESTLESS LEGS: Primary | ICD-10-CM

## 2021-10-26 NOTE — TELEPHONE ENCOUNTER
From: Hilary Ramirez  To: HANNA Dutta - KENNY  Sent: 10/26/2021 12:17 PM EDT  Subject: Prescription Question    I need a new med for restless leg the requip is causing me severe side effects I've been on it for 11 years and I believe it's causing me more problems than good, I've done process of elimination and it's the one making me feel bad

## 2021-10-26 NOTE — TELEPHONE ENCOUNTER
Fill Date ID   Written Drug Qty Days Prescriber Rx # Pharmacy Refill   Daily Dose* Pymt Type      10/22/2021  1   09/16/2021  Pregabalin 50 MG Capsule  90.00  30 Le a   [id number]   [facility] ([id number])   1  1.00 LME  [place]   [place]   10/22/2021  1   09/16/2021  Lorazepam 0.5 MG Tablet  90.00  30 Le a   [id number]   [facility] ([id number])   1  1.50 LME  Comm [facility]   09/19/2021  1   09/16/2021  Pregabalin 50 MG Capsule  90.00  30 Le a   [id number]   [facility] ([id number])   0  1.00 LME  [place]   [place]   09/16/2021  1   09/16/2021  Lorazepam 0.5 MG Tablet  90.00  30 Le a   [id number]   [facility] ([id number])   0  1.50 LME  Comm [facility]   09/08/2021  1   09/08/2021  Oxycodone-Acetaminophen 5-325  20.00  6 [facility]   [id number]   [facility] ([id number])   0  25.00 MME  Comm [facility]

## 2021-10-27 RX ORDER — PRAMIPEXOLE DIHYDROCHLORIDE 0.12 MG/1
0.12 TABLET ORAL NIGHTLY
Qty: 90 TABLET | Refills: 3 | Status: SHIPPED | OUTPATIENT
Start: 2021-10-27 | End: 2021-12-03 | Stop reason: SDUPTHER

## 2021-11-02 ENCOUNTER — OFFICE VISIT (OUTPATIENT)
Dept: ORTHOPEDIC SURGERY | Age: 46
End: 2021-11-02

## 2021-11-02 VITALS
HEIGHT: 64 IN | WEIGHT: 113 LBS | RESPIRATION RATE: 17 BRPM | BODY MASS INDEX: 19.29 KG/M2 | HEART RATE: 7 BPM | OXYGEN SATURATION: 97 %

## 2021-11-02 DIAGNOSIS — S42.022D CLOSED DISPLACED FRACTURE OF SHAFT OF LEFT CLAVICLE WITH ROUTINE HEALING, SUBSEQUENT ENCOUNTER: Primary | ICD-10-CM

## 2021-11-02 PROCEDURE — 99024 POSTOP FOLLOW-UP VISIT: CPT | Performed by: ORTHOPAEDIC SURGERY

## 2021-11-02 RX ORDER — LORAZEPAM 0.5 MG/1
0.5 TABLET ORAL 3 TIMES DAILY
COMMUNITY
End: 2022-01-03 | Stop reason: SDUPTHER

## 2021-11-02 NOTE — PROGRESS NOTES
Patient presents to the office with left clavicle fx, DOI: 09/02/21. Patient states she has no pain today. Pt states she hasn't been using a sling and has no increased pressure doing so. Pt states she has great range of motion and has no pain lifting her arm above her head or across her body. Pt denies any new injuries or need for medications.

## 2021-11-02 NOTE — PROGRESS NOTES
11/2/2021   Chief Complaint   Patient presents with    Clavicle Injury     Left, DOI: 09/02/21        History of Present Illness:                             Rufina Farfan is a 42 y.o. female who returns today for follow-up of her left clavicle fracture. She has noticed a great improvement in her function and range of motion of the shoulder. She does not complain of any pain or instability at the healing fracture site. She has resumed most of her light activities of daily living with no limitations or pain. She is pleased with her healing process. Patient presents to the office with left clavicle fx, DOI: 09/02/21. Patient states she has no pain today. Pt states she hasn't been using a sling and has no increased pressure doing so. Pt states she has great range of motion and has no pain lifting her arm above her head or across her body. Pt denies any new injuries or need for medications. Medical History  Patient's medications, allergies, past medical, surgical, social and family histories were reviewed and updated as appropriate. Review of Systems                                            Examination:  General Exam:  Vitals: Pulse (!) 7   Resp 17   Ht 5' 4\" (1.626 m)   Wt 113 lb (51.3 kg)   LMP 11/18/2004   SpO2 97%   BMI 19.40 kg/m²    Physical Exam     Left upper extremity:  Improved minimal tenderness at the healing fracture site. No instability at the clavicle fracture sites with active range of motion of the shoulder stress examination across the clavicle. Strength is 5 out of 5 with all planes of motion at the shoulder. Sensation motor functions intact throughout. Diagnostic testing:  X-rays reviewed in office, I independently reviewed the films in the office today:   XR Clavicle Left    Result Date: 11/2/2021  2 views of the clavicle show stable alignment of the oblique midshaft fracture of the clavicle with stable mild superior displacement medial fragment.   There is evidence of interval callus formation and bony healing present along the fracture site. Maintained alignment of the length of the clavicle and articulations of the clavicle. Office Procedures:  No orders of the defined types were placed in this encounter. Assessment and Plan  1. Left midshaft mildly displaced clavicle fracture, healing      She has been doing well with her advancement of activities. We reviewed her x-rays and fracture is healing well. I have advised her on building up strength and easing back into heavy and repetitive activities over the next 4 to 6 weeks. Follow-up here as needed if any questions or concerns.       Electronically signed by Joleen Burt MD on 11/2/2021 at 1:14 PM

## 2021-11-23 ENCOUNTER — HOSPITAL ENCOUNTER (EMERGENCY)
Age: 46
Discharge: HOME | End: 2021-11-23
Attending: EMERGENCY MEDICINE
Payer: COMMERCIAL

## 2021-11-23 VITALS
TEMPERATURE: 98 F | SYSTOLIC BLOOD PRESSURE: 111 MMHG | OXYGEN SATURATION: 97 % | HEART RATE: 47 BPM | BODY MASS INDEX: 19.29 KG/M2 | WEIGHT: 113 LBS | DIASTOLIC BLOOD PRESSURE: 57 MMHG | RESPIRATION RATE: 17 BRPM | HEIGHT: 64 IN

## 2021-11-23 DIAGNOSIS — G43.001 MIGRAINE WITHOUT AURA AND WITH STATUS MIGRAINOSUS, NOT INTRACTABLE: Primary | ICD-10-CM

## 2021-11-23 LAB
ANION GAP SERPL CALCULATED.3IONS-SCNC: 16 MMOL/L (ref 4–16)
BASOPHILS ABSOLUTE: 0 K/CU MM
BASOPHILS RELATIVE PERCENT: 0.5 % (ref 0–1)
BUN BLDV-MCNC: 9 MG/DL (ref 6–23)
CALCIUM SERPL-MCNC: 9 MG/DL (ref 8.3–10.6)
CHLORIDE BLD-SCNC: 106 MMOL/L (ref 99–110)
CO2: 22 MMOL/L (ref 21–32)
CREAT SERPL-MCNC: 0.9 MG/DL (ref 0.6–1.1)
DIFFERENTIAL TYPE: ABNORMAL
EOSINOPHILS ABSOLUTE: 0.2 K/CU MM
EOSINOPHILS RELATIVE PERCENT: 2.8 % (ref 0–3)
GFR AFRICAN AMERICAN: >60 ML/MIN/1.73M2
GFR NON-AFRICAN AMERICAN: >60 ML/MIN/1.73M2
GLUCOSE BLD-MCNC: 94 MG/DL (ref 70–99)
HCT VFR BLD CALC: 39.9 % (ref 37–47)
HEMOGLOBIN: 13.2 GM/DL (ref 12.5–16)
IMMATURE NEUTROPHIL %: 0.2 % (ref 0–0.43)
LYMPHOCYTES ABSOLUTE: 3.6 K/CU MM
LYMPHOCYTES RELATIVE PERCENT: 44.2 % (ref 24–44)
MCH RBC QN AUTO: 31.4 PG (ref 27–31)
MCHC RBC AUTO-ENTMCNC: 33.1 % (ref 32–36)
MCV RBC AUTO: 94.8 FL (ref 78–100)
MONOCYTES ABSOLUTE: 0.5 K/CU MM
MONOCYTES RELATIVE PERCENT: 6.3 % (ref 0–4)
PDW BLD-RTO: 12.3 % (ref 11.7–14.9)
PLATELET # BLD: 160 K/CU MM (ref 140–440)
PMV BLD AUTO: 11.7 FL (ref 7.5–11.1)
POTASSIUM SERPL-SCNC: 4 MMOL/L (ref 3.5–5.1)
RBC # BLD: 4.21 M/CU MM (ref 4.2–5.4)
SEGMENTED NEUTROPHILS ABSOLUTE COUNT: 3.8 K/CU MM
SEGMENTED NEUTROPHILS RELATIVE PERCENT: 46 % (ref 36–66)
SODIUM BLD-SCNC: 144 MMOL/L (ref 135–145)
TOTAL IMMATURE NEUTOROPHIL: 0.02 K/CU MM
WBC # BLD: 8.2 K/CU MM (ref 4–10.5)

## 2021-11-23 PROCEDURE — 80048 BASIC METABOLIC PNL TOTAL CA: CPT

## 2021-11-23 PROCEDURE — 96375 TX/PRO/DX INJ NEW DRUG ADDON: CPT

## 2021-11-23 PROCEDURE — 2580000003 HC RX 258: Performed by: EMERGENCY MEDICINE

## 2021-11-23 PROCEDURE — 6360000002 HC RX W HCPCS: Performed by: EMERGENCY MEDICINE

## 2021-11-23 PROCEDURE — 99283 EMERGENCY DEPT VISIT LOW MDM: CPT

## 2021-11-23 PROCEDURE — 85025 COMPLETE CBC W/AUTO DIFF WBC: CPT

## 2021-11-23 PROCEDURE — 96374 THER/PROPH/DIAG INJ IV PUSH: CPT

## 2021-11-23 RX ORDER — PROMETHAZINE HYDROCHLORIDE 25 MG/ML
12.5 INJECTION, SOLUTION INTRAMUSCULAR; INTRAVENOUS ONCE
Status: COMPLETED | OUTPATIENT
Start: 2021-11-23 | End: 2021-11-23

## 2021-11-23 RX ORDER — 0.9 % SODIUM CHLORIDE 0.9 %
1000 INTRAVENOUS SOLUTION INTRAVENOUS ONCE
Status: COMPLETED | OUTPATIENT
Start: 2021-11-23 | End: 2021-11-23

## 2021-11-23 RX ADMIN — BUTORPHANOL TARTRATE 1 MG: 2 INJECTION, SOLUTION INTRAMUSCULAR; INTRAVENOUS at 22:53

## 2021-11-23 RX ADMIN — SODIUM CHLORIDE 1000 ML: 9 INJECTION, SOLUTION INTRAVENOUS at 22:53

## 2021-11-23 RX ADMIN — PROMETHAZINE HYDROCHLORIDE 12.5 MG: 25 INJECTION INTRAMUSCULAR; INTRAVENOUS at 22:54

## 2021-11-23 ASSESSMENT — ENCOUNTER SYMPTOMS
RESPIRATORY NEGATIVE: 1
BLURRED VISION: 1
PHOTOPHOBIA: 0
NAUSEA: 1

## 2021-11-23 ASSESSMENT — PAIN SCALES - GENERAL
PAINLEVEL_OUTOF10: 7
PAINLEVEL_OUTOF10: 7

## 2021-11-23 ASSESSMENT — PAIN DESCRIPTION - PAIN TYPE: TYPE: ACUTE PAIN

## 2021-11-23 ASSESSMENT — PAIN DESCRIPTION - DESCRIPTORS: DESCRIPTORS: ACHING;SHARP

## 2021-11-23 ASSESSMENT — PAIN DESCRIPTION - FREQUENCY: FREQUENCY: CONTINUOUS

## 2021-11-23 ASSESSMENT — VISUAL ACUITY: OU: 1

## 2021-11-23 ASSESSMENT — PAIN DESCRIPTION - PROGRESSION: CLINICAL_PROGRESSION: NOT CHANGED

## 2021-11-23 ASSESSMENT — PAIN DESCRIPTION - ONSET: ONSET: ON-GOING

## 2021-11-23 ASSESSMENT — PAIN DESCRIPTION - LOCATION: LOCATION: HEAD

## 2021-11-24 NOTE — ED PROVIDER NOTES
The history is provided by the patient. Headache  Pain location:  Generalized  Radiates to:  L neck and R neck  Onset quality:  Gradual  Duration:  7 days  Timing:  Constant  Chronicity:  Chronic  Similar to prior headaches: yes    Context: activity, bright light and loud noise    Relieved by:  Nothing  Worsened by: Activity, light, neck movement and sound  Ineffective treatments:  None tried  Associated symptoms: blurred vision and nausea    Associated symptoms: no fever, no focal weakness, no hearing loss, no loss of balance, no numbness, no paresthesias, no photophobia, no seizures and no weakness    Associated symptoms comment:  Double vision       Review of Systems   Constitutional: Negative. Negative for fever. HENT: Negative. Negative for hearing loss. Eyes: Positive for blurred vision. Negative for photophobia. Respiratory: Negative. Cardiovascular: Negative. Gastrointestinal: Positive for nausea. Genitourinary: Negative. Musculoskeletal: Negative. Skin: Negative. Neurological: Positive for headaches. Negative for focal weakness, seizures, weakness, numbness, paresthesias and loss of balance. All other systems reviewed and are negative.       Family History   Problem Relation Age of Onset    Other Mother         Neurocardiogenic syncope    ADHD Mother     Alcohol Abuse Father     Bipolar Disorder Sister     Anxiety Disorder Sister     Bipolar Disorder Sister     OCD Sister     Heart Surgery Maternal Grandmother     ADHD Daughter     ADHD Daughter     Lupus Paternal Uncle     Other Paternal Uncle         myasthenia gravis     Social History     Socioeconomic History    Marital status: Single     Spouse name: Not on file    Number of children: Not on file    Years of education: Not on file    Highest education level: Not on file   Occupational History    Not on file   Tobacco Use    Smoking status: Current Every Day Smoker     Packs/day: 0.50     Years: 20.00 Pack years: 10.00     Types: Cigarettes    Smokeless tobacco: Never Used   Vaping Use    Vaping Use: Former    Substances: Always   Substance and Sexual Activity    Alcohol use: No    Drug use: Not Currently     Types: Methamphetamines (Crystal Meth)     Comment: clean for 11 years, past hx of crack use    Sexual activity: Yes     Partners: Male   Other Topics Concern    Not on file   Social History Narrative    Not on file     Social Determinants of Health     Financial Resource Strain: Low Risk     Difficulty of Paying Living Expenses: Not hard at all   Food Insecurity: Food Insecurity Present    Worried About [facility] in the Last Year: Sometimes true    Ursula of Food in the Last Year: Sometimes true   Transportation Needs:     Lack of Transportation (Medical): Not on file    Lack of Transportation (Non-Medical):  Not on file   Physical Activity:     Days of Exercise per Week: Not on file    Minutes of Exercise per Session: Not on file   Stress:     Feeling of Stress : Not on file   Social Connections:     Frequency of Communication with Friends and Family: Not on file    Frequency of Social Gatherings with Friends and Family: Not on file    Attends Ep[employer] Services: Not on file    Active Member of [address] [place] [address] or Organizations: Not on file    Attends Club or Organization Meetings: Not on file    Marital Status: Not on file   Intimate Partner Violence:     Fear of Current or Ex-Partner: Not on file    Emotionally Abused: Not on file    Physically Abused: Not on file    Sexually Abused: Not on file   Housing Stability:     Unable to Pay for Housing in the Last Year: Not on file    Number of [place] in the Last Year: Not on file    Unstable Housing in the Last Year: Not on file     Past Surgical History:   Procedure Laterality Date    CHOLECYSTECTOMY  11/22/2011    laparoscopic    COLONOSCOPY      DENTAL SURGERY  8/9    ELBOW ARTHROSCOPY      bilateral elbows    ENDOSCOPY, COLON, DIAGNOSTIC      HYSTERECTOMY      OTHER SURGICAL HISTORY      venous ablation, bilateral legs    TUBAL LIGATION       Past Medical History:   Diagnosis Date    ADHD     Arthritis     OSTEO ARTHITIS    Bipolar disorder, unspecified ([facility] [address]     Cholecystenteric fistula     COPD (chronic obstructive pulmonary disease) (HCC)     Degenerative disc disease     Fibromyalgia     Generalized anxiety disorder     H/O 24 hour EKG monitoring 02/16/2017      Sinus tach events , no major arrhythmias , follow up in office as routine     H/O echocardiogram 03/01/2017    EF 55-60% Normal LV structure  and systolic function.  H/O exercise stress test 03/01/2017    Normal stress test. Patient has physical deconditioning as she achieved target HR in 2 mins. Recommend to increase PO fluids intake and exercise training.  Headache     History of cardiac monitoring 09/21/2020    Normal 30-day event monitor with average heart rate of 63 lowest heart rate of 57 highest heart rate of 82 patient reported episodes of dizziness which did not correlate with any arrhythmia.   There were no episodes of tachycardia uneventful monitor however average heart rate and even the highest heart rate is on the lower side     Osteopenia     Osteoporosis     PTSD (post-traumatic stress disorder)     S/P cholecystectomy 11/22/2011    Schizo-affective schizophrenia ([facility] [address]     Sjoegren syndrome     Syncope     Tardive dyskinesia     Venous insufficiency of leg 2012    Vertigo      Allergies   Allergen Reactions    Rozerem [Ramelteon] Other (See Comments)     Increased symptoms of TD    Codeine Itching and Nausea And Vomiting    Imitrex [Sumatriptan] Itching and Nausea And Vomiting     PASSED OUT    Abilify [Aripiprazole] Other (See Comments)     Patient reports symptoms of TD    Amitriptyline     Botox [Onabotulinumtoxina] Other (See Comments)     Tardive dyskinesia    Compazine [Prochlorperazine Maleate] Other daily    Historical Provider, MD       BP (!) 110/49   Pulse 58   Temp 98 °F (36.7 °C) (Oral)   Resp 17   Ht 5' 4\" (1.626 m)   Wt 113 lb (51.3 kg)   LMP 11/18/2004   SpO2 99%   BMI 19.40 kg/m²     Physical Exam  Vitals and nursing note reviewed. Constitutional:       Appearance: She is well-developed. HENT:      Head: Normocephalic and atraumatic. Right Ear: External ear normal. No hemotympanum. Tympanic membrane is not erythematous. Left Ear: External ear normal. No hemotympanum. Tympanic membrane is not erythematous. Nose: Nose normal.   Eyes:      General: Vision grossly intact. Gaze aligned appropriately. Conjunctiva/sclera: Conjunctivae normal.      Pupils: Pupils are equal, round, and reactive to light. Visual Fields: Right eye visual fields normal and left eye visual fields normal.   Neck:      Trachea: Trachea normal.      Meningeal: Brudzinski's sign and Kernig's sign absent. Cardiovascular:      Rate and Rhythm: Normal rate and regular rhythm. Heart sounds: Normal heart sounds. Pulmonary:      Effort: Pulmonary effort is normal.      Breath sounds: Normal breath sounds. Abdominal:      General: Bowel sounds are normal.      Palpations: Abdomen is soft. Musculoskeletal:         General: Normal range of motion. Cervical back: Normal range of motion and neck supple. No rigidity. Muscular tenderness present. No spinous process tenderness. Normal range of motion. Skin:     General: Skin is warm and dry. Neurological:      Mental Status: She is alert and oriented to person, place, and time. GCS: GCS eye subscore is 4. GCS verbal subscore is 5. GCS motor subscore is 6. Cranial Nerves: No cranial nerve deficit. Sensory: No sensory deficit. Motor: No weakness, atrophy or abnormal muscle tone. Coordination: Coordination normal.      Gait: Gait normal.      Deep Tendon Reflexes: Reflexes are normal and symmetric.       Reflex Scores: Tricep reflexes are 2+ on the right side and 2+ on the left side. Bicep reflexes are 2+ on the right side and 2+ on the left side. Brachioradialis reflexes are 2+ on the right side and 2+ on the left side. Patellar reflexes are 2+ on the right side and 2+ on the left side. Achilles reflexes are 2+ on the right side and 2+ on the left side. Comments: od 20/30  Os 20/30  Ou 20/30     Psychiatric:         Behavior: Behavior normal.         Thought Content: Thought content normal.         Judgment: Judgment normal.         MDM:    Labs Reviewed   CBC WITH AUTO DIFFERENTIAL - Abnormal; Notable for the following components:       Result Value    MCH 31.4 (*)     MPV 11.7 (*)     Lymphocytes % 44.2 (*)     Monocytes % 6.3 (*)     All other components within normal limits   BASIC METABOLIC PANEL       No orders to display        Patient given IVF and stadol/phenergan. She has improvement with her headache. She will be discharged with follow up as she feels necessary. I have discussed with the patient  my clinical impression and the result of the patient's current clinical evaluation for their presentation. In addition we discussed the risk and benefits of further testing and hospitalization. I discussed candidly with the patient  and the patient  was allowed to provide input as to their thoughts concerning the current presentation. Although the risk of progression or development of new more serious signs and symptoms cannot be excluded she feels better and would like to go home and rest   My typical dicussion, presentation,and considerations for this patients' chief complaint, diagnosis, and differential diagnosis have been considered. I have stressed need for follow up and reexamination for this encounter. I have discussed my clinical impression and the results of the current evaluation. Patient was not prescribed medication. Final Impression    1.  Migraine without aura and with status migrainosus, not intractable              Ty Human, DO  11/23/21 2191

## 2021-11-24 NOTE — ED NOTES
Discharge instructions reviewed with patient. No additional questions asked. Voiced understanding. Encouraged patient to follow up as discussed by the ED physician.      Carson Castillo RN  11/23/21 1015

## 2021-12-02 ENCOUNTER — TELEPHONE (OUTPATIENT)
Dept: FAMILY MEDICINE CLINIC | Age: 46
End: 2021-12-02

## 2021-12-02 DIAGNOSIS — G25.81 RESTLESS LEGS: ICD-10-CM

## 2021-12-02 NOTE — TELEPHONE ENCOUNTER
Pt called in state they needed her medication instructions changed so they can get a refill of the mirapex.  Please advise

## 2021-12-03 RX ORDER — PRAMIPEXOLE DIHYDROCHLORIDE 0.75 MG/1
0.75 TABLET ORAL NIGHTLY
Qty: 30 TABLET | Refills: 0 | Status: SHIPPED | OUTPATIENT
Start: 2021-12-03 | End: 2022-01-03 | Stop reason: SDUPTHER

## 2021-12-03 NOTE — TELEPHONE ENCOUNTER
Please call patient back to clarify request, what is her current dosing strategy of Mirapex that she is taking etc.

## 2021-12-03 NOTE — TELEPHONE ENCOUNTER
Refill of Mirapex 0.75 mg nightly sent into pharmacy; patient needs appointment for any future refills.

## 2021-12-09 ENCOUNTER — HOSPITAL ENCOUNTER (OUTPATIENT)
Age: 46
Setting detail: SPECIMEN
Discharge: HOME | End: 2021-12-09
Payer: COMMERCIAL

## 2021-12-09 ENCOUNTER — OFFICE VISIT (OUTPATIENT)
Dept: INTERNAL MEDICINE CLINIC | Age: 46
End: 2021-12-09
Payer: COMMERCIAL

## 2021-12-09 VITALS
TEMPERATURE: 98.4 F | WEIGHT: 113 LBS | OXYGEN SATURATION: 99 % | BODY MASS INDEX: 19.29 KG/M2 | HEART RATE: 66 BPM | HEIGHT: 64 IN

## 2021-12-09 DIAGNOSIS — R05.9 COUGH: ICD-10-CM

## 2021-12-09 DIAGNOSIS — B34.9 VIRAL ILLNESS: Primary | ICD-10-CM

## 2021-12-09 PROCEDURE — G8420 CALC BMI NORM PARAMETERS: HCPCS | Performed by: NURSE PRACTITIONER

## 2021-12-09 PROCEDURE — G8484 FLU IMMUNIZE NO ADMIN: HCPCS | Performed by: NURSE PRACTITIONER

## 2021-12-09 PROCEDURE — 99213 OFFICE O/P EST LOW 20 MIN: CPT | Performed by: NURSE PRACTITIONER

## 2021-12-09 PROCEDURE — G8428 CUR MEDS NOT DOCUMENT: HCPCS | Performed by: NURSE PRACTITIONER

## 2021-12-09 PROCEDURE — 4004F PT TOBACCO SCREEN RCVD TLK: CPT | Performed by: NURSE PRACTITIONER

## 2021-12-09 PROCEDURE — 0202U NFCT DS 22 TRGT SARS-COV-2: CPT

## 2021-12-09 RX ORDER — GUAIFENESIN/DEXTROMETHORPHAN 100-10MG/5
5 SYRUP ORAL 3 TIMES DAILY PRN
Qty: 120 ML | Refills: 0 | Status: SHIPPED | OUTPATIENT
Start: 2021-12-09 | End: 2021-12-19

## 2021-12-09 NOTE — PATIENT INSTRUCTIONS
Patient Education        Viral Infections: Care Instructions  Your Care Instructions     You don't feel well, but it's not clear what's causing it. You may have a viral infection. Viruses cause many illnesses, such as the common cold, influenza, fever, rashes, and the diarrhea, nausea, and vomiting that are often called \"stomach flu. \" You may wonder if antibiotic medicines could make you feel better. But antibiotics only treat infections caused by bacteria. They don't work on viruses. The good news is that viral infections usually aren't serious. Most will go away in a few days without medical treatment. In the meantime, there are a few things you can do to make yourself more comfortable. Follow-up care is a key part of your treatment and safety. Be sure to make and go to all appointments, and call your doctor if you are having problems. It's also a good idea to know your test results and keep a list of the medicines you take. How can you care for yourself at home? · Get plenty of rest if you feel tired. · Take an over-the-counter pain medicine if needed, such as acetaminophen (Tylenol), ibuprofen (Advil, Motrin), or naproxen (Aleve). Read and follow all instructions on the label. · Be careful when taking over-the-counter cold or flu medicines and Tylenol at the same time. Many of these medicines have acetaminophen, which is Tylenol. Read the labels to make sure that you are not taking more than the recommended dose. Too much acetaminophen (Tylenol) can be harmful. · Drink plenty of fluids. If you have kidney, heart, or liver disease and have to limit fluids, talk with your doctor before you increase the amount of fluids you drink. · Stay home from work, school, and other public places while you have a fever. When should you call for help? Call 911 anytime you think you may need emergency care. For example, call if:    · You have severe trouble breathing.     · You passed out (lost consciousness). Call your doctor now or seek immediate medical care if:    · You seem to be getting much sicker.     · You have a new or higher fever.     · You have blood in your stools.     · You have new belly pain, or your pain gets worse.     · You have a new rash. Watch closely for changes in your health, and be sure to contact your doctor if:    · You start to get better and then get worse.     · You do not get better as expected. Where can you learn more? Go to https://[employer]. org and sign in to your [employer] account. Enter J795 in the [employer] box to learn more about \"Viral Infections: Care Instructions. \"     If you do not have an account, please click on the \"Sign Up Now\" link. Current as of: July 1, 2021               Content Version: 13.0  © 4816-1836 Healthwise, Incorporated. Care instructions adapted under license by [facility] ([facility]). If you have questions about a medical condition or this instruction, always ask your healthcare professional. [address] any warranty or liability for your use of this information.

## 2021-12-09 NOTE — PROGRESS NOTES
and erythema bilateral naris  Eyes:  conjunctiva normal, no discharge, no scleral icterus  Cardiovascular:  Normal heart rate, normal rhythm, no murmurs, gallops or rubs  Thorax & Lungs:  Normal breath sounds, no respiratory distress, no wheezing, no rales, no rhonchi, non productive cough noted with deep inspiration  Skin:  Warm, dry, no erythema, no rash  Neurologic:  Alert & oriented   Psychiatric:  Affect normal, mood normal    ASSESSMENT/PLAN:  1. Viral illness  - Respiratory Panel, Molecular, with COVID-19; Future  - increase fluid intake    2. Cough  - Respiratory Panel, Molecular, with COVID-19; Future  - guaiFENesin-dextromethorphan (ROBITUSSIN DM) 100-10 MG/5ML syrup; Take 5 mLs by mouth 3 times daily as needed for Cough  Dispense: 120 mL; Refill: 0      FOLLOW-UP:  Return in about 3 days (around 12/12/2021), or if symptoms worsen or fail to improve.     In addition to other information, the printed after visit summary provided to the patient includes:  [x] COVID-19 Self care instructions  [x] COVID-19 General patient information    HANNA Butler - CNP     12/10/2021 @ 1430 respiratory panel and COVID -19 are negative, patient called with results

## 2021-12-10 LAB
ADENOVIRUS DETECTION BY PCR: NOT DETECTED
BORDETELLA PARAPERTUSSIS BY PCR: NOT DETECTED
BORDETELLA PERTUSSIS PCR: NOT DETECTED
CHLAMYDOPHILA PNEUMONIA PCR: NOT DETECTED
CORONAVIRUS 229E PCR: NOT DETECTED
CORONAVIRUS HKU1 PCR: NOT DETECTED
CORONAVIRUS NL63 PCR: NOT DETECTED
CORONAVIRUS OC43 PCR: NOT DETECTED
HUMAN METAPNEUMOVIRUS PCR: NOT DETECTED
INFLUENZA A BY PCR: NOT DETECTED
INFLUENZA A H1 (2009) PCR: NOT DETECTED
INFLUENZA A H1 PANDEMIC PCR: NOT DETECTED
INFLUENZA A H3 PCR: NOT DETECTED
INFLUENZA B BY PCR: NOT DETECTED
MYCOPLASMA PNEUMONIAE PCR: NOT DETECTED
PARAINFLUENZA 1 PCR: NOT DETECTED
PARAINFLUENZA 2 PCR: NOT DETECTED
PARAINFLUENZA 3 PCR: NOT DETECTED
PARAINFLUENZA 4 PCR: NOT DETECTED
RHINOVIRUS ENTEROVIRUS PCR: NOT DETECTED
RSV PCR: NOT DETECTED
SARS-COV-2: NOT DETECTED

## 2021-12-21 ENCOUNTER — HOSPITAL ENCOUNTER (EMERGENCY)
Age: 46
Discharge: HOME | End: 2021-12-21
Attending: EMERGENCY MEDICINE
Payer: COMMERCIAL

## 2021-12-21 VITALS
HEART RATE: 50 BPM | RESPIRATION RATE: 16 BRPM | DIASTOLIC BLOOD PRESSURE: 68 MMHG | TEMPERATURE: 98.2 F | SYSTOLIC BLOOD PRESSURE: 118 MMHG | OXYGEN SATURATION: 98 %

## 2021-12-21 DIAGNOSIS — G43.001 MIGRAINE WITHOUT AURA AND WITH STATUS MIGRAINOSUS, NOT INTRACTABLE: Primary | ICD-10-CM

## 2021-12-21 PROCEDURE — 6360000002 HC RX W HCPCS: Performed by: EMERGENCY MEDICINE

## 2021-12-21 PROCEDURE — 99283 EMERGENCY DEPT VISIT LOW MDM: CPT

## 2021-12-21 PROCEDURE — 96372 THER/PROPH/DIAG INJ SC/IM: CPT

## 2021-12-21 RX ORDER — ACETAMINOPHEN 325 MG/1
650 TABLET ORAL EVERY 6 HOURS PRN
COMMUNITY
End: 2022-07-22 | Stop reason: ALTCHOICE

## 2021-12-21 RX ORDER — ACETAMINOPHEN, ASPIRIN AND CAFFEINE 250; 250; 65 MG/1; MG/1; MG/1
1 TABLET, FILM COATED ORAL EVERY 6 HOURS PRN
COMMUNITY
End: 2022-01-03

## 2021-12-21 RX ORDER — PROMETHAZINE HYDROCHLORIDE 25 MG/ML
25 INJECTION, SOLUTION INTRAMUSCULAR; INTRAVENOUS ONCE
Status: COMPLETED | OUTPATIENT
Start: 2021-12-21 | End: 2021-12-21

## 2021-12-21 RX ORDER — IBUPROFEN 400 MG/1
400 TABLET ORAL EVERY 6 HOURS PRN
COMMUNITY
End: 2022-07-22 | Stop reason: ALTCHOICE

## 2021-12-21 RX ADMIN — PROMETHAZINE HYDROCHLORIDE 25 MG: 25 INJECTION INTRAMUSCULAR; INTRAVENOUS at 23:00

## 2021-12-21 RX ADMIN — BUTORPHANOL TARTRATE 2 MG: 2 INJECTION, SOLUTION INTRAMUSCULAR; INTRAVENOUS at 22:59

## 2021-12-21 ASSESSMENT — PAIN SCALES - GENERAL
PAINLEVEL_OUTOF10: 5
PAINLEVEL_OUTOF10: 5
PAINLEVEL_OUTOF10: 7

## 2021-12-22 ASSESSMENT — ENCOUNTER SYMPTOMS
RESPIRATORY NEGATIVE: 1
NAUSEA: 1
BLURRED VISION: 0
PHOTOPHOBIA: 1
VOMITING: 1

## 2021-12-22 NOTE — ED PROVIDER NOTES
Every Day Smoker     Packs/day: 0.50     Years: 20.00     Pack years: 10.00     Types: Cigarettes    Smokeless tobacco: Never Used   Vaping Use    Vaping Use: Former    Substances: Always   Substance and Sexual Activity    Alcohol use: No    Drug use: Not Currently     Types: Methamphetamines (Crystal Meth)     Comment: clean for 11 years, past hx of crack use    Sexual activity: Yes     Partners: Male   Other Topics Concern    Not on file   Social History Narrative    Not on file     Social Determinants of Health     Financial Resource Strain: Low Risk     Difficulty of Paying Living Expenses: Not hard at all   Food Insecurity: Food Insecurity Present    Worried About [facility] in the Last Year: Sometimes true    Ursula of Food in the Last Year: Sometimes true   Transportation Needs:     Lack of Transportation (Medical): Not on file    Lack of Transportation (Non-Medical):  Not on file   Physical Activity:     Days of Exercise per Week: Not on file    Minutes of Exercise per Session: Not on file   Stress:     Feeling of Stress : Not on file   Social Connections:     Frequency of Communication with Friends and Family: Not on file    Frequency of Social Gatherings with Friends and Family: Not on file    Attends [employer] Services: Not on file    Active Member of [address] [place] [address] or Organizations: Not on file    Attends Club or Organization Meetings: Not on file    Marital Status: Not on file   Intimate Partner Violence:     Fear of Current or Ex-Partner: Not on file    Emotionally Abused: Not on file    Physically Abused: Not on file    Sexually Abused: Not on file   Housing Stability:     Unable to Pay for Housing in the Last Year: Not on file    Number of [place] in the Last Year: Not on file    Unstable Housing in the Last Year: Not on file     Past Surgical History:   Procedure Laterality Date    CHOLECYSTECTOMY  11/22/2011    laparoscopic    COLONOSCOPY     [facility] 8/9    ELBOW ARTHROSCOPY      bilateral elbows    ENDOSCOPY, COLON, DIAGNOSTIC      HYSTERECTOMY      OTHER SURGICAL HISTORY      venous ablation, bilateral legs    TUBAL LIGATION       Past Medical History:   Diagnosis Date    ADHD     Arthritis     OSTEO ARTHITIS    Bipolar disorder, unspecified ([address]     Cholecystenteric fistula     COPD [facility]     Degenerative disc disease     Fibromyalgia     Generalized anxiety disorder     H/O 24 hour EKG monitoring 02/16/2017      Sinus tach events , no major arrhythmias , follow up in office as routine     H/O echocardiogram 03/01/2017    EF 55-60% Normal LV structure  and systolic function.  H/O exercise stress test 03/01/2017    Normal stress test. Patient has physical deconditioning as she achieved target HR in 2 mins. Recommend to increase PO fluids intake and exercise training.  Headache     History of cardiac monitoring 09/21/2020    Normal 30-day event monitor with average heart rate of 63 lowest heart rate of 57 highest heart rate of 82 patient reported episodes of dizziness which did not correlate with any arrhythmia.   There were no episodes of tachycardia uneventful monitor however average heart rate and even the highest heart rate is on the lower side     Osteopenia     Osteoporosis     PTSD (post-traumatic stress disorder)     S/P cholecystectomy 11/22/2011    Schizo-affective schizophrenia [facility]     Sjoegren syndrome     Syncope     Tardive dyskinesia     Venous insufficiency of leg 2012    Vertigo      Allergies   Allergen Reactions    Rozerem [Ramelteon] Other (See Comments)     Increased symptoms of TD    Codeine Itching and Nausea And Vomiting    Imitrex [Sumatriptan] Itching and Nausea And Vomiting     PASSED OUT    Abilify [Aripiprazole] Other (See Comments)     Patient reports symptoms of TD    Amitriptyline     Botox [Onabotulinumtoxina] Other (See Comments)     Tardive dyskinesia    Compazine [Prochlorperazine Maleate] Other (See Comments)    Lamictal [Lamotrigine]      Constant movement    Meclizine     Topamax [Topiramate]      Made TD worse    Cephalexin Nausea And Vomiting and Other (See Comments)     ABDOMINAL PAIN FOR 2 WEEKS    Magnesium-Containing Compounds Other (See Comments)     Pt sts \"they called it the mags. I start twitching. \"     Meclizine Hcl Other (See Comments)     \"Makes me stutter and twitch. \"    Nsaids Other (See Comments)     Tardive dyskinisia    Pcn [Penicillins] Nausea And Vomiting and Other (See Comments)     HEADACHE    Reglan [Metoclopramide] Other (See Comments)     Tardive dyskinisia    Toradol [Ketorolac Tromethamine] Other (See Comments)     Tremors    Vistaril [Hydroxyzine Hcl] Other (See Comments)     dyskinsia      Zofran [Ondansetron Hcl] Other (See Comments)     Cramping and burning in stomach     Prior to Admission medications    Medication Sig Start Date End Date Taking? Authorizing Provider   acetaminophen (TYLENOL) 325 MG tablet Take 650 mg by mouth every 6 hours as needed for Pain   Yes Historical Provider, MD   ibuprofen (ADVIL;MOTRIN) 400 MG tablet Take 400 mg by mouth every 6 hours as needed for Pain   Yes Historical Provider, MD   aspirin-acetaminophen-caffeine ([place]) 249-085-44 MG per tablet Take 1 tablet by mouth every 6 hours as needed for Headaches   Yes Historical Provider, MD   pramipexole (MIRAPEX) 0.75 MG tablet Take 1 tablet by mouth nightly 12/3/21   NELIDA Travis   traZODone (DESYREL) 100 MG tablet Take 1 tablet by mouth nightly as needed for Sleep 11/29/21   HANNA Levy - CNP   LORazepam (ATIVAN) 0.5 MG tablet Take 0.5 mg by mouth every 6 hours as needed. Historical Provider, MD   pregabalin (LYRICA) 50 MG capsule Take 1 capsule by mouth 3 times daily for 30 days.  9/16/21 10/16/21  HANNA Wood CNP   albuterol sulfate HFA (VENTOLIN HFA) 108 (90 Base) MCG/ACT inhaler Inhale 2 puffs into the lungs 4 times daily as needed for Wheezing or Shortness of Breath 8/30/21 9/29/21  HANNA Zhou CNP   Valbenazine Tosylate [facility]) 80 MG CAPS Take 80 mg by mouth daily 8/16/21   HANNA Zhou CNP   vitamin B-12 (CYANOCOBALAMIN) 1000 MCG tablet Take 1,000 mcg by mouth daily    Historical Provider, MD       /68   Pulse 50   Temp 98.2 °F (36.8 °C) (Oral)   Resp 16   LMP 11/18/2004   SpO2 98%     Physical Exam  Vitals and nursing note reviewed. Constitutional:       Appearance: She is well-developed. HENT:      Head: Normocephalic and atraumatic. Right Ear: External ear normal. No hemotympanum. Tympanic membrane is not erythematous. Left Ear: External ear normal. No hemotympanum. Tympanic membrane is not erythematous. Nose: Nose normal.   Eyes:      Conjunctiva/sclera: Conjunctivae normal.      Pupils: Pupils are equal, round, and reactive to light. Neck:      Trachea: Trachea normal.      Meningeal: Brudzinski's sign and Kernig's sign absent. Cardiovascular:      Rate and Rhythm: Normal rate and regular rhythm. Heart sounds: Normal heart sounds. Pulmonary:      Effort: Pulmonary effort is normal.      Breath sounds: Normal breath sounds. Abdominal:      General: Bowel sounds are normal.      Palpations: Abdomen is soft. Musculoskeletal:         General: Normal range of motion. Cervical back: Normal range of motion and neck supple. No rigidity. Muscular tenderness present. No spinous process tenderness. Normal range of motion. Skin:     General: Skin is warm and dry. Neurological:      Mental Status: She is alert and oriented to person, place, and time. GCS: GCS eye subscore is 4. GCS verbal subscore is 5. GCS motor subscore is 6. Cranial Nerves: No cranial nerve deficit. Sensory: No sensory deficit. Motor: No tremor, atrophy or abnormal muscle tone.       Coordination: Coordination normal.      Gait: Gait normal.      Deep Tendon Reflexes: Reflexes are normal and symmetric. Reflex Scores:       Tricep reflexes are 2+ on the right side and 2+ on the left side. Bicep reflexes are 2+ on the right side and 2+ on the left side. Brachioradialis reflexes are 2+ on the right side and 2+ on the left side. Patellar reflexes are 2+ on the right side and 2+ on the left side. Achilles reflexes are 2+ on the right side and 2+ on the left side. Psychiatric:         Behavior: Behavior normal.         Thought Content: Thought content normal.         Judgment: Judgment normal.         MDM:    Labs Reviewed - No data to display    No orders to display        Stadol and phenergan. Patient selected drug allergies preclude treatment with practically every migraine medication and treatment. I advised her she needs to get referral to chronic pain doctor. I have discussed with the patient  my clinical impression and the result of the patient's current clinical evaluation for their presentation. In addition we discussed the risk and benefits of further testing and hospitalization. I discussed candidly with the patient  and the patient  was allowed to provide input as to their thoughts concerning the current presentation. Final Impression    1.  Migraine without aura and with status migrainosus, not intractable             [address], DO  12/22/21 0036

## 2021-12-22 NOTE — ED NOTES
Pt states she has had her HA for about 3 weeks now. States today she was having trouble focusing on blankets she was making due to pain on the top and frontal of her head. + photosensitivity but not much to sound. A&Ox3.        Rickie Mckinley RN  12/21/21 [id number]

## 2022-01-03 ENCOUNTER — VIRTUAL VISIT (OUTPATIENT)
Dept: FAMILY MEDICINE CLINIC | Age: 47
End: 2022-01-03
Payer: COMMERCIAL

## 2022-01-03 ENCOUNTER — HOSPITAL ENCOUNTER (EMERGENCY)
Age: 47
Discharge: HOME | End: 2022-01-03
Attending: EMERGENCY MEDICINE
Payer: COMMERCIAL

## 2022-01-03 VITALS
BODY MASS INDEX: 19.22 KG/M2 | OXYGEN SATURATION: 97 % | SYSTOLIC BLOOD PRESSURE: 108 MMHG | TEMPERATURE: 98.4 F | HEART RATE: 71 BPM | DIASTOLIC BLOOD PRESSURE: 64 MMHG | RESPIRATION RATE: 18 BRPM | WEIGHT: 112 LBS

## 2022-01-03 DIAGNOSIS — G25.81 RESTLESS LEGS: ICD-10-CM

## 2022-01-03 DIAGNOSIS — G43.011 INTRACTABLE MIGRAINE WITHOUT AURA AND WITH STATUS MIGRAINOSUS: ICD-10-CM

## 2022-01-03 DIAGNOSIS — F90.0 ATTENTION DEFICIT HYPERACTIVITY DISORDER (ADHD), PREDOMINANTLY INATTENTIVE TYPE: ICD-10-CM

## 2022-01-03 DIAGNOSIS — G43.809 OTHER MIGRAINE WITHOUT STATUS MIGRAINOSUS, NOT INTRACTABLE: Primary | ICD-10-CM

## 2022-01-03 DIAGNOSIS — F60.3 BORDERLINE PERSONALITY DISORDER (HCC): ICD-10-CM

## 2022-01-03 DIAGNOSIS — G24.01 TARDIVE DYSKINESIA: ICD-10-CM

## 2022-01-03 DIAGNOSIS — F33.1 MODERATE EPISODE OF RECURRENT MAJOR DEPRESSIVE DISORDER (HCC): Primary | ICD-10-CM

## 2022-01-03 DIAGNOSIS — F41.1 GAD (GENERALIZED ANXIETY DISORDER): ICD-10-CM

## 2022-01-03 DIAGNOSIS — F51.01 PRIMARY INSOMNIA: ICD-10-CM

## 2022-01-03 PROCEDURE — 96372 THER/PROPH/DIAG INJ SC/IM: CPT

## 2022-01-03 PROCEDURE — G8428 CUR MEDS NOT DOCUMENT: HCPCS | Performed by: NURSE PRACTITIONER

## 2022-01-03 PROCEDURE — 99283 EMERGENCY DEPT VISIT LOW MDM: CPT

## 2022-01-03 PROCEDURE — 6360000002 HC RX W HCPCS: Performed by: EMERGENCY MEDICINE

## 2022-01-03 PROCEDURE — 99214 OFFICE O/P EST MOD 30 MIN: CPT | Performed by: NURSE PRACTITIONER

## 2022-01-03 RX ORDER — TRAZODONE HYDROCHLORIDE 100 MG/1
100 TABLET ORAL NIGHTLY PRN
Qty: 30 TABLET | Refills: 2 | Status: SHIPPED | OUTPATIENT
Start: 2022-01-03 | End: 2022-04-28 | Stop reason: SDUPTHER

## 2022-01-03 RX ORDER — PRAMIPEXOLE DIHYDROCHLORIDE 0.75 MG/1
0.75 TABLET ORAL NIGHTLY
Qty: 30 TABLET | Refills: 2 | Status: SHIPPED | OUTPATIENT
Start: 2022-01-03 | End: 2022-02-03 | Stop reason: SDUPTHER

## 2022-01-03 RX ORDER — LORAZEPAM 0.5 MG/1
0.5 TABLET ORAL 3 TIMES DAILY
Qty: 90 TABLET | Refills: 2 | Status: SHIPPED | OUTPATIENT
Start: 2022-01-03 | End: 2022-02-02

## 2022-01-03 RX ADMIN — BUTORPHANOL TARTRATE 1 MG: 2 INJECTION, SOLUTION INTRAMUSCULAR; INTRAVENOUS at 22:40

## 2022-01-03 ASSESSMENT — PAIN DESCRIPTION - ONSET: ONSET: ON-GOING

## 2022-01-03 ASSESSMENT — PAIN SCALES - GENERAL
PAINLEVEL_OUTOF10: 8
PAINLEVEL_OUTOF10: 8

## 2022-01-03 ASSESSMENT — ANXIETY QUESTIONNAIRES
4. TROUBLE RELAXING: 3
1. FEELING NERVOUS, ANXIOUS, OR ON EDGE: 3
GAD7 TOTAL SCORE: 21
5. BEING SO RESTLESS THAT IT IS HARD TO SIT STILL: 3
IF YOU CHECKED OFF ANY PROBLEMS ON THIS QUESTIONNAIRE, HOW DIFFICULT HAVE THESE PROBLEMS MADE IT FOR YOU TO DO YOUR WORK, TAKE CARE OF THINGS AT HOME, OR GET ALONG WITH OTHER PEOPLE: VERY DIFFICULT
3. WORRYING TOO MUCH ABOUT DIFFERENT THINGS: 3
2. NOT BEING ABLE TO STOP OR CONTROL WORRYING: 3
6. BECOMING EASILY ANNOYED OR IRRITABLE: 3
7. FEELING AFRAID AS IF SOMETHING AWFUL MIGHT HAPPEN: 3

## 2022-01-03 ASSESSMENT — PATIENT HEALTH QUESTIONNAIRE - PHQ9
6. FEELING BAD ABOUT YOURSELF - OR THAT YOU ARE A FAILURE OR HAVE LET YOURSELF OR YOUR FAMILY DOWN: 3
SUM OF ALL RESPONSES TO PHQ QUESTIONS 1-9: 23
8. MOVING OR SPEAKING SO SLOWLY THAT OTHER PEOPLE COULD HAVE NOTICED. OR THE OPPOSITE, BEING SO FIGETY OR RESTLESS THAT YOU HAVE BEEN MOVING AROUND A LOT MORE THAN USUAL: 3
SUM OF ALL RESPONSES TO PHQ QUESTIONS 1-9: 23
SUM OF ALL RESPONSES TO PHQ9 QUESTIONS 1 & 2: 6
3. TROUBLE FALLING OR STAYING ASLEEP: 2
5. POOR APPETITE OR OVEREATING: 3
1. LITTLE INTEREST OR PLEASURE IN DOING THINGS: 3
2. FEELING DOWN, DEPRESSED OR HOPELESS: 3
SUM OF ALL RESPONSES TO PHQ QUESTIONS 1-9: 23
10. IF YOU CHECKED OFF ANY PROBLEMS, HOW DIFFICULT HAVE THESE PROBLEMS MADE IT FOR YOU TO DO YOUR WORK, TAKE CARE OF THINGS AT HOME, OR GET ALONG WITH OTHER PEOPLE: 2
SUM OF ALL RESPONSES TO PHQ QUESTIONS 1-9: 23
9. THOUGHTS THAT YOU WOULD BE BETTER OFF DEAD, OR OF HURTING YOURSELF: 0
4. FEELING TIRED OR HAVING LITTLE ENERGY: 3
7. TROUBLE CONCENTRATING ON THINGS, SUCH AS READING THE NEWSPAPER OR WATCHING TELEVISION: 3

## 2022-01-03 ASSESSMENT — PAIN DESCRIPTION - LOCATION: LOCATION: HEAD

## 2022-01-03 ASSESSMENT — PAIN DESCRIPTION - DESCRIPTORS: DESCRIPTORS: ACHING

## 2022-01-03 ASSESSMENT — PAIN DESCRIPTION - PAIN TYPE: TYPE: ACUTE PAIN

## 2022-01-03 ASSESSMENT — PAIN DESCRIPTION - FREQUENCY: FREQUENCY: CONTINUOUS

## 2022-01-03 NOTE — PROGRESS NOTES
1/3/2022    TELEHEALTH EVALUATION -- Audio/Visual (During QEMBW-20 public health emergency)    HPI:    Brandan Wooten (:  1975) has requested an audio/video evaluation for the following concern(s): Anxiety/Depression  Pt is here to follow up for depression and anxiety. Her hx includes ADHD, generalized anxiety disorder, major depressive disorder, borderline personality disorder,  substance abuse and tardive dyskinesia. She has failed treatment with multiple medications in the past and had experienced an increased in symptoms of TD with almost all of them. Current treatment includes Ativan 0.5 mg TID for anxiety.  She is taking Ativan 2-3 times a day based on the severity of her symptoms. States that her depression is \"bad\"right now. She endorses avolition, anhedonia, depressed mood, difficulty concentrating, fatigue, feelings of worthlessness/guilt, hopelessness, impaired memory and psychomotor agitation. Her anxiety is \"through the roof\" and notes racing thoughts and rumination  She doesn't feel that she is sleeping well. She reports 6 hours of interrupted sleep  a night. Her appetite is poor. She states \"all I do is sit in my chair and watch tv all day. I don't want to leave the house. \"    She is not currently seeing a therapist because her insurance no longer covers her previous therapist. She is resistant to therapy at this time because \"I will not find someone as good as Dee Krishna. \"   Pt denies hallucinations.  Pt denies current suicidal ideation, plan and intent. Pt  denies current homicidal ideation, plan and intent. OARRS reviewed; Ativan last filled 2021. ADHD  She is concerned that symptoms of ADHD are increasing symptoms of anxiety and depression.   She notes fails to give close attention to details or makes careless mistakes, has difficulty sustaining attention in tasks or play activities, has difficulty organizing tasks and activities, loses things that are necessary for tasks and activities, is easily distracted by extraneous stimuli, is often forgetful in daily activities and avoids engaging in tasks that require sustained attention. Previous behavioral health provider started Concerta but medication was discontinued. Mirlande thinks it is because the provider was concerned with her hx of drug abuse. She states that her daughter is prescribed Adderall and she wonders if this would help manage her symptoms. She reports failed treatment or has allergies to the following medications:     Antidepressants:   Lexapro  Mirtazapine  Sertraline  Viibryd  Doxepin  Prozac  Paxil  Celexa  Wellbutrin  Cymbalta  Doxepin  Amitriptyline     Anxiolytics:    Buspar  Ativan  Xanax  Valium  Librium  Temazepam  Propranolol   Prazosin      Antipsychotics:  Haldol  Seroquel  Risperidone  Ziprasidone  Compazine  Abilify  Klonopin  Lithium     Mood Stabilizers:   Lamictal  Depakote  Gabapentin  Carbamazepine     Tardive Dyskinesia  Symptoms well controlled with Ingrezza 80 mg daily.     Migraine  Increasing frequency of symptoms over the last 3 months. She has a hx of failed medication treatment as well as allergies to all migraine medication. She has been seen in the ED several times over the last 2 months and states that Stadol and phenergan are the only two medications that are working. She does follow with Neurology but has been unable to schedule an appointment with that physician. Referral to pain management offered; pt declined at this time. Review of Systems   Constitutional: Positive for activity change, appetite change and fatigue. HENT: Negative. Eyes: Negative for visual disturbance. Respiratory: Negative for chest tightness and wheezing. Cardiovascular: Negative. Negative for chest pain and palpitations. Gastrointestinal: Negative for abdominal pain, constipation, diarrhea, nausea and vomiting.    Endocrine: Negative for cold intolerance, heat intolerance, polydipsia, polyphagia and polyuria. Genitourinary: Negative for genital sores and hematuria. She reports that she is scheduled for cystoscopy tomorrow 09/17/21   Musculoskeletal: Negative for arthralgias and myalgias. Skin: Negative. Neurological: Positive for headaches. Negative for dizziness, seizures, syncope, facial asymmetry, speech difficulty, light-headedness and numbness. Psychiatric/Behavioral: Positive for agitation, decreased concentration, dysphoric mood, hallucinations and sleep disturbance. Negative for behavioral problems, confusion, self-injury and suicidal ideas. The patient is nervous/anxious and is hyperactive. Prior to Visit Medications    Medication Sig Taking? Authorizing Provider   pramipexole (MIRAPEX) 0.75 MG tablet Take 1 tablet by mouth nightly Yes HANNA Blandon CNP   LORazepam (ATIVAN) 0.5 MG tablet Take 1 tablet by mouth 3 times daily for 30 days. Yes HANNA Knowles CNP   traZODone (DESYREL) 100 MG tablet Take 1 tablet by mouth nightly as needed for Sleep Yes HANNA Knowles CNP   acetaminophen (TYLENOL) 325 MG tablet Take 650 mg by mouth every 6 hours as needed for Pain Yes Historical Provider, MD   ibuprofen (ADVIL;MOTRIN) 400 MG tablet Take 400 mg by mouth every 6 hours as needed for Pain Yes Historical Provider, MD   pregabalin (LYRICA) 50 MG capsule Take 1 capsule by mouth 3 times daily for 30 days.  Yes HANNA Knowles CNP   Valbenazine Tosylate (INGREZZA) 80 MG CAPS Take 80 mg by mouth daily Yes HANNA Blandon CNP   vitamin B-12 (CYANOCOBALAMIN) 1000 MCG tablet Take 1,000 mcg by mouth daily Yes Historical Provider, MD   albuterol sulfate HFA (VENTOLIN HFA) 108 (90 Base) MCG/ACT inhaler Inhale 2 puffs into the lungs 4 times daily as needed for Wheezing or Shortness of Breath  HANNA Blandon CNP       Social History     Tobacco Use    Smoking status: Current Every Day Smoker     Packs/day: 0.50     Years: 20.00     Pack years: 10. 00     Types: Cigarettes    Smokeless tobacco: Never Used   Vaping Use    Vaping Use: Former    Substances: Always   Substance Use Topics    Alcohol use: No    Drug use: Not Currently     Types: Methamphetamines (Crystal Meth)     Comment: clean for 11 years, past hx of crack use        Allergies   Allergen Reactions    Rozerem [Ramelteon] Other (See Comments)     Increased symptoms of TD    Codeine Itching and Nausea And Vomiting    Imitrex [Sumatriptan] Itching and Nausea And Vomiting     PASSED OUT    Abilify [Aripiprazole] Other (See Comments)     Patient reports symptoms of TD    Amitriptyline     Botox [Onabotulinumtoxina] Other (See Comments)     Tardive dyskinesia    Compazine [Prochlorperazine Maleate] Other (See Comments)    Lamictal [Lamotrigine]      Constant movement    Meclizine     Topamax [Topiramate]      Made TD worse    Cephalexin Nausea And Vomiting and Other (See Comments)     ABDOMINAL PAIN FOR 2 WEEKS    Magnesium-Containing Compounds Other (See Comments)     Pt sts \"they called it the mags. I start twitching. \"     Meclizine Hcl Other (See Comments)     \"Makes me stutter and twitch. \"    Nsaids Other (See Comments)     Tardive dyskinisia    Pcn [Penicillins] Nausea And Vomiting and Other (See Comments)     HEADACHE    Reglan [Metoclopramide] Other (See Comments)     Tardive dyskinisia    Toradol [Ketorolac Tromethamine] Other (See Comments)     Tremors    Vistaril [Hydroxyzine Hcl] Other (See Comments)     dyskinsia      Zofran [Ondansetron Hcl] Other (See Comments)     Cramping and burning in stomach   ,   Past Medical History:   Diagnosis Date    ADHD     Arthritis     OSTEO ARTHITIS    [facility] disorder, [facility]     Cholecystenteric fistula     COPD (chronic obstructive pulmonary disease) [facility]     Degenerative disc disease     Fibromyalgia     Generalized anxiety disorder     H/O 24 hour EKG monitoring 02/16/2017      Sinus tach events , no major arrhythmias , follow up in office as routine     H/O echocardiogram 03/01/2017    EF 55-60% Normal LV structure  and systolic function.  H/O exercise stress test 03/01/2017    Normal stress test. Patient has physical deconditioning as she achieved target HR in 2 mins. Recommend to increase PO fluids intake and exercise training.  Headache     History of cardiac monitoring 09/21/2020    Normal 30-day event monitor with average heart rate of 63 lowest heart rate of 57 highest heart rate of 82 patient reported episodes of dizziness which did not correlate with any arrhythmia. There were no episodes of tachycardia uneventful monitor however average heart rate and even the highest heart rate is on the lower side     Osteopenia     Osteoporosis     PTSD (post-traumatic stress disorder)     S/P cholecystectomy 11/22/2011    [facility]     Sjoegren syndrome     Syncope     Tardive dyskinesia     Venous insufficiency of leg 2012    Vertigo    ,   Past Surgical History:   Procedure Laterality Date    CHOLECYSTECTOMY  11/22/2011    laparoscopic    COLONOSCOPY      DENTAL SURGERY  8/9    ELBOW ARTHROSCOPY      bilateral elbows    ENDOSCOPY, COLON, DIAGNOSTIC      HYSTERECTOMY      OTHER SURGICAL HISTORY      venous ablation, bilateral legs    TUBAL LIGATION     ,   Social History     Tobacco Use    Smoking status: Current Every Day Smoker     Packs/day: 0.50     Years: 20.00     Pack years: 10.00     Types: Cigarettes    Smokeless tobacco: Never Used   Vaping Use    Vaping Use: Former    Substances: Always   Substance Use Topics    Alcohol use: No    Drug use: Not Currently     Types: Methamphetamines (Crystal Meth)     Comment: clean for 11 years, past hx of crack use     MSE:  Appearance: alert, crying  Attention:Intact  Appetite: abnormal: poor  Ambulation: unable to assess.    Sleep disturbance: Yes  Loss of pleasure: Yes  Speech: hyperverbal and interrupting  Mood: Anxious  Depressed  Irritability  Affect: agitation  Thought Content: hopelessness, worthlessness, intrusive thoughts, cognitive distortions and all or nothing thinking  Insight: Poor  Judgment: Impaired  Memory: Intact long-term and Intact short-term  Suicide Assessment: no suicidal ideation  Homicide Assessment: denies current homicidal ideation, plan and intent    ASSESSMENT/PLAN:  1. Moderate episode of recurrent major depressive disorder (HCC)  Stable. Patient to call if any concerns or SI before her follow up appointment. If she develops suicidal thoughts with a plan, she is instructed to go to emergency room immediately. Behavioral counseling recommended. 2. STEPHANIA (generalized anxiety disorder)  OARRS reviewed, medication refilled. Some concern for drug seeking behaviors as patient is currently prescribed Ativan for STEPHANIA and is requesting Stadol and Adderall. Patient is aware that this provider will not prescribe any additional controlled substances and will review patients chart and treatment plan with collaborating psychiatrist before any new medications will be started. Behavioral counseling recommended. - LORazepam (ATIVAN) 0.5 MG tablet; Take 1 tablet by mouth 3 times daily for 30 days. Dispense: 90 tablet; Refill: 2  - traZODone (DESYREL) 100 MG tablet; Take 1 tablet by mouth nightly as needed for Sleep  Dispense: 30 tablet; Refill: 2    3. Borderline personality disorder ([address]  Behavioral counseling strongly recommended. 4. Primary insomnia  Bed should only be used for sleep. Avoid watching TV, playing on your phone or tablet, or reading while in bed. Go to bed and wake up at the same time each day. Exercise regularly, preferably not before bedtime. Limit intake of caffeine, alcohol, and smoking.    - traZODone (DESYREL) 100 MG tablet; Take 1 tablet by mouth nightly as needed for Sleep  Dispense: 30 tablet; Refill: 2    5.  Tardive dyskinesia  Continue Martine Standing. 6. Restless legs  · Try bathing in hot or cold water. Applying a heating pad or ice bag to your legs may also help symptoms. · Stretch and massage your legs before bed or when discomfort begins. · Get some exercise for at least 30 minutes a day on most days of the week. Stop exercising at least 3 hours before bedtime. · Avoid caffeine products, such as coffee, tea, cola, and chocolate. Caffeine can interrupt your sleep and stimulate you. · Do not drink alcohol late in the evening    - pramipexole (MIRAPEX) 0.75 MG tablet; Take 1 tablet by mouth nightly  Dispense: 30 tablet; Refill: 2    7. Intractable migraine without aura and with status migrainosus  Recommend follow up with Neurology and encouraged pt to consider pain management. Time Spent: 45 minutes    Return in about 2 weeks (around 1/17/2022) for Behavioral Health. Bobby Pierre, was evaluated through a synchronous (real-time) audio-video encounter. The patient (or guardian if applicable) is aware that this is a billable service. Verbal consent to proceed has been obtained within the past 12 months. The visit was conducted pursuant to the emergency declaration under the [address] [place] [address] authority and the [place] [employer] General Act. Patient identification was verified, and a caregiver was present when appropriate. The patient was located in a state where the provider was credentialed to provide care. --Agustin Ross, HANNA - CNP on 1/4/2022 at 9:57 AM    An electronic signature was used to authenticate this note.

## 2022-01-04 PROBLEM — F90.0 ATTENTION DEFICIT HYPERACTIVITY DISORDER (ADHD), PREDOMINANTLY INATTENTIVE TYPE: Status: ACTIVE | Noted: 2022-01-04

## 2022-01-04 PROBLEM — F60.3 BORDERLINE PERSONALITY DISORDER (HCC): Status: ACTIVE | Noted: 2022-01-04

## 2022-01-04 PROBLEM — G43.011 INTRACTABLE MIGRAINE WITHOUT AURA AND WITH STATUS MIGRAINOSUS: Status: ACTIVE | Noted: 2022-01-04

## 2022-01-04 ASSESSMENT — ENCOUNTER SYMPTOMS
DIARRHEA: 0
CHEST TIGHTNESS: 0
CONSTIPATION: 0
WHEEZING: 0
ABDOMINAL PAIN: 0
NAUSEA: 0
VOMITING: 0

## 2022-01-04 NOTE — ED PROVIDER NOTES
Triage Chief Complaint:   Migraine (Saw PCP today who told her to come into ED. About a week she's had a bad migraine. )    [place]:  Vaishnavi Torrez is a 42 y.o. female that presents with headache. Patient was in baseline state of health until approximately 6 days ago when the above started. Patient reports \"I have another migraine\". Patient has a history of migraine headaches and follows with a primary care provider for this. Patient actually saw her PCP today who encouraged her to come to the emergency department given the duration of her symptoms. Patient reports \"only Stadol or Dilaudid helped\". Patient has failed numerous migraine medications in the past and is allergic to almost all of them. Patient was just referred to pain management today for her now chronic issues. Patient denies any new features to this headache and reports that his whole head and radiating into bilateral shoulders and worse with lights. Pain is currently severe and constant. No head injuries. No numbness or weakness. No vision changes. Difficulty with speech or understanding. Daughter is also present and does report headaches at times but no current headache.     ROS:  General:  No fevers, no chills  ENT: No sore throat, no runny nose  Cardiovascular:  No chest pain, no palpitations  Respiratory:  No shortness of breath, no cough  Gastrointestinal:  No pain, no nausea, no vomiting, no diarrhea  : No pain with urinating, no increased frequency urinating  Neurologic:  No numbness, no weakness, + headache  Extremities:  No edema, no pain  Skin:  No rash  Psych: No axienty    Past Medical History:   Diagnosis Date    ADHD     Arthritis     OSTEO ARTHITIS    Bipolar disorder, unspecified ([facility] [address]     Cholecystenteric fistula     COPD (chronic obstructive pulmonary disease) (HCC)     Degenerative disc disease     Fibromyalgia     Generalized anxiety disorder     H/O 24 hour EKG monitoring 02/16/2017      Sinus tach events , no major arrhythmias , follow up in office as routine     H/O echocardiogram 03/01/2017    EF 55-60% Normal LV structure  and systolic function.  H/O exercise stress test 03/01/2017    Normal stress test. Patient has physical deconditioning as she achieved target HR in 2 mins. Recommend to increase PO fluids intake and exercise training.  Headache     History of cardiac monitoring 09/21/2020    Normal 30-day event monitor with average heart rate of 63 lowest heart rate of 57 highest heart rate of 82 patient reported episodes of dizziness which did not correlate with any arrhythmia.   There were no episodes of tachycardia uneventful monitor however average heart rate and even the highest heart rate is on the lower side     Osteopenia     Osteoporosis     PTSD (post-traumatic stress disorder)     S/P cholecystectomy 11/22/2011    Schizo[facility]affective schizophrenia ([facility] [address]     Sjoegren syndrome     Syncope     Tardive dyskinesia     Venous insufficiency of leg 2012    Vertigo      Past Surgical History:   Procedure Laterality Date    CHOLECYSTECTOMY  11/22/2011    laparoscopic    COLONOSCOPY      DENTAL SURGERY  8/9    ELBOW ARTHROSCOPY      bilateral elbows    ENDOSCOPY, COLON, DIAGNOSTIC      HYSTERECTOMY      OTHER SURGICAL HISTORY      venous ablation, bilateral legs    TUBAL LIGATION       Family History   Problem Relation Age of Onset    Other Mother         Neurocardiogenic syncope    ADHD Mother     Alcohol Abuse Father     Bipolar Disorder Sister     Anxiety Disorder Sister     Bipolar Disorder Sister     OCD Sister     Heart Surgery Maternal Grandmother     ADHD Daughter     ADHD Daughter     Lupus Paternal Uncle     Other Paternal Uncle         myasthenia gravis     Social History     Socioeconomic History    Marital status: Single     Spouse name: Not on file    Number of children: Not on file    Years of education: Not on file    Highest education level: Not on file Occupational History    Not on file   Tobacco Use    Smoking status: Current Every Day Smoker     Packs/day: 0.50     Years: 20.00     Pack years: 10.00     Types: Cigarettes    Smokeless tobacco: Never Used   Vaping Use    Vaping Use: Former    Substances: Always   Substance and Sexual Activity    Alcohol use: No    Drug use: Not Currently     Types: Methamphetamines (Crystal Meth)     Comment: clean for 11 years, past hx of crack use    Sexual activity: Yes     Partners: Male   Other Topics Concern    Not on file   Social History Narrative    Not on file     Social Determinants of Health     Financial Resource Strain: Low Risk     Difficulty of Paying Living Expenses: Not hard at all   Food Insecurity: Food Insecurity Present    Worried About [address] [place] in the Last Year: Sometimes true    Ursula of Food in the Last Year: Sometimes true   Transportation Needs:     Lack of Transportation (Medical): Not on file    Lack of Transportation (Non-Medical):  Not on file   Physical Activity:     Days of Exercise per Week: Not on file    Minutes of Exercise per Session: Not on file   Stress:     Feeling of Stress : Not on file   Social Connections:     Frequency of Communication with Friends and Family: Not on file    Frequency of Social Gatherings with Friends and Family: Not on file    Attends Anabaptism Services: Not on file    Active Member of [address] [place] [address] [place] or Organizations: Not on file    Attends Club or Organization Meetings: Not on file    Marital Status: Not on file   Intimate Partner Violence:     Fear of Current or Ex-Partner: Not on file    Emotionally Abused: Not on file    Physically Abused: Not on file    Sexually Abused: Not on file   Housing Stability:     Unable to Pay for Housing in the Last Year: Not on file    Number of [place] in the Last Year: Not on file    Unstable Housing in the Last Year: Not on file     Current Facility-Administered Medications   Medication Dose Route Frequency Provider Last Rate Last Admin    butorphanol (STADOL) injection 1 mg  1 mg IntraMUSCular Once Hill Manrique MD         Current Outpatient Medications   Medication Sig Dispense Refill    pramipexole (MIRAPEX) 0.75 MG tablet Take 1 tablet by mouth nightly 30 tablet 2    LORazepam (ATIVAN) 0.5 MG tablet Take 1 tablet by mouth 3 times daily for 30 days. 90 tablet 2    traZODone (DESYREL) 100 MG tablet Take 1 tablet by mouth nightly as needed for Sleep 30 tablet 2    acetaminophen (TYLENOL) 325 MG tablet Take 650 mg by mouth every 6 hours as needed for Pain      ibuprofen (ADVIL;MOTRIN) 400 MG tablet Take 400 mg by mouth every 6 hours as needed for Pain      pregabalin (LYRICA) 50 MG capsule Take 1 capsule by mouth 3 times daily for 30 days. 90 capsule 2    albuterol sulfate HFA (VENTOLIN HFA) 108 (90 Base) MCG/ACT inhaler Inhale 2 puffs into the lungs 4 times daily as needed for Wheezing or Shortness of Breath 1 Inhaler 0    Valbenazine Tosylate (INGREZZA) 80 MG CAPS Take 80 mg by mouth daily 30 capsule 5    vitamin B-12 (CYANOCOBALAMIN) 1000 MCG tablet Take 1,000 mcg by mouth daily       Allergies   Allergen Reactions    Rozerem [Ramelteon] Other (See Comments)     Increased symptoms of TD    Codeine Itching and Nausea And Vomiting    Imitrex [Sumatriptan] Itching and Nausea And Vomiting     PASSED OUT    Abilify [Aripiprazole] Other (See Comments)     Patient reports symptoms of TD    Amitriptyline     Botox [Onabotulinumtoxina] Other (See Comments)     Tardive dyskinesia    Compazine [Prochlorperazine Maleate] Other (See Comments)    Lamictal [Lamotrigine]      Constant movement    Meclizine     Topamax [Topiramate]      Made TD worse    Cephalexin Nausea And Vomiting and Other (See Comments)     ABDOMINAL PAIN FOR 2 WEEKS    Magnesium-Containing Compounds Other (See Comments)     Pt sts \"they called it the mags. I start twitching. \"     Meclizine Hcl Other (See Comments) \"Makes me stutter and twitch. \"    Nsaids Other (See Comments)     Tardive dyskinisia    Pcn [Penicillins] Nausea And Vomiting and Other (See Comments)     HEADACHE    Reglan [Metoclopramide] Other (See Comments)     Tardive dyskinisia    Toradol [Ketorolac Tromethamine] Other (See Comments)     Tremors    Vistaril [Hydroxyzine Hcl] Other (See Comments)     dyskinsia      Zofran [Ondansetron Hcl] Other (See Comments)     Cramping and burning in stomach       Nursing Notes Reviewed    Physical Exam:  ED Triage Vitals [01/03/22 1940]   Enc Vitals Group      /64      Pulse 71      Resp 18      Temp 98.4 °F (36.9 °C)      Temp Source Oral      SpO2 97 %      Weight 112 lb (50.8 kg)      Height       Head Circumference       Peak Flow       Pain Score       Pain Loc       Pain Edu? Excl. in [address]? My pulse ox interpretation is - normal    General appearance:  No acute distress. Sitting comfortably in bed. Skin:  Warm. Dry. No rash exposed skin of head or face. Eye:  Extraocular movements intact. Ears, nose, mouth and throat:  Oral mucosa moist   Extremity:  No swelling. Normal ROM     Heart:  Regular rate and rhythm, normal S1 & S2, no extra heart sounds. Abdomen:  Soft. Nontender. Nondistended. No rebound or guarding. Respiratory:  Lungs clear to auscultation bilaterally. Respirations nonlabored. Speaking clearly in full sentences. No respiratory distress. Neurological:  Alert and oriented times 3. No focal neuro deficits. Symmetric brow raise and nasolabial folds. No dysarthria or aphasia. Sensation intact to light touch to distal upper/lower extremities; 5/5 and symmetric  and dorsi/plantar flexion. Ambulatory with a steady gait. I have reviewed and interpreted all of the currently available lab results from this visit (if applicable):  No results found for this visit on 01/03/22.    Radiographs (if obtained):  [] The following radiograph was interpreted by myself in the absence of a radiologist:   [] Radiologist's Report Reviewed:  No orders to display         EKG (if obtained): (All EKG's are interpreted by myself in the absence of a cardiologist)    Chart review shows recent radiographs:  No results found. MDM:  Pt presents as above. Emergent conditions considered. Presentation prompted initial history and physical.  Presentation consistent with acute on chronic migraine headache. Patient is treated with Stadol given her allergy profile and what has previously worked in the past.  I did discuss the risks of using medications like Stadol including those of rebound headaches and strongly encouraged patient to follow-up with pain management to explore other options. I discussed specific signs and symptoms and when to return to the emergency department as well as need for close outpatient follow-up. Questions sought and answered with the patient. They voice understanding and agree with plan. Care of this patient did occur during the COVID-19 pandemic. Clinical Impression:  1. Other migraine without status migrainosus, not intractable      Disposition referral (if applicable):  Ralph Abreu, APRN - CNP  [facility] [address] [place] [address]  [phone]    Schedule an appointment as soon as possible for a visit       [facility] Emergency Department  [facility] [address]  [address] [facility] [address] [phone]  Today  If symptoms worsen    Disposition medications (if applicable):  New Prescriptions    No medications on file       Comment: Please note this report has been produced using speech recognition software and may contain errors related to that system including errors in grammar, punctuation, and spelling, as well as words and phrases that may be inappropriate. If there are any questions or concerns please feel free to contact the dictating provider for clarification.          Shellie Gomez MD  01/03/22 [id number]

## 2022-01-11 DIAGNOSIS — F41.1 GAD (GENERALIZED ANXIETY DISORDER): ICD-10-CM

## 2022-01-11 DIAGNOSIS — F33.1 MODERATE EPISODE OF RECURRENT MAJOR DEPRESSIVE DISORDER (HCC): Primary | ICD-10-CM

## 2022-01-11 RX ORDER — LAMOTRIGINE 25 MG/1
TABLET ORAL
Qty: 70 TABLET | Refills: 0 | Status: SHIPPED
Start: 2022-01-11 | End: 2022-03-27 | Stop reason: SINTOL

## 2022-01-27 ENCOUNTER — HOSPITAL ENCOUNTER (EMERGENCY)
Age: 47
Discharge: HOME | End: 2022-01-27
Attending: EMERGENCY MEDICINE
Payer: COMMERCIAL

## 2022-01-27 VITALS
HEART RATE: 77 BPM | HEIGHT: 64 IN | SYSTOLIC BLOOD PRESSURE: 106 MMHG | RESPIRATION RATE: 16 BRPM | WEIGHT: 110 LBS | OXYGEN SATURATION: 98 % | BODY MASS INDEX: 18.78 KG/M2 | DIASTOLIC BLOOD PRESSURE: 54 MMHG | TEMPERATURE: 98.5 F

## 2022-01-27 DIAGNOSIS — G43.909 NONINTRACTABLE CHRONIC MIGRAINE: Primary | ICD-10-CM

## 2022-01-27 PROCEDURE — 99283 EMERGENCY DEPT VISIT LOW MDM: CPT

## 2022-01-27 PROCEDURE — 6370000000 HC RX 637 (ALT 250 FOR IP): Performed by: EMERGENCY MEDICINE

## 2022-01-27 RX ORDER — PROMETHAZINE HYDROCHLORIDE 25 MG/1
25 TABLET ORAL ONCE
Status: COMPLETED | OUTPATIENT
Start: 2022-01-27 | End: 2022-01-27

## 2022-01-27 RX ORDER — BUTALBITAL, ACETAMINOPHEN AND CAFFEINE 50; 325; 40 MG/1; MG/1; MG/1
1 TABLET ORAL ONCE
Status: COMPLETED | OUTPATIENT
Start: 2022-01-27 | End: 2022-01-27

## 2022-01-27 RX ADMIN — BUTALBITAL, ACETAMINOPHEN, AND CAFFEINE 1 TABLET: 50; 325; 40 TABLET ORAL at 19:27

## 2022-01-27 RX ADMIN — PROMETHAZINE HYDROCHLORIDE 25 MG: 25 TABLET ORAL at 19:27

## 2022-01-27 ASSESSMENT — ENCOUNTER SYMPTOMS
NAUSEA: 1
SINUS PRESSURE: 0
VOMITING: 1
BLURRED VISION: 0
VISUAL CHANGE: 0
TINGLING: 0
PHOTOPHOBIA: 1
RESPIRATORY NEGATIVE: 1

## 2022-01-27 ASSESSMENT — PAIN DESCRIPTION - DESCRIPTORS: DESCRIPTORS: THROBBING;POUNDING

## 2022-01-27 ASSESSMENT — PAIN SCALES - GENERAL: PAINLEVEL_OUTOF10: 7

## 2022-01-27 ASSESSMENT — PAIN DESCRIPTION - LOCATION: LOCATION: HEAD

## 2022-01-28 NOTE — ED PROVIDER NOTES
The history is provided by the patient. Headache  Pain location:  Generalized  Onset quality:  Gradual  Timing:  Constant  Progression:  Unchanged  Chronicity:  Chronic  Similar to prior headaches: yes    Context comment:  Migraine for the past 2 days. Patient has been taking Excerdrin and Ibuprofen. Patient has neurology appointment tomorrow. Relieved by:  Nothing  Worsened by:  Nothing  Associated symptoms: nausea, photophobia and vomiting    Associated symptoms: no blurred vision, no dizziness, no fever, no focal weakness, no loss of balance, no neck pain, no neck stiffness, no paresthesias, no seizures, no sinus pressure, no tingling, no visual change and no weakness        Review of Systems   Constitutional: Negative. Negative for fever. HENT: Negative. Negative for sinus pressure. Eyes: Positive for photophobia. Negative for blurred vision. Respiratory: Negative. Cardiovascular: Negative. Gastrointestinal: Positive for nausea and vomiting. Genitourinary: Negative. Musculoskeletal: Negative. Negative for neck pain and neck stiffness. Skin: Negative. Neurological: Positive for headaches. Negative for dizziness, focal weakness, seizures, weakness, paresthesias and loss of balance. All other systems reviewed and are negative.       Family History   Problem Relation Age of Onset    Other Mother         Neurocardiogenic syncope    ADHD Mother     Alcohol Abuse Father     Bipolar Disorder Sister     Anxiety Disorder Sister     Bipolar Disorder Sister     OCD Sister     Heart Surgery Maternal Grandmother     ADHD Daughter     ADHD Daughter     Lupus Paternal Uncle     Other Paternal Uncle         myasthenia gravis     Social History     Socioeconomic History    Marital status: Single     Spouse name: Not on file    Number of children: Not on file    Years of education: Not on file    Highest education level: Not on file   Occupational History    Not on file   Tobacco Use COLONOSCOPY      DENTAL SURGERY  8/9    ELBOW ARTHROSCOPY      bilateral elbows    ENDOSCOPY, COLON, DIAGNOSTIC      HYSTERECTOMY      OTHER SURGICAL HISTORY      venous ablation, bilateral legs    TUBAL LIGATION       Past Medical History:   Diagnosis Date    ADHD     Arthritis     OSTEO ARTHITIS    Bipolar disorder, unspecified ([address]     Cholecystenteric fistula     COPD (chronic [facility] disease) ([facility] [address]     Degenerative disc disease     Fibromyalgia     Generalized anxiety disorder     H/O 24 hour EKG monitoring 02/16/2017      Sinus tach events , no major arrhythmias , follow up in office as routine     H/O echocardiogram 03/01/2017    EF 55-60% Normal LV structure  and systolic function.  H/O exercise stress test 03/01/2017    Normal stress test. Patient has physical deconditioning as she achieved target HR in 2 mins. Recommend to increase PO fluids intake and exercise training.  Headache     History of cardiac monitoring 09/21/2020    Normal 30-day event monitor with average heart rate of 63 lowest heart rate of 57 highest heart rate of 82 patient reported episodes of dizziness which did not correlate with any arrhythmia.   There were no episodes of tachycardia uneventful monitor however average heart rate and even the highest heart rate is on the lower side     Osteopenia     Osteoporosis     PTSD (post-traumatic stress disorder)     S/P cholecystectomy 11/22/2011    Schizo-affective schizophrenia ([facility] [address]     Sjoegren syndrome     Syncope     Tardive dyskinesia     Venous insufficiency of leg 2012    Vertigo      Allergies   Allergen Reactions    Rozerem [Ramelteon] Other (See Comments)     Increased symptoms of TD    Codeine Itching and Nausea And Vomiting    Imitrex [Sumatriptan] Itching and Nausea And Vomiting     PASSED OUT    Abilify [Aripiprazole] Other (See Comments)     Patient reports symptoms of TD    Amitriptyline     Botox [Onabotulinumtoxina] Other (See Comments)     Tardive dyskinesia    Compazine [Prochlorperazine Maleate] Other (See Comments)    Meclizine     Topamax [Topiramate]      Made TD worse    Cephalexin Nausea And Vomiting and Other (See Comments)     ABDOMINAL PAIN FOR 2 WEEKS    Magnesium-Containing Compounds Other (See Comments)     Pt sts \"they called it the mags. I start twitching. \"     Meclizine Hcl Other (See Comments)     \"Makes me stutter and twitch. \"    Nsaids Other (See Comments)     Tardive dyskinisia    Pcn [Penicillins] Nausea And Vomiting and Other (See Comments)     HEADACHE    Reglan [Metoclopramide] Other (See Comments)     Tardive dyskinisia    Toradol [Ketorolac Tromethamine] Other (See Comments)     Tremors    Vistaril [Hydroxyzine Hcl] Other (See Comments)     dyskinsia      Zofran [Ondansetron Hcl] Other (See Comments)     Cramping and burning in stomach     Prior to Admission medications    Medication Sig Start Date End Date Taking? Authorizing Provider   lamoTRIgine (LAMICTAL) 25 MG tablet Take 1 tablet daily x2 weeks, increase to 2 tablets daily x2 weeks then increase to 4 tablets daily on week 5. Patient taking differently: Take 50 mg by mouth Take 1 tablet daily x2 weeks, increase to 2 tablets daily x2 weeks then increase to 4 tablets daily on week 5. 1/11/22  Yes HANNA Damico CNP   pramipexole (MIRAPEX) 0.75 MG tablet Take 1 tablet by mouth nightly 1/3/22  Yes HANNA Damico CNP   LORazepam (ATIVAN) 0.5 MG tablet Take 1 tablet by mouth 3 times daily for 30 days.  1/3/22 2/2/22 Yes HANNA Mike CNP   traZODone (DESYREL) 100 MG tablet Take 1 tablet by mouth nightly as needed for Sleep 1/3/22  Yes HANNA Damico CNP   acetaminophen (TYLENOL) 325 MG tablet Take 650 mg by mouth every 6 hours as needed for Pain   Yes Historical Provider, MD   ibuprofen (ADVIL;MOTRIN) 400 MG tablet Take 400 mg by mouth every 6 hours as needed for Pain   Yes Historical Provider, MD pregabalin (LYRICA) 50 MG capsule Take 1 capsule by mouth 3 times daily for 30 days. 9/16/21 1/27/22 Yes HANNA Keita CNP   Valbenazine Tosylate [facility]) 80 MG CAPS Take 80 mg by mouth daily 8/16/21  Yes HANNA Muro - CNP   vitamin B-12 (CYANOCOBALAMIN) 1000 MCG tablet Take 1,000 mcg by mouth daily   Yes Historical Provider, MD       BP (!) 106/54   Pulse 77   Temp 98.5 °F (36.9 °C) (Oral)   Resp 16   Ht 5' 4\" (1.626 m)   Wt 110 lb (49.9 kg)   LMP 11/18/2004   SpO2 98%   BMI 18.88 kg/m²     Physical Exam  Vitals and nursing note reviewed. Constitutional:       Appearance: She is well-developed. HENT:      Head: Normocephalic and atraumatic. Right Ear: External ear normal. No hemotympanum. Tympanic membrane is not erythematous. Left Ear: External ear normal. No hemotympanum. Tympanic membrane is not erythematous. Nose: Nose normal.   Eyes:      Conjunctiva/sclera: Conjunctivae normal.      Pupils: Pupils are equal, round, and reactive to light. Neck:      Trachea: Trachea normal.      Meningeal: Brudzinski's sign and Kernig's sign absent. Cardiovascular:      Rate and Rhythm: Normal rate and regular rhythm. Heart sounds: Normal heart sounds. Pulmonary:      Effort: Pulmonary effort is normal.      Breath sounds: Normal breath sounds. Abdominal:      General: Bowel sounds are normal.      Palpations: Abdomen is soft. Musculoskeletal:         General: Normal range of motion. Cervical back: Normal range of motion and neck supple. No rigidity. Muscular tenderness present. No spinous process tenderness. Normal range of motion. Skin:     General: Skin is warm and dry. Neurological:      Mental Status: She is alert and oriented to person, place, and time. GCS: GCS eye subscore is 4. GCS verbal subscore is 5. GCS motor subscore is 6. Cranial Nerves: No cranial nerve deficit. Sensory: No sensory deficit.       Motor: No tremor, atrophy or abnormal muscle tone. Coordination: Coordination normal.      Gait: Gait normal.      Deep Tendon Reflexes: Reflexes are normal and symmetric. Reflex Scores:       Tricep reflexes are 2+ on the right side and 2+ on the left side. Bicep reflexes are 2+ on the right side and 2+ on the left side. Brachioradialis reflexes are 2+ on the right side and 2+ on the left side. Patellar reflexes are 2+ on the right side and 2+ on the left side. Achilles reflexes are 2+ on the right side and 2+ on the left side. Psychiatric:         Behavior: Behavior normal.         Thought Content: Thought content normal.         Judgment: Judgment normal.         MDM:    Labs Reviewed - No data to display    No orders to display        Fioricet and phenergan. She alleges neurology follow up tomorrow at 120pm. The patient drove herself and I am not giving IVF or IV pain medications. She needs to follow up. Her selected allergies prevent almost all standard therapies or interventions beside narcotic pain killers. I have discussed with the patient my clinical impression and the result of the patient's current clinical evaluation for their presentation. In addition we discussed the risk and benefits of further testing  I discussed candidly with the patient  and the patient  was allowed to provide input as to their thoughts concerning the current presentation. Although the risk of progression or development of new more serious signs and symptoms cannot be excluded She can follow up for this chronic issue   My typical dicussion, presentation,and considerations for this patients' chief complaint, diagnosis, and differential diagnosis have been considered. I have stressed need for follow up and reexamination for this encounter. I have discussed my clinical impression and the results of the current evaluation. No medication prescribed      Final Impression    1.  Nonintractable chronic migraine [address],   01/27/22 2003

## 2022-02-03 DIAGNOSIS — G25.81 RESTLESS LEGS: ICD-10-CM

## 2022-02-07 DIAGNOSIS — S42.002P: ICD-10-CM

## 2022-02-07 RX ORDER — PRAMIPEXOLE DIHYDROCHLORIDE 0.75 MG/1
0.75 TABLET ORAL NIGHTLY
Qty: 30 TABLET | Refills: 2 | Status: SHIPPED
Start: 2022-02-07 | End: 2022-03-27

## 2022-02-07 RX ORDER — PREGABALIN 50 MG/1
50 CAPSULE ORAL 3 TIMES DAILY
Qty: 90 CAPSULE | Refills: 2 | OUTPATIENT
Start: 2022-02-07 | End: 2022-03-09

## 2022-02-09 ENCOUNTER — HOSPITAL ENCOUNTER (EMERGENCY)
Age: 47
Discharge: HOME | End: 2022-02-09
Attending: EMERGENCY MEDICINE
Payer: COMMERCIAL

## 2022-02-09 VITALS
WEIGHT: 110 LBS | DIASTOLIC BLOOD PRESSURE: 73 MMHG | HEART RATE: 60 BPM | OXYGEN SATURATION: 99 % | RESPIRATION RATE: 16 BRPM | TEMPERATURE: 98.2 F | SYSTOLIC BLOOD PRESSURE: 100 MMHG | BODY MASS INDEX: 18.88 KG/M2

## 2022-02-09 DIAGNOSIS — R11.2 NAUSEA AND VOMITING IN ADULT PATIENT: Primary | ICD-10-CM

## 2022-02-09 DIAGNOSIS — G43.109 CHRONIC MIGRAINE WITH AURA: ICD-10-CM

## 2022-02-09 LAB
ALBUMIN SERPL-MCNC: 4.1 GM/DL (ref 3.4–5)
ALP BLD-CCNC: 66 IU/L (ref 40–129)
ALT SERPL-CCNC: 15 U/L (ref 10–40)
ANION GAP SERPL CALCULATED.3IONS-SCNC: 8 MMOL/L (ref 4–16)
AST SERPL-CCNC: 19 IU/L (ref 15–37)
BASOPHILS ABSOLUTE: 0 K/CU MM
BASOPHILS RELATIVE PERCENT: 0.6 % (ref 0–1)
BILIRUB SERPL-MCNC: 0.3 MG/DL (ref 0–1)
BUN BLDV-MCNC: 6 MG/DL (ref 6–23)
CALCIUM SERPL-MCNC: 8.2 MG/DL (ref 8.3–10.6)
CHLORIDE BLD-SCNC: 103 MMOL/L (ref 99–110)
CO2: 27 MMOL/L (ref 21–32)
CREAT SERPL-MCNC: 0.9 MG/DL (ref 0.6–1.1)
DIFFERENTIAL TYPE: ABNORMAL
EOSINOPHILS ABSOLUTE: 0.2 K/CU MM
EOSINOPHILS RELATIVE PERCENT: 3 % (ref 0–3)
GFR AFRICAN AMERICAN: >60 ML/MIN/1.73M2
GFR NON-AFRICAN AMERICAN: >60 ML/MIN/1.73M2
GLUCOSE BLD-MCNC: 97 MG/DL (ref 70–99)
HCT VFR BLD CALC: 36.8 % (ref 37–47)
HEMOGLOBIN: 12.3 GM/DL (ref 12.5–16)
IMMATURE NEUTROPHIL %: 0.2 % (ref 0–0.43)
LIPASE: 21 IU/L (ref 13–60)
LYMPHOCYTES ABSOLUTE: 2.7 K/CU MM
LYMPHOCYTES RELATIVE PERCENT: 42.2 % (ref 24–44)
MCH RBC QN AUTO: 31.1 PG (ref 27–31)
MCHC RBC AUTO-ENTMCNC: 33.4 % (ref 32–36)
MCV RBC AUTO: 93.2 FL (ref 78–100)
MONOCYTES ABSOLUTE: 0.4 K/CU MM
MONOCYTES RELATIVE PERCENT: 6.2 % (ref 0–4)
PDW BLD-RTO: 11.9 % (ref 11.7–14.9)
PLATELET # BLD: 150 K/CU MM (ref 140–440)
PMV BLD AUTO: 11.6 FL (ref 7.5–11.1)
POTASSIUM SERPL-SCNC: 3.2 MMOL/L (ref 3.5–5.1)
RBC # BLD: 3.95 M/CU MM (ref 4.2–5.4)
SEGMENTED NEUTROPHILS ABSOLUTE COUNT: 3 K/CU MM
SEGMENTED NEUTROPHILS RELATIVE PERCENT: 47.8 % (ref 36–66)
SODIUM BLD-SCNC: 138 MMOL/L (ref 135–145)
TOTAL IMMATURE NEUTOROPHIL: 0.01 K/CU MM
TOTAL PROTEIN: 6.2 GM/DL (ref 6.4–8.2)
WBC # BLD: 6.3 K/CU MM (ref 4–10.5)

## 2022-02-09 PROCEDURE — 96375 TX/PRO/DX INJ NEW DRUG ADDON: CPT

## 2022-02-09 PROCEDURE — 85025 COMPLETE CBC W/AUTO DIFF WBC: CPT

## 2022-02-09 PROCEDURE — 2580000003 HC RX 258: Performed by: EMERGENCY MEDICINE

## 2022-02-09 PROCEDURE — 6360000002 HC RX W HCPCS: Performed by: EMERGENCY MEDICINE

## 2022-02-09 PROCEDURE — 99283 EMERGENCY DEPT VISIT LOW MDM: CPT

## 2022-02-09 PROCEDURE — 80053 COMPREHEN METABOLIC PANEL: CPT

## 2022-02-09 PROCEDURE — 96374 THER/PROPH/DIAG INJ IV PUSH: CPT

## 2022-02-09 PROCEDURE — 83690 ASSAY OF LIPASE: CPT

## 2022-02-09 PROCEDURE — 96376 TX/PRO/DX INJ SAME DRUG ADON: CPT

## 2022-02-09 PROCEDURE — 6370000000 HC RX 637 (ALT 250 FOR IP): Performed by: EMERGENCY MEDICINE

## 2022-02-09 RX ORDER — DIPHENHYDRAMINE HYDROCHLORIDE 50 MG/ML
50 INJECTION INTRAMUSCULAR; INTRAVENOUS ONCE
Status: COMPLETED | OUTPATIENT
Start: 2022-02-09 | End: 2022-02-09

## 2022-02-09 RX ORDER — ACETAMINOPHEN 500 MG
1000 TABLET ORAL ONCE
Status: COMPLETED | OUTPATIENT
Start: 2022-02-09 | End: 2022-02-09

## 2022-02-09 RX ORDER — PROMETHAZINE HYDROCHLORIDE 25 MG/1
25 TABLET ORAL EVERY 6 HOURS PRN
Qty: 12 TABLET | Refills: 0 | Status: SHIPPED | OUTPATIENT
Start: 2022-02-09 | End: 2022-02-12

## 2022-02-09 RX ORDER — PROMETHAZINE HYDROCHLORIDE 25 MG/ML
12.5 INJECTION, SOLUTION INTRAMUSCULAR; INTRAVENOUS ONCE
Status: COMPLETED | OUTPATIENT
Start: 2022-02-09 | End: 2022-02-09

## 2022-02-09 RX ORDER — 0.9 % SODIUM CHLORIDE 0.9 %
1000 INTRAVENOUS SOLUTION INTRAVENOUS ONCE
Status: COMPLETED | OUTPATIENT
Start: 2022-02-09 | End: 2022-02-09

## 2022-02-09 RX ADMIN — PROMETHAZINE HYDROCHLORIDE 12.5 MG: 25 INJECTION, SOLUTION INTRAMUSCULAR; INTRAVENOUS at 20:16

## 2022-02-09 RX ADMIN — ACETAMINOPHEN 1000 MG: 500 TABLET ORAL at 21:22

## 2022-02-09 RX ADMIN — SODIUM CHLORIDE 1000 ML: 9 INJECTION, SOLUTION INTRAVENOUS at 20:15

## 2022-02-09 RX ADMIN — PROMETHAZINE HYDROCHLORIDE 12.5 MG: 25 INJECTION, SOLUTION INTRAMUSCULAR; INTRAVENOUS at 21:02

## 2022-02-09 RX ADMIN — DIPHENHYDRAMINE HYDROCHLORIDE 50 MG: 50 INJECTION, SOLUTION INTRAMUSCULAR; INTRAVENOUS at 20:16

## 2022-02-09 ASSESSMENT — ENCOUNTER SYMPTOMS
RESPIRATORY NEGATIVE: 1
SORE THROAT: 0
COUGH: 0
EYES NEGATIVE: 1
ABDOMINAL PAIN: 0
DIARRHEA: 0
NAUSEA: 1

## 2022-02-09 ASSESSMENT — PAIN DESCRIPTION - PAIN TYPE: TYPE: ACUTE PAIN

## 2022-02-09 ASSESSMENT — PAIN SCALES - GENERAL: PAINLEVEL_OUTOF10: 6

## 2022-02-09 ASSESSMENT — PAIN DESCRIPTION - LOCATION: LOCATION: HEAD

## 2022-02-09 NOTE — Clinical Note
Rivera Stroud was seen and treated in our emergency department on 2/9/2022. She may return to work on 02/14/2022. If you have any questions or concerns, please don't hesitate to call.       Talat Braxton, DO

## 2022-02-10 NOTE — ED PROVIDER NOTES
The history is provided by the patient. Nausea & Vomiting  Severity:  Severe  Duration:  1 hour  Timing:  Constant  Number of daily episodes: Too numerous too count  Emesis appearance: Patient mainly dry heaving after intiial episode. Progression:  Unchanged  Chronicity:  Recurrent  Relieved by:  Nothing  Worsened by:  Nothing  Ineffective treatments:  None tried  Associated symptoms: headaches    Associated symptoms: no abdominal pain, no arthralgias, no chills, no cough, no diarrhea, no fever, no myalgias and no sore throat    Associated symptoms comment:  There is a co-worker who has been sick and the patient thinks she may have caught something from this person. Her nausea and vomiting set off a migraine headache and her work sent her to the ER. The patient is having an exacerbation of her chronic migraines due to the fact she has had nausea and vomiting which led to dry heaves. The patient has been referred to a new neurologist since she is refractory and or allergic to all normal therapies for her chronic migraines      Review of Systems   Constitutional: Negative. Negative for chills and fever. HENT: Negative. Negative for sore throat. Eyes: Negative. Respiratory: Negative. Negative for cough. Cardiovascular: Negative. Gastrointestinal: Positive for nausea. Negative for abdominal pain and diarrhea. Genitourinary: Negative. Musculoskeletal: Negative. Negative for arthralgias and myalgias. Skin: Negative. Neurological: Positive for headaches. All other systems reviewed and are negative.       Family History   Problem Relation Age of Onset    Other Mother         Neurocardiogenic syncope    ADHD Mother     Alcohol Abuse Father     Bipolar Disorder Sister     Anxiety Disorder Sister     Bipolar Disorder Sister     OCD Sister     Heart Surgery Maternal Grandmother     ADHD Daughter     ADHD Daughter     Lupus Paternal Uncle     Other Paternal Uncle myasthenia gravis     Social History     Socioeconomic History    Marital status: Single     Spouse name: Not on file    Number of children: Not on file    Years of education: Not on file    Highest education level: Not on file   Occupational History    Not on file   Tobacco Use    Smoking status: Current Every Day Smoker     Packs/day: 0.50     Years: 20.00     Pack years: 10.00     Types: Cigarettes    Smokeless tobacco: Never Used   Vaping Use    Vaping Use: Former    Substances: Always   Substance and Sexual Activity    Alcohol use: No    Drug use: Not Currently     Types: Methamphetamines (Crystal Meth)     Comment: clean for 11 years, past hx of crack use    Sexual activity: Yes     Partners: Male   Other Topics Concern    Not on file   Social History Narrative    Not on file     Social Determinants of Health     Financial Resource Strain: Low Risk     Difficulty of Paying Living Expenses: Not hard at all   Food Insecurity: Food Insecurity Present    Worried About [facility] in the Last Year: Sometimes true    Ursula of Food in the Last Year: Sometimes true   Transportation Needs:     Lack of Transportation (Medical): Not on file    Lack of Transportation (Non-Medical):  Not on file   Physical Activity:     Days of Exercise per Week: Not on file    Minutes of Exercise per Session: Not on file   Stress:     Feeling of Stress : Not on file   Social Connections:     Frequency of Communication with Friends and Family: Not on file    Frequency of Social Gatherings with Friends and Family: Not on file    Attends Restorationist Services: Not on file    Active Member of Clubs or Organizations: Not on file    Attends Club or Organization Meetings: Not on file    Marital Status: Not on file   Intimate Partner Violence:     Fear of Current or Ex-Partner: Not on file    Emotionally Abused: Not on file    Physically Abused: Not on file    Sexually Abused: Not on file   Housing Stability:  Unable to Pay for Housing in the Last Year: Not on file    Number of Places Lived in the Last Year: Not on file    Unstable Housing in the Last Year: Not on file     Past Surgical History:   Procedure Laterality Date    CHOLECYSTECTOMY  11/22/2011    laparoscopic    COLONOSCOPY      DENTAL SURGERY  8/9    ELBOW ARTHROSCOPY      bilateral elbows    ENDOSCOPY, COLON, DIAGNOSTIC      HYSTERECTOMY      OTHER SURGICAL HISTORY      venous ablation, bilateral legs    TUBAL LIGATION       Past Medical History:   Diagnosis Date    ADHD     Arthritis     OSTEO ARTHITIS    Bipolar disorder, unspecified ([address]     Cholecystenteric fistula     [facility]     Degenerative disc disease     Fibromyalgia     Generalized anxiety disorder     H/O 24 hour EKG monitoring 02/16/2017      Sinus tach events , no major arrhythmias , follow up in office as routine     H/O echocardiogram 03/01/2017    EF 55-60% Normal LV structure  and systolic function.  H/O exercise stress test 03/01/2017    Normal stress test. Patient has physical deconditioning as she achieved target HR in 2 mins. Recommend to increase PO fluids intake and exercise training.  Headache     History of cardiac monitoring 09/21/2020    Normal 30-day event monitor with average heart rate of 63 lowest heart rate of 57 highest heart rate of 82 patient reported episodes of dizziness which did not correlate with any arrhythmia.   There were no episodes of tachycardia uneventful monitor however average heart rate and even the highest heart rate is on the lower side     Osteopenia     Osteoporosis     PTSD (post-traumatic stress disorder)     S/P cholecystectomy 11/22/2011    Schizo-affective schizophrenia ([facility]     Sjoegren syndrome     Syncope     Tardive dyskinesia     Venous insufficiency of leg 2012    Vertigo      Allergies   Allergen Reactions    Rozerem [Ramelteon] Other (See Comments) Increased symptoms of TD    Codeine Itching and Nausea And Vomiting    Imitrex [Sumatriptan] Itching and Nausea And Vomiting     PASSED OUT    Abilify [Aripiprazole] Other (See Comments)     Patient reports symptoms of TD    Amitriptyline     Botox [Onabotulinumtoxina] Other (See Comments)     Tardive dyskinesia    Compazine [Prochlorperazine Maleate] Other (See Comments)    Meclizine     Topamax [Topiramate]      Made TD worse    Cephalexin Nausea And Vomiting and Other (See Comments)     ABDOMINAL PAIN FOR 2 WEEKS    Magnesium-Containing Compounds Other (See Comments)     Pt sts \"they called it the mags. I start twitching. \"     Meclizine Hcl Other (See Comments)     \"Makes me stutter and twitch. \"    Nsaids Other (See Comments)     Tardive dyskinisia    Pcn [Penicillins] Nausea And Vomiting and Other (See Comments)     HEADACHE    Reglan [Metoclopramide] Other (See Comments)     Tardive dyskinisia    Toradol [Ketorolac Tromethamine] Other (See Comments)     Tremors    Vistaril [Hydroxyzine Hcl] Other (See Comments)     dyskinsia      Zofran [Ondansetron Hcl] Other (See Comments)     Cramping and burning in stomach     Prior to Admission medications    Medication Sig Start Date End Date Taking? Authorizing Provider   promethazine (PHENERGAN) 25 MG tablet Take 1 tablet by mouth every 6 hours as needed for Nausea 2/9/22 2/12/22 Yes Liss Pierce DO   pramipexole (MIRAPEX) 0.75 MG tablet Take 1 tablet by mouth nightly 2/7/22   HANNA Barry CNP   lamoTRIgine (LAMICTAL) 25 MG tablet Take 1 tablet daily x2 weeks, increase to 2 tablets daily x2 weeks then increase to 4 tablets daily on week 5.   Patient taking differently: Take 50 mg by mouth Take 1 tablet daily x2 weeks, increase to 2 tablets daily x2 weeks then increase to 4 tablets daily on week 5. 1/11/22   HANNA Barry CNP   traZODone (DESYREL) 100 MG tablet Take 1 tablet by mouth nightly as needed for Sleep 1/3/22   Joshua Sellers HANNA Bower CNP   acetaminophen (TYLENOL) 325 MG tablet Take 650 mg by mouth every 6 hours as needed for Pain    Historical Provider, MD   ibuprofen (ADVIL;MOTRIN) 400 MG tablet Take 400 mg by mouth every 6 hours as needed for Pain    Historical Provider, MD   pregabalin (LYRICA) 50 MG capsule Take 1 capsule by mouth 3 times daily for 30 days. 9/16/21 1/27/22  HANNA Horvath CNP   Valbenazine Tosylate [facility]) 80 MG CAPS Take 80 mg by mouth daily 8/16/21   HANNA Horvath CNP   vitamin B-12 (CYANOCOBALAMIN) 1000 MCG tablet Take 1,000 mcg by mouth daily    Historical Provider, MD       /73   Pulse 60   Temp 98.2 °F (36.8 °C) (Oral)   Resp 16   Wt 110 lb (49.9 kg)   LMP 11/18/2004   SpO2 99%   BMI 18.88 kg/m²     Physical Exam  Vitals and nursing note reviewed. Exam conducted with a chaperone present. Constitutional:       General: She is not in acute distress. Appearance: Normal appearance. She is not ill-appearing, toxic-appearing or diaphoretic. HENT:      Head: Normocephalic. Nose: Nose normal.      Mouth/Throat:      Mouth: Mucous membranes are moist.   Eyes:      Extraocular Movements: Extraocular movements intact. Pupils: Pupils are equal, round, and reactive to light. Cardiovascular:      Rate and Rhythm: Normal rate. Pulses: Normal pulses. Pulmonary:      Effort: Pulmonary effort is normal.      Breath sounds: Normal breath sounds. Abdominal:      General: There is no distension. Tenderness: There is no abdominal tenderness. There is no guarding or rebound. Musculoskeletal:      Cervical back: No rigidity or tenderness. Lymphadenopathy:      Cervical: No cervical adenopathy. Skin:     General: Skin is warm and dry. Capillary Refill: Capillary refill takes less than 2 seconds. Neurological:      General: No focal deficit present. Mental Status: She is alert and oriented to person, place, and time.  Mental status is at baseline. Cranial Nerves: No cranial nerve deficit. Sensory: No sensory deficit. Motor: No weakness. Coordination: Coordination normal.      Gait: Gait normal.         MDM:    Labs Reviewed   CBC WITH AUTO DIFFERENTIAL - Abnormal; Notable for the following components:       Result Value    RBC 3.95 (*)     Hemoglobin 12.3 (*)     Hematocrit 36.8 (*)     MCH 31.1 (*)     MPV 11.6 (*)     Monocytes % 6.2 (*)     All other components within normal limits   COMPREHENSIVE METABOLIC PANEL - Abnormal; Notable for the following components:    Potassium 3.2 (*)     Calcium 8.2 (*)     Total Protein 6.2 (*)     All other components within normal limits   LIPASE       No orders to display        The patient has over 18 allergies that preclude the use of anything normally used for nausea except phenergan and she cant take any nonnarcotic medications for pain control. All normal preparations for migraine prophylaxis or prevention cause allergic reactions. She is being given phenergan 12.5 mg IV and Benadryl 50 mg IV both for their antinausea affect. I am checking basic labs and she is being given IVF. I will not be giving her any narcotic pain killers including stadol which she asked for by name. The patient pain was addressed with tylenol and she was given work note. I have discussed with the patient  my clinical impression and the result of the patient's current clinical evaluation for their presentation. In addition we discussed the risk and benefits of further testing. I discussed candidly with the patient  and the patient  was allowed to provide input as to their thoughts concerning the current presentation.   Although the risk of progression or development of new more serious signs and symptoms cannot be excluded she needs to follow up with her new neurologist from [facility] to get new options for her migraines since the traditional therapies do not help or cause an allergic reaction   My typical dicussion, presentation,and considerations for this patients' chief complaint, diagnosis, and differential diagnosis have been considered. I have stressed need for follow up and reexamination for this encounter. I have discussed my clinical impression and the results of the current evaluation. Patient was  prescribed phenergan po because she cant take Zofran. Zofran causes her to get GERD and I do not want her to return to the ER for abdominal pain and GERD. .  The medication(s) use,  medication(s) safety and medication(s) interactions with already prescribed medication(s) have been explained and outlined for this encounter. The patient  was educated that it is their responsibility to verify this information is correct at the time of discharge and to contact this department of any complications with the pharmacy providing this medication(s) or if their any difficulty in obtaining this medication(s). Final Impression    1. Nausea and vomiting in adult patient    2.  Chronic migraine with aura             Dav Kyle DO  02/09/22 7301

## 2022-02-20 DIAGNOSIS — F33.1 MODERATE EPISODE OF RECURRENT MAJOR DEPRESSIVE DISORDER (HCC): ICD-10-CM

## 2022-02-20 DIAGNOSIS — F41.1 GAD (GENERALIZED ANXIETY DISORDER): ICD-10-CM

## 2022-02-21 RX ORDER — LAMOTRIGINE 25 MG/1
TABLET ORAL
Qty: 70 TABLET | Refills: 0 | OUTPATIENT
Start: 2022-02-21

## 2022-02-23 DIAGNOSIS — G24.01 TARDIVE DYSKINESIA: ICD-10-CM

## 2022-02-24 RX ORDER — VALBENAZINE 80 MG/1
CAPSULE ORAL
Qty: 30 CAPSULE | Refills: 5 | Status: SHIPPED | OUTPATIENT
Start: 2022-02-24 | End: 2022-08-25

## 2022-03-25 ENCOUNTER — OFFICE VISIT (OUTPATIENT)
Dept: FAMILY MEDICINE CLINIC | Age: 47
End: 2022-03-25
Payer: COMMERCIAL

## 2022-03-25 VITALS
BODY MASS INDEX: 18.78 KG/M2 | HEART RATE: 72 BPM | RESPIRATION RATE: 14 BRPM | WEIGHT: 109.4 LBS | SYSTOLIC BLOOD PRESSURE: 98 MMHG | DIASTOLIC BLOOD PRESSURE: 58 MMHG | TEMPERATURE: 97.4 F | OXYGEN SATURATION: 98 %

## 2022-03-25 DIAGNOSIS — F33.1 MODERATE EPISODE OF RECURRENT MAJOR DEPRESSIVE DISORDER (HCC): Primary | ICD-10-CM

## 2022-03-25 DIAGNOSIS — S42.002P: ICD-10-CM

## 2022-03-25 DIAGNOSIS — F41.1 GAD (GENERALIZED ANXIETY DISORDER): ICD-10-CM

## 2022-03-25 DIAGNOSIS — E88.89: ICD-10-CM

## 2022-03-25 DIAGNOSIS — F90.0 ATTENTION DEFICIT HYPERACTIVITY DISORDER (ADHD), PREDOMINANTLY INATTENTIVE TYPE: ICD-10-CM

## 2022-03-25 DIAGNOSIS — F60.3 BORDERLINE PERSONALITY DISORDER (HCC): ICD-10-CM

## 2022-03-25 PROCEDURE — G8420 CALC BMI NORM PARAMETERS: HCPCS | Performed by: NURSE PRACTITIONER

## 2022-03-25 PROCEDURE — 4004F PT TOBACCO SCREEN RCVD TLK: CPT | Performed by: NURSE PRACTITIONER

## 2022-03-25 PROCEDURE — G8484 FLU IMMUNIZE NO ADMIN: HCPCS | Performed by: NURSE PRACTITIONER

## 2022-03-25 PROCEDURE — 99214 OFFICE O/P EST MOD 30 MIN: CPT | Performed by: NURSE PRACTITIONER

## 2022-03-25 PROCEDURE — G8427 DOCREV CUR MEDS BY ELIG CLIN: HCPCS | Performed by: NURSE PRACTITIONER

## 2022-03-25 RX ORDER — LORAZEPAM 0.5 MG/1
TABLET ORAL 3 TIMES DAILY
COMMUNITY
Start: 2022-03-24 | End: 2022-04-28

## 2022-03-25 RX ORDER — PREGABALIN 50 MG/1
50 CAPSULE ORAL 3 TIMES DAILY
Qty: 90 CAPSULE | Refills: 2 | Status: SHIPPED | OUTPATIENT
Start: 2022-03-25 | End: 2022-06-24 | Stop reason: SDUPTHER

## 2022-03-25 RX ORDER — RIMEGEPANT SULFATE 75 MG/75MG
TABLET, ORALLY DISINTEGRATING ORAL PRN
COMMUNITY
Start: 2022-03-17 | End: 2022-04-22

## 2022-03-25 RX ORDER — GALCANEZUMAB 120 MG/ML
INJECTION, SOLUTION SUBCUTANEOUS
COMMUNITY
Start: 2022-03-23

## 2022-03-25 RX ORDER — ATOMOXETINE 40 MG/1
40 CAPSULE ORAL NIGHTLY
Qty: 30 CAPSULE | Refills: 0 | Status: SHIPPED
Start: 2022-03-25 | End: 2022-04-22 | Stop reason: DRUGHIGH

## 2022-03-25 ASSESSMENT — PATIENT HEALTH QUESTIONNAIRE - PHQ9
SUM OF ALL RESPONSES TO PHQ QUESTIONS 1-9: 10
5. POOR APPETITE OR OVEREATING: 3
10. IF YOU CHECKED OFF ANY PROBLEMS, HOW DIFFICULT HAVE THESE PROBLEMS MADE IT FOR YOU TO DO YOUR WORK, TAKE CARE OF THINGS AT HOME, OR GET ALONG WITH OTHER PEOPLE: 2
SUM OF ALL RESPONSES TO PHQ QUESTIONS 1-9: 10
4. FEELING TIRED OR HAVING LITTLE ENERGY: 1
9. THOUGHTS THAT YOU WOULD BE BETTER OFF DEAD, OR OF HURTING YOURSELF: 0
SUM OF ALL RESPONSES TO PHQ QUESTIONS 1-9: 10
7. TROUBLE CONCENTRATING ON THINGS, SUCH AS READING THE NEWSPAPER OR WATCHING TELEVISION: 0
3. TROUBLE FALLING OR STAYING ASLEEP: 3
6. FEELING BAD ABOUT YOURSELF - OR THAT YOU ARE A FAILURE OR HAVE LET YOURSELF OR YOUR FAMILY DOWN: 0
1. LITTLE INTEREST OR PLEASURE IN DOING THINGS: 0
2. FEELING DOWN, DEPRESSED OR HOPELESS: 0
SUM OF ALL RESPONSES TO PHQ9 QUESTIONS 1 & 2: 0
8. MOVING OR SPEAKING SO SLOWLY THAT OTHER PEOPLE COULD HAVE NOTICED. OR THE OPPOSITE, BEING SO FIGETY OR RESTLESS THAT YOU HAVE BEEN MOVING AROUND A LOT MORE THAN USUAL: 3
SUM OF ALL RESPONSES TO PHQ QUESTIONS 1-9: 10

## 2022-03-25 ASSESSMENT — ANXIETY QUESTIONNAIRES
6. BECOMING EASILY ANNOYED OR IRRITABLE: 3
1. FEELING NERVOUS, ANXIOUS, OR ON EDGE: 3
4. TROUBLE RELAXING: 3
2. NOT BEING ABLE TO STOP OR CONTROL WORRYING: 3
3. WORRYING TOO MUCH ABOUT DIFFERENT THINGS: 3
IF YOU CHECKED OFF ANY PROBLEMS ON THIS QUESTIONNAIRE, HOW DIFFICULT HAVE THESE PROBLEMS MADE IT FOR YOU TO DO YOUR WORK, TAKE CARE OF THINGS AT HOME, OR GET ALONG WITH OTHER PEOPLE: EXTREMELY DIFFICULT
5. BEING SO RESTLESS THAT IT IS HARD TO SIT STILL: 3
7. FEELING AFRAID AS IF SOMETHING AWFUL MIGHT HAPPEN: 0
GAD7 TOTAL SCORE: 18

## 2022-03-25 NOTE — PROGRESS NOTES
Subjective:      Chief Complaint   Patient presents with    Follow-up     Patient presents today for a follow up        HPI:  Kareem Chen is a 42 y.o. female who presents today for medication follow up. Since she was last seen she has found a full time job and is working at [employer]. ADD/ADHD  She continues to be concerned that symptoms of ADHD are increasing symptoms of anxiety and depression. She notes fails to give close attention to details or makes careless mistakes, has difficulty sustaining attention in tasks or play activities, has difficulty organizing tasks and activities, loses things that are necessary for tasks and activities, is easily distracted by extraneous stimuli, is often forgetful in daily activities and avoids engaging in tasks that require sustained attention. Anxiety  The following anxiety symptoms are present: excessive worry, agitation, labile mood and depression. She is prescribed Ativan 0.5 mg TID for STEPHANIA but doesn't feel that this is always effective in managing symptoms. She wonders if she could increase her dose. Depression  She endorses the following depressive symptoms: decreased appetite, fatigue, difficulty getting to sleep, and psychomotor agitation. She has been diagnosed with borderline personality disorder. She is no longer seeing a therapist. The patient denies visual  and auditory hallucinations, delusions, illusions and paranoia. Pt denies current suicidal ideation, plan and intent. Pt  denies current homicidal ideation, plan and intent.     She reports failed treatment or has experienced SE to the following medications:      Antidepressants:   Lexapro  Mirtazapine  Sertraline  Viibryd  Doxepin  Prozac  Paxil  Celexa  Wellbutrin  Cymbalta  Doxepin  Amitriptyline     Anxiolytics:    Buspar  Ativan  Xanax  Valium  Librium  Temazepam  Propranolol   Prazosin    Antipsychotics:  Haldol  Seroquel  Risperidone  Ziprasidone  Compazine  Abilify  Klonopin  Lithium     Mood Stabilizers:   Lamictal  Depakote  Gabapentin  Carbamazepine    Tardive Dyskinesia  Symptoms well controlled with Ingrezza 80 mg daily.     Chronic Migraines  She is currently prescribed Emgality and Nurtec from Neurology. Migraines are better controlled now than what they have been in the past.     Neuropathy   Pain is related to previous left clavicular fracture. Pain is well controlled with Lyrica. Past Medical History:   Diagnosis Date    ADHD     Arthritis     OSTEO ARTHITIS    Bipolar disorder, unspecified ([address]     Cholecystenteric fistula     COPD (chronic obstructive pulmonary disease) ([address]     Degenerative disc disease     Fibromyalgia     Generalized anxiety disorder     H/O 24 hour EKG monitoring 02/16/2017      Sinus tach events , no major arrhythmias , follow up in office as routine     H/O echocardiogram 03/01/2017    EF 55-60% Normal LV structure  and systolic function.  H/O exercise stress test 03/01/2017    Normal stress test. Patient has physical deconditioning as she achieved target HR in 2 mins. Recommend to increase PO fluids intake and exercise training.  Headache     History of cardiac monitoring 09/21/2020    Normal 30-day event monitor with average heart rate of 63 lowest heart rate of 57 highest heart rate of 82 patient reported episodes of dizziness which did not correlate with any arrhythmia.   There were no episodes of tachycardia uneventful monitor however average heart rate and even the highest heart rate is on the lower side     Osteopenia     Osteoporosis     PTSD (post-traumatic stress disorder)     S/P cholecystectomy 11/22/2011    Schizo-affective schizophrenia ([address]     Sjoegren syndrome     Syncope     Tardive dyskinesia     Venous insufficiency of leg 2012    Vertigo         Social History     Tobacco Use    Smoking status: Current Every Day Smoker     Packs/day: 0.25     Years: 20.00     Pack years: 5.00     Types: Cigarettes    Smokeless tobacco: Never Used   Substance Use Topics    Alcohol use: No        Review of Systems   Constitutional: Positive for appetite change and fatigue. Negative for activity change. HENT: Negative. Eyes: Negative for visual disturbance. Respiratory: Negative for chest tightness and wheezing. Cardiovascular: Negative. Negative for chest pain and palpitations. Gastrointestinal: Negative for abdominal pain, constipation, diarrhea, nausea and vomiting. Endocrine: Negative for cold intolerance, heat intolerance, polydipsia, polyphagia and polyuria. Musculoskeletal: Negative for arthralgias and myalgias. Skin: Negative. Neurological: Positive for numbness and headaches. Negative for dizziness, seizures, syncope, facial asymmetry, speech difficulty and light-headedness. Psychiatric/Behavioral: Positive for agitation, decreased concentration, dysphoric mood, hallucinations and sleep disturbance. Negative for behavioral problems, confusion, self-injury and suicidal ideas. The patient is nervous/anxious and is hyperactive. Objective:      BP (!) 98/58 (Site: Right Upper Arm, Position: Sitting, Cuff Size: Medium Adult)   Pulse 72   Temp 97.4 °F (36.3 °C) (Temporal)   Resp 14   Wt 109 lb 6.4 oz (49.6 kg)   LMP 11/18/2004   SpO2 98%   BMI 18.78 kg/m²      Physical Exam  Vitals reviewed. Constitutional:       General: She is not in acute distress. Appearance: Normal appearance. Eyes:      Conjunctiva/sclera: Conjunctivae normal.      Pupils: Pupils are equal, round, and reactive to light. Cardiovascular:      Rate and Rhythm: Normal rate and regular rhythm. Heart sounds: Normal heart sounds. Pulmonary:      Effort: Pulmonary effort is normal.      Breath sounds: Normal breath sounds. Musculoskeletal:         General: Normal range of motion.       Cervical back: Normal range of motion and neck supple. No tenderness. Right lower leg: No edema. Left lower leg: No edema. Skin:     General: Skin is warm and dry. Neurological:      General: No focal deficit present. Mental Status: She is alert and oriented to person, place, and time. Deep Tendon Reflexes: Reflexes are normal and symmetric. Reflexes normal.   Psychiatric:         Attention and Perception: Attention and perception normal.         Mood and Affect: Mood is anxious and depressed. Speech: Speech normal.         Behavior: Behavior normal. Behavior is cooperative. Thought Content: Thought content normal.            Assessment / Plan:      1. Attention deficit hyperactivity disorder (ADHD), predominantly inattentive type  Will start Strattera and titrate to therapeutic dose to manage symptoms of ADHD. - atomoxetine (STRATTERA) 40 MG capsule; Take 1 capsule by mouth at bedtime  Dispense: 30 capsule; Refill: 0    2. Moderate episode of recurrent major depressive disorder (HCC)  Strattera 40 mg daily for symptoms of ADHD, STEPHANIA, and MDD. Patient to call if any concerns or SI before her follow up appointment. If she develops suicidal thoughts with a plan, she is instructed to go to emergency room immediately. Behavioral counseling recommended. - atomoxetine (STRATTERA) 40 MG capsule; Take 1 capsule by mouth at bedtime  Dispense: 30 capsule; Refill: 0    3. STEPHANIA (generalized anxiety disorder)  Strattera 40 mg daily for symptoms of ADHD, STEPHANIA, and MDD. Behavioral counseling recommended. - atomoxetine (STRATTERA) 40 MG capsule; Take 1 capsule by mouth at bedtime  Dispense: 30 capsule; Refill: 0    4. Borderline personality disorder ([facility]  DBT recommended. 5. [id number] poor metabolizer ([address]    6. Closed displaced fracture of left clavicle with malunion, unspecified part of clavicle, subsequent encounter  OARRS reviewed; no concerning behaviors noted.     - pregabalin (LYRICA) 50 MG capsule; Take 1 capsule by mouth 3 times daily for 30 days. Dispense: 90 capsule; Refill: 2    PDMP Monitoring:    Last PDMP [place] as Reviewed [facility]):  Review User Review Instant Review Result   Irena PATEL 3/25/2022  4:43 PM Reviewed PDMP [1]     Last Controlled Substance Monitoring Documentation      Office Visit from 3/25/2022 in [address]   Periodic Controlled Substance Monitoring Possible medication side effects, risk of tolerance/dependence & alternative treatments discussed., No signs of potential drug abuse or diversion identified. , Assessed functional status., Obtaining appropriate analgesic effect of treatment. filed at 03/25/2022 1652        Urine Drug Screenings (1 yr)     POCT Rapid Drug Screen  Collected: 8/16/2021  3:00 PM (Edited Result - FINAL)    Complete Results          POCT Rapid Drug Screen  Collected: 7/13/2021  2:00 PM (Final result)    Complete Results          POCT Rapid Drug Screen  Collected: 6/15/2021 12:35 PM (Final result)    Complete Results          POCT Rapid Drug Screen  Collected: 5/12/2020  3:49 PM (Final result)    Complete Results          POCT Rapid Drug Screen  Collected: 1/8/2020  4:34 PM (Final result)    Complete Results          Drug screen, multiple, urine  Collected: 5/7/2014  9:15 AM (Final result)   Narrative:         THRESHOLD CONCENTRATIONS (mg/dL)  AMP,mAMP,TCA          1000  BAR,BZO,BRIONNA,OPI       300. .. Complete Results          Drug Screen, Multiple, Urine  Collected: 9/9/2013  3:20 AM (Final result)   Narrative: Performed @ [facility], [address] [facility], [address] [place] [address]     Complete Results          Drug Screen, Multiple, Urine  Collected: 9/23/2012 12:15 AM (Final result)   Narrative:         THRESHOLD CONCENTRATIONS (mg/dL)  AMP,mAMP,TCA          1000  BAR,BZO,BRIONNA,OPI       300. ..      Complete Results          Drug Screen, Multiple, Urine  Collected: 4/27/2012  8:00 PM (Final result)   Narrative: THRESHOLD CONCENTRATIONS (mg/dL)  AMP,mAMP,TCA          1000  BAR,BZO,BRIONNA,OPI       300. .. Complete Results          Drug screen, multiple, urine  Collected: 10/5/2011  8:15 PM (Final result)   Narrative:         THRESHOLD CONCENTRATIONS (mg/dL)  AMP,mAMP,TCA          1000  BAR,BZO,BRIONNA,OPI       300. .. Complete Results          Urine Drug Screen  Collected: 7/22/2021  3:33 PM (Final result)    Complete Results          Urine Drug Screen  Collected: 11/30/2015 12:15 AM (Final result)   Narrative:         THRESHOLD CONCENTRATIONS (mg/dL)  AMPHT               1000  BRIONNA,OPIA             300... Complete Results          Urine Drug Screen  Collected: 4/21/2015  7:30 AM (Final result)   Narrative:         THRESHOLD CONCENTRATIONS (mg/dL)  AMPHT               1000  BRIONNA,OPIA             300... Complete Results          Drugs of Abuse 7  Collected: 7/9/2013  3:30 PM (Final result)   Narrative:         THRESHOLD CONCENTRATIONS (mg/dL)  AMPHT               1000  BRIONNA,PPX,OPIA         300. .. Complete Results          Drugs of Abuse 7  Collected: 1/17/2012  3:30 PM (Final result)   Narrative:         THRESHOLD CONCENTRATIONS (mg/dL)  AMPHT               1000  BRIONNA,PPX,OPIA         300. ..      Complete Results              Medication Contract and Consent for Opioid Use Documents Filed      No documents found                     HANNA West NP

## 2022-03-27 ASSESSMENT — ENCOUNTER SYMPTOMS
CONSTIPATION: 0
NAUSEA: 0
DIARRHEA: 0
CHEST TIGHTNESS: 0
VOMITING: 0
ABDOMINAL PAIN: 0
WHEEZING: 0

## 2022-04-22 ENCOUNTER — OFFICE VISIT (OUTPATIENT)
Dept: FAMILY MEDICINE CLINIC | Age: 47
End: 2022-04-22
Payer: COMMERCIAL

## 2022-04-22 VITALS
WEIGHT: 108.4 LBS | OXYGEN SATURATION: 97 % | RESPIRATION RATE: 15 BRPM | SYSTOLIC BLOOD PRESSURE: 96 MMHG | HEART RATE: 74 BPM | BODY MASS INDEX: 18.61 KG/M2 | DIASTOLIC BLOOD PRESSURE: 72 MMHG | TEMPERATURE: 98.6 F

## 2022-04-22 DIAGNOSIS — F41.1 GAD (GENERALIZED ANXIETY DISORDER): ICD-10-CM

## 2022-04-22 DIAGNOSIS — F33.1 MODERATE EPISODE OF RECURRENT MAJOR DEPRESSIVE DISORDER (HCC): Primary | ICD-10-CM

## 2022-04-22 DIAGNOSIS — F90.0 ATTENTION DEFICIT HYPERACTIVITY DISORDER (ADHD), PREDOMINANTLY INATTENTIVE TYPE: ICD-10-CM

## 2022-04-22 PROCEDURE — G8420 CALC BMI NORM PARAMETERS: HCPCS | Performed by: NURSE PRACTITIONER

## 2022-04-22 PROCEDURE — G8427 DOCREV CUR MEDS BY ELIG CLIN: HCPCS | Performed by: NURSE PRACTITIONER

## 2022-04-22 PROCEDURE — 99214 OFFICE O/P EST MOD 30 MIN: CPT | Performed by: NURSE PRACTITIONER

## 2022-04-22 PROCEDURE — 4004F PT TOBACCO SCREEN RCVD TLK: CPT | Performed by: NURSE PRACTITIONER

## 2022-04-22 RX ORDER — ATOMOXETINE 80 MG/1
80 CAPSULE ORAL DAILY
Qty: 30 CAPSULE | Refills: 3 | Status: SHIPPED
Start: 2022-04-22 | End: 2022-05-27 | Stop reason: DRUGHIGH

## 2022-04-22 RX ORDER — PRAMIPEXOLE DIHYDROCHLORIDE 0.75 MG/1
TABLET ORAL
COMMUNITY
Start: 2022-04-06 | End: 2022-07-08

## 2022-04-22 RX ORDER — UBROGEPANT 50 MG/1
TABLET ORAL
COMMUNITY
Start: 2022-04-04

## 2022-04-22 ASSESSMENT — PATIENT HEALTH QUESTIONNAIRE - PHQ9
10. IF YOU CHECKED OFF ANY PROBLEMS, HOW DIFFICULT HAVE THESE PROBLEMS MADE IT FOR YOU TO DO YOUR WORK, TAKE CARE OF THINGS AT HOME, OR GET ALONG WITH OTHER PEOPLE: 1
SUM OF ALL RESPONSES TO PHQ QUESTIONS 1-9: 8
SUM OF ALL RESPONSES TO PHQ QUESTIONS 1-9: 8
SUM OF ALL RESPONSES TO PHQ9 QUESTIONS 1 & 2: 2
5. POOR APPETITE OR OVEREATING: 1
6. FEELING BAD ABOUT YOURSELF - OR THAT YOU ARE A FAILURE OR HAVE LET YOURSELF OR YOUR FAMILY DOWN: 0
1. LITTLE INTEREST OR PLEASURE IN DOING THINGS: 1
SUM OF ALL RESPONSES TO PHQ QUESTIONS 1-9: 8
8. MOVING OR SPEAKING SO SLOWLY THAT OTHER PEOPLE COULD HAVE NOTICED. OR THE OPPOSITE, BEING SO FIGETY OR RESTLESS THAT YOU HAVE BEEN MOVING AROUND A LOT MORE THAN USUAL: 2
3. TROUBLE FALLING OR STAYING ASLEEP: 0
SUM OF ALL RESPONSES TO PHQ QUESTIONS 1-9: 8
9. THOUGHTS THAT YOU WOULD BE BETTER OFF DEAD, OR OF HURTING YOURSELF: 0
4. FEELING TIRED OR HAVING LITTLE ENERGY: 3
7. TROUBLE CONCENTRATING ON THINGS, SUCH AS READING THE NEWSPAPER OR WATCHING TELEVISION: 0
2. FEELING DOWN, DEPRESSED OR HOPELESS: 1

## 2022-04-22 ASSESSMENT — ENCOUNTER SYMPTOMS
ABDOMINAL PAIN: 0
CONSTIPATION: 0
CHEST TIGHTNESS: 0
DIARRHEA: 0
VOMITING: 0
NAUSEA: 0
WHEEZING: 0

## 2022-04-22 ASSESSMENT — ANXIETY QUESTIONNAIRES
6. BECOMING EASILY ANNOYED OR IRRITABLE: 3
IF YOU CHECKED OFF ANY PROBLEMS ON THIS QUESTIONNAIRE, HOW DIFFICULT HAVE THESE PROBLEMS MADE IT FOR YOU TO DO YOUR WORK, TAKE CARE OF THINGS AT HOME, OR GET ALONG WITH OTHER PEOPLE: VERY DIFFICULT
4. TROUBLE RELAXING: 1
5. BEING SO RESTLESS THAT IT IS HARD TO SIT STILL: 1
1. FEELING NERVOUS, ANXIOUS, OR ON EDGE: 2
7. FEELING AFRAID AS IF SOMETHING AWFUL MIGHT HAPPEN: 0
3. WORRYING TOO MUCH ABOUT DIFFERENT THINGS: 0
GAD7 TOTAL SCORE: 10
2. NOT BEING ABLE TO STOP OR CONTROL WORRYING: 3

## 2022-04-22 NOTE — PROGRESS NOTES
Subjective:      Chief Complaint   Patient presents with    Follow-up     Patient presents today for a follow up. HPI:  Lary Lawson is a 42 y.o. female who presents today for medication follow up. Since she was last seen she has found a full time job and is working at [employer].        ADD/ADHD  Current treatment: Strattera-40, which has been somewhat effective. Residual symptoms: inattention, impulsivity, depressed mood, anxiety. Patient denies anorexia, nausea, vomiting, abdominal pain, involuntary weight loss, palpitations, insomnia, irritability, headache and tremor.     Anxiety  The following anxiety symptoms are present: excessive worry, agitation and mild depression.       Depression  She endorses the following depressive symptoms: mild avolition and depressed mood. Mood has improved significantly since starting Strattera. She has been diagnosed with borderline personality disorder. She is no longer seeing a therapist. The patient denies visual  and auditory hallucinations, delusions, illusions and paranoia. Pt denies current suicidal ideation, plan and intent.  Pt  denies current homicidal ideation, plan and intent.     She reports failed treatment or has experienced SE to the following medications:      Antidepressants:   Lexapro  Mirtazapine  Sertraline  Viibryd  Doxepin  Prozac  Paxil  Celexa  Wellbutrin  Cymbalta  Doxepin  Amitriptyline     Anxiolytics:    Buspar  Ativan  Xanax  Valium  Librium  Temazepam  Propranolol   Prazosin      Antipsychotics:  Haldol  Seroquel  Risperidone  Ziprasidone  Compazine  Abilify  Klonopin  Lithium     Mood Stabilizers:   Lamictal  Depakote  Gabapentin  Carbamazepine        Past Medical History:   Diagnosis Date    ADHD     Arthritis     OSTEO ARTHITIS    [facility] disorder[facility] unspecified [facility]     Cholecystenteric fistula     COPD [facility]chronic obstructive pulmonary disease) ([facility] [facility]     Degenerative disc disease     Fibromyalgia     Generalized anxiety disorder     H/O 24 hour EKG monitoring 02/16/2017      Sinus tach events , no major arrhythmias , follow up in office as routine     H/O echocardiogram 03/01/2017    EF 55-60% Normal LV structure  and systolic function.  H/O exercise stress test 03/01/2017    Normal stress test. Patient has physical deconditioning as she achieved target HR in 2 mins. Recommend to increase PO fluids intake and exercise training.  Headache     History of cardiac monitoring 09/21/2020    Normal 30-day event monitor with average heart rate of 63 lowest heart rate of 57 highest heart rate of 82 patient reported episodes of dizziness which did not correlate with any arrhythmia. There were no episodes of tachycardia uneventful monitor however average heart rate and even the highest heart rate is on the lower side     Osteopenia     Osteoporosis     PTSD (post-traumatic stress disorder)     S/P cholecystectomy 11/22/2011    [facility]izo[facility]affective schizophrenia ([facility] [address]     Sjoegren syndrome     Syncope     Tardive dyskinesia     Venous insufficiency of leg 2012    Vertigo         Social History     Tobacco Use    Smoking status: Current Every Day Smoker     Packs/day: 0.25     Years: 20.00     Pack years: 5.00     Types: Cigarettes    Smokeless tobacco: Never Used   Substance Use Topics    Alcohol use: No        Review of Systems   Constitutional: Negative for activity change, appetite change and fatigue. HENT: Negative. Eyes: Negative for visual disturbance. Respiratory: Negative for chest tightness and wheezing. Cardiovascular: Negative. Negative for chest pain and palpitations. Gastrointestinal: Negative for abdominal pain, constipation, diarrhea, nausea and vomiting. Endocrine: Negative for cold intolerance, heat intolerance, polydipsia, polyphagia and polyuria. Musculoskeletal: Negative for arthralgias and myalgias. Skin: Negative. Neurological: Positive for numbness and headaches.  Negative for dizziness, seizures, syncope, facial asymmetry, speech difficulty and light-headedness. Psychiatric/Behavioral: Positive for decreased concentration, dysphoric mood, hallucinations and sleep disturbance. Negative for agitation, behavioral problems, confusion, self-injury and suicidal ideas. The patient is nervous/anxious and is hyperactive. Symptoms are improving           Objective:      BP 96/72 (Site: Left Upper Arm, Position: Sitting, Cuff Size: Medium Adult)   Pulse 74   Temp 98.6 °F (37 °C) (Temporal)   Resp 15   Wt 108 lb 6.4 oz (49.2 kg)   LMP 11/18/2004   SpO2 97%   BMI 18.61 kg/m²      Physical Exam  Vitals reviewed. Constitutional:       General: She is not in acute distress. Appearance: Normal appearance. Eyes:      Conjunctiva/sclera: Conjunctivae normal.      Pupils: Pupils are equal, round, and reactive to light. Cardiovascular:      Rate and Rhythm: Normal rate and regular rhythm. Heart sounds: Normal heart sounds. Pulmonary:      Effort: Pulmonary effort is normal.      Breath sounds: Normal breath sounds. Musculoskeletal:         General: Normal range of motion. Cervical back: Normal range of motion and neck supple. No tenderness. Right lower leg: No edema. Left lower leg: No edema. Skin:     General: Skin is warm and dry. Neurological:      General: No focal deficit present. Mental Status: She is alert and oriented to person, place, and time. Deep Tendon Reflexes: Reflexes are normal and symmetric. Reflexes normal.   Psychiatric:         Attention and Perception: Attention and perception normal.         Mood and Affect: Mood is anxious and depressed. Speech: Speech normal.         Behavior: Behavior normal. Behavior is cooperative. Thought Content: Thought content normal.      Comments: Symptoms are improved            Assessment / Plan:      1. Moderate episode of recurrent major depressive disorder (HCC)  Stable. Will increase Strattera to 80 mg to target residual symptoms of anxiety, depression and ADHD. Patient to call if any concerns or SI before his follow up appointment. If he develops suicidal thoughts with a plan, he is instructed to go to emergency room immediately. Behavioral counseling recommended. - atomoxetine (STRATTERA) 80 MG capsule; Take 1 capsule by mouth daily  Dispense: 30 capsule; Refill: 3    2. STEPHANIA (generalized anxiety disorder)  Stable. Will increase Strattera to 80 mg to target residual symptoms of anxiety, depression and ADHD. Behavioral counseling recommended. - atomoxetine (STRATTERA) 80 MG capsule; Take 1 capsule by mouth daily  Dispense: 30 capsule; Refill: 3    3. Attention deficit hyperactivity disorder (ADHD), predominantly inattentive type  Stable. Will increase Strattera to 80 mg to target residual symptoms of anxiety, depression and ADHD. Behavioral counseling recommended. - atomoxetine (STRATTERA) 80 MG capsule; Take 1 capsule by mouth daily  Dispense: 30 capsule;  Refill: [address], APRN - NP

## 2022-04-27 ENCOUNTER — PATIENT MESSAGE (OUTPATIENT)
Dept: FAMILY MEDICINE CLINIC | Age: 47
End: 2022-04-27

## 2022-04-27 DIAGNOSIS — F41.1 GAD (GENERALIZED ANXIETY DISORDER): Primary | ICD-10-CM

## 2022-04-27 DIAGNOSIS — F51.01 PRIMARY INSOMNIA: ICD-10-CM

## 2022-04-27 DIAGNOSIS — F41.1 GAD (GENERALIZED ANXIETY DISORDER): ICD-10-CM

## 2022-04-28 RX ORDER — LORAZEPAM 0.5 MG/1
TABLET ORAL
Qty: 90 TABLET | Refills: 0 | Status: SHIPPED | OUTPATIENT
Start: 2022-04-28 | End: 2022-05-27 | Stop reason: SDUPTHER

## 2022-04-28 RX ORDER — TRAZODONE HYDROCHLORIDE 100 MG/1
100 TABLET ORAL NIGHTLY PRN
Qty: 30 TABLET | Refills: 2 | Status: SHIPPED
Start: 2022-04-28 | End: 2022-06-24 | Stop reason: DRUGHIGH

## 2022-04-28 NOTE — TELEPHONE ENCOUNTER
From: Mandeep Zee  Sent: 4/28/2022 10:52 AM EDT  To: [facility] Clinical Staff  Subject: Refill    Thank you I'm going to need trazadone too tavon if you did that last Friday

## 2022-05-19 ENCOUNTER — HOSPITAL ENCOUNTER (EMERGENCY)
Age: 47
Discharge: HOME | End: 2022-05-19
Attending: EMERGENCY MEDICINE
Payer: COMMERCIAL

## 2022-05-19 VITALS
RESPIRATION RATE: 18 BRPM | SYSTOLIC BLOOD PRESSURE: 113 MMHG | TEMPERATURE: 98.1 F | WEIGHT: 110 LBS | HEIGHT: 64 IN | BODY MASS INDEX: 18.78 KG/M2 | DIASTOLIC BLOOD PRESSURE: 69 MMHG | OXYGEN SATURATION: 100 % | HEART RATE: 65 BPM

## 2022-05-19 DIAGNOSIS — N39.0 URINARY TRACT INFECTION WITH HEMATURIA, SITE UNSPECIFIED: Primary | ICD-10-CM

## 2022-05-19 DIAGNOSIS — R31.9 URINARY TRACT INFECTION WITH HEMATURIA, SITE UNSPECIFIED: Primary | ICD-10-CM

## 2022-05-19 LAB
BACTERIA: ABNORMAL /HPF
BILIRUBIN URINE: NEGATIVE MG/DL
BLOOD, URINE: ABNORMAL
CAST TYPE: 0 /HPF
CLARITY: CLEAR
COLOR: ABNORMAL
CRYSTAL TYPE: 0 /HPF
EPITHELIAL CELLS, UA: ABNORMAL /HPF
GLUCOSE, URINE: 100 MG/DL
KETONES, URINE: NEGATIVE MG/DL
LEUKOCYTE ESTERASE, URINE: ABNORMAL
NITRITE URINE, QUANTITATIVE: POSITIVE
PH, URINE: 5.5 (ref 5–8)
PROTEIN UA: 30 MG/DL
RBC URINE: 0 /HPF (ref 0–6)
SPECIFIC GRAVITY UA: 1.01 (ref 1–1.03)
UROBILINOGEN, URINE: 2 MG/DL (ref 0.2–1)
WBC UA: 10 /HPF (ref 0–5)

## 2022-05-19 PROCEDURE — 6370000000 HC RX 637 (ALT 250 FOR IP): Performed by: EMERGENCY MEDICINE

## 2022-05-19 PROCEDURE — 81001 URINALYSIS AUTO W/SCOPE: CPT

## 2022-05-19 PROCEDURE — 99283 EMERGENCY DEPT VISIT LOW MDM: CPT

## 2022-05-19 PROCEDURE — 87086 URINE CULTURE/COLONY COUNT: CPT

## 2022-05-19 RX ORDER — PHENAZOPYRIDINE HYDROCHLORIDE 200 MG/1
200 TABLET, FILM COATED ORAL 3 TIMES DAILY PRN
Qty: 9 TABLET | Refills: 0 | Status: SHIPPED | OUTPATIENT
Start: 2022-05-19 | End: 2022-05-22

## 2022-05-19 RX ORDER — CIPROFLOXACIN 500 MG/1
500 TABLET, FILM COATED ORAL ONCE
Status: COMPLETED | OUTPATIENT
Start: 2022-05-19 | End: 2022-05-19

## 2022-05-19 RX ORDER — CIPROFLOXACIN 500 MG/1
500 TABLET, FILM COATED ORAL 2 TIMES DAILY
Qty: 20 TABLET | Refills: 0 | Status: SHIPPED | OUTPATIENT
Start: 2022-05-19 | End: 2022-05-29

## 2022-05-19 RX ORDER — HYDROCODONE BITARTRATE AND ACETAMINOPHEN 5; 325 MG/1; MG/1
1 TABLET ORAL ONCE
Status: COMPLETED | OUTPATIENT
Start: 2022-05-19 | End: 2022-05-19

## 2022-05-19 RX ORDER — PHENAZOPYRIDINE HYDROCHLORIDE 100 MG/1
200 TABLET, FILM COATED ORAL ONCE
Status: COMPLETED | OUTPATIENT
Start: 2022-05-19 | End: 2022-05-19

## 2022-05-19 RX ADMIN — CIPROFLOXACIN 500 MG: 500 TABLET, FILM COATED ORAL at 03:11

## 2022-05-19 RX ADMIN — HYDROCODONE BITARTRATE AND ACETAMINOPHEN 1 TABLET: 5; 325 TABLET ORAL at 03:10

## 2022-05-19 RX ADMIN — PHENAZOPYRIDINE 200 MG: 100 TABLET ORAL at 03:11

## 2022-05-19 ASSESSMENT — PAIN DESCRIPTION - DESCRIPTORS: DESCRIPTORS: PRESSURE

## 2022-05-19 ASSESSMENT — PAIN DESCRIPTION - ORIENTATION
ORIENTATION: LOWER
ORIENTATION: LOWER

## 2022-05-19 ASSESSMENT — ENCOUNTER SYMPTOMS
EYES NEGATIVE: 1
VOMITING: 0
RESPIRATORY NEGATIVE: 1
NAUSEA: 0
GASTROINTESTINAL NEGATIVE: 1

## 2022-05-19 ASSESSMENT — PAIN - FUNCTIONAL ASSESSMENT: PAIN_FUNCTIONAL_ASSESSMENT: 0-10

## 2022-05-19 ASSESSMENT — PAIN SCALES - GENERAL
PAINLEVEL_OUTOF10: 5
PAINLEVEL_OUTOF10: 7

## 2022-05-19 ASSESSMENT — PAIN DESCRIPTION - LOCATION
LOCATION: ABDOMEN
LOCATION: ABDOMEN;BACK

## 2022-05-19 NOTE — ED TRIAGE NOTES
Patient c/o low abdominal pain, and low back pain since about 11pm last night, that is worse when she is on her feet and walking around. She admits frequency but denies burning or discharge.  She states it feels like when she has had a bladder infection in the past.

## 2022-05-19 NOTE — ED PROVIDER NOTES
The history is provided by the patient. Dysuria   This is a recurrent problem. The current episode started more than 2 days ago. The problem occurs every urination. The problem has not changed since onset. The quality of the pain is described as burning and aching. The pain is at a severity of 10/10. The pain is severe. There has been no fever. There is a history of pyelonephritis. Associated symptoms include hematuria and urgency. Pertinent negatives include no chills, no sweats, no nausea, no vomiting, no discharge, no frequency, no hesitancy, no possible pregnancy and no flank pain. She has tried nothing for the symptoms. Her past medical history is significant for recurrent UTIs. Review of Systems   Constitutional: Negative. Negative for chills. HENT: Negative. Eyes: Negative. Respiratory: Negative. Cardiovascular: Negative. Gastrointestinal: Negative. Negative for nausea and vomiting. Genitourinary: Positive for dysuria, hematuria and urgency. Negative for flank pain, frequency and hesitancy. Musculoskeletal: Negative. Skin: Negative. Neurological: Negative. All other systems reviewed and are negative.       Family History   Problem Relation Age of Onset    Other Mother         Neurocardiogenic syncope    ADHD Mother     Alcohol Abuse Father     Bipolar Disorder Sister     Anxiety Disorder Sister     Bipolar Disorder Sister     OCD Sister     Heart Surgery Maternal Grandmother     ADHD Daughter     ADHD Daughter     Lupus Paternal Uncle     Other Paternal Uncle         myasthenia gravis     Social History     Socioeconomic History    Marital status: Single     Spouse name: Not on file    Number of children: Not on file    Years of education: Not on file    Highest education level: Not on file   Occupational History    Not on file   Tobacco Use    Smoking status: Current Every Day Smoker     Packs/day: 0.25     Years: 20.00     Pack years: 5.00     Types: Cigarettes    Smokeless tobacco: Never Used   Vaping Use    Vaping Use: Former    Substances: Always   Substance and Sexual Activity    Alcohol use: No    Drug use: Not Currently     Types: Methamphetamines (Crystal Meth)     Comment: clean for 11 years, past hx of crack use    Sexual activity: Yes     Partners: Male   Other Topics Concern    Not on file   Social History Narrative    Not on file     Social Determinants of Health     Financial Resource Strain: Low Risk     Difficulty of Paying Living Expenses: Not hard at all   Food Insecurity: Food Insecurity Present    Worried About [facility] in the Last Year: Sometimes true    Ursula of Food in the Last Year: Sometimes true   Transportation Needs:     Lack of Transportation (Medical): Not on file    Lack of Transportation (Non-Medical):  Not on file   Physical Activity:     Days of Exercise per Week: Not on file    Minutes of Exercise per Session: Not on file   Stress:     Feeling of Stress : Not on file   Social Connections:     Frequency of Communication with Friends and Family: Not on file    Frequency of Social Gatherings with Friends and Family: Not on file    Attends [employer] Services: Not on file    Active Member of [address] [place] [address] or Organizations: Not on file    Attends Club or Organization Meetings: Not on file    Marital Status: Not on file   Intimate Partner Violence:     Fear of Current or Ex-Partner: Not on file    Emotionally Abused: Not on file    Physically Abused: Not on file    Sexually Abused: Not on file   Housing Stability:     Unable to Pay for Housing in the Last Year: Not on file    Number of [place] in the Last Year: Not on file    Unstable Housing in the Last Year: Not on file     Past Surgical History:   Procedure Laterality Date    CHOLECYSTECTOMY  11/22/2011    laparoscopic    COLONOSCOPY      DENTAL SURGERY  8/9    ELBOW ARTHROSCOPY      bilateral elbows    ENDOSCOPY, COLON, DIAGNOSTIC      HYSTERECTOMY      OTHER SURGICAL HISTORY      venous ablation, bilateral legs    TUBAL LIGATION       Past Medical History:   Diagnosis Date    ADHD     Arthritis     OSTEO ARTHITIS    Bipolar disorder, unspecified ([address]     Cholecystenteric fistula     COPD [facility]chronic obstructive pulmonary disease) [facility] [facility]     Degenerative disc disease     Fibromyalgia     Generalized anxiety disorder     H/O 24 hour EKG monitoring 02/16/2017      Sinus tach events , no major arrhythmias , follow up in office as routine     H/O echocardiogram 03/01/2017    EF 55-60% Normal LV structure  and systolic function.  H/O exercise stress test 03/01/2017    Normal stress test. Patient has physical deconditioning as she achieved target HR in 2 mins. Recommend to increase PO fluids intake and exercise training.  Headache     History of cardiac monitoring 09/21/2020    Normal 30-day event monitor with average heart rate of 63 lowest heart rate of 57 highest heart rate of 82 patient reported episodes of dizziness which did not correlate with any arrhythmia.   There were no episodes of tachycardia uneventful monitor however average heart rate and even the highest heart rate is on the lower side     Osteopenia     Osteoporosis     PTSD (post-traumatic stress disorder)     S/P cholecystectomy 11/22/2011    Schizo[facility]affective schizophrenia ([facility] [address] [facility]     Sjoegren syndrome     Syncope     Tardive dyskinesia     Venous insufficiency of leg 2012    Vertigo      Allergies   Allergen Reactions    Rozerem [Ramelteon] Other (See Comments)     Increased symptoms of TD    Codeine Itching and Nausea And Vomiting    Imitrex [Sumatriptan] Itching and Nausea And Vomiting     PASSED OUT    Abilify [Aripiprazole] Other (See Comments)     Patient reports symptoms of TD    Amitriptyline     Botox [Onabotulinumtoxina] Other (See Comments)     Tardive dyskinesia    Compazine [Prochlorperazine Maleate] Other (See Comments)    Meclizine     Topamax [Topiramate]      Made TD worse    Cephalexin Nausea And Vomiting and Other (See Comments)     ABDOMINAL PAIN FOR 2 WEEKS    Magnesium-Containing Compounds Other (See Comments)     Pt sts \"they called it the mags. I start twitching. \"     Meclizine Hcl Other (See Comments)     \"Makes me stutter and twitch. \"    Nsaids Other (See Comments)     Tardive dyskinisia    Pcn [Penicillins] Nausea And Vomiting and Other (See Comments)     HEADACHE    Reglan [Metoclopramide] Other (See Comments)     Tardive dyskinisia    Toradol [Ketorolac Tromethamine] Other (See Comments)     Tremors    Vistaril [Hydroxyzine Hcl] Other (See Comments)     dyskinsia      Zofran [Ondansetron Hcl] Other (See Comments)     Cramping and burning in stomach     Prior to Admission medications    Medication Sig Start Date End Date Taking? Authorizing Provider   ciprofloxacin (CIPRO) 500 MG tablet Take 1 tablet by mouth 2 times daily for 10 days 5/19/22 5/29/22 Yes Piter Pierce, DO   phenazopyridine (PYRIDIUM) 200 MG tablet Take 1 tablet by mouth 3 times daily as needed for Pain 5/19/22 5/22/22 Yes Piter Pierce, DO   LORazepam (ATIVAN) 0.5 MG tablet TAKE ONE TABLET BY MOUTH THREE TIMES A DAY 4/28/22 5/28/22  HANNA Olson NP   traZODone (DESYREL) 100 MG tablet Take 1 tablet by mouth nightly as needed for Sleep 4/28/22   HANNA Olson NP   pramipexole (MIRAPEX) 0.75 MG tablet  4/6/22   Historical Provider, MD   UBRELVY 50 MG TABS  4/4/22   Historical Provider, MD   atomoxetine (STRATTERA) 80 MG capsule Take 1 capsule by mouth daily 4/22/22   HANNA Olson NP   EMGALITY 120 MG/ML SOAJ  3/23/22   Historical Provider, MD   pregabalin (LYRICA) 50 MG capsule Take 1 capsule by mouth 3 times daily for 30 days.  3/25/22 4/24/22  HANNA Olson NP   INGREZZA 80 MG CAPS TAKE 1 CAPSULE BY MOUTH DAILY 2/24/22   HANNA Olson NP   acetaminophen (TYLENOL) 325 MG tablet Take 650 mg by mouth every 6 hours as needed for Pain    Historical Provider, MD   ibuprofen (ADVIL;MOTRIN) 400 MG tablet Take 400 mg by mouth every 6 hours as needed for Pain    Historical Provider, MD   vitamin B-12 (CYANOCOBALAMIN) 1000 MCG tablet Take 1,000 mcg by mouth daily    Historical Provider, MD       /69   Pulse 65   Temp 98.1 °F (36.7 °C) (Oral)   Resp 18   Ht 5' 4\" (1.626 m)   Wt 110 lb (49.9 kg)   LMP 11/18/2004   SpO2 100%   BMI 18.88 kg/m²     Physical Exam  Vitals and nursing note reviewed. Constitutional:       Appearance: She is well-developed. HENT:      Head: Normocephalic and atraumatic. Right Ear: External ear normal.      Left Ear: External ear normal.      Nose: Nose normal.   Eyes:      Conjunctiva/sclera: Conjunctivae normal.      Pupils: Pupils are equal, round, and reactive to light. Cardiovascular:      Rate and Rhythm: Normal rate and regular rhythm. Heart sounds: Normal heart sounds. Pulmonary:      Effort: Pulmonary effort is normal.      Breath sounds: Normal breath sounds. Abdominal:      General: Bowel sounds are normal.      Palpations: Abdomen is soft. Tenderness: There is abdominal tenderness in the suprapubic area. Musculoskeletal:         General: Normal range of motion. Cervical back: Normal range of motion and neck supple. Skin:     General: Skin is warm and dry. Neurological:      Mental Status: She is alert and oriented to person, place, and time. GCS: GCS eye subscore is 4. GCS verbal subscore is 5. GCS motor subscore is 6. Psychiatric:         Behavior: Behavior normal.         Thought Content:  Thought content normal.         Judgment: Judgment normal.         MDM:    Labs Reviewed   URINALYSIS - Abnormal; Notable for the following components:       Result Value    Glucose, Urine 100 (*)     Protein, UA 30 (*)     Urobilinogen, Urine 2.0 (*)     Nitrite Urine, Quantitative POSITIVE (*)     Leukocyte Esterase, Urine LARGE (*)     WBC, UA 10 (*)     Cast Type 0 (*)     Bacteria, UA 1+ (*)     Crystal Type 0 (*)     All other components within normal limits   CULTURE, URINE       No orders to display        Supportive care  I have discussed with the patient  my clinical impression and the result of the patient's current clinical evaluation for their presentation. In addition we discussed the risk and benefits of further testing and hospitalization. I discussed candidly with the patient  and the patient  was allowed to provide input as to their thoughts concerning the current presentation. Although the risk of progression or development of new more serious signs and symptoms cannot be excluded she appears to have uncomplicated UTI. My typical dicussion, presentation,and considerations for this patients' chief complaint, diagnosis, and differential diagnosis have been considered. I have stressed need for follow up and reexamination for this encounter. I have discussed my clinical impression and the results of the current evaluation. Patient was  prescribed Cipro and pyridium. The medication(s) use,  medication(s) safety and medication(s) interactions with already prescribed medication(s) have been explained and outlined for this encounter. The patient  was educated that it is their responsibility to verify this information is correct at the time of discharge and to contact this department of any complications with the pharmacy providing this medication(s) or if their any difficulty in obtaining this medication(s). Final Impression    1.  Urinary tract infection with hematuria, site unspecified             [address] Amy Lutz DO  05/19/22 [id number]

## 2022-05-20 LAB
CULTURE: NORMAL
Lab: NORMAL
SPECIMEN: NORMAL

## 2022-05-25 ENCOUNTER — HOSPITAL ENCOUNTER (EMERGENCY)
Age: 47
Discharge: HOME | End: 2022-05-26
Attending: EMERGENCY MEDICINE
Payer: COMMERCIAL

## 2022-05-25 ENCOUNTER — APPOINTMENT (OUTPATIENT)
Dept: CT IMAGING | Age: 47
End: 2022-05-25
Payer: COMMERCIAL

## 2022-05-25 VITALS
RESPIRATION RATE: 18 BRPM | OXYGEN SATURATION: 96 % | HEART RATE: 88 BPM | SYSTOLIC BLOOD PRESSURE: 108 MMHG | DIASTOLIC BLOOD PRESSURE: 57 MMHG | TEMPERATURE: 98.4 F | BODY MASS INDEX: 18.88 KG/M2 | WEIGHT: 110 LBS

## 2022-05-25 DIAGNOSIS — K59.00 CONSTIPATION, UNSPECIFIED CONSTIPATION TYPE: ICD-10-CM

## 2022-05-25 DIAGNOSIS — R35.0 URINARY FREQUENCY: Primary | ICD-10-CM

## 2022-05-25 LAB
BACTERIA: ABNORMAL /HPF
BILIRUBIN URINE: NEGATIVE MG/DL
BLOOD, URINE: ABNORMAL
CAST TYPE: NEGATIVE /HPF
CLARITY: ABNORMAL
COLOR: YELLOW
CRYSTAL TYPE: NEGATIVE /HPF
EPITHELIAL CELLS, UA: ABNORMAL /HPF
GLUCOSE, URINE: NEGATIVE MG/DL
KETONES, URINE: NEGATIVE MG/DL
LEUKOCYTE ESTERASE, URINE: NEGATIVE
NITRITE URINE, QUANTITATIVE: NEGATIVE
PH, URINE: 5.5 (ref 5–8)
PROTEIN UA: NEGATIVE MG/DL
RBC URINE: NEGATIVE /HPF (ref 0–6)
SPECIFIC GRAVITY UA: 1.01 (ref 1–1.03)
UROBILINOGEN, URINE: 0.2 MG/DL (ref 0.2–1)
WBC UA: NEGATIVE /HPF (ref 0–5)

## 2022-05-25 PROCEDURE — 99284 EMERGENCY DEPT VISIT MOD MDM: CPT

## 2022-05-25 PROCEDURE — 81001 URINALYSIS AUTO W/SCOPE: CPT

## 2022-05-25 PROCEDURE — 74176 CT ABD & PELVIS W/O CONTRAST: CPT

## 2022-05-25 RX ORDER — ACETAMINOPHEN 325 MG/1
650 TABLET ORAL ONCE
Status: COMPLETED | OUTPATIENT
Start: 2022-05-25 | End: 2022-05-26

## 2022-05-25 RX ORDER — OXYBUTYNIN CHLORIDE 5 MG/1
5 TABLET ORAL ONCE
Status: COMPLETED | OUTPATIENT
Start: 2022-05-25 | End: 2022-05-26

## 2022-05-26 LAB
ALBUMIN SERPL-MCNC: 4.4 GM/DL (ref 3.4–5)
ALP BLD-CCNC: 70 IU/L (ref 40–129)
ALT SERPL-CCNC: 25 U/L (ref 10–40)
ANION GAP SERPL CALCULATED.3IONS-SCNC: 6 MMOL/L (ref 4–16)
AST SERPL-CCNC: 33 IU/L (ref 15–37)
BASOPHILS ABSOLUTE: 0.1 K/CU MM
BASOPHILS RELATIVE PERCENT: 0.8 % (ref 0–1)
BILIRUB SERPL-MCNC: 0.2 MG/DL (ref 0–1)
BUN BLDV-MCNC: 18 MG/DL (ref 6–23)
CALCIUM SERPL-MCNC: 9.2 MG/DL (ref 8.3–10.6)
CHLORIDE BLD-SCNC: 105 MMOL/L (ref 99–110)
CO2: 27 MMOL/L (ref 21–32)
CREAT SERPL-MCNC: 0.9 MG/DL (ref 0.6–1.1)
DIFFERENTIAL TYPE: ABNORMAL
EOSINOPHILS ABSOLUTE: 0.3 K/CU MM
EOSINOPHILS RELATIVE PERCENT: 3.4 % (ref 0–3)
GFR AFRICAN AMERICAN: >60 ML/MIN/1.73M2
GFR NON-AFRICAN AMERICAN: >60 ML/MIN/1.73M2
GLUCOSE BLD-MCNC: 89 MG/DL (ref 70–99)
HCT VFR BLD CALC: 38.7 % (ref 37–47)
HEMOGLOBIN: 12.6 GM/DL (ref 12.5–16)
IMMATURE NEUTROPHIL %: 0.2 % (ref 0–0.43)
LYMPHOCYTES ABSOLUTE: 3.2 K/CU MM
LYMPHOCYTES RELATIVE PERCENT: 36.4 % (ref 24–44)
MCH RBC QN AUTO: 30.8 PG (ref 27–31)
MCHC RBC AUTO-ENTMCNC: 32.6 % (ref 32–36)
MCV RBC AUTO: 94.6 FL (ref 78–100)
MONOCYTES ABSOLUTE: 0.6 K/CU MM
MONOCYTES RELATIVE PERCENT: 6.9 % (ref 0–4)
PDW BLD-RTO: 12.1 % (ref 11.7–14.9)
PLATELET # BLD: 167 K/CU MM (ref 140–440)
PMV BLD AUTO: 11.3 FL (ref 7.5–11.1)
POTASSIUM SERPL-SCNC: 4.2 MMOL/L (ref 3.5–5.1)
RBC # BLD: 4.09 M/CU MM (ref 4.2–5.4)
SEGMENTED NEUTROPHILS ABSOLUTE COUNT: 4.6 K/CU MM
SEGMENTED NEUTROPHILS RELATIVE PERCENT: 52.3 % (ref 36–66)
SODIUM BLD-SCNC: 138 MMOL/L (ref 135–145)
TOTAL IMMATURE NEUTOROPHIL: 0.02 K/CU MM
TOTAL PROTEIN: 6.6 GM/DL (ref 6.4–8.2)
WBC # BLD: 8.8 K/CU MM (ref 4–10.5)

## 2022-05-26 PROCEDURE — 80053 COMPREHEN METABOLIC PANEL: CPT

## 2022-05-26 PROCEDURE — 85025 COMPLETE CBC W/AUTO DIFF WBC: CPT

## 2022-05-26 PROCEDURE — 6370000000 HC RX 637 (ALT 250 FOR IP): Performed by: EMERGENCY MEDICINE

## 2022-05-26 RX ORDER — SENNOSIDES 8.6 MG
1 TABLET ORAL DAILY
Qty: 30 TABLET | Refills: 0 | Status: SHIPPED | OUTPATIENT
Start: 2022-05-26 | End: 2022-06-24 | Stop reason: ALTCHOICE

## 2022-05-26 RX ORDER — OXYBUTYNIN CHLORIDE 5 MG/1
5 TABLET, EXTENDED RELEASE ORAL DAILY
Qty: 30 TABLET | Refills: 0 | Status: SHIPPED | OUTPATIENT
Start: 2022-05-26 | End: 2022-06-24 | Stop reason: ALTCHOICE

## 2022-05-26 RX ORDER — SODIUM PHOSPHATE, DIBASIC AND SODIUM PHOSPHATE, MONOBASIC 7; 19 G/133ML; G/133ML
1 ENEMA RECTAL
Qty: 118 ML | Refills: 0 | Status: SHIPPED | OUTPATIENT
Start: 2022-05-26 | End: 2022-05-26

## 2022-05-26 RX ORDER — DOCUSATE SODIUM 100 MG/1
100 CAPSULE, LIQUID FILLED ORAL 2 TIMES DAILY
Qty: 60 CAPSULE | Refills: 0 | Status: SHIPPED | OUTPATIENT
Start: 2022-05-26 | End: 2022-06-24 | Stop reason: ALTCHOICE

## 2022-05-26 RX ADMIN — ACETAMINOPHEN 650 MG: 325 TABLET ORAL at 00:17

## 2022-05-26 RX ADMIN — OXYBUTYNIN CHLORIDE 5 MG: 5 TABLET ORAL at 00:17

## 2022-05-26 ASSESSMENT — PAIN DESCRIPTION - LOCATION: LOCATION: ABDOMEN

## 2022-05-26 ASSESSMENT — PAIN DESCRIPTION - ORIENTATION: ORIENTATION: LOWER;MID

## 2022-05-26 ASSESSMENT — PAIN SCALES - GENERAL: PAINLEVEL_OUTOF10: 6

## 2022-05-26 ASSESSMENT — PAIN DESCRIPTION - DESCRIPTORS: DESCRIPTORS: SHARP;THROBBING

## 2022-05-26 NOTE — ED PROVIDER NOTES
Triage Chief Complaint:   Cystitis (abx for a week. Still having bladder pain )    [place]:  Alison Otero is a 42 y.o. female that presents with persistent urinary frequency. Patient reports that she was recently evaluated here in the emergency department placed on antibiotics for urinary tract infection. Patient reports that she is still having increased urinary frequency and lower abdominal pains. No nausea or vomiting. No flank pains. No fevers or chills. Patient has had some constipation issues. No diarrhea. No vaginal bleeding or discharge. Patient is not sexually active for last 3 years and is not concerned regarding STDs. ROS:  General:  No fevers, no chills, no weakness  Eyes:  No recent vison changes, no discharge  ENT:  No sore throat, no nasal congestion, no hearing changes  Cardiovascular:  No chest pain, no palpitations  Respiratory:  No shortness of breath, no cough, no wheezing  Gastrointestinal:  + pain, no nausea, no vomiting, no diarrhea, + constipation  Musculoskeletal:  No muscle pain, no joint pain  Skin:  No rash, no pruritis, no easy bruising  Neurologic:  No speech problems, no headache, no extremity numbness, no extremity tingling, no extremity weakness  Psychiatric:  No anxiety  Genitourinary:  No dysuria, no hematuria, + frequency  Endocrine:  No unexpected weight gain, no unexpected weight loss  Extremities:  no edema, no pain    Past Medical History:   Diagnosis Date    ADHD     Arthritis     OSTEO ARTHITIS    Bipolar disorder, unspecified ([address]     Cholecystenteric fistula     COPD (chronic obstructive pulmonary disease) (HCC)     Degenerative disc disease     Fibromyalgia     Generalized anxiety disorder     H/O 24 hour EKG monitoring 02/16/2017      Sinus tach events , no major arrhythmias , follow up in office as routine     H/O echocardiogram 03/01/2017    EF 55-60% Normal LV structure  and systolic function.      H/O exercise stress test 03/01/2017    Normal stress test. Patient has physical deconditioning as she achieved target HR in 2 mins. Recommend to increase PO fluids intake and exercise training.  Headache     History of cardiac monitoring 09/21/2020    Normal 30-day event monitor with average heart rate of 63 lowest heart rate of 57 highest heart rate of 82 patient reported episodes of dizziness which did not correlate with any arrhythmia.   There were no episodes of tachycardia uneventful monitor however average heart rate and even the highest heart rate is on the lower side     Osteopenia     Osteoporosis     PTSD (post-traumatic stress disorder)     S/P cholecystectomy 11/22/2011    Schizo-affective schizophrenia ([facility] [address]     Sjoegren syndrome     Syncope     Tardive dyskinesia     Venous insufficiency of leg 2012    Vertigo      Past Surgical History:   Procedure Laterality Date    CHOLECYSTECTOMY  11/22/2011    laparoscopic    COLONOSCOPY      DENTAL SURGERY  8/9    ELBOW ARTHROSCOPY      bilateral elbows    ENDOSCOPY, COLON, DIAGNOSTIC      HYSTERECTOMY      OTHER SURGICAL HISTORY      venous ablation, bilateral legs    TUBAL LIGATION       Family History   Problem Relation Age of Onset    Other Mother         Neurocardiogenic syncope    ADHD Mother     Alcohol Abuse Father     Bipolar Disorder Sister     Anxiety Disorder Sister     Bipolar Disorder Sister     OCD Sister     Heart Surgery Maternal Grandmother     ADHD Daughter     ADHD Daughter     Lupus Paternal Uncle     Other Paternal Uncle         myasthenia gravis     Social History     Socioeconomic History    Marital status: Single     Spouse name: Not on file    Number of children: Not on file    Years of education: Not on file    Highest education level: Not on file   Occupational History    Not on file   Tobacco Use    Smoking status: Current Every Day Smoker     Packs/day: 0.25     Years: 20.00     Pack years: 5.00     Types: Cigarettes    Smokeless tobacco: Never Used   Vaping Use    Vaping Use: Former    Substances: Always   Substance and Sexual Activity    Alcohol use: No    Drug use: Not Currently     Types: Methamphetamines (Crystal Meth)     Comment: clean for 11 years, past hx of crack use    Sexual activity: Yes     Partners: Male   Other Topics Concern    Not on file   Social History Narrative    Not on file     Social Determinants of Health     Financial Resource Strain: Low Risk     Difficulty of Paying Living Expenses: Not hard at all   Food Insecurity: Food Insecurity Present    Worried About [facility] in the Last Year: Sometimes true    Ursula of Food in the Last Year: Sometimes true   Transportation Needs:     Lack of Transportation (Medical): Not on file    Lack of Transportation (Non-Medical): Not on file   Physical Activity:     Days of Exercise per Week: Not on file    Minutes of Exercise per Session: Not on file   Stress:     Feeling of Stress : Not on file   Social Connections:     Frequency of Communication with Friends and Family: Not on file    Frequency of Social Gatherings with Friends and Family: Not on file    Attends Sikhism Services: Not on file    Active Member of [address] [place] [address] or Organizations: Not on file    Attends Club or Organization Meetings: Not on file    Marital Status: Not on file   Intimate Partner Violence:     Fear of Current or Ex-Partner: Not on file    Emotionally Abused: Not on file    Physically Abused: Not on file    Sexually Abused: Not on file   Housing Stability:     Unable to Pay for Housing in the Last Year: Not on file    Number of [place] in the Last Year: Not on file    Unstable Housing in the Last Year: Not on file     No current facility-administered medications for this encounter.      Current Outpatient Medications   Medication Sig Dispense Refill    docusate sodium (COLACE) 100 mg capsule Take 1 capsule by mouth 2 times daily 60 capsule 0    senna (SENOKOT) 8.6 MG TABS tablet Take 1 tablet by mouth daily 30 tablet 0    Sodium Phosphates (FLEET) 7-19 GM/118ML Place 1 enema rectally once as needed (constipation) 118 mL 0    oxybutynin (DITROPAN XL) 5 MG extended release tablet Take 1 tablet by mouth daily 30 tablet 0    ciprofloxacin (CIPRO) 500 MG tablet Take 1 tablet by mouth 2 times daily for 10 days 20 tablet 0    LORazepam (ATIVAN) 0.5 MG tablet TAKE ONE TABLET BY MOUTH THREE TIMES A DAY 90 tablet 0    traZODone (DESYREL) 100 MG tablet Take 1 tablet by mouth nightly as needed for Sleep 30 tablet 2    pramipexole (MIRAPEX) 0.75 MG tablet       UBRELVY 50 MG TABS       atomoxetine (STRATTERA) 80 MG capsule Take 1 capsule by mouth daily 30 capsule 3    EMGALITY 120 MG/ML SOAJ       pregabalin (LYRICA) 50 MG capsule Take 1 capsule by mouth 3 times daily for 30 days. 90 capsule 2    INGREZZA 80 MG CAPS TAKE 1 CAPSULE BY MOUTH DAILY 30 capsule 5    acetaminophen (TYLENOL) 325 MG tablet Take 650 mg by mouth every 6 hours as needed for Pain      ibuprofen (ADVIL;MOTRIN) 400 MG tablet Take 400 mg by mouth every 6 hours as needed for Pain      vitamin B-12 (CYANOCOBALAMIN) 1000 MCG tablet Take 1,000 mcg by mouth daily       Allergies   Allergen Reactions    Rozerem [Ramelteon] Other (See Comments)     Increased symptoms of TD    Codeine Itching and Nausea And Vomiting    Imitrex [Sumatriptan] Itching and Nausea And Vomiting     PASSED OUT    Abilify [Aripiprazole] Other (See Comments)     Patient reports symptoms of TD    Amitriptyline     Botox [Onabotulinumtoxina] Other (See Comments)     Tardive dyskinesia    Compazine [Prochlorperazine Maleate] Other (See Comments)    Meclizine     Topamax [Topiramate]      Made TD worse    Cephalexin Nausea And Vomiting and Other (See Comments)     ABDOMINAL PAIN FOR 2 WEEKS    Magnesium-Containing Compounds Other (See Comments)     Pt sts \"they called it the mags. I start twitching. \"     Meclizine Hcl Other (See Comments) \"Makes me stutter and twitch. \"    Nsaids Other (See Comments)     Tardive dyskinisia    Pcn [Penicillins] Nausea And Vomiting and Other (See Comments)     HEADACHE    Reglan [Metoclopramide] Other (See Comments)     Tardive dyskinisia    Toradol [Ketorolac Tromethamine] Other (See Comments)     Tremors    Vistaril [Hydroxyzine Hcl] Other (See Comments)     dyskinsia      Zofran [Ondansetron Hcl] Other (See Comments)     Cramping and burning in stomach       Nursing Notes Reviewed    Physical Exam:  ED Triage Vitals [05/25/22 2200]   Enc Vitals Group      BP (!) 108/57      Heart Rate 88      Resp 18      Temp 98.4 °F (36.9 °C)      Temp Source Oral      SpO2 96 %      Weight 110 lb (49.9 kg)      Height       Head Circumference       Peak Flow       Pain Score       Pain Loc       Pain Edu? Excl. in [address]? My pulse ox interpretation is - normal    General appearance:  No acute distress. Appears overall nontoxic. Skin:  Warm. Dry. Eye:  Extraocular movements intact. Ears, nose, mouth and throat:  Oral mucosa moist   Neck:  Trachea midline. Extremity:  No swelling. Normal ROM     Heart:  Regular rate and rhythm, normal S1 & S2, no extra heart sounds. Perfusion:  Intact   Respiratory:  Lungs clear to auscultation bilaterally. Respirations nonlabored. Abdominal:  Normal bowel sounds. Soft. Mild diffuse lower abdominal tenderness to palpation is worse in suprapubic region. Non distended. Thin. Back:  No CVA tenderness to palpation     Neurological:  Alert and oriented times 3. No focal neuro deficits.              Psychiatric:  Appropriate    I have reviewed and interpreted all of the currently available lab results from this visit (if applicable):  Results for orders placed or performed during the hospital encounter of 05/25/22   Urinalysis   Result Value Ref Range    Color, UA YELLOW YELLOW    Clarity, UA SL HAZY CLEAR    Glucose, Urine NEGATIVE NEGATIVE MG/DL    Bilirubin Urine NEGATIVE NEGATIVE MG/DL    Ketones, Urine NEGATIVE NEGATIVE MG/DL    Specific Gravity, UA 1.010 1.001 - 1.035    Blood, Urine TRACE NEGATIVE    pH, Urine 5.5 5.0 - 8.0    Protein, UA NEGATIVE NEGATIVE MG/DL    Urobilinogen, Urine 0.2 0.2 - 1.0 MG/DL    Nitrite Urine, Quantitative NEGATIVE NEGATIVE    Leukocyte Esterase, Urine NEGATIVE NEGATIVE    RBC, UA NEGATIVE 0 - 6 /HPF    WBC, UA NEGATIVE 0 - 5 /HPF    Epithelial Cells, UA 0 TO 1 /HPF    Cast Type NEGATIVE (A) NO CAST FORMS SEEN /HPF    Bacteria, UA FEW NEGATIVE /HPF    Crystal Type NEGATIVE NEGATIVE /HPF   CBC with Auto Differential   Result Value Ref Range    WBC 8.8 4.0 - 10.5 K/CU MM    RBC 4.09 (L) 4.2 - 5.4 M/CU MM    Hemoglobin 12.6 12.5 - 16.0 GM/DL    Hematocrit 38.7 37 - 47 %    MCV 94.6 78 - 100 FL    MCH 30.8 27 - 31 PG    MCHC 32.6 32.0 - 36.0 %    RDW 12.1 11.7 - 14.9 %    Platelets 759 147 - 298 K/CU MM    MPV 11.3 (H) 7.5 - 11.1 FL    Differential Type AUTOMATED DIFFERENTIAL     Segs Relative 52.3 36 - 66 %    Lymphocytes % 36.4 24 - 44 %    Monocytes % 6.9 (H) 0 - 4 %    Eosinophils % 3.4 (H) 0 - 3 %    Basophils % 0.8 0 - 1 %    Segs Absolute 4.6 K/CU MM    Lymphocytes Absolute 3.2 K/CU MM    Monocytes Absolute 0.6 K/CU MM    Eosinophils Absolute 0.3 K/CU MM    Basophils Absolute 0.1 K/CU MM    Immature Neutrophil % 0.2 0 - 0.43 %    Total Immature Neutrophil 0.02 K/CU MM   Comprehensive Metabolic Panel w/ Reflex to MG   Result Value Ref Range    Sodium 138 135 - 145 MMOL/L    Potassium 4.2 3.5 - 5.1 MMOL/L    Chloride 105 99 - 110 mMol/L    CO2 27 21 - 32 MMOL/L    BUN 18 6 - 23 MG/DL    CREATININE 0.9 0.6 - 1.1 MG/DL    Glucose 89 70 - 99 MG/DL    Calcium 9.2 8.3 - 10.6 MG/DL    Albumin 4.4 3.4 - 5.0 GM/DL    Total Protein 6.6 6.4 - 8.2 GM/DL    Total Bilirubin 0.2 0.0 - 1.0 MG/DL    ALT 25 10 - 40 U/L    AST 33 15 - 37 IU/L    Alkaline Phosphatase 70 40 - 129 IU/L    GFR Non-African American >60 >60 mL/min/1.73m2    GFR African American >60 >60 mL/min/1.73m2    Anion Gap 6 4 - 16      Radiographs (if obtained):  [] The following radiograph was interpreted by myself in the absence of a radiologist:   [x] Radiologist's Report Reviewed:  CT ABDOMEN PELVIS WO CONTRAST Additional Contrast? None   Final Result   1. Large amount of stool present throughout the colon, consistent with   constipation   2. No evidence of obstructive uropathy   3. Normal appearance to the urinary bladder               EKG (if obtained): (All EKG's are interpreted by myself in the absence of a cardiologist)    Chart review shows recent radiographs:  CT ABDOMEN PELVIS WO CONTRAST Additional Contrast? None    Result Date: 5/26/2022  EXAMINATION: CT OF THE ABDOMEN AND PELVIS WITHOUT CONTRAST 5/25/2022 8:53 pm TECHNIQUE: CT of the abdomen and pelvis was performed without the administration of intravenous contrast. Multiplanar reformatted images are provided for review. Automated exposure control, iterative reconstruction, and/or weight based adjustment of the mA/kV was utilized to reduce the radiation dose to as low as reasonably achievable. COMPARISON: September 2, 2021 HISTORY: ORDERING SYSTEM PROVIDED HISTORY: lower abd pain, urinary frequency, recent uti TECHNOLOGIST PROVIDED HISTORY: Reason for exam:->lower abd pain, urinary frequency, recent uti Additional Contrast?->None Decision Support Exception - unselect if not a suspected or confirmed emergency medical condition->Emergency Medical Condition (MA) Is the patient pregnant?->No Reason for Exam: pelvic pain, pt states feels like a bladder infection FINDINGS: Lower Chest: Dependent changes are present within the lung bases. Organs: The unenhanced liver, spleen, pancreas, and adrenal glands appear normal.  The gallbladder surgically absent. No intrarenal calculi or evidence of hydronephrosis is present. GI/Bowel: Stomach appears grossly normal.  No evidence of small-bowel obstruction is present.   Large amount of stool is present throughout the colon. The appendix is normal. Pelvis: Urinary bladder is incompletely distended. Uterus is surgically absent. Peritoneum/Retroperitoneum: No free fluid or free air is present within the abdomen or pelvis. No aneurysm formation is noted. No pathological adenopathy is present. Bones/Soft Tissues: No acute osseous abnormality is present. 1. Large amount of stool present throughout the colon, consistent with constipation 2. No evidence of obstructive uropathy 3. Normal appearance to the urinary bladder       MDM:  Pt presents as above. Emergent conditions considered. Presentation prompted initial labs and imaging. CBC and CMP without clinically significant derangement. hCG is not sent as patient is status post hysterectomy. Urinalysis is not consistent with a urinary tract infection. CT imaging of patient's abdomen pelvis does demonstrate large amount of stool throughout the colon consistent with her underlying constipation. No obstructive uropathy or other acute process noted. I do believe patient's constipation is because majority of her pain complaints. Patient was trialed on oxybutynin here for her urinary frequency and potential bladder spasms with improvement. I will place patient on senna and Colace and provide a Fleet enema based on her allergies for further assistance with her constipation. Short course of Ditropan for bladder spasms. PCP follow-up encouraged. I discussed specific signs and symptoms on when to return to the emergency department as well as the need for close outpatient follow-up. Questions sought and answered with the patient. They voice understanding and agree with plan. Is this patient to be included in the SEP-1 Core Measure due to severe sepsis or septic shock? No   Exclusion criteria - the patient is NOT to be included for SEP-1 Core Measure due to:   Infection is not suspected            Care of this patient occurred during the COVID-19 pandemic. Clinical Impression:  1. Urinary frequency    2. Constipation, unspecified constipation type      Disposition referral (if applicable):  HANNA Lundy - NP  [address] Emergency Department  [address] [address]  [phone]  Today  If symptoms worsen    Disposition medications (if applicable):  Discharge Medication List as of 5/26/2022  1:31 AM      START taking these medications    Details   docusate sodium (COLACE) 100 mg capsule Take 1 capsule by mouth 2 times daily, Disp-60 capsule, R-0Normal      senna (SENOKOT) 8.6 MG TABS tablet Take 1 tablet by mouth daily, Disp-30 tablet, R-0Normal      Sodium Phosphates (FLEET) 7-19 GM/118ML Place 1 enema rectally once as needed (constipation), Disp-118 mL, R-0Normal      oxybutynin (DITROPAN XL) 5 MG extended release tablet Take 1 tablet by mouth daily, Disp-30 tablet, R-0Normal             Comment: Please note this report has been produced using speech recognition software and may contain errors related to that system including errors in grammar, punctuation, and spelling, as well as words and phrases that may be inappropriate. If there are any questions or concerns please feel free to contact the dictating provider for clarification.        Lazarus Avena, MD  05/26/22 [id number]

## 2022-05-26 NOTE — ED NOTES
Pt discharged with instructions and prescriptions. Discussed when and how to take medications and pt stated understanding.   Pt walked out of the [id number], RN  05/26/22 7105

## 2022-05-27 ENCOUNTER — TELEMEDICINE (OUTPATIENT)
Dept: FAMILY MEDICINE CLINIC | Age: 47
End: 2022-05-27
Payer: COMMERCIAL

## 2022-05-27 DIAGNOSIS — F90.0 ATTENTION DEFICIT HYPERACTIVITY DISORDER (ADHD), PREDOMINANTLY INATTENTIVE TYPE: ICD-10-CM

## 2022-05-27 DIAGNOSIS — N64.52 NIPPLE DISCHARGE IN FEMALE: ICD-10-CM

## 2022-05-27 DIAGNOSIS — K59.00 CONSTIPATION, UNSPECIFIED CONSTIPATION TYPE: ICD-10-CM

## 2022-05-27 DIAGNOSIS — F33.1 MODERATE EPISODE OF RECURRENT MAJOR DEPRESSIVE DISORDER (HCC): Primary | ICD-10-CM

## 2022-05-27 DIAGNOSIS — Z12.31 SCREENING MAMMOGRAM FOR BREAST CANCER: ICD-10-CM

## 2022-05-27 DIAGNOSIS — F41.1 GAD (GENERALIZED ANXIETY DISORDER): ICD-10-CM

## 2022-05-27 PROCEDURE — G8420 CALC BMI NORM PARAMETERS: HCPCS | Performed by: NURSE PRACTITIONER

## 2022-05-27 PROCEDURE — 99214 OFFICE O/P EST MOD 30 MIN: CPT | Performed by: NURSE PRACTITIONER

## 2022-05-27 PROCEDURE — G8428 CUR MEDS NOT DOCUMENT: HCPCS | Performed by: NURSE PRACTITIONER

## 2022-05-27 PROCEDURE — 4004F PT TOBACCO SCREEN RCVD TLK: CPT | Performed by: NURSE PRACTITIONER

## 2022-05-27 RX ORDER — LORAZEPAM 0.5 MG/1
0.5 TABLET ORAL 3 TIMES DAILY
Qty: 90 TABLET | Refills: 2 | Status: SHIPPED | OUTPATIENT
Start: 2022-05-27 | End: 2022-06-24

## 2022-05-27 RX ORDER — ATOMOXETINE 60 MG/1
60 CAPSULE ORAL DAILY
Qty: 30 CAPSULE | Refills: 3 | Status: SHIPPED | OUTPATIENT
Start: 2022-05-27 | End: 2022-09-23 | Stop reason: SDUPTHER

## 2022-05-27 ASSESSMENT — ENCOUNTER SYMPTOMS
NAUSEA: 0
CHEST TIGHTNESS: 0
WHEEZING: 0
VOMITING: 0
ABDOMINAL PAIN: 0
DIARRHEA: 0

## 2022-05-27 NOTE — PROGRESS NOTES
2022    TELEHEALTH EVALUATION -- Audio/Visual (During LPYEB-18 public health emergency)    HPI:    Alisson Mcallister (:  1975) has requested an audio/video evaluation for the following concern(s):    ADHD/Anxiety/Depression  Current treatment: Strattera-80, which has been somewhat effective. Residual symptoms: no motivation. Since increasing Strattera she feels more sedated and less motivated. Patient denies anorexia, nausea, vomiting, abdominal pain, involuntary weight loss, palpitations, insomnia, irritability, headache and tremor. Anxiety and depression are currently well controlled. She is prescribed Ativan 0.5 mg TID for STEPHANIA. OARRS reviewed; medication last filled 2022. Constipation  It has been over a week since her last BM. She is passing gas and has been feeling mildly nauseated. She denies abdominal bloating or cramping. She has been taking Senna daily with no BM. Nipple Discharge  She notes green drainage from both nipples starting a couple of months ago. She denies lumps, skin color changes or foul odor. She has not had a mammogram.     Review of Systems   Constitutional: Negative for activity change, appetite change and fatigue. HENT: Negative. Eyes: Negative for visual disturbance. Respiratory: Negative for chest tightness and wheezing. Cardiovascular: Negative. Negative for chest pain and palpitations. Gastrointestinal: Positive for constipation. Negative for abdominal distention, abdominal pain, diarrhea, nausea and vomiting. Endocrine: Negative for cold intolerance, heat intolerance, polydipsia, polyphagia and polyuria. Musculoskeletal: Negative for arthralgias and myalgias. Skin: Negative. Neurological: Positive for headaches (well controlled). Negative for dizziness, seizures, syncope, facial asymmetry, speech difficulty, light-headedness and numbness.    Psychiatric/Behavioral: Positive for decreased concentration, dysphoric mood, hallucinations and sleep disturbance. Negative for agitation, behavioral problems, confusion, self-injury and suicidal ideas. The patient is nervous/anxious and is hyperactive. Symptoms are improving       Prior to Visit Medications    Medication Sig Taking? Authorizing Provider   magnesium citrate solution Take 296 mLs by mouth once for 1 dose Yes HANNA Starr NP   atomoxetine (STRATTERA) 60 MG capsule Take 1 capsule by mouth daily Yes HANNA Mcelroy NP   LORazepam (ATIVAN) 0.5 MG tablet Take 1 tablet by mouth 3 times daily for 30 days. Yes HANNA Mcelroy NP   docusate sodium (COLACE) 100 mg capsule Take 1 capsule by mouth 2 times daily Yes Yaw Bolton MD   senna (SENOKOT) 8.6 MG TABS tablet Take 1 tablet by mouth daily Yes Yaw Bolton MD   oxybutynin (DITROPAN XL) 5 MG extended release tablet Take 1 tablet by mouth daily Yes Yaw Bolton MD   ciprofloxacin (CIPRO) 500 MG tablet Take 1 tablet by mouth 2 times daily for 10 days Yes [address], DO   traZODone (DESYREL) 100 MG tablet Take 1 tablet by mouth nightly as needed for Sleep Yes HANNA Mcelroy NP   pramipexole (MIRAPEX) 0.75 MG tablet  Yes Historical Provider, MD   UBRELVY 50 MG TABS  Yes Historical Provider, MD   EMGALITY 120 MG/ML SOAJ  Yes Historical Provider, MD   pregabalin (LYRICA) 50 MG capsule Take 1 capsule by mouth 3 times daily for 30 days.  Yes HANNA Starr NP   INGREZZA 80 MG CAPS TAKE 1 CAPSULE BY MOUTH DAILY Yes HANNA Starr NP   acetaminophen (TYLENOL) 325 MG tablet Take 650 mg by mouth every 6 hours as needed for Pain Yes Historical Provider, MD   ibuprofen (ADVIL;MOTRIN) 400 MG tablet Take 400 mg by mouth every 6 hours as needed for Pain Yes Historical Provider, MD   vitamin B-12 (CYANOCOBALAMIN) 1000 MCG tablet Take 1,000 mcg by mouth daily Yes Historical Provider, MD       Social History     Tobacco Use    Smoking status: Current Every Day Smoker     Packs/day: 0.25     Years: 20.00     Pack years: 5.00     Types: Cigarettes    Smokeless tobacco: Never Used   Vaping Use    Vaping Use: Former    Substances: Always   Substance Use Topics    Alcohol use: No    Drug use: Not Currently     Types: Methamphetamines (Crystal Meth)     Comment: clean for 11 years, past hx of crack use        Allergies   Allergen Reactions    Rozerem [Ramelteon] Other (See Comments)     Increased symptoms of TD    Codeine Itching and Nausea And Vomiting    Imitrex [Sumatriptan] Itching and Nausea And Vomiting     PASSED OUT    Abilify [Aripiprazole] Other (See Comments)     Patient reports symptoms of TD    Amitriptyline     Botox [Onabotulinumtoxina] Other (See Comments)     Tardive dyskinesia    Compazine [Prochlorperazine Maleate] Other (See Comments)    Meclizine     Topamax [Topiramate]      Made TD worse    Cephalexin Nausea And Vomiting and Other (See Comments)     ABDOMINAL PAIN FOR 2 WEEKS    Magnesium-Containing Compounds Other (See Comments)     Pt sts \"they called it the mags. I start twitching. \"     Meclizine Hcl Other (See Comments)     \"Makes me stutter and twitch. \"    Nsaids Other (See Comments)     Tardive dyskinisia    Pcn [Penicillins] Nausea And Vomiting and Other (See Comments)     HEADACHE    Reglan [Metoclopramide] Other (See Comments)     Tardive dyskinisia    Toradol [Ketorolac Tromethamine] Other (See Comments)     Tremors    Vistaril [Hydroxyzine Hcl] Other (See Comments)     dyskinsia      Zofran [Ondansetron Hcl] Other (See Comments)     Cramping and burning in stomach   ,   Past Medical History:   Diagnosis Date    ADHD     Arthritis     OSTEO ARTHITIS    [facility] disorder, unspecified [facility]     Cholecystenteric fistula     COPD (chronic obstructive pulmonary disease) ([facility]     Degenerative disc disease     Fibromyalgia     Generalized anxiety disorder     H/O 24 hour EKG monitoring 02/16/2017      Sinus tach events , no major arrhythmias , follow up in office as routine     H/O echocardiogram 03/01/2017    EF 55-60% Normal LV structure  and systolic function.  H/O exercise stress test 03/01/2017    Normal stress test. Patient has physical deconditioning as she achieved target HR in 2 mins. Recommend to increase PO fluids intake and exercise training.  Headache     History of cardiac monitoring 09/21/2020    Normal 30-day event monitor with average heart rate of 63 lowest heart rate of 57 highest heart rate of 82 patient reported episodes of dizziness which did not correlate with any arrhythmia. There were no episodes of tachycardia uneventful monitor however average heart rate and even the highest heart rate is on the lower side     Osteopenia     Osteoporosis     PTSD (post-traumatic stress disorder)     S/P cholecystectomy 11/22/2011    Schizo-affective schizophrenia ([facility]     Sjoegren syndrome     Syncope     Tardive dyskinesia     Venous insufficiency of leg 2012    Vertigo        PHYSICAL EXAMINATION:  [ INSTRUCTIONS:  \"[x]\" Indicates a positive item  \"[]\" Indicates a negative item  -- DELETE ALL ITEMS NOT EXAMINED]  Vital Signs: Pt unable to obtain VS    Constitutional: [x] Appears well-developed and well-nourished [x] No apparent distress      [] Abnormal-   Mental status  [x] Alert and awake  [x] Oriented to person/place/time [x]Able to follow commands      Eyes:  EOM    [x]  Normal  [] Abnormal-  Sclera  []  Normal  [] Abnormal -         Discharge []  None visible  [] Abnormal -    HENT:   [x] Normocephalic, atraumatic.   [] Abnormal   [x] Mouth/Throat: Mucous membranes are moist.     External Ears [x] Normal  [] Abnormal-     Neck: [x] No visualized mass     Pulmonary/Chest: [x] Respiratory effort normal.  [x] No visualized signs of difficulty breathing or respiratory distress        [] Abnormal-      Musculoskeletal:   [] Normal gait with no signs of ataxia         [x] Normal range of motion of neck        [] Abnormal- Skin:        [x] No significant exanthematous lesions or discoloration noted on facial skin         [] Abnormal- unable to complete breast exam           Psychiatric:       [x] Normal Affect [x] No Hallucinations        [] Abnormal-     ASSESSMENT/PLAN:  1. Moderate episode of recurrent major depressive disorder (HCC)  Will decrease Strattera to 60 mg daily due to increased sedation. Patient to call if any concerns or SI before their follow up appointment. If she develops suicidal thoughts with a plan, she is instructed to go to emergency room immediately. Behavioral counseling recommended. - atomoxetine (STRATTERA) 60 MG capsule; Take 1 capsule by mouth daily  Dispense: 30 capsule; Refill: 3    2. STEPHANIA (generalized anxiety disorder)  A discussion regarding medication was held with the patient. Discussed the dangerous nature of narcotic/benzo medicines, including the risk of respiratory depression, death and addiction. OARRS reviewed. - atomoxetine (STRATTERA) 60 MG capsule; Take 1 capsule by mouth daily  Dispense: 30 capsule; Refill: 3  - LORazepam (ATIVAN) 0.5 MG tablet; Take 1 tablet by mouth 3 times daily for 30 days. Dispense: 90 tablet; Refill: 2    3. Attention deficit hyperactivity disorder (ADHD), predominantly inattentive type  Will decrease Strattera to 60 mg daily for ADHD, STEPHANIA, and MDD  - atomoxetine (STRATTERA) 60 MG capsule; Take 1 capsule by mouth daily  Dispense: 30 capsule; Refill: 3    4. Constipation, unspecified constipation type  Increase your daily fiber intake; eating foods high in fiber like fresh fruits with the peel & fresh vegetables, and whole grain foods. Limit cheese as it can cause constipation. Make sure that you are drinking at least 8 glasses of water every day. May take a daily fiber supplement like Metamucil or Benefiber. Return for new or worsening symptoms.    - magnesium citrate solution;  Take 296 mLs by mouth once for 1 dose  Dispense: 296 mL; Refill: 0    5. Screening mammogram for breast cancer  - [facility] DIGITAL SCREEN W CAD BILATERAL; Future    6. Nipple discharge in female  Will check labs. - [facility] DIGITAL SCREEN W CAD BILATERAL; Future    Controlled Substance Monitoring:    Acute and Chronic Pain Monitoring:   RX Monitoring 5/27/2022   Periodic Controlled Substance Monitoring Possible medication side effects, risk of tolerance/dependence & alternative treatments discussed. ;No signs of potential drug abuse or diversion identified.;Obtaining appropriate analgesic effect of treatment. ;Assessed functional status. No follow-ups on file. [place], was evaluated through a synchronous (real-time) audio-video encounter. The patient (or guardian if applicable) is aware that this is a billable service, which includes applicable co-pays. This Virtual Visit was conducted with patient's (and/or legal guardian's) consent. The visit was conducted pursuant to the emergency declaration under the [address] [place] [address], [address] [place] [address] [facility] authority and the [employer] and [employer] General Act. Patient identification was verified, and a caregiver was present when appropriate. The patient was located at Home: [address] [place] [address]. Provider was located at [facility] (Appt Dept): [address]. Total time spent on this encounter: 30 minutes    --HANNA Alfonso NP on 5/27/2022 at 2:57 PM    An electronic signature was used to authenticate this note.

## 2022-05-31 ASSESSMENT — ENCOUNTER SYMPTOMS
CONSTIPATION: 1
ABDOMINAL DISTENTION: 0

## 2022-06-15 ENCOUNTER — HOSPITAL ENCOUNTER (EMERGENCY)
Age: 47
Discharge: HOME | End: 2022-06-15
Attending: EMERGENCY MEDICINE
Payer: COMMERCIAL

## 2022-06-15 ENCOUNTER — APPOINTMENT (OUTPATIENT)
Dept: CT IMAGING | Age: 47
End: 2022-06-15
Payer: COMMERCIAL

## 2022-06-15 VITALS
SYSTOLIC BLOOD PRESSURE: 105 MMHG | DIASTOLIC BLOOD PRESSURE: 68 MMHG | HEART RATE: 93 BPM | RESPIRATION RATE: 18 BRPM | TEMPERATURE: 98.3 F | WEIGHT: 110 LBS | HEIGHT: 64 IN | OXYGEN SATURATION: 98 % | BODY MASS INDEX: 18.78 KG/M2

## 2022-06-15 DIAGNOSIS — R51.9 ACUTE INTRACTABLE HEADACHE, UNSPECIFIED HEADACHE TYPE: Primary | ICD-10-CM

## 2022-06-15 DIAGNOSIS — T67.5XXA HEAT EXHAUSTION, INITIAL ENCOUNTER: ICD-10-CM

## 2022-06-15 LAB
ALBUMIN SERPL-MCNC: 4.3 GM/DL (ref 3.4–5)
ALP BLD-CCNC: 71 IU/L (ref 40–129)
ALT SERPL-CCNC: 20 U/L (ref 10–40)
ANION GAP SERPL CALCULATED.3IONS-SCNC: 11 MMOL/L (ref 4–16)
AST SERPL-CCNC: 23 IU/L (ref 15–37)
BASOPHILS ABSOLUTE: 0.1 K/CU MM
BASOPHILS RELATIVE PERCENT: 0.7 % (ref 0–1)
BILIRUB SERPL-MCNC: 0.2 MG/DL (ref 0–1)
BUN BLDV-MCNC: 12 MG/DL (ref 6–23)
CALCIUM SERPL-MCNC: 8.8 MG/DL (ref 8.3–10.6)
CHLORIDE BLD-SCNC: 106 MMOL/L (ref 99–110)
CO2: 27 MMOL/L (ref 21–32)
CREAT SERPL-MCNC: 1 MG/DL (ref 0.6–1.1)
DIFFERENTIAL TYPE: ABNORMAL
EOSINOPHILS ABSOLUTE: 0.2 K/CU MM
EOSINOPHILS RELATIVE PERCENT: 2.2 % (ref 0–3)
GFR AFRICAN AMERICAN: >60 ML/MIN/1.73M2
GFR NON-AFRICAN AMERICAN: >60 ML/MIN/1.73M2
GLUCOSE BLD-MCNC: 106 MG/DL (ref 70–99)
HCT VFR BLD CALC: 37.1 % (ref 37–47)
HEMOGLOBIN: 12.6 GM/DL (ref 12.5–16)
IMMATURE NEUTROPHIL %: 0.1 % (ref 0–0.43)
LYMPHOCYTES ABSOLUTE: 2.2 K/CU MM
LYMPHOCYTES RELATIVE PERCENT: 29.7 % (ref 24–44)
MAGNESIUM: 2 MG/DL (ref 1.8–2.4)
MCH RBC QN AUTO: 32.3 PG (ref 27–31)
MCHC RBC AUTO-ENTMCNC: 34 % (ref 32–36)
MCV RBC AUTO: 95.1 FL (ref 78–100)
MONOCYTES ABSOLUTE: 0.5 K/CU MM
MONOCYTES RELATIVE PERCENT: 7.3 % (ref 0–4)
PDW BLD-RTO: 12.4 % (ref 11.7–14.9)
PLATELET # BLD: 164 K/CU MM (ref 140–440)
PMV BLD AUTO: 11.5 FL (ref 7.5–11.1)
POTASSIUM SERPL-SCNC: 3.5 MMOL/L (ref 3.5–5.1)
RBC # BLD: 3.9 M/CU MM (ref 4.2–5.4)
SEGMENTED NEUTROPHILS ABSOLUTE COUNT: 4.4 K/CU MM
SEGMENTED NEUTROPHILS RELATIVE PERCENT: 60 % (ref 36–66)
SODIUM BLD-SCNC: 144 MMOL/L (ref 135–145)
TOTAL IMMATURE NEUTOROPHIL: 0.01 K/CU MM
TOTAL PROTEIN: 6.3 GM/DL (ref 6.4–8.2)
WBC # BLD: 7.4 K/CU MM (ref 4–10.5)

## 2022-06-15 PROCEDURE — 6370000000 HC RX 637 (ALT 250 FOR IP): Performed by: EMERGENCY MEDICINE

## 2022-06-15 PROCEDURE — 83735 ASSAY OF MAGNESIUM: CPT

## 2022-06-15 PROCEDURE — 99284 EMERGENCY DEPT VISIT MOD MDM: CPT

## 2022-06-15 PROCEDURE — 80053 COMPREHEN METABOLIC PANEL: CPT

## 2022-06-15 PROCEDURE — 6360000002 HC RX W HCPCS: Performed by: EMERGENCY MEDICINE

## 2022-06-15 PROCEDURE — 70450 CT HEAD/BRAIN W/O DYE: CPT

## 2022-06-15 PROCEDURE — 2580000003 HC RX 258: Performed by: EMERGENCY MEDICINE

## 2022-06-15 PROCEDURE — 96374 THER/PROPH/DIAG INJ IV PUSH: CPT

## 2022-06-15 PROCEDURE — 85025 COMPLETE CBC W/AUTO DIFF WBC: CPT

## 2022-06-15 RX ORDER — HYDROCODONE BITARTRATE AND ACETAMINOPHEN 5; 325 MG/1; MG/1
2 TABLET ORAL ONCE
Status: COMPLETED | OUTPATIENT
Start: 2022-06-15 | End: 2022-06-15

## 2022-06-15 RX ORDER — 0.9 % SODIUM CHLORIDE 0.9 %
1000 INTRAVENOUS SOLUTION INTRAVENOUS ONCE
Status: COMPLETED | OUTPATIENT
Start: 2022-06-15 | End: 2022-06-15

## 2022-06-15 RX ORDER — DIPHENHYDRAMINE HYDROCHLORIDE 50 MG/ML
50 INJECTION INTRAMUSCULAR; INTRAVENOUS ONCE
Status: COMPLETED | OUTPATIENT
Start: 2022-06-15 | End: 2022-06-15

## 2022-06-15 RX ORDER — PROMETHAZINE HYDROCHLORIDE 12.5 MG/1
25 TABLET ORAL ONCE
Status: COMPLETED | OUTPATIENT
Start: 2022-06-15 | End: 2022-06-15

## 2022-06-15 RX ADMIN — SODIUM CHLORIDE 1000 ML: 9 INJECTION, SOLUTION INTRAVENOUS at 18:13

## 2022-06-15 RX ADMIN — HYDROCODONE BITARTRATE AND ACETAMINOPHEN 2 TABLET: 5; 325 TABLET ORAL at 20:36

## 2022-06-15 RX ADMIN — DIPHENHYDRAMINE HYDROCHLORIDE 50 MG: 50 INJECTION INTRAMUSCULAR; INTRAVENOUS at 18:13

## 2022-06-15 RX ADMIN — PROMETHAZINE HYDROCHLORIDE 25 MG: 12.5 TABLET ORAL at 18:13

## 2022-06-15 ASSESSMENT — PAIN DESCRIPTION - PAIN TYPE: TYPE: ACUTE PAIN

## 2022-06-15 ASSESSMENT — PAIN DESCRIPTION - LOCATION: LOCATION: HEAD

## 2022-06-15 ASSESSMENT — PAIN - FUNCTIONAL ASSESSMENT: PAIN_FUNCTIONAL_ASSESSMENT: 0-10

## 2022-06-15 ASSESSMENT — PAIN DESCRIPTION - DESCRIPTORS: DESCRIPTORS: THROBBING

## 2022-06-15 ASSESSMENT — PAIN SCALES - GENERAL
PAINLEVEL_OUTOF10: 8
PAINLEVEL_OUTOF10: 10

## 2022-06-15 NOTE — Clinical Note
Jose Andrew was seen and treated in our emergency department on 6/15/2022. She may return to work on 06/20/2022. If you have any questions or concerns, please don't hesitate to call.       [address], DO

## 2022-06-15 NOTE — Clinical Note
Michelle Ventura was seen and treated in our emergency department on 6/15/2022. She may return to work on 06/20/2022. If you have any questions or concerns, please don't hesitate to call.       Tamra Ingram, DO

## 2022-06-15 NOTE — ED PROVIDER NOTES
Emergency [facility] DEPARTMENT    Patient: Noah Yost  MRN: [id number]  : 1975  Date of Evaluation: 6/15/2022  ED Provider: North Cazares DO    Chief Complaint       Chief Complaint   Patient presents with    Headache     pt states had to leave work early yesterday d/t a headache. pt reports ongoing headache with no relief from home medications. pt also c/o muscle cramps in legs and decreased appetite. Keyonna Pardo is a 42 y.o. female who presents to the emergency department complaints of a headache \"all over my head\". This started about 3:00 yesterday when she went to work. 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 Patient thinks that he may have gone to her. She is complaining of nausea but has not vomited. She has been drinking fluids but is complaining of cramps in her legs. She is nauseated but has not vomited. She states her headache is different from her normal migraine. She took ibuprofen and another migraine medication at home with no relief. is not worst headache of life. is not acute onset. Fevers Absent . Neck stiffness Absent . Numbness, weakness and tingling Absent . Syncope Absent . ROS:     At least 10 systems reviewed and otherwise acutely negative except as in the [address] Past History     Past Medical History:   Diagnosis Date    ADHD     Arthritis     OSTEO ARTHITIS    Bipolar disorder, unspecified ([address]     Cholecystenteric fistula     COPD (chronic obstructive pulmonary disease) ([address]     Degenerative disc disease     Fibromyalgia     Generalized anxiety disorder     H/O 24 hour EKG monitoring 2017      Sinus tach events , no major arrhythmias , follow up in office as routine     H/O echocardiogram 2017    EF 55-60% Normal LV structure  and systolic function.      H/O exercise stress test 2017    Normal stress test. Patient has physical deconditioning as she achieved target HR in 2 mins. Recommend to increase PO fluids intake and exercise training.  Headache     History of cardiac monitoring 09/21/2020    Normal 30-day event monitor with average heart rate of 63 lowest heart rate of 57 highest heart rate of 82 patient reported episodes of dizziness which did not correlate with any arrhythmia.   There were no episodes of tachycardia uneventful monitor however average heart rate and even the highest heart rate is on the lower side     Osteopenia     Osteoporosis     PTSD (post-traumatic stress disorder)     S/P cholecystectomy 11/22/2011    Schizo-affective schizophrenia ([facility]     Sjoegren syndrome     Syncope     Tardive dyskinesia     Venous insufficiency of leg 2012    Vertigo      Past Surgical History:   Procedure Laterality Date    CHOLECYSTECTOMY  11/22/2011    laparoscopic    COLONOSCOPY      DENTAL SURGERY  8/9    ELBOW ARTHROSCOPY      bilateral elbows    ENDOSCOPY, COLON, DIAGNOSTIC      HYSTERECTOMY (CERVIX STATUS UNKNOWN)      OTHER SURGICAL HISTORY      venous ablation, bilateral legs    TUBAL LIGATION       Social History     Socioeconomic History    Marital status: Single     Spouse name: None    Number of children: None    Years of education: None    Highest education level: None   Occupational History    None   Tobacco Use    Smoking status: Current Every Day Smoker     Packs/day: 0.25     Years: 20.00     Pack years: 5.00     Types: Cigarettes    Smokeless tobacco: Never Used   Vaping Use    Vaping Use: Former    Substances: Always   Substance and Sexual Activity    Alcohol use: No    Drug use: Not Currently     Types: Methamphetamines (Crystal Meth)     Comment: clean for 11 years, past hx of crack use    Sexual activity: Yes     Partners: Male   Other Topics Concern    None   Social History Narrative    None     Social Determinants of Health     Financial Resource Strain: Low Risk     Difficulty of Paying Living Expenses: Not hard at all   Food Insecurity: Food Insecurity Present    Worried About [facility] in the Last Year: Sometimes true    Ursula of Food in the Last Year: Sometimes true   Transportation Needs:     Lack of Transportation (Medical): Not on file    Lack of Transportation (Non-Medical): Not on file   Physical Activity:     Days of Exercise per Week: Not on file    Minutes of Exercise per Session: Not on file   Stress:     Feeling of Stress : Not on file   Social Connections:     Frequency of Communication with Friends and Family: Not on file    Frequency of Social Gatherings with Friends and Family: Not on file    Attends Restorationist Services: Not on file    Active Member of [address] [place] [address] or Organizations: Not on file    Attends Club or Organization Meetings: Not on file    Marital Status: Not on file   Intimate Partner Violence:     Fear of Current or Ex-Partner: Not on file    Emotionally Abused: Not on file    Physically Abused: Not on file    Sexually Abused: Not on file   Housing Stability:     Unable to Pay for Housing in the Last Year: Not on file    Number of [place] in the Last Year: Not on file    Unstable Housing in the Last Year: Not on file       Medications/Allergies     Previous Medications    ACETAMINOPHEN (TYLENOL) 325 MG TABLET    Take 650 mg by mouth every 6 hours as needed for Pain    ATOMOXETINE (STRATTERA) 60 MG CAPSULE    Take 1 capsule by mouth daily    DOCUSATE SODIUM (COLACE) 100 MG CAPSULE    Take 1 capsule by mouth 2 times daily    EMGALITY 120 MG/ML SOAJ        IBUPROFEN (ADVIL;MOTRIN) 400 MG TABLET    Take 400 mg by mouth every 6 hours as needed for Pain    INGREZZA 80 MG CAPS    TAKE 1 CAPSULE BY MOUTH DAILY    LORAZEPAM (ATIVAN) 0.5 MG TABLET    Take 1 tablet by mouth 3 times daily for 30 days.     MAGNESIUM CITRATE SOLUTION    Take 296 mLs by mouth once for 1 dose    OXYBUTYNIN (DITROPAN XL) 5 MG EXTENDED RELEASE TABLET    Take 1 tablet by mouth daily PRAMIPEXOLE (MIRAPEX) 0.75 MG TABLET        PREGABALIN (LYRICA) 50 MG CAPSULE    Take 1 capsule by mouth 3 times daily for 30 days. SENNA (SENOKOT) 8.6 MG TABS TABLET    Take 1 tablet by mouth daily    TRAZODONE (DESYREL) 100 MG TABLET    Take 1 tablet by mouth nightly as needed for Sleep    UBRELVY 50 MG TABS        VITAMIN B-12 (CYANOCOBALAMIN) 1000 MCG TABLET    Take 1,000 mcg by mouth daily     Allergies   Allergen Reactions    Rozerem [Ramelteon] Other (See Comments)     Increased symptoms of TD    Codeine Itching and Nausea And Vomiting    Imitrex [Sumatriptan] Itching and Nausea And Vomiting     PASSED OUT    Abilify [Aripiprazole] Other (See Comments)     Patient reports symptoms of TD    Amitriptyline     Botox [Onabotulinumtoxina] Other (See Comments)     Tardive dyskinesia    Compazine [Prochlorperazine Maleate] Other (See Comments)    Meclizine     Topamax [Topiramate]      Made TD worse    Cephalexin Nausea And Vomiting and Other (See Comments)     ABDOMINAL PAIN FOR 2 WEEKS    Magnesium-Containing Compounds Other (See Comments)     Pt sts \"they called it the mags. I start twitching. \"     Meclizine Hcl Other (See Comments)     \"Makes me stutter and twitch. \"    Nsaids Other (See Comments)     Tardive dyskinisia    Pcn [Penicillins] Nausea And Vomiting and Other (See Comments)     HEADACHE    Reglan [Metoclopramide] Other (See Comments)     Tardive dyskinisia    Toradol [Ketorolac Tromethamine] Other (See Comments)     Tremors    Vistaril [Hydroxyzine Hcl] Other (See Comments)     dyskinsia      Zofran [Ondansetron Hcl] Other (See Comments)     Cramping and burning in stomach        Physical Exam       ED Triage Vitals [06/15/22 1743]   Enc Vitals Group      /68      Heart Rate 93      Resp 18      Temp 98.3 °F (36.8 °C)      Temp Source Oral      SpO2 98 %      Weight 110 lb (49.9 kg)      Height 5' 4\" (1.626 m)      Head Circumference       Peak Flow       Pain Score Pain Loc       Pain Edu? Excl. in [address]? GENERAL APPEARANCE: Awake and alert. She is laying on the bed on her left side. She groans and relief when I turn out the lights. HEAD: Normocephalic. Atraumatic. EYES: EOM's grossly intact. Sclera anicteric. Pupils are equally reactive to light  ENT: Mucous membranes are moist. Tolerates saliva. No trismus. NECK: Supple. No meningismus. Trachea midline. HEART: RRR. Radial pulses 2+. LUNGS: Respirations unlabored. CTAB  ABDOMEN: Soft. Non-tender. No guarding or rebound. EXTREMITIES: No acute deformities. SKIN: Warm and dry. NEUROLOGICAL: Awake and alert. GCS 15. Cranial nerves 2-12 grossly intact. Strength 5/5 throughout. Light touch sensation intact throughout. Finger to nose WNL. DTR's 2+ in all 4 extremities. PSYCHIATRIC: Normal mood. Diagnostics   Labs:  Results for orders placed or performed during the hospital encounter of 06/15/22   CBC with Auto Differential   Result Value Ref Range    WBC 7.4 4.0 - 10.5 K/CU MM    RBC 3.90 (L) 4.2 - 5.4 M/CU MM    Hemoglobin 12.6 12.5 - 16.0 GM/DL    Hematocrit 37.1 37 - 47 %    MCV 95.1 78 - 100 FL    MCH 32.3 (H) 27 - 31 PG    MCHC 34.0 32.0 - 36.0 %    RDW 12.4 11.7 - 14.9 %    Platelets 126 818 - 692 K/CU MM    MPV 11.5 (H) 7.5 - 11.1 FL    Differential Type AUTOMATED DIFFERENTIAL     Segs Relative 60.0 36 - 66 %    Lymphocytes % 29.7 24 - 44 %    Monocytes % 7.3 (H) 0 - 4 %    Eosinophils % 2.2 0 - 3 %    Basophils % 0.7 0 - 1 %    Segs Absolute 4.4 K/CU MM    Lymphocytes Absolute 2.2 K/CU MM    Monocytes Absolute 0.5 K/CU MM    Eosinophils Absolute 0.2 K/CU MM    Basophils Absolute 0.1 K/CU MM    Immature Neutrophil % 0.1 0 - 0.43 %    Total Immature Neutrophil 0.01 K/CU MM     Radiographs:  No results found. ED Course and MDM   In brief, Carmen Bridges is a 42 y.o. female who presented to the emergency department with complaints of headache. Work-up in the ER is pending.   A CT scan was ordered since this is not her typical headache. Laboratory data and CT scan are pending at the time of shift change and care will be transferred to the oncoming night doctor. Please see his note regarding final disposition of this patient based on the findings of the CT scan and response to treatment. ED Medication Orders (From admission, onward)    Start Ordered     Status Ordering Provider    06/15/22 1800 06/15/22 1755  0.9 % sodium chloride bolus  ONCE         Last MAR action: New Bag - by Bruce Campos on 06/15/22 at [address] [place]    06/15/22 1800 06/15/22 1755  diphenhydrAMINE (BENADRYL) injection 50 mg  ONCE         Last MAR action: Given - by Bruce Campos on 06/15/22 at [address] [place]    06/15/22 1800 06/15/22 1758  promethazine (PHENERGAN) tablet 25 mg  ONCE         Last MAR action: Given - by Bruce Campos on 06/15/22 at [address] [place]          I estimate there is LOW risk for (including but not limited to) BACTERIAL MENINGITIS, ISCHEMIC OR HEMORRHAGE CVA (Ex. [facility]), VASCULAR DISSECTION, TEMPORAL ARTERITIS, or ACUTE CORONARY SYNDROME thus I consider the discharge disposition reasonable. Alisson Mcallister (or their surrogate) and I have discussed the diagnosis and risks, and we agree with discharging home with close follow-up. We also discussed returning to the Emergency Department immediately if new or worsening symptoms occur. We have discussed the symptoms which are most concerning that necessitate immediate return. Final Impression      1.  Acute intractable headache, unspecified headache type        DISPOSITION       @[facility]@  (Please note that portions of this note may have been completed with a voice recognition program. Efforts were made to edit the dictations but occasionally words are mis-transcribed.)    Brenden Verdin, [address] [facility], DO  06/15/22 6828

## 2022-06-16 NOTE — ED PROVIDER NOTES
outlined to this patient for this patient encounter. I have stressed need for follow up and reexamination for this encounter     FINAL IMPRESSION:  1. Acute intractable headache, unspecified headache type    2.  Heat exhaustion, initial encounter        Discharge Medication List as of 6/15/2022  8:31 PM                   [address] DO  06/16/22 4615

## 2022-06-24 ENCOUNTER — OFFICE VISIT (OUTPATIENT)
Dept: FAMILY MEDICINE CLINIC | Age: 47
End: 2022-06-24
Payer: COMMERCIAL

## 2022-06-24 VITALS
BODY MASS INDEX: 18.81 KG/M2 | WEIGHT: 109.6 LBS | OXYGEN SATURATION: 98 % | DIASTOLIC BLOOD PRESSURE: 64 MMHG | TEMPERATURE: 98.1 F | RESPIRATION RATE: 16 BRPM | HEART RATE: 89 BPM | SYSTOLIC BLOOD PRESSURE: 100 MMHG

## 2022-06-24 DIAGNOSIS — F33.1 MODERATE EPISODE OF RECURRENT MAJOR DEPRESSIVE DISORDER (HCC): Primary | ICD-10-CM

## 2022-06-24 DIAGNOSIS — F60.3 BORDERLINE PERSONALITY DISORDER (HCC): ICD-10-CM

## 2022-06-24 DIAGNOSIS — F51.01 PRIMARY INSOMNIA: ICD-10-CM

## 2022-06-24 DIAGNOSIS — F41.1 GAD (GENERALIZED ANXIETY DISORDER): ICD-10-CM

## 2022-06-24 DIAGNOSIS — M79.7 FIBROMYALGIA: ICD-10-CM

## 2022-06-24 PROCEDURE — 4004F PT TOBACCO SCREEN RCVD TLK: CPT | Performed by: NURSE PRACTITIONER

## 2022-06-24 PROCEDURE — G8427 DOCREV CUR MEDS BY ELIG CLIN: HCPCS | Performed by: NURSE PRACTITIONER

## 2022-06-24 PROCEDURE — G8420 CALC BMI NORM PARAMETERS: HCPCS | Performed by: NURSE PRACTITIONER

## 2022-06-24 PROCEDURE — 99214 OFFICE O/P EST MOD 30 MIN: CPT | Performed by: NURSE PRACTITIONER

## 2022-06-24 RX ORDER — TRAZODONE HYDROCHLORIDE 150 MG/1
150 TABLET ORAL NIGHTLY
Qty: 30 TABLET | Refills: 0 | Status: SHIPPED | OUTPATIENT
Start: 2022-06-24 | End: 2022-08-04

## 2022-06-24 RX ORDER — METHOCARBAMOL 500 MG/1
500 TABLET, FILM COATED ORAL 3 TIMES DAILY
Qty: 21 TABLET | Refills: 0 | Status: SHIPPED | OUTPATIENT
Start: 2022-06-24 | End: 2022-07-01

## 2022-06-24 RX ORDER — PREGABALIN 50 MG/1
50 CAPSULE ORAL 3 TIMES DAILY
Qty: 90 CAPSULE | Refills: 2 | Status: SHIPPED | OUTPATIENT
Start: 2022-06-24 | End: 2022-09-23 | Stop reason: SDUPTHER

## 2022-06-24 RX ORDER — LORAZEPAM 0.5 MG/1
1 TABLET ORAL 3 TIMES DAILY
Qty: 90 TABLET | Refills: 2
Start: 2022-06-24 | End: 2022-07-01

## 2022-06-24 ASSESSMENT — PATIENT HEALTH QUESTIONNAIRE - PHQ9
3. TROUBLE FALLING OR STAYING ASLEEP: 3
1. LITTLE INTEREST OR PLEASURE IN DOING THINGS: 3
8. MOVING OR SPEAKING SO SLOWLY THAT OTHER PEOPLE COULD HAVE NOTICED. OR THE OPPOSITE, BEING SO FIGETY OR RESTLESS THAT YOU HAVE BEEN MOVING AROUND A LOT MORE THAN USUAL: 3
SUM OF ALL RESPONSES TO PHQ9 QUESTIONS 1 & 2: 6
4. FEELING TIRED OR HAVING LITTLE ENERGY: 3
SUM OF ALL RESPONSES TO PHQ QUESTIONS 1-9: 24
6. FEELING BAD ABOUT YOURSELF - OR THAT YOU ARE A FAILURE OR HAVE LET YOURSELF OR YOUR FAMILY DOWN: 3
2. FEELING DOWN, DEPRESSED OR HOPELESS: 3
SUM OF ALL RESPONSES TO PHQ QUESTIONS 1-9: 24
10. IF YOU CHECKED OFF ANY PROBLEMS, HOW DIFFICULT HAVE THESE PROBLEMS MADE IT FOR YOU TO DO YOUR WORK, TAKE CARE OF THINGS AT HOME, OR GET ALONG WITH OTHER PEOPLE: 3
5. POOR APPETITE OR OVEREATING: 3
SUM OF ALL RESPONSES TO PHQ QUESTIONS 1-9: 24
SUM OF ALL RESPONSES TO PHQ QUESTIONS 1-9: 24
9. THOUGHTS THAT YOU WOULD BE BETTER OFF DEAD, OR OF HURTING YOURSELF: 0
7. TROUBLE CONCENTRATING ON THINGS, SUCH AS READING THE NEWSPAPER OR WATCHING TELEVISION: 3

## 2022-06-24 ASSESSMENT — ANXIETY QUESTIONNAIRES
GAD7 TOTAL SCORE: 21
4. TROUBLE RELAXING: 3
7. FEELING AFRAID AS IF SOMETHING AWFUL MIGHT HAPPEN: 3
2. NOT BEING ABLE TO STOP OR CONTROL WORRYING: 3
IF YOU CHECKED OFF ANY PROBLEMS ON THIS QUESTIONNAIRE, HOW DIFFICULT HAVE THESE PROBLEMS MADE IT FOR YOU TO DO YOUR WORK, TAKE CARE OF THINGS AT HOME, OR GET ALONG WITH OTHER PEOPLE: EXTREMELY DIFFICULT
1. FEELING NERVOUS, ANXIOUS, OR ON EDGE: 3
6. BECOMING EASILY ANNOYED OR IRRITABLE: 3
3. WORRYING TOO MUCH ABOUT DIFFERENT THINGS: 3
5. BEING SO RESTLESS THAT IT IS HARD TO SIT STILL: 3

## 2022-06-24 ASSESSMENT — COLUMBIA-SUICIDE SEVERITY RATING SCALE - C-SSRS
2. HAVE YOU ACTUALLY HAD ANY THOUGHTS OF KILLING YOURSELF?: NO
6. HAVE YOU EVER DONE ANYTHING, STARTED TO DO ANYTHING, OR PREPARED TO DO ANYTHING TO END YOUR LIFE?: NO
1. WITHIN THE PAST MONTH, HAVE YOU WISHED YOU WERE DEAD OR WISHED YOU COULD GO TO SLEEP AND NOT WAKE UP?: NO

## 2022-06-24 NOTE — PROGRESS NOTES
Subjective:      Chief Complaint   Patient presents with    Follow-up     Has questions she would like to discuss       HPI:  Kelly Ward is a 42 y.o. female who presents today for medication follow up. Anxiety/Depression/Borderline Personality Disorder  Current treatment: Strattera-60 for ADHD, STEPHANIA and MDD. She is prescribed Ativan 0.5 mg TID for STEPHANIA. OARRS reviewed; medication last filled 05/27/2022. She endorses the following depressive symptoms: feelings of being down, depressed or hopelessness,  loss of interest in usual activities, decreased appetite, fatigue, sleep disturbance difficulty getting to sleep, feelings of guilt, worthlessness or loss of self confidence, problems concentrating, agitation, and psychomotor retardation. Recent stressors include: she has a volatile relationship with her son and he is moving to [place] \"to get away from me. \"  She is having difficulties with her landlord and is also feeling alienated at work. She has been diagnosed with Borderline Personality Disorder in the past and is not currently seeing a therapist.  She is agreeable to therapy today. The following anxiety symptoms are present;  excessive worry, agitation, fearfulness, labile mood, insomnia, depression, adrenergic symptoms and episodes of panic. She denies visual  and auditory hallucinations, delusions and illusions. Pt denies current suicidal ideation, plan and intent. Pt  denies current homicidal ideation, plan and intent. Tardive Dyskinesia  Symptoms well controlled with Ingrezza 80 mg daily.     Fibromyalgia  Symptoms are well controlled with Lyrica.   OARRS reviewed; medication last filled 05/29/2022.     Past Medical History:   Diagnosis Date    ADHD     Arthritis     OSTEO ARTHITIS    Bipolar disorder, unspecified ([address]     Cholecystenteric fistula     COPD [facility]chronic obstructive [facility] disease) [facility] [address]     Degenerative disc disease     Fibromyalgia     Generalized anxiety disorder     H/O 24 hour EKG monitoring 02/16/2017      Sinus tach events , no major arrhythmias , follow up in office as routine     H/O echocardiogram 03/01/2017    EF 55-60% Normal LV structure  and systolic function.  H/O exercise stress test 03/01/2017    Normal stress test. Patient has physical deconditioning as she achieved target HR in 2 mins. Recommend to increase PO fluids intake and exercise training.  Headache     History of cardiac monitoring 09/21/2020    Normal 30-day event monitor with average heart rate of 63 lowest heart rate of 57 highest heart rate of 82 patient reported episodes of dizziness which did not correlate with any arrhythmia. There were no episodes of tachycardia uneventful monitor however average heart rate and even the highest heart rate is on the lower side     Osteopenia     Osteoporosis     PTSD (post-traumatic stress disorder)     S/P cholecystectomy 11/22/2011    Schizo-affective schizophrenia ([facility] [address]     Sjoegren syndrome     Syncope     Tardive dyskinesia     Venous insufficiency of leg 2012    Vertigo         Social History     Tobacco Use    Smoking status: Current Every Day Smoker     Packs/day: 0.25     Years: 20.00     Pack years: 5.00     Types: Cigarettes    Smokeless tobacco: Never Used   Substance Use Topics    Alcohol use: No        Review of Systems   Constitutional: Negative for activity change, appetite change and fatigue. HENT: Negative. Eyes: Negative for visual disturbance. Respiratory: Negative for chest tightness and wheezing. Cardiovascular: Negative. Negative for chest pain and palpitations. Gastrointestinal: Negative for abdominal distention, abdominal pain, constipation, diarrhea, nausea and vomiting. Endocrine: Negative for cold intolerance, heat intolerance, polydipsia, polyphagia and polyuria. Musculoskeletal: Positive for myalgias. Negative for arthralgias. Skin: Negative.     Neurological: Positive for headaches (well controlled). Negative for dizziness, seizures, syncope, facial asymmetry, speech difficulty, light-headedness and numbness. Psychiatric/Behavioral: Positive for agitation, decreased concentration, dysphoric mood, hallucinations and sleep disturbance. Negative for behavioral problems, confusion, self-injury and suicidal ideas. The patient is nervous/anxious and is hyperactive. Objective:      /64 (Site: Right Upper Arm, Position: Sitting, Cuff Size: Medium Adult)   Pulse 89   Temp 98.1 °F (36.7 °C)   Resp 16   Wt 109 lb 9.6 oz (49.7 kg)   LMP 11/18/2004   SpO2 98%   BMI 18.81 kg/m²      Physical Exam  Vitals reviewed. Constitutional:       General: She is not in acute distress. Appearance: Normal appearance. Eyes:      Conjunctiva/sclera: Conjunctivae normal.      Pupils: Pupils are equal, round, and reactive to light. Cardiovascular:      Rate and Rhythm: Normal rate and regular rhythm. Heart sounds: Normal heart sounds. Pulmonary:      Effort: Pulmonary effort is normal.      Breath sounds: Normal breath sounds. Musculoskeletal:         General: Normal range of motion. Cervical back: Normal range of motion and neck supple. No tenderness. Right lower leg: No edema. Left lower leg: No edema. Skin:     General: Skin is warm and dry. Neurological:      General: No focal deficit present. Mental Status: She is alert and oriented to person, place, and time. Deep Tendon Reflexes: Reflexes are normal and symmetric. Reflexes normal.   Psychiatric:         Attention and Perception: Perception normal. She is inattentive. Mood and Affect: Mood is anxious and depressed. Affect is tearful. Speech: Speech is rapid and pressured. Behavior: Behavior is agitated. Behavior is cooperative. Thought Content: Thought content is paranoid. Judgment: Judgment is inappropriate. Assessment / Plan:      1. Moderate episode of recurrent major depressive disorder (HCC)  Continue Strattera 60 mg daily to target symptoms of anxiety and depression. Patient to call if any concerns or SI before their follow up appointment. If she develops suicidal thoughts with a plan, she is instructed to go to emergency room immediately. Behavioral counseling recommended; list of area counselors given to patient. 2. Borderline personality disorder ([facility] [address]  Behavioral counseling recommended; list of area counselors given to patient. 3. STEPHANIA (generalized anxiety disorder)  A discussion regarding medication was held with the patient. Discussed the dangerous nature of narcotic/benzo medicines, including the risk of respiratory depression, death and addiction. OARRS reviewed. - LORazepam (ATIVAN) 0.5 MG tablet; Take 2 tablets by mouth 3 times daily for 7 days. Dispense: 90 tablet; Refill: 2    4. Primary insomnia  Will increase Trazodone to 150 mg at HS. Sleep hygiene reinforced. - traZODone (DESYREL) 150 MG tablet; Take 1 tablet by mouth nightly  Dispense: 30 tablet; Refill: 0    5. Fibromyalgia  Medication refilled. Regular exercise and a healthy diet encouraged. - pregabalin (LYRICA) 50 MG capsule; Take 1 capsule by mouth 3 times daily for 30 days. Dispense: 90 capsule; Refill: 2    Controlled Substance Monitoring:    Acute and Chronic Pain Monitoring:   RX Monitoring 5/27/2022   Periodic Controlled Substance Monitoring Possible medication side effects, risk of tolerance/dependence & alternative treatments discussed. ;No signs of potential drug abuse or diversion identified.;Obtaining appropriate analgesic effect of treatment. ;Assessed functional status.             HANNA Hawkins NP

## 2022-07-05 ASSESSMENT — ENCOUNTER SYMPTOMS
CONSTIPATION: 0
NAUSEA: 0
DIARRHEA: 0
CHEST TIGHTNESS: 0
VOMITING: 0
ABDOMINAL PAIN: 0
WHEEZING: 0
ABDOMINAL DISTENTION: 0

## 2022-07-08 ENCOUNTER — OFFICE VISIT (OUTPATIENT)
Dept: FAMILY MEDICINE CLINIC | Age: 47
End: 2022-07-08
Payer: COMMERCIAL

## 2022-07-08 VITALS
TEMPERATURE: 98.2 F | SYSTOLIC BLOOD PRESSURE: 102 MMHG | OXYGEN SATURATION: 100 % | HEART RATE: 99 BPM | WEIGHT: 113.6 LBS | BODY MASS INDEX: 19.5 KG/M2 | RESPIRATION RATE: 15 BRPM | DIASTOLIC BLOOD PRESSURE: 49 MMHG

## 2022-07-08 DIAGNOSIS — F33.1 MODERATE EPISODE OF RECURRENT MAJOR DEPRESSIVE DISORDER (HCC): Primary | ICD-10-CM

## 2022-07-08 DIAGNOSIS — F41.0 GENERALIZED ANXIETY DISORDER WITH PANIC ATTACKS: ICD-10-CM

## 2022-07-08 DIAGNOSIS — F41.1 GENERALIZED ANXIETY DISORDER WITH PANIC ATTACKS: ICD-10-CM

## 2022-07-08 DIAGNOSIS — F60.3 BORDERLINE PERSONALITY DISORDER (HCC): ICD-10-CM

## 2022-07-08 DIAGNOSIS — G25.81 RESTLESS LEGS: ICD-10-CM

## 2022-07-08 PROCEDURE — G8427 DOCREV CUR MEDS BY ELIG CLIN: HCPCS | Performed by: NURSE PRACTITIONER

## 2022-07-08 PROCEDURE — 4004F PT TOBACCO SCREEN RCVD TLK: CPT | Performed by: NURSE PRACTITIONER

## 2022-07-08 PROCEDURE — G8420 CALC BMI NORM PARAMETERS: HCPCS | Performed by: NURSE PRACTITIONER

## 2022-07-08 PROCEDURE — 99214 OFFICE O/P EST MOD 30 MIN: CPT | Performed by: NURSE PRACTITIONER

## 2022-07-08 RX ORDER — PRAMIPEXOLE DIHYDROCHLORIDE 0.75 MG/1
TABLET ORAL
Qty: 90 TABLET | Status: CANCELLED | OUTPATIENT
Start: 2022-07-08

## 2022-07-08 RX ORDER — CARBIDOPA AND LEVODOPA 25; 100 MG/1; MG/1
1 TABLET, EXTENDED RELEASE ORAL NIGHTLY
Qty: 30 TABLET | Refills: 0 | Status: SHIPPED
Start: 2022-07-08 | End: 2022-07-12 | Stop reason: SINTOL

## 2022-07-08 ASSESSMENT — PATIENT HEALTH QUESTIONNAIRE - PHQ9
6. FEELING BAD ABOUT YOURSELF - OR THAT YOU ARE A FAILURE OR HAVE LET YOURSELF OR YOUR FAMILY DOWN: 3
SUM OF ALL RESPONSES TO PHQ QUESTIONS 1-9: 18
3. TROUBLE FALLING OR STAYING ASLEEP: 3
4. FEELING TIRED OR HAVING LITTLE ENERGY: 3
7. TROUBLE CONCENTRATING ON THINGS, SUCH AS READING THE NEWSPAPER OR WATCHING TELEVISION: 0
SUM OF ALL RESPONSES TO PHQ QUESTIONS 1-9: 18
1. LITTLE INTEREST OR PLEASURE IN DOING THINGS: 3
8. MOVING OR SPEAKING SO SLOWLY THAT OTHER PEOPLE COULD HAVE NOTICED. OR THE OPPOSITE, BEING SO FIGETY OR RESTLESS THAT YOU HAVE BEEN MOVING AROUND A LOT MORE THAN USUAL: 3
9. THOUGHTS THAT YOU WOULD BE BETTER OFF DEAD, OR OF HURTING YOURSELF: 0
SUM OF ALL RESPONSES TO PHQ9 QUESTIONS 1 & 2: 6
SUM OF ALL RESPONSES TO PHQ QUESTIONS 1-9: 18
5. POOR APPETITE OR OVEREATING: 0
2. FEELING DOWN, DEPRESSED OR HOPELESS: 3
SUM OF ALL RESPONSES TO PHQ QUESTIONS 1-9: 18

## 2022-07-08 ASSESSMENT — ANXIETY QUESTIONNAIRES
3. WORRYING TOO MUCH ABOUT DIFFERENT THINGS: 3
1. FEELING NERVOUS, ANXIOUS, OR ON EDGE: 3
6. BECOMING EASILY ANNOYED OR IRRITABLE: 3
2. NOT BEING ABLE TO STOP OR CONTROL WORRYING: 3
7. FEELING AFRAID AS IF SOMETHING AWFUL MIGHT HAPPEN: 3
IF YOU CHECKED OFF ANY PROBLEMS ON THIS QUESTIONNAIRE, HOW DIFFICULT HAVE THESE PROBLEMS MADE IT FOR YOU TO DO YOUR WORK, TAKE CARE OF THINGS AT HOME, OR GET ALONG WITH OTHER PEOPLE: VERY DIFFICULT
5. BEING SO RESTLESS THAT IT IS HARD TO SIT STILL: 3
4. TROUBLE RELAXING: 3
GAD7 TOTAL SCORE: 21

## 2022-07-08 NOTE — PROGRESS NOTES
Subjective:      Chief Complaint   Patient presents with    Medication Check     Patient presents today for a follow up on medications.  Follow-up       HPI:  Kyung Montero is a 42 y.o. female who presents today for medication follow up. Anxiety/Depression/Borderline Personality Disorder  Current treatment: Strattera-60 for ADHD, STEPHANIA and MDD. Carol [place] is prescribed Ativan 0.5 mg TID for STEPHANIA.  OARRS reviewed; medication last filled 06/30/2022.      She endorses the following depressive symptoms: feelings of being down, depressed or hopelessness,  loss of interest in usual activities, decreased appetite, fatigue, sleep disturbance difficulty getting to sleep, feelings of guilt, worthlessness or loss of self confidence, problems concentrating, agitation, and psychomotor retardation. She also continues to not excessive worry, agitation, fearfulness, labile mood, insomnia, depression, adrenergic symptoms and episodes of panic. She denies visual  and auditory hallucinations, delusions and illusions. Pt denies current suicidal ideation, plan and intent. Pt  denies current homicidal ideation, plan and intent. She has not called to make an appointment with a therapist.  She is requesting a letter for an emotional support animal.  She has two dogs who are her primary source of comfort. RLS   She continues to report worsening symptoms of RLS. Requip is no longer as effective as it once was. Symptoms are waking her up throughout the night and she has to get out of bed and walk around to relieve symptoms.         Past Medical History:   Diagnosis Date    ADHD     Arthritis     OSTEO ARTHITIS    Bipolar disorder, unspecified ([facility] [address]     Cholecystenteric fistula     COPD (chronic obstructive pulmonary disease) (HCC)     Degenerative disc disease     Fibromyalgia     Generalized anxiety disorder     H/O 24 hour EKG monitoring 02/16/2017      Sinus tach events , no major arrhythmias , follow up in office as numbness. Psychiatric/Behavioral: Positive for agitation, decreased concentration, dysphoric mood, hallucinations and sleep disturbance. Negative for behavioral problems, confusion, self-injury and suicidal ideas. The patient is nervous/anxious and is hyperactive. Objective:      BP (!) 102/49 (Site: Right Upper Arm, Position: Sitting, Cuff Size: Medium Adult)   Pulse 99   Temp 98.2 °F (36.8 °C) (Temporal)   Resp 15   Wt 113 lb 9.6 oz (51.5 kg)   LMP 11/18/2004   SpO2 100%   BMI 19.50 kg/m²      Physical Exam  Vitals reviewed. Constitutional:       General: She is not in acute distress. Appearance: Normal appearance. Eyes:      Conjunctiva/sclera: Conjunctivae normal.      Pupils: Pupils are equal, round, and reactive to light. Cardiovascular:      Rate and Rhythm: Normal rate and regular rhythm. Heart sounds: Normal heart sounds. Pulmonary:      Effort: Pulmonary effort is normal.      Breath sounds: Normal breath sounds. Musculoskeletal:         General: Normal range of motion. Cervical back: Normal range of motion and neck supple. No tenderness. Right lower leg: No edema. Left lower leg: No edema. Skin:     General: Skin is warm and dry. Neurological:      General: No focal deficit present. Mental Status: She is alert and oriented to person, place, and time. Deep Tendon Reflexes: Reflexes are normal and symmetric. Reflexes normal.   Psychiatric:         Attention and Perception: Attention and perception normal.         Mood and Affect: Mood is anxious and depressed. Affect is not tearful. Speech: Speech is rapid and pressured. Behavior: Behavior is agitated. Behavior is cooperative. Thought Content: Thought content is paranoid. Judgment: Judgment is inappropriate. Assessment / Plan:      1. Moderate episode of recurrent major depressive disorder (HCC)  Continue medication.   Patient to call if any

## 2022-07-11 ENCOUNTER — TELEPHONE (OUTPATIENT)
Dept: FAMILY MEDICINE CLINIC | Age: 47
End: 2022-07-11

## 2022-07-11 ENCOUNTER — HOSPITAL ENCOUNTER (EMERGENCY)
Age: 47
Discharge: HOME | End: 2022-07-11
Attending: EMERGENCY MEDICINE
Payer: COMMERCIAL

## 2022-07-11 VITALS
OXYGEN SATURATION: 97 % | TEMPERATURE: 98.6 F | DIASTOLIC BLOOD PRESSURE: 76 MMHG | HEIGHT: 64 IN | RESPIRATION RATE: 18 BRPM | BODY MASS INDEX: 19.29 KG/M2 | WEIGHT: 113 LBS | HEART RATE: 84 BPM | SYSTOLIC BLOOD PRESSURE: 123 MMHG

## 2022-07-11 DIAGNOSIS — G24.01 TARDIVE DYSKINESIA: Primary | ICD-10-CM

## 2022-07-11 PROCEDURE — 99284 EMERGENCY DEPT VISIT MOD MDM: CPT

## 2022-07-11 PROCEDURE — 96372 THER/PROPH/DIAG INJ SC/IM: CPT

## 2022-07-11 PROCEDURE — 6360000002 HC RX W HCPCS: Performed by: EMERGENCY MEDICINE

## 2022-07-11 RX ORDER — DIPHENHYDRAMINE HYDROCHLORIDE 50 MG/ML
50 INJECTION INTRAMUSCULAR; INTRAVENOUS ONCE
Status: COMPLETED | OUTPATIENT
Start: 2022-07-11 | End: 2022-07-11

## 2022-07-11 RX ADMIN — DIPHENHYDRAMINE HYDROCHLORIDE 50 MG: 50 INJECTION, SOLUTION INTRAMUSCULAR; INTRAVENOUS at 13:55

## 2022-07-11 ASSESSMENT — PAIN - FUNCTIONAL ASSESSMENT
PAIN_FUNCTIONAL_ASSESSMENT: PREVENTS OR INTERFERES WITH MANY ACTIVE NOT PASSIVE ACTIVITIES
PAIN_FUNCTIONAL_ASSESSMENT: 0-10

## 2022-07-11 ASSESSMENT — ENCOUNTER SYMPTOMS
RESPIRATORY NEGATIVE: 1
GASTROINTESTINAL NEGATIVE: 1

## 2022-07-11 ASSESSMENT — PAIN DESCRIPTION - LOCATION: LOCATION: HEAD

## 2022-07-11 ASSESSMENT — PAIN DESCRIPTION - PAIN TYPE: TYPE: ACUTE PAIN

## 2022-07-11 ASSESSMENT — LIFESTYLE VARIABLES: HOW OFTEN DO YOU HAVE A DRINK CONTAINING ALCOHOL: NEVER

## 2022-07-11 ASSESSMENT — PAIN DESCRIPTION - DESCRIPTORS: DESCRIPTORS: THROBBING

## 2022-07-11 ASSESSMENT — PAIN SCALES - GENERAL: PAINLEVEL_OUTOF10: 7

## 2022-07-11 ASSESSMENT — PAIN DESCRIPTION - FREQUENCY: FREQUENCY: CONTINUOUS

## 2022-07-11 NOTE — ED PROVIDER NOTES
Triage Chief Complaint:   Medication Reaction (States took a new medication for her restless legs syndrome. Sinemet. Started it last night. States spasms began around 0900 this morning. States not getting any better. ) and Spasms    [place]:  Kimberlee Juárez is a 42 y.o. female that presents to the ED complaining of possible adverse side effect she started carbidopa levodopa last evening. She has a history of restless legs and tardive dyskinesia numerous other comorbidities including  Personality disorder. The patient is tearful complain of a headache just not feeling well and her muscles are jumping. Her nurse practitioner ordered her meds she is under the care of neurologist and states she is only caring for her headaches. The patient's history is more complex than that she wants this to be resolved clearly she is been on long-term antidopaminergic medicines she has a combination of her kinetic movements she has stuttering to her voice which is most likely a component of a supratentorial combined issue as well. No other high risk features or red flags denies any drug use no fevers no chills. No incontinence        Past Medical History:   Diagnosis Date    ADHD     Arthritis     OSTEO ARTHITIS    Bipolar disorder, unspecified ([address]     Cholecystenteric fistula     COPD [facility] obstructive [facility] disease) [facility] [address]     Degenerative disc disease     Fibromyalgia     Generalized anxiety disorder     H/O 24 hour EKG monitoring 02/16/2017      Sinus tach events , no major arrhythmias , follow up in office as routine     H/O echocardiogram 03/01/2017    EF 55-60% Normal LV structure  and systolic function.  H/O exercise stress test 03/01/2017    Normal stress test. Patient has physical deconditioning as she achieved target HR in 2 mins. Recommend to increase PO fluids intake and exercise training.      Headache     History of cardiac monitoring 09/21/2020    Normal 30-day event monitor with average heart rate of 63 lowest heart rate of 57 highest heart rate of 82 patient reported episodes of dizziness which did not correlate with any arrhythmia.   There were no episodes of tachycardia uneventful monitor however average heart rate and even the highest heart rate is on the lower side     Osteopenia     Osteoporosis     PTSD (post-traumatic stress disorder)     S/P cholecystectomy 11/22/2011    Schizo-affective schizophrenia ([facility] [address]     Sjoegren syndrome     Syncope     Tardive dyskinesia     Venous insufficiency of leg 2012    Vertigo      Past Surgical History:   Procedure Laterality Date    CHOLECYSTECTOMY  11/22/2011    laparoscopic    COLONOSCOPY      DENTAL SURGERY  8/9    ELBOW ARTHROSCOPY      bilateral elbows    ENDOSCOPY, COLON, DIAGNOSTIC      HYSTERECTOMY (CERVIX STATUS UNKNOWN)      OTHER SURGICAL HISTORY      venous ablation, bilateral legs    TUBAL LIGATION       Family History   Problem Relation Age of Onset    Other Mother         Neurocardiogenic syncope    ADHD Mother     Alcohol Abuse Father     Bipolar Disorder Sister     Anxiety Disorder Sister     Bipolar Disorder Sister     OCD Sister     Heart Surgery Maternal Grandmother     ADHD Daughter     ADHD Daughter     Lupus Paternal Uncle     Other Paternal Uncle         myasthenia gravis     Social History     Socioeconomic History    Marital status: Single     Spouse name: Not on file    Number of children: Not on file    Years of education: Not on file    Highest education level: Not on file   Occupational History    Not on file   Tobacco Use    Smoking status: Current Every Day Smoker     Packs/day: 0.25     Years: 20.00     Pack years: 5.00     Types: Cigarettes    Smokeless tobacco: Never Used   Vaping Use    Vaping Use: Former    Substances: Always   Substance and Sexual Activity    Alcohol use: No    Drug use: Not Currently     Types: Methamphetamines (Crystal Meth)     Comment: clean for 11 years, past hx of crack use    Sexual activity: Yes     Partners: Male   Other Topics Concern    Not on file   Social History Narrative    Not on file     Social Determinants of Health     Financial Resource Strain:     Difficulty of Paying Living Expenses: Not on file   Food Insecurity:     Worried About Running Out of Food in the Last Year: Not on file    Ursula of Food in the Last Year: Not on file   Transportation Needs:     Lack of Transportation (Medical): Not on file    Lack of Transportation (Non-Medical): Not on file   Physical Activity:     Days of Exercise per Week: Not on file    Minutes of Exercise per Session: Not on file   Stress:     Feeling of Stress : Not on file   Social Connections:     Frequency of Communication with Friends and Family: Not on file    Frequency of Social Gatherings with Friends and Family: Not on file    Attends [employer] Services: Not on file    Active Member of [address] [place] [address] [employer] or Organizations: Not on file    Attends Club or Organization Meetings: Not on file    Marital Status: Not on file   Intimate Partner Violence:     Fear of Current or Ex-Partner: Not on file    Emotionally Abused: Not on file    Physically Abused: Not on file    Sexually Abused: Not on file   Housing Stability:     Unable to Pay for Housing in the Last Year: Not on file    Number of [place] in the Last Year: Not on file    Unstable Housing in the Last Year: Not on file     Current Facility-Administered Medications   Medication Dose Route Frequency Provider Last Rate Last Admin    diphenhydrAMINE (BENADRYL) injection 50 mg  50 mg IntraMUSCular Once True Chaudhry, DO         Current Outpatient Medications   Medication Sig Dispense Refill    carbidopa-levodopa (SINEMET CR)  MG per extended release tablet Take 1 tablet by mouth nightly 30 tablet 0    pregabalin (LYRICA) 50 MG capsule Take 1 capsule by mouth 3 times daily for 30 days.  90 capsule 2    traZODone (DESYREL) 150 MG tablet Take 1 tablet by mouth nightly 30 tablet 0    atomoxetine (STRATTERA) 60 MG capsule Take 1 capsule by mouth daily 30 capsule 3    UBRELVY 50 MG TABS       EMGALITY 120 MG/ML SOAJ       INGREZZA 80 MG CAPS TAKE 1 CAPSULE BY MOUTH DAILY 30 capsule 5    acetaminophen (TYLENOL) 325 MG tablet Take 650 mg by mouth every 6 hours as needed for Pain      ibuprofen (ADVIL;MOTRIN) 400 MG tablet Take 400 mg by mouth every 6 hours as needed for Pain      vitamin B-12 (CYANOCOBALAMIN) 1000 MCG tablet Take 1,000 mcg by mouth daily       Allergies   Allergen Reactions    Rozerem [Ramelteon] Other (See Comments)     Increased symptoms of TD    Codeine Itching and Nausea And Vomiting    Imitrex [Sumatriptan] Itching and Nausea And Vomiting     PASSED OUT    Abilify [Aripiprazole] Other (See Comments)     Patient reports symptoms of TD    Amitriptyline     Botox [Onabotulinumtoxina] Other (See Comments)     Tardive dyskinesia    Compazine [Prochlorperazine Maleate] Other (See Comments)    Meclizine     Other Other (See Comments)    Topamax [Topiramate]      Made TD worse    Cephalexin Nausea And Vomiting and Other (See Comments)     ABDOMINAL PAIN FOR 2 WEEKS    Magnesium-Containing Compounds Other (See Comments)     Pt sts \"they called it the mags. I start twitching. \"     Meclizine Hcl Other (See Comments)     \"Makes me stutter and twitch. \"    Nsaids Other (See Comments)     Tardive dyskinisia    Pcn [Penicillins] Nausea And Vomiting and Other (See Comments)     HEADACHE    Reglan [Metoclopramide] Other (See Comments)     Tardive dyskinisia    Toradol [Ketorolac Tromethamine] Other (See Comments)     Tremors    Vistaril [Hydroxyzine Hcl] Other (See Comments)     dyskinsia      Zofran [Ondansetron Hcl] Other (See Comments)     Cramping and burning in stomach         ROS:    Review of Systems   Constitutional: Negative. HENT: Negative. Respiratory: Negative. Cardiovascular: Negative. Gastrointestinal: Negative. Musculoskeletal:        Restless legs   Neurological: Positive for headaches. Tardive dyskinesia is worse   Psychiatric/Behavioral: Positive for dysphoric mood. The patient is nervous/anxious. All other systems reviewed and are negative. Nursing Notes Reviewed    Physical Exam:      ED Triage Vitals [07/11/22 1335]   Enc Vitals Group      /76      Heart Rate 84      Resp 18      Temp 98.6 °F (37 °C)      Temp Source Oral      SpO2 97 %      Weight 113 lb (51.3 kg)      Height 5' 4\" (1.626 m)      Head Circumference       Peak Flow       Pain Score       Pain Loc       Pain Edu? Excl. in [address]? Physical Exam  Vitals and nursing note reviewed. Constitutional:       Appearance: She is well-developed. She is ill-appearing. HENT:      Head: Normocephalic and atraumatic. Right Ear: External ear normal.      Left Ear: External ear normal.   Eyes:      General: No scleral icterus. Right eye: No discharge. Left eye: No discharge. Conjunctiva/sclera: Conjunctivae normal.      Pupils: Pupils are equal, round, and reactive to light. Neck:      Thyroid: No thyromegaly. Vascular: No JVD. Trachea: No tracheal deviation. Cardiovascular:      Rate and Rhythm: Normal rate and regular rhythm. Heart sounds: Normal heart sounds. No murmur heard. No friction rub. No gallop. Pulmonary:      Effort: Pulmonary effort is normal. No respiratory distress. Breath sounds: Normal breath sounds. No stridor. No wheezing or rales. Chest:      Chest wall: No tenderness. Abdominal:      General: Bowel sounds are normal. There is no distension. Palpations: Abdomen is soft. There is no mass. Tenderness: There is no abdominal tenderness. There is no guarding or rebound. Hernia: No hernia is present. Musculoskeletal:         General: No tenderness or deformity. Normal range of motion.       Cervical back: Normal range of motion and neck supple. Lymphadenopathy:      Cervical: No cervical adenopathy. Skin:     General: Skin is warm and dry. Coloration: Skin is not pale. Findings: No erythema or rash. Neurological:      General: No focal deficit present. Mental Status: She is alert and oriented to person, place, and time. GCS: GCS eye subscore is 4. GCS verbal subscore is 5. GCS motor subscore is 6. Cranial Nerves: Cranial nerves are intact. No cranial nerve deficit. Sensory: Sensation is intact. Motor: Tremor present. Deep Tendon Reflexes: Reflexes normal.      Reflex Scores:       Bicep reflexes are 2+ on the right side and 2+ on the left side. Patellar reflexes are 2+ on the right side and 2+ on the left side. Achilles reflexes are 1+ on the right side and 1+ on the left side. Comments: Tardive dyskinesia with frequent myoclonic jerking hyperkinetic episodes in the upper lower extremities. compartments are soft. No leadpipe or cogwheel rigidity   Psychiatric:         Speech: Speech normal.         Behavior: Behavior normal.         Thought Content: Thought content normal.         Judgment: Judgment normal.         I have reviewed and interpreted all of the currently available lab results from this visit (ifapplicable):  No results found for this visit on 07/11/22.    Radiographs (if obtained):  [] The following radiograph wasinterpreted by myself in the absence of a radiologist:   [] Radiologist's Report Reviewed:  No orders to display         EKG (if obtained): (All EKG's are interpreted by myself in the absence of a cardiologist)    Chart review shows recent radiographs:  CT HEAD WO CONTRAST    Result Date: 6/15/2022  EXAMINATION: CT OF THE HEAD WITHOUT CONTRAST  6/15/2022 4:08 pm TECHNIQUE: CT of the head was performed without the administration of intravenous contrast. Automated exposure control, iterative reconstruction, and/or weight based adjustment of the mA/kV was utilized to reduce the radiation dose to as low as reasonably achievable. COMPARISON: CT scan dated September 2, 2021 HISTORY: ORDERING SYSTEM PROVIDED HISTORY: Headache TECHNOLOGIST PROVIDED HISTORY: Has a \"code stroke\" or \"stroke alert\" been called? ->No Reason for exam:->Headache Is the patient pregnant?->No Reason for Exam:   head pain Additional signs and symptoms: head ache, pt is crying FINDINGS: BRAIN/VENTRICLES: There is no acute intracranial hemorrhage, mass effect or midline shift. No abnormal extra-axial fluid collection. The gray-white differentiation is maintained without evidence of an acute infarct. There is no evidence of hydrocephalus. ORBITS: The visualized portion of the orbits demonstrate no acute abnormality. SINUSES: The visualized paranasal sinuses and mastoid air cells demonstrate no acute abnormality. SOFT TISSUES/SKULL:  No acute abnormality of the visualized skull or soft tissues. No acute intracranial abnormality. MDM:    Patient presents here with concern of possible adverse side effects. I do not have a suspicion that she is having a side effect due to her carbidopa levodopa. The patient is extremely anxious I will treat her with a short-term dose of IM diphenhydramine she is to follow-up with her neurologist for further guidance whether to continue this medicine. In the short-term I recommended holding her meds since she thinks this could be a side effect clinically this would not make tardive dyskinesia worse. Clinical Impression:  1. Tardive dyskinesia      Disposition referral (if applicable):  No follow-up provider specified. Disposition medications (if applicable):  New Prescriptions    No medications on file           James Montague DO, FACEFÉLIX      Comment: Please note this report has been produced using speech recognition software and maycontain errors related to that system including errors in grammar, punctuation, and spelling, as well as words and phrases that may be inappropriate. If there are any questions or concerns please feel free to contact thedictating provider for clarification.         Gume Morris,   07/11/22 5048

## 2022-07-11 NOTE — ED TRIAGE NOTES
Arrived per w/c to room 2 for triage. Tolerated without difficulty. Bed in lowest position. Call light given. Patient tearful. Unable to keep legs still.

## 2022-07-11 NOTE — TELEPHONE ENCOUNTER
Patient called in complaint of ED. Patient says the ED provider was rude and did not help her at all. She is stuttering and tearful on the phone. Patient advised by PCP to take Ativan 1mg now and then as prescribed. Patient also advised to take benadryl every 6 hours. Patient advised to call if no improvement.

## 2022-07-14 DIAGNOSIS — G25.81 RESTLESS LEGS: ICD-10-CM

## 2022-07-14 PROBLEM — F41.1 GENERALIZED ANXIETY DISORDER WITH PANIC ATTACKS: Status: ACTIVE | Noted: 2022-07-14

## 2022-07-14 PROBLEM — F41.0 GENERALIZED ANXIETY DISORDER WITH PANIC ATTACKS: Status: ACTIVE | Noted: 2022-07-14

## 2022-07-14 RX ORDER — ROPINIROLE 2 MG/1
TABLET, FILM COATED ORAL
Qty: 30 TABLET | Refills: 2 | Status: SHIPPED | OUTPATIENT
Start: 2022-07-14 | End: 2022-07-22 | Stop reason: SDUPTHER

## 2022-07-14 ASSESSMENT — ENCOUNTER SYMPTOMS
WHEEZING: 0
NAUSEA: 0
CONSTIPATION: 0
ABDOMINAL DISTENTION: 0
VOMITING: 0
CHEST TIGHTNESS: 0
DIARRHEA: 0
ABDOMINAL PAIN: 0
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
## 2022-07-22 ENCOUNTER — OFFICE VISIT (OUTPATIENT)
Dept: FAMILY MEDICINE CLINIC | Age: 47
End: 2022-07-22
Payer: COMMERCIAL

## 2022-07-22 VITALS
SYSTOLIC BLOOD PRESSURE: 92 MMHG | OXYGEN SATURATION: 97 % | DIASTOLIC BLOOD PRESSURE: 60 MMHG | HEART RATE: 80 BPM | WEIGHT: 113.8 LBS | BODY MASS INDEX: 19.53 KG/M2 | TEMPERATURE: 98 F | RESPIRATION RATE: 14 BRPM

## 2022-07-22 DIAGNOSIS — R25.2 LEG CRAMPS: ICD-10-CM

## 2022-07-22 DIAGNOSIS — R25.2 LEG CRAMPS: Primary | ICD-10-CM

## 2022-07-22 DIAGNOSIS — G25.81 RESTLESS LEGS: ICD-10-CM

## 2022-07-22 DIAGNOSIS — K52.9 ACUTE GASTROENTERITIS: Primary | ICD-10-CM

## 2022-07-22 PROCEDURE — 99214 OFFICE O/P EST MOD 30 MIN: CPT | Performed by: NURSE PRACTITIONER

## 2022-07-22 PROCEDURE — G8427 DOCREV CUR MEDS BY ELIG CLIN: HCPCS | Performed by: NURSE PRACTITIONER

## 2022-07-22 PROCEDURE — 4004F PT TOBACCO SCREEN RCVD TLK: CPT | Performed by: NURSE PRACTITIONER

## 2022-07-22 PROCEDURE — G8420 CALC BMI NORM PARAMETERS: HCPCS | Performed by: NURSE PRACTITIONER

## 2022-07-22 PROCEDURE — 36415 COLL VENOUS BLD VENIPUNCTURE: CPT | Performed by: NURSE PRACTITIONER

## 2022-07-22 RX ORDER — PROMETHAZINE HYDROCHLORIDE 12.5 MG/1
12.5 TABLET ORAL 4 TIMES DAILY PRN
Qty: 20 TABLET | Refills: 0 | Status: SHIPPED | OUTPATIENT
Start: 2022-07-22 | End: 2022-07-29

## 2022-07-22 RX ORDER — ROPINIROLE 2 MG/1
TABLET, FILM COATED ORAL
Qty: 30 TABLET | Refills: 2 | Status: SHIPPED | OUTPATIENT
Start: 2022-07-22 | End: 2022-09-23 | Stop reason: SDUPTHER

## 2022-07-22 ASSESSMENT — PATIENT HEALTH QUESTIONNAIRE - PHQ9
6. FEELING BAD ABOUT YOURSELF - OR THAT YOU ARE A FAILURE OR HAVE LET YOURSELF OR YOUR FAMILY DOWN: 0
1. LITTLE INTEREST OR PLEASURE IN DOING THINGS: 1
2. FEELING DOWN, DEPRESSED OR HOPELESS: 0
SUM OF ALL RESPONSES TO PHQ QUESTIONS 1-9: 4
8. MOVING OR SPEAKING SO SLOWLY THAT OTHER PEOPLE COULD HAVE NOTICED. OR THE OPPOSITE, BEING SO FIGETY OR RESTLESS THAT YOU HAVE BEEN MOVING AROUND A LOT MORE THAN USUAL: 0
SUM OF ALL RESPONSES TO PHQ QUESTIONS 1-9: 4
7. TROUBLE CONCENTRATING ON THINGS, SUCH AS READING THE NEWSPAPER OR WATCHING TELEVISION: 0
SUM OF ALL RESPONSES TO PHQ QUESTIONS 1-9: 4
SUM OF ALL RESPONSES TO PHQ9 QUESTIONS 1 & 2: 1
10. IF YOU CHECKED OFF ANY PROBLEMS, HOW DIFFICULT HAVE THESE PROBLEMS MADE IT FOR YOU TO DO YOUR WORK, TAKE CARE OF THINGS AT HOME, OR GET ALONG WITH OTHER PEOPLE: 1
4. FEELING TIRED OR HAVING LITTLE ENERGY: 3
SUM OF ALL RESPONSES TO PHQ QUESTIONS 1-9: 4
3. TROUBLE FALLING OR STAYING ASLEEP: 0
5. POOR APPETITE OR OVEREATING: 0
9. THOUGHTS THAT YOU WOULD BE BETTER OFF DEAD, OR OF HURTING YOURSELF: 0

## 2022-07-22 ASSESSMENT — ANXIETY QUESTIONNAIRES
3. WORRYING TOO MUCH ABOUT DIFFERENT THINGS: 0
7. FEELING AFRAID AS IF SOMETHING AWFUL MIGHT HAPPEN: 0
2. NOT BEING ABLE TO STOP OR CONTROL WORRYING: 3
IF YOU CHECKED OFF ANY PROBLEMS ON THIS QUESTIONNAIRE, HOW DIFFICULT HAVE THESE PROBLEMS MADE IT FOR YOU TO DO YOUR WORK, TAKE CARE OF THINGS AT HOME, OR GET ALONG WITH OTHER PEOPLE: SOMEWHAT DIFFICULT
GAD7 TOTAL SCORE: 9
1. FEELING NERVOUS, ANXIOUS, OR ON EDGE: 3
6. BECOMING EASILY ANNOYED OR IRRITABLE: 3
5. BEING SO RESTLESS THAT IT IS HARD TO SIT STILL: 0
4. TROUBLE RELAXING: 0

## 2022-07-22 NOTE — PROGRESS NOTES
dyskinesia     Venous insufficiency of leg 2012    Vertigo         Social History     Tobacco Use    Smoking status: Every Day     Packs/day: 0.25     Years: 20.00     Pack years: 5.00     Types: Cigarettes    Smokeless tobacco: Never   Substance Use Topics    Alcohol use: No        Review of Systems   Constitutional:  Positive for fatigue. Negative for appetite change, chills, fever and unexpected weight change. Respiratory:  Negative for cough and shortness of breath. Cardiovascular:  Negative for chest pain. Gastrointestinal:  Positive for abdominal pain, nausea and vomiting. Negative for abdominal distention, constipation and diarrhea. Genitourinary:  Negative for decreased urine volume, dysuria, frequency, hematuria and urgency. Musculoskeletal:  Positive for myalgias. Objective:      BP 92/60 (Site: Right Upper Arm, Position: Sitting, Cuff Size: Medium Adult)   Pulse 80   Temp 98 °F (36.7 °C) (Temporal)   Resp 14   Wt 113 lb 12.8 oz (51.6 kg)   LMP 11/18/2004   SpO2 97%   BMI 19.53 kg/m²      Physical Exam  Constitutional:       Appearance: Normal appearance. She is well-developed. She is ill-appearing. Cardiovascular:      Rate and Rhythm: Normal rate and regular rhythm. Heart sounds: Normal heart sounds. Pulmonary:      Effort: Pulmonary effort is normal.      Breath sounds: Normal breath sounds. Abdominal:      General: Bowel sounds are normal. There is no distension. Palpations: Abdomen is soft. There is no mass. Tenderness: There is generalized abdominal tenderness. There is no guarding or rebound. Negative signs include Medina's sign and McBurney's sign. Skin:     General: Skin is warm and dry. Neurological:      Mental Status: She is alert. Psychiatric:         Behavior: Behavior is cooperative. Assessment / Plan:      1. Acute gastroenteritis  Frequent hand washing. Drink plenty of water, to keep your urine light colored.   Avoid caffeine and dairy. Phenergan prn for nausea. - promethazine (PHENERGAN) 12.5 MG tablet; Take 1 tablet by mouth 4 times daily as needed for Nausea  Dispense: 20 tablet; Refill: 0    2. Leg cramps  Will check labs. Maintain hydration. Recommend Gatorade or Pedialyte, taking small sips a little bit at at time. - Basic Metabolic Panel  - Magnesium    3. Restless legs  Medication refilled. - rOPINIRole (REQUIP) 2 MG tablet; TAKE ONE TABLET BY MOUTH ONCE NIGHTLY  Dispense: 30 tablet; Refill: 2     The Moustapha Tanner,  was seen with a total time spent of 30 minutes for the visit on this date of service by the E/M provider. The time component had both face to face and non face to face time spent in determining the total time component. Counseling and education regarding diagnosis listed and options regarding those diagnoses were also included in determining the time component.          HANNA Lane - NP

## 2022-07-23 LAB
ANION GAP SERPL CALCULATED.3IONS-SCNC: 11 MMOL/L (ref 3–16)
BUN BLDV-MCNC: 11 MG/DL (ref 7–20)
CALCIUM SERPL-MCNC: 9.1 MG/DL (ref 8.3–10.6)
CHLORIDE BLD-SCNC: 106 MMOL/L (ref 99–110)
CO2: 26 MMOL/L (ref 21–32)
CREAT SERPL-MCNC: 0.9 MG/DL (ref 0.6–1.1)
GFR AFRICAN AMERICAN: >60
GFR NON-AFRICAN AMERICAN: >60
GLUCOSE BLD-MCNC: 74 MG/DL (ref 70–99)
MAGNESIUM: 2 MG/DL (ref 1.8–2.4)
POTASSIUM SERPL-SCNC: 3.8 MMOL/L (ref 3.5–5.1)
SODIUM BLD-SCNC: 143 MMOL/L (ref 136–145)

## 2022-07-29 ASSESSMENT — ENCOUNTER SYMPTOMS
SHORTNESS OF BREATH: 0
COUGH: 0
ABDOMINAL DISTENTION: 0
VOMITING: 1
NAUSEA: 1
ABDOMINAL PAIN: 1
CONSTIPATION: 0
DIARRHEA: 0

## 2022-08-02 ENCOUNTER — HOSPITAL ENCOUNTER (EMERGENCY)
Age: 47
Discharge: HOME | End: 2022-08-02
Attending: EMERGENCY MEDICINE
Payer: COMMERCIAL

## 2022-08-02 VITALS
RESPIRATION RATE: 16 BRPM | HEART RATE: 82 BPM | BODY MASS INDEX: 19.63 KG/M2 | SYSTOLIC BLOOD PRESSURE: 107 MMHG | HEIGHT: 64 IN | OXYGEN SATURATION: 97 % | WEIGHT: 115 LBS | TEMPERATURE: 98.4 F | DIASTOLIC BLOOD PRESSURE: 64 MMHG

## 2022-08-02 DIAGNOSIS — R51.9 ACUTE NONINTRACTABLE HEADACHE, UNSPECIFIED HEADACHE TYPE: Primary | ICD-10-CM

## 2022-08-02 DIAGNOSIS — B34.9 VIRAL SYNDROME: ICD-10-CM

## 2022-08-02 LAB
SARS-COV-2, NAAT: NOT DETECTED
SOURCE: NORMAL

## 2022-08-02 PROCEDURE — 99283 EMERGENCY DEPT VISIT LOW MDM: CPT

## 2022-08-02 PROCEDURE — 6370000000 HC RX 637 (ALT 250 FOR IP): Performed by: EMERGENCY MEDICINE

## 2022-08-02 PROCEDURE — 87635 SARS-COV-2 COVID-19 AMP PRB: CPT

## 2022-08-02 RX ORDER — ACETAMINOPHEN 500 MG
1000 TABLET ORAL ONCE
Status: COMPLETED | OUTPATIENT
Start: 2022-08-02 | End: 2022-08-02

## 2022-08-02 RX ADMIN — ACETAMINOPHEN 1000 MG: 500 TABLET ORAL at 19:59

## 2022-08-02 ASSESSMENT — PAIN DESCRIPTION - ORIENTATION: ORIENTATION: LEFT

## 2022-08-02 ASSESSMENT — PAIN - FUNCTIONAL ASSESSMENT: PAIN_FUNCTIONAL_ASSESSMENT: 0-10

## 2022-08-02 ASSESSMENT — PAIN SCALES - GENERAL
PAINLEVEL_OUTOF10: 0
PAINLEVEL_OUTOF10: 7

## 2022-08-02 ASSESSMENT — ENCOUNTER SYMPTOMS
SHORTNESS OF BREATH: 0
NAUSEA: 0
CONSTIPATION: 0
SINUS PRESSURE: 0
COUGH: 1
VOMITING: 0
DIARRHEA: 0
ABDOMINAL PAIN: 0
SORE THROAT: 1
RHINORRHEA: 0
WHEEZING: 0

## 2022-08-02 ASSESSMENT — PAIN DESCRIPTION - DESCRIPTORS: DESCRIPTORS: SHARP

## 2022-08-02 ASSESSMENT — PAIN DESCRIPTION - LOCATION: LOCATION: HEAD;THROAT;EAR

## 2022-08-02 NOTE — ED PROVIDER NOTES
Emergency Department Encounter    Patient: Gena Lopez  MRN: [id number]  : 1975  Date of Evaluation: 2022  ED Provider:  [id number] [facility],     Triage Chief Complaint:   Concern For COVID-19 (C/o headache, left ear pain, sore throat and cough since yesterday. Denies fever. Patient has not had any OTC medication.)    HPI:  Gena Lopez is a 42 y.o. female past medical history as listed below who presents with a headache. Patient has had 3 days of intermittent, dull, aching headache at the back of her head, 7 out of 10 pain. She does have a history of migraines, she does state this feels similar to her migraines, however she has been taking her migraine medication and has not improved her symptoms. She also did try ibuprofen and Tylenol yesterday without improvement of symptoms. Headache is associated with a sore throat, left ear pain, nonproductive cough and generalized malaise, fatigue and body aches. She does have multiple sick contacts at work all with [employer]. Patient is unvaccinated for COVID-19. Patient denies any fever, chills, chest pain, shortness of breath, palpitations, nausea, vomiting, abdominal pain, dysuria, diarrhea. ROS:  Review of Systems   Constitutional:  Positive for fatigue. Negative for chills and fever. HENT:  Positive for congestion, ear pain and sore throat. Negative for rhinorrhea and sinus pressure. Eyes:  Negative for visual disturbance. Respiratory:  Positive for cough. Negative for shortness of breath and wheezing. Cardiovascular:  Negative for chest pain and palpitations. Gastrointestinal:  Negative for abdominal pain, constipation, diarrhea, nausea and vomiting. Genitourinary:  Negative for dysuria, frequency and urgency. Musculoskeletal:  Positive for myalgias. Negative for arthralgias. Skin:  Negative for rash. Neurological:  Negative for dizziness and syncope.    Psychiatric/Behavioral:  Negative for agitation, behavioral problems and confusion. Past Medical History:   Diagnosis Date    ADHD     Arthritis     OSTEO ARTHITIS    Bipolar disorder, unspecified ([address]     Cholecystenteric fistula     COPD (chronic obstructive pulmonary disease) (HCC)     Degenerative disc disease     Fibromyalgia     Generalized anxiety disorder     H/O 24 hour EKG monitoring 02/16/2017      Sinus tach events , no major arrhythmias , follow up in office as routine     H/O echocardiogram 03/01/2017    EF 55-60% Normal LV structure  and systolic function. H/O exercise stress test 03/01/2017    Normal stress test. Patient has physical deconditioning as she achieved target HR in 2 mins. Recommend to increase PO fluids intake and exercise training. Headache     History of cardiac monitoring 09/21/2020    Normal 30-day event monitor with average heart rate of 63 lowest heart rate of 57 highest heart rate of 82 patient reported episodes of dizziness which did not correlate with any arrhythmia.   There were no episodes of tachycardia uneventful monitor however average heart rate and even the highest heart rate is on the lower side     Osteopenia     Osteoporosis     PTSD (post-traumatic stress disorder)     S/P cholecystectomy 11/22/2011    Schizo-affective schizophrenia ([facility] [address]     Sjoegren syndrome     Syncope     Tardive dyskinesia     Venous insufficiency of leg 2012    Vertigo      Past Surgical History:   Procedure Laterality Date    CHOLECYSTECTOMY  11/22/2011    laparoscopic    COLONOSCOPY      DENTAL SURGERY  8/9    ELBOW ARTHROSCOPY      bilateral elbows    ENDOSCOPY, COLON, DIAGNOSTIC      HYSTERECTOMY (CERVIX STATUS UNKNOWN)      OTHER SURGICAL HISTORY      venous ablation, bilateral legs    TUBAL LIGATION       Family History   Problem Relation Age of Onset    Other Mother         Neurocardiogenic syncope    ADHD Mother     Alcohol Abuse Father     Bipolar Disorder Sister     Anxiety Disorder Sister     Bipolar Disorder Sister     OCD Sister     Heart Surgery Maternal Grandmother     ADHD Daughter     ADHD Daughter     Lupus Paternal Uncle     Other Paternal Uncle         myasthenia gravis     Social History     Socioeconomic History    Marital status: Single     Spouse name: Not on file    Number of children: Not on file    Years of education: Not on file    Highest education level: Not on file   Occupational History    Not on file   Tobacco Use    Smoking status: Every Day     Packs/day: 0.25     Years: 20.00     Pack years: 5.00     Types: Cigarettes    Smokeless tobacco: Never   Vaping Use    Vaping Use: Former    Substances: Always   Substance and Sexual Activity    Alcohol use: No    Drug use: Not Currently     Types: Methamphetamines (Crystal Meth)     Comment: clean for 11 years, past hx of crack use    Sexual activity: Yes     Partners: Male   Other Topics Concern    Not on file   Social History Narrative    Not on file     Social Determinants of Health     Financial Resource Strain: Not on file   Food Insecurity: Not on file   Transportation Needs: Not on file   Physical Activity: Not on file   Stress: Not on file   Social Connections: Not on file   Intimate Partner Violence: Not on file   Housing Stability: Not on file     No current facility-administered medications for this encounter. Current Outpatient Medications   Medication Sig Dispense Refill    rOPINIRole (REQUIP) 2 MG tablet TAKE ONE TABLET BY MOUTH ONCE NIGHTLY 30 tablet 2    pregabalin (LYRICA) 50 MG capsule Take 1 capsule by mouth 3 times daily for 30 days.  90 capsule 2    traZODone (DESYREL) 150 MG tablet Take 1 tablet by mouth nightly 30 tablet 0    atomoxetine (STRATTERA) 60 MG capsule Take 1 capsule by mouth daily 30 capsule 3    UBRELVY 50 MG TABS       EMGALITY 120 MG/ML SOAJ       INGREZZA 80 MG CAPS TAKE 1 CAPSULE BY MOUTH DAILY 30 capsule 5    vitamin B-12 (CYANOCOBALAMIN) 1000 MCG tablet Take 1,000 mcg by mouth daily       Allergies   Allergen Reactions Rozerem [Ramelteon] Other (See Comments)     Increased symptoms of TD    Sinemet [Carbidopa-Levodopa]      Increased TD symptoms    Codeine Itching and Nausea And Vomiting    Imitrex [Sumatriptan] Itching and Nausea And Vomiting     PASSED OUT    Abilify [Aripiprazole] Other (See Comments)     Patient reports symptoms of TD    Amitriptyline     Botox [Onabotulinumtoxina] Other (See Comments)     Tardive dyskinesia    Compazine [Prochlorperazine Maleate] Other (See Comments)    Meclizine     Other Other (See Comments)    Topamax [Topiramate]      Made TD worse    Cephalexin Nausea And Vomiting and Other (See Comments)     ABDOMINAL PAIN FOR 2 WEEKS    Magnesium-Containing Compounds Other (See Comments)     Pt sts \"they called it the mags. I start twitching. \"     Meclizine Hcl Other (See Comments)     \"Makes me stutter and twitch. \"    Nsaids Other (See Comments)     Tardive dyskinisia    Pcn [Penicillins] Nausea And Vomiting and Other (See Comments)     HEADACHE    Reglan [Metoclopramide] Other (See Comments)     Tardive dyskinisia    Toradol [Ketorolac Tromethamine] Other (See Comments)     Tremors    Vistaril [Hydroxyzine Hcl] Other (See Comments)     dyskinsia      Zofran [Ondansetron Hcl] Other (See Comments)     Cramping and burning in stomach       Nursing Notes Reviewed    Physical Exam:  Triage VS:    ED Triage Vitals [08/02/22 1839]   Enc Vitals Group      /64      Heart Rate 82      Resp 16      Temp 98.4 °F (36.9 °C)      Temp Source Oral      SpO2 97 %      Weight 115 lb (52.2 kg)      Height 5' 4\" (1.626 m)      Head Circumference       Peak Flow       Pain Score       Pain Loc       Pain Edu? Excl. in [address]? Physical Exam  Vitals and nursing note reviewed. Constitutional:       General: She is not in acute distress. Appearance: Normal appearance. She is not ill-appearing or toxic-appearing. HENT:      Head: Normocephalic and atraumatic.       Right Ear: Tympanic membrane and ear On exam, she is well-appearing, nontoxic-appearing, speaking in full sentences and in no respiratory distress. She is afebrile, not tachycardic, not tachypneic, 97% on room air with blood pressure within acceptable limits. I do think patient is appropriate for outpatient follow-up at this time. Patient to follow-up with primary care provider in 1 to 2 days. She is to return to the emergency department if symptoms worsen, return precautions are discussed and she does verbalize agreement understanding. Clinical Impression:  1. Acute nonintractable headache, unspecified headache type    2. Viral syndrome      Disposition referral (if applicable):  HANNA Blake - NP  [address]  [phone]    Schedule an appointment as soon as possible for a visit in 2 days      [facility] Emergency Department  [address] [place] [address]  [phone]  Go to   As needed, If symptoms worsen  Disposition medications (if applicable):  New Prescriptions    No medications on file       Comment: Please note this report has been produced using speech recognition software and may contain errors related to that system including errors in grammar, punctuation, and spelling, as well as words and phrases that may be inappropriate. Efforts were made to edit the dictations.        [address] [place] [address],   08/02/22 1736

## 2022-08-02 NOTE — LETTER
[facility] Emergency Department  [address] [place] [id number]  Phone: [phone]  Fax: [phone]               August 2, 2022    Patient: Jim Godwin   YOB: 1975   Date of Visit: 8/2/2022       To Whom It May Concern:    Dominick Ramon was seen and treated in our emergency department on 8/2/2022. She may return to work on 08/03/2022.       Sincerely,       Eleonora Hutchison RN         Signature:__________________________________

## 2022-08-03 DIAGNOSIS — F51.01 PRIMARY INSOMNIA: ICD-10-CM

## 2022-08-03 NOTE — ED NOTES
Pt discharged with instructions and pt stated understanding.   Pt walked out of the [id number], RN  08/02/22 2113

## 2022-08-04 RX ORDER — TRAZODONE HYDROCHLORIDE 150 MG/1
TABLET ORAL
Qty: 30 TABLET | Refills: 0 | Status: SHIPPED | OUTPATIENT
Start: 2022-08-04 | End: 2022-09-02 | Stop reason: SDUPTHER

## 2022-08-23 DIAGNOSIS — G24.01 TARDIVE DYSKINESIA: ICD-10-CM

## 2022-08-25 RX ORDER — VALBENAZINE 80 MG/1
CAPSULE ORAL
Qty: 30 CAPSULE | Refills: 11 | Status: SHIPPED | OUTPATIENT
Start: 2022-08-25

## 2022-09-02 DIAGNOSIS — F51.01 PRIMARY INSOMNIA: ICD-10-CM

## 2022-09-06 DIAGNOSIS — F41.0 GENERALIZED ANXIETY DISORDER WITH PANIC ATTACKS: Primary | ICD-10-CM

## 2022-09-06 DIAGNOSIS — F41.1 GENERALIZED ANXIETY DISORDER WITH PANIC ATTACKS: Primary | ICD-10-CM

## 2022-09-06 RX ORDER — LORAZEPAM 0.5 MG/1
0.5 TABLET ORAL 3 TIMES DAILY
Qty: 90 TABLET | Refills: 0 | Status: SHIPPED
Start: 2022-09-06 | End: 2022-09-23 | Stop reason: DRUGHIGH

## 2022-09-06 RX ORDER — TRAZODONE HYDROCHLORIDE 150 MG/1
150 TABLET ORAL NIGHTLY
Qty: 30 TABLET | Refills: 11 | Status: SHIPPED | OUTPATIENT
Start: 2022-09-06 | End: 2022-09-23 | Stop reason: SDUPTHER

## 2022-09-06 RX ORDER — LORAZEPAM 0.5 MG/1
1 TABLET ORAL 3 TIMES DAILY
COMMUNITY
Start: 2022-08-03 | End: 2022-09-06 | Stop reason: SDUPTHER

## 2022-09-22 ENCOUNTER — HOSPITAL ENCOUNTER (EMERGENCY)
Age: 47
Discharge: HOME | End: 2022-09-22
Attending: EMERGENCY MEDICINE
Payer: COMMERCIAL

## 2022-09-22 ENCOUNTER — TELEPHONE (OUTPATIENT)
Dept: FAMILY MEDICINE CLINIC | Age: 47
End: 2022-09-22

## 2022-09-22 VITALS
DIASTOLIC BLOOD PRESSURE: 70 MMHG | SYSTOLIC BLOOD PRESSURE: 97 MMHG | RESPIRATION RATE: 16 BRPM | HEART RATE: 71 BPM | TEMPERATURE: 97.9 F | OXYGEN SATURATION: 97 % | BODY MASS INDEX: 19.12 KG/M2 | HEIGHT: 64 IN | WEIGHT: 112 LBS

## 2022-09-22 DIAGNOSIS — R51.9 ACUTE NONINTRACTABLE HEADACHE, UNSPECIFIED HEADACHE TYPE: Primary | ICD-10-CM

## 2022-09-22 DIAGNOSIS — M62.838 CERVICAL PARASPINAL MUSCLE SPASM: ICD-10-CM

## 2022-09-22 PROCEDURE — 99284 EMERGENCY DEPT VISIT MOD MDM: CPT

## 2022-09-22 PROCEDURE — 96372 THER/PROPH/DIAG INJ SC/IM: CPT

## 2022-09-22 PROCEDURE — 6360000002 HC RX W HCPCS: Performed by: EMERGENCY MEDICINE

## 2022-09-22 RX ORDER — PROMETHAZINE HYDROCHLORIDE 25 MG/ML
25 INJECTION, SOLUTION INTRAMUSCULAR; INTRAVENOUS ONCE
Status: COMPLETED | OUTPATIENT
Start: 2022-09-22 | End: 2022-09-22

## 2022-09-22 RX ORDER — ORPHENADRINE CITRATE 30 MG/ML
60 INJECTION INTRAMUSCULAR; INTRAVENOUS ONCE
Status: COMPLETED | OUTPATIENT
Start: 2022-09-22 | End: 2022-09-22

## 2022-09-22 RX ORDER — ORPHENADRINE CITRATE 100 MG/1
100 TABLET, EXTENDED RELEASE ORAL 2 TIMES DAILY
Qty: 20 TABLET | Refills: 0 | Status: SHIPPED | OUTPATIENT
Start: 2022-09-22 | End: 2022-10-02

## 2022-09-22 RX ADMIN — ORPHENADRINE CITRATE 60 MG: 30 INJECTION INTRAMUSCULAR; INTRAVENOUS at 16:37

## 2022-09-22 RX ADMIN — PROMETHAZINE HYDROCHLORIDE 25 MG: 25 INJECTION INTRAMUSCULAR; INTRAVENOUS at 16:35

## 2022-09-22 RX ADMIN — BUTORPHANOL TARTRATE 1 MG: 2 INJECTION, SOLUTION INTRAMUSCULAR; INTRAVENOUS at 16:36

## 2022-09-22 ASSESSMENT — PAIN DESCRIPTION - DESCRIPTORS: DESCRIPTORS: POUNDING;THROBBING

## 2022-09-22 ASSESSMENT — PAIN SCALES - GENERAL
PAINLEVEL_OUTOF10: 7
PAINLEVEL_OUTOF10: 7
PAINLEVEL_OUTOF10: 2
PAINLEVEL_OUTOF10: 7

## 2022-09-22 ASSESSMENT — PAIN - FUNCTIONAL ASSESSMENT
PAIN_FUNCTIONAL_ASSESSMENT: 0-10
PAIN_FUNCTIONAL_ASSESSMENT: PREVENTS OR INTERFERES WITH ALL ACTIVE AND SOME PASSIVE ACTIVITIES

## 2022-09-22 ASSESSMENT — LIFESTYLE VARIABLES: HOW OFTEN DO YOU HAVE A DRINK CONTAINING ALCOHOL: NEVER

## 2022-09-22 ASSESSMENT — PAIN DESCRIPTION - PAIN TYPE: TYPE: ACUTE PAIN

## 2022-09-22 ASSESSMENT — PAIN DESCRIPTION - LOCATION
LOCATION: NECK
LOCATION: HEAD

## 2022-09-22 ASSESSMENT — PAIN DESCRIPTION - FREQUENCY: FREQUENCY: CONTINUOUS

## 2022-09-22 NOTE — TELEPHONE ENCOUNTER
Patient called requesting appt. Today for dizziness and neck pain, advised patient that we have no openings today (pt. Has scheduled appt. Tomorrow) Advised pt. That she can go to [facility] or [place] walk in clinic to be seen today, states she did not want to go there and would wait until appt. Tomorrow. I advised pt. If her symptoms got worse to go to ER for evaluation. Patient advised and voices understanding.

## 2022-09-22 NOTE — ED PROVIDER NOTES
EMERGENCY DEPARTMENT ENCOUNTER      CHIEF COMPLAINT:   Headache    HPI: Jackie Valderrama is a 41 y.o. female who presents to the Emergency Department complaining of a headache. She states that the headache started the day before yesterday. It is located to the top of her head and feels throbbing and pounding in nature. It is been constant since onset. There are no exacerbating or relieving factors. She has tried taking her migraine medication without relief. It came on gradually and is not the worst headache of the patient's life. There was no thunderclap presentation. The patient has a history of headaches and this feels similar to previous. The patient also complains of left-sided neck pain. She states that the pain started yesterday and was worse when she woke up today. She thinks she may have slept wrong. She tried Robaxin and Tylenol yesterday, without relief. The patient denies blurred vision, slurred speech, vomiting, numbness, tingling, or weakness. REVIEW OF SYSTEMS:  CONSTITUTIONAL:  Denies fever, chills, weight loss or weakness  EYES:  Denies photophobia or discharge  ENT:  Denies sore throat or ear pain  CARDIOVASCULAR:  Denies chest pain, palpitations or swelling  RESPIRATORY:  Denies cough or shortness of breath  GI:  Denies abdominal pain, nausea, vomiting, or diarrhea  MUSCULOSKELETAL: See HPI  SKIN:  No rash  NEUROLOGIC: See HPI  All systems negative except as marked. \"Remaining review of systems reviewed and negative. I have reviewed the nursing triage documentation and agree unless otherwise noted below. \"      PAST MEDICAL HISTORY:   Past Medical History:   Diagnosis Date    ADHD     Arthritis     OSTEO ARTHITIS    Bipolar disorder, unspecified [address] [address]     Cholecystenteric fistula     COPD (chronic obstructive pulmonary disease) (HCC)     Degenerative disc disease     Fibromyalgia     Generalized anxiety disorder     H/O 24 hour EKG monitoring 02/16/2017      Sinus tach events , no major arrhythmias , follow up in office as routine     H/O echocardiogram 03/01/2017    EF 55-60% Normal LV structure  and systolic function. H/O exercise stress test 03/01/2017    Normal stress test. Patient has physical deconditioning as she achieved target HR in 2 mins. Recommend to increase PO fluids intake and exercise training. Headache     History of cardiac monitoring 09/21/2020    Normal 30-day event monitor with average heart rate of 63 lowest heart rate of 57 highest heart rate of 82 patient reported episodes of dizziness which did not correlate with any arrhythmia. There were no episodes of tachycardia uneventful monitor however average heart rate and even the highest heart rate is on the lower side     Osteopenia     Osteoporosis     PTSD (post-traumatic stress disorder)     S/P cholecystectomy 11/22/2011    Schizo-affective schizophrenia ([facility] [address]     Sjoegren syndrome     Syncope     Tardive dyskinesia     Venous insufficiency of leg 2012    Vertigo        CURRENT MEDICATIONS:   Home medications reviewed.     SURGICAL HISTORY:   Past Surgical History:   Procedure Laterality Date    CHOLECYSTECTOMY  11/22/2011    laparoscopic    COLONOSCOPY      DENTAL SURGERY  8/9    ELBOW ARTHROSCOPY      bilateral elbows    ENDOSCOPY, COLON, DIAGNOSTIC      HYSTERECTOMY (CERVIX STATUS UNKNOWN)      OTHER SURGICAL HISTORY      venous ablation, bilateral legs    TUBAL LIGATION         FAMILY HISTORY:   Family History   Problem Relation Age of Onset    Other Mother         Neurocardiogenic syncope    ADHD Mother     Alcohol Abuse Father     Bipolar Disorder Sister     Anxiety Disorder Sister     Bipolar Disorder Sister     OCD Sister     Heart Surgery Maternal Grandmother     ADHD Daughter     ADHD Daughter     Lupus Paternal Uncle     Other Paternal Uncle         myasthenia gravis       SOCIAL HISTORY:   Social History     Socioeconomic History    Marital status: Single     Spouse name: Not on file    Number of children: Not on file    Years of education: Not on file    Highest education level: Not on file   Occupational History    Not on file   Tobacco Use    Smoking status: Every Day     Packs/day: 0.25     Years: 20.00     Pack years: 5.00     Types: Cigarettes    Smokeless tobacco: Never   Vaping Use    Vaping Use: Former    Substances: Always   Substance and Sexual Activity    Alcohol use: No    Drug use: Not Currently     Types: Methamphetamines (Crystal Meth)     Comment: clean for 11 years, past hx of crack use    Sexual activity: Yes     Partners: Male   Other Topics Concern    Not on file   Social History Narrative    Not on file     Social Determinants of Health     Financial Resource Strain: Not on file   Food Insecurity: Not on file   Transportation Needs: Not on file   Physical Activity: Not on file   Stress: Not on file   Social Connections: Not on file   Intimate Partner Violence: Not on file   Housing Stability: Not on file       ALLERGIES: Rozerem [ramelteon], Sinemet [carbidopa-levodopa], Codeine, Imitrex [sumatriptan], Abilify [aripiprazole], Amitriptyline, Botox [onabotulinumtoxina], Compazine [prochlorperazine maleate], Meclizine, Other, Topamax [topiramate], Cephalexin, Magnesium-containing compounds, Meclizine hcl, Nsaids, Pcn [penicillins], Reglan [metoclopramide], Toradol [ketorolac tromethamine], Vistaril [hydroxyzine hcl], and Zofran [ondansetron hcl]    PHYSICAL EXAM:  VITAL SIGNS:  ED Triage Vitals [09/22/22 1530]   Enc Vitals Group      BP (!) 101/59      Heart Rate 85      Resp 16      Temp 97.9 °F (36.6 °C)      Temp Source Oral      SpO2 97 %      Weight 112 lb (50.8 kg)      Height 5' 4\" (1.626 m)      Head Circumference       Peak Flow       Pain Score       Pain Loc       Pain Edu? Excl. in [address]?        Constitutional:  Non-toxic appearance, Awake, Alert  HENT: Normocephalic, Atraumatic, Bilateral external ears normal, Oropharynx moist, No oral exudates, Nose normal.  Eyes: PERRL, conjunctiva normal   Neck: Difficulty turning neck side to side due to neck pain, able to flex chin to chest, there is left paraspinal cervical tenderness to palpation with palpable spasm, No lymphadenopathy, No stridor, No meningeal signs  Cardiovascular:  Normal heart rate, Normal rhythm  Pulmonary/Chest:  Normal breath sounds, No respiratory distress, No wheezing  Abdomen: Bowel sounds normal, Soft, No tenderness, No masses, No pulsatile masses  Extremities:  Normal range of motion, Intact distal pulses, No edema, No tenderness  Neurologic:  Alert & oriented x 3, Speech clear, Normal motor function, Sensation intact to light touch throughout,  No focal deficits  Skin:  Warm, Dry, No erythema, No rash    EKG:    None    Radiology / Procedures:  None    ED COURSE & MEDICAL DECISION MAKING:  Pertinent Labs & Imaging studies reviewed. (See chart for details)  On exam, the patient is afebrile and nontoxic appearing. She is awake and alert. She is hemodynamically stable and neurologically intact. Neck is supple with no meningeal signs. The patient has a long-standing history of headaches and this is similar in character to previous. She had a negative CT scan of the head 3 months ago. I do not feel that any imaging is indicated today. The patient was treated with Norflex, Phenergan and Stadol with improvement of her headache. She is allergic to most of the medications that you would typically get and a migraine cocktail. I suspect that the patient has a headache and a cervical paraspinal muscle spasm. This is likely a tension headache versus a migraine headache. I have a low suspicion for subarachnoid hemorrhage, meningitis, encephalitis,vasculitis, temporal arteritis, cerebrovascular accident, hypertensive emergency, pseudotumor cerebri, or mass effect. I feel that the patient is stable for outpatient management with follow up in 2-3 days. Return precautions are given.   The patient verbalized understanding, was agreeable with plan, and the patient was discharged home in stable condition. Clinical Impression:  1. Acute nonintractable headache, unspecified headache type    2. Cervical paraspinal muscle spasm        Disposition referral (if applicable):  HANNA Blake - NP  [address] [place] [address] [address] [facility]  [phone]    Schedule an appointment as soon as possible for a visit       [facility] Emergency Department  [address] [address] [phone]  Go to   If symptoms worsen    Disposition medications (if applicable):  Discharge Medication List as of 9/22/2022  5:17 PM        START taking these medications    Details   orphenadrine (NORFLEX) 100 MG extended release tablet Take 1 tablet by mouth 2 times daily for 10 days, Disp-20 tablet, R-0Normal               Comment: Please note this report has been produced using speech recognition software and may contain errors related to that system including errors in grammar, punctuation, and spelling, as well as words and phrases that may be inappropriate. If there are any questions or concerns please feel free to contact the dictating provider for clarification.         Shea Gilbert MD  09/28/22 7584

## 2022-09-23 ENCOUNTER — OFFICE VISIT (OUTPATIENT)
Dept: FAMILY MEDICINE CLINIC | Age: 47
End: 2022-09-23
Payer: COMMERCIAL

## 2022-09-23 VITALS
DIASTOLIC BLOOD PRESSURE: 57 MMHG | SYSTOLIC BLOOD PRESSURE: 98 MMHG | HEART RATE: 78 BPM | WEIGHT: 113.6 LBS | TEMPERATURE: 97.9 F | BODY MASS INDEX: 19.5 KG/M2 | OXYGEN SATURATION: 98 %

## 2022-09-23 DIAGNOSIS — F90.0 ATTENTION DEFICIT HYPERACTIVITY DISORDER (ADHD), PREDOMINANTLY INATTENTIVE TYPE: Primary | ICD-10-CM

## 2022-09-23 DIAGNOSIS — F41.0 GENERALIZED ANXIETY DISORDER WITH PANIC ATTACKS: ICD-10-CM

## 2022-09-23 DIAGNOSIS — M79.7 FIBROMYALGIA: ICD-10-CM

## 2022-09-23 DIAGNOSIS — F41.1 GENERALIZED ANXIETY DISORDER WITH PANIC ATTACKS: ICD-10-CM

## 2022-09-23 DIAGNOSIS — F51.01 PRIMARY INSOMNIA: ICD-10-CM

## 2022-09-23 DIAGNOSIS — F33.1 MODERATE EPISODE OF RECURRENT MAJOR DEPRESSIVE DISORDER (HCC): ICD-10-CM

## 2022-09-23 DIAGNOSIS — G25.81 RESTLESS LEGS: ICD-10-CM

## 2022-09-23 PROCEDURE — 99214 OFFICE O/P EST MOD 30 MIN: CPT | Performed by: NURSE PRACTITIONER

## 2022-09-23 PROCEDURE — G8427 DOCREV CUR MEDS BY ELIG CLIN: HCPCS | Performed by: NURSE PRACTITIONER

## 2022-09-23 PROCEDURE — 4004F PT TOBACCO SCREEN RCVD TLK: CPT | Performed by: NURSE PRACTITIONER

## 2022-09-23 PROCEDURE — G8420 CALC BMI NORM PARAMETERS: HCPCS | Performed by: NURSE PRACTITIONER

## 2022-09-23 RX ORDER — ATOMOXETINE 60 MG/1
60 CAPSULE ORAL DAILY
Qty: 30 CAPSULE | Refills: 2 | Status: SHIPPED | OUTPATIENT
Start: 2022-09-23

## 2022-09-23 RX ORDER — DEXTROAMPHETAMINE SACCHARATE, AMPHETAMINE ASPARTATE MONOHYDRATE, DEXTROAMPHETAMINE SULFATE AND AMPHETAMINE SULFATE 2.5; 2.5; 2.5; 2.5 MG/1; MG/1; MG/1; MG/1
10 CAPSULE, EXTENDED RELEASE ORAL DAILY
Qty: 30 CAPSULE | Refills: 0 | Status: SHIPPED
Start: 2022-09-23 | End: 2022-10-14 | Stop reason: DRUGHIGH

## 2022-09-23 RX ORDER — LORAZEPAM 0.5 MG/1
0.5 TABLET ORAL 3 TIMES DAILY
Qty: 90 TABLET | Refills: 0 | Status: CANCELLED | OUTPATIENT
Start: 2022-09-23 | End: 2022-10-23

## 2022-09-23 RX ORDER — LIDOCAINE 50 MG/G
1 PATCH TOPICAL DAILY
Qty: 10 PATCH | Refills: 0 | Status: SHIPPED | OUTPATIENT
Start: 2022-09-23 | End: 2022-10-03

## 2022-09-23 RX ORDER — LORAZEPAM 1 MG/1
1 TABLET ORAL NIGHTLY
Qty: 30 TABLET | Refills: 2 | Status: SHIPPED | OUTPATIENT
Start: 2022-09-23 | End: 2022-10-23

## 2022-09-23 RX ORDER — PREGABALIN 50 MG/1
50 CAPSULE ORAL 3 TIMES DAILY
Qty: 90 CAPSULE | Refills: 2 | Status: SHIPPED | OUTPATIENT
Start: 2022-09-23 | End: 2022-10-23

## 2022-09-23 RX ORDER — TRAZODONE HYDROCHLORIDE 150 MG/1
150 TABLET ORAL NIGHTLY
Qty: 30 TABLET | Refills: 11 | Status: SHIPPED | OUTPATIENT
Start: 2022-09-23 | End: 2022-10-23

## 2022-09-23 RX ORDER — ROPINIROLE 2 MG/1
TABLET, FILM COATED ORAL
Qty: 30 TABLET | Refills: 2 | Status: SHIPPED | OUTPATIENT
Start: 2022-09-23

## 2022-09-23 ASSESSMENT — ANXIETY QUESTIONNAIRES
7. FEELING AFRAID AS IF SOMETHING AWFUL MIGHT HAPPEN: 0
5. BEING SO RESTLESS THAT IT IS HARD TO SIT STILL: 3
2. NOT BEING ABLE TO STOP OR CONTROL WORRYING: 1
GAD7 TOTAL SCORE: 16
4. TROUBLE RELAXING: 3
3. WORRYING TOO MUCH ABOUT DIFFERENT THINGS: 3
1. FEELING NERVOUS, ANXIOUS, OR ON EDGE: 3
IF YOU CHECKED OFF ANY PROBLEMS ON THIS QUESTIONNAIRE, HOW DIFFICULT HAVE THESE PROBLEMS MADE IT FOR YOU TO DO YOUR WORK, TAKE CARE OF THINGS AT HOME, OR GET ALONG WITH OTHER PEOPLE: EXTREMELY DIFFICULT
6. BECOMING EASILY ANNOYED OR IRRITABLE: 3

## 2022-09-23 ASSESSMENT — PATIENT HEALTH QUESTIONNAIRE - PHQ9
SUM OF ALL RESPONSES TO PHQ QUESTIONS 1-9: 18
9. THOUGHTS THAT YOU WOULD BE BETTER OFF DEAD, OR OF HURTING YOURSELF: 0
10. IF YOU CHECKED OFF ANY PROBLEMS, HOW DIFFICULT HAVE THESE PROBLEMS MADE IT FOR YOU TO DO YOUR WORK, TAKE CARE OF THINGS AT HOME, OR GET ALONG WITH OTHER PEOPLE: 3
3. TROUBLE FALLING OR STAYING ASLEEP: 0
SUM OF ALL RESPONSES TO PHQ QUESTIONS 1-9: 18
5. POOR APPETITE OR OVEREATING: 3
SUM OF ALL RESPONSES TO PHQ QUESTIONS 1-9: 18
4. FEELING TIRED OR HAVING LITTLE ENERGY: 3
SUM OF ALL RESPONSES TO PHQ9 QUESTIONS 1 & 2: 6
8. MOVING OR SPEAKING SO SLOWLY THAT OTHER PEOPLE COULD HAVE NOTICED. OR THE OPPOSITE, BEING SO FIGETY OR RESTLESS THAT YOU HAVE BEEN MOVING AROUND A LOT MORE THAN USUAL: 3
7. TROUBLE CONCENTRATING ON THINGS, SUCH AS READING THE NEWSPAPER OR WATCHING TELEVISION: 3
6. FEELING BAD ABOUT YOURSELF - OR THAT YOU ARE A FAILURE OR HAVE LET YOURSELF OR YOUR FAMILY DOWN: 0
1. LITTLE INTEREST OR PLEASURE IN DOING THINGS: 3
SUM OF ALL RESPONSES TO PHQ QUESTIONS 1-9: 18
2. FEELING DOWN, DEPRESSED OR HOPELESS: 3

## 2022-09-23 NOTE — PROGRESS NOTES
Subjective:      Chief Complaint   Patient presents with    Follow-up     Needs refills       HPI:  La Nena Membreno is a 41 y.o. female who presents today for medication follow up. Anxiety/Depression/Borderline Personality Disorder  Current treatment: Strattera-60 for ADHD, STEPHANIA and MDD. She is prescribed Ativan 0.5 mg TID for STEPHANIA. OARRS reviewed; medication last filled 09/06/22. She endorses the following depressive symptoms: feelings of being down, depressed or hopelessness,  loss of interest in usual activities, decreased appetite, fatigue, sleep disturbance difficulty getting to sleep, feelings of guilt, worthlessness or loss of self confidence, problems concentrating, agitation, and psychomotor retardation. She has been diagnosed with Borderline Personality Disorder in the past and is not currently seeing a therapist.  She has not found a therapist .      The following anxiety symptoms are present;  excessive worry, agitation, fearfulness, labile mood, insomnia, depression, adrenergic symptoms and episodes of panic. She denies visual  and auditory hallucinations, delusions and illusions. Pt denies current suicidal ideation, plan and intent. Pt  denies current homicidal ideation, plan and intent. She reports failed treatment or has experienced SE to the following medications:      Antidepressants:   Lexapro  Mirtazapine  Sertraline  Viibryd  Doxepin  Prozac  Paxil  Celexa  Wellbutrin  Cymbalta  Doxepin  Amitriptyline     Anxiolytics:    Buspar  Ativan  Xanax  Valium  Librium  Temazepam  Propranolol   Prazosin      Antipsychotics:  Haldol  Seroquel  Risperidone  Ziprasidone  Compazine  Abilify  Klonopin  Lithium     Mood Stabilizers:   Lamictal  Depakote  Gabapentin  Carbamazepine    Stimulants  Concerta - \"I couldn't sleep\"     Tardive Dyskinesia  Symptoms well controlled with Ingrezza 80 mg daily. Fibromyalgia  Symptoms are well controlled with Lyrica.   OARRS reviewed; medication last filled 09/08/2022. She would like a rx for lidocaine patches. ADD/ADHD Symptoms  Patient  history of inattention, hyperactivity, impulsivity, depressed mood, anhedonia, anxiety, including the following: fails to give close attention to details or makes careless mistakes in school, work, or other activities, has difficulty sustaining attention in tasks or play activities, does not seem to listen when spoken to directly, has difficulty organizing tasks and activities, does not follow through on instructions and fails to finish schoolwork, chores, or duties in the workplace, loses things that are necessary for tasks and activities, is easily distracted by extraneous stimuli, is often forgetful in daily activities, and avoids engaging in tasks that require sustained attention. She has a known history of ADD/ADHD. Previous treatment:has included: Concerta- but it was stopped because it caused insomnia. Past Medical History:   Diagnosis Date    ADHD     Arthritis     OSTEO ARTHITIS    Bipolar disorder, unspecified ([facility] [address]     Cholecystenteric fistula     COPD (chronic obstructive pulmonary disease) (HCC)     Degenerative disc disease     Fibromyalgia     Generalized anxiety disorder     H/O 24 hour EKG monitoring 02/16/2017      Sinus tach events , no major arrhythmias , follow up in office as routine     H/O echocardiogram 03/01/2017    EF 55-60% Normal LV structure  and systolic function. H/O exercise stress test 03/01/2017    Normal stress test. Patient has physical deconditioning as she achieved target HR in 2 mins. Recommend to increase PO fluids intake and exercise training. Headache     History of cardiac monitoring 09/21/2020    Normal 30-day event monitor with average heart rate of 63 lowest heart rate of 57 highest heart rate of 82 patient reported episodes of dizziness which did not correlate with any arrhythmia.   There were no episodes of tachycardia uneventful monitor however average heart rate and even the highest heart rate is on the lower side     Osteopenia     Osteoporosis     PTSD (post-traumatic stress disorder)     S/P cholecystectomy 11/22/2011    Schizo-affective schizophrenia ([facility] [address]     Sjoegren syndrome     Syncope     Tardive dyskinesia     Venous insufficiency of leg 2012    Vertigo         Social History     Tobacco Use    Smoking status: Every Day     Packs/day: 0.25     Years: 20.00     Pack years: 5.00     Types: Cigarettes    Smokeless tobacco: Never   Substance Use Topics    Alcohol use: No        Review of Systems   Constitutional:  Negative for activity change, appetite change and fatigue. HENT: Negative. Eyes:  Negative for visual disturbance. Respiratory:  Negative for chest tightness and wheezing. Cardiovascular: Negative. Negative for chest pain and palpitations. Gastrointestinal:  Negative for abdominal distention, abdominal pain, constipation, diarrhea, nausea and vomiting. Endocrine: Negative for cold intolerance, heat intolerance, polydipsia, polyphagia and polyuria. Musculoskeletal:  Positive for myalgias. Negative for arthralgias. Skin: Negative. Neurological:  Positive for headaches (follows with neurology). Negative for dizziness, seizures, syncope, facial asymmetry, speech difficulty, light-headedness and numbness. Psychiatric/Behavioral:  Positive for agitation, decreased concentration, dysphoric mood, hallucinations and sleep disturbance. Negative for behavioral problems, confusion, self-injury and suicidal ideas. The patient is nervous/anxious and is hyperactive. Objective:      BP (!) 98/57 (Site: Right Upper Arm, Position: Sitting, Cuff Size: Large Adult)   Pulse 78   Temp 97.9 °F (36.6 °C) (Temporal)   Wt 113 lb 9.6 oz (51.5 kg)   LMP 11/18/2004   SpO2 98%   BMI 19.50 kg/m²      Physical Exam  Vitals reviewed. Constitutional:       General: She is not in acute distress. Appearance: Normal appearance.    Eyes: Conjunctiva/sclera: Conjunctivae normal.      Pupils: Pupils are equal, round, and reactive to light. Cardiovascular:      Rate and Rhythm: Normal rate and regular rhythm. Heart sounds: Normal heart sounds. Pulmonary:      Effort: Pulmonary effort is normal.      Breath sounds: Normal breath sounds. Musculoskeletal:         General: Normal range of motion. Cervical back: Normal range of motion and neck supple. No tenderness. Right lower leg: No edema. Left lower leg: No edema. Skin:     General: Skin is warm and dry. Neurological:      General: No focal deficit present. Mental Status: She is alert and oriented to person, place, and time. Deep Tendon Reflexes: Reflexes are normal and symmetric. Reflexes normal.   Psychiatric:         Attention and Perception: Perception normal. She is inattentive. She does not perceive auditory or visual hallucinations. Mood and Affect: Mood is anxious and depressed. Affect is not tearful. Speech: Speech normal. Speech is not rapid and pressured. Behavior: Behavior normal. Behavior is not agitated. Behavior is cooperative. Thought Content: Thought content is not paranoid. Cognition and Memory: Cognition normal.         Judgment: Judgment is impulsive. Judgment is not inappropriate. Assessment / Plan:      1. Attention deficit hyperactivity disorder (ADHD), predominantly inattentive type  Will start Adderall 10 mg daily to target symptoms of ADHD. Discussed stimulant use, controlled substance policy, side effects, mechanism of action  Monitor closely for medication effectiveness, duration, and side effects of concern. Return in 3 months for medication follow up.  - atomoxetine (STRATTERA) 60 MG capsule; Take 1 capsule by mouth daily  Dispense: 30 capsule; Refill: 2  - amphetamine-dextroamphetamine (ADDERALL XR) 10 MG extended release capsule; Take 1 capsule by mouth daily for 30 days. Dispense: 30 capsule; Refill: 0    2. Fibromyalgia  Medication refilled. Lidocaine patches as needed for pain. - pregabalin (LYRICA) 50 MG capsule; Take 1 capsule by mouth 3 times daily for 30 days. Dispense: 90 capsule; Refill: 2  - lidocaine (LIDODERM) 5 %; Place 1 patch onto the skin daily for 10 days 12 hours on, 12 hours off. Dispense: 10 patch; Refill: 0    3. Restless legs  Medication refilled. Try bathing in hot or cold water. Applying a heating pad or ice bag to your legs may also help symptoms. Stretch and massage your legs before bed or when discomfort begins. Get some exercise for at least 30 minutes a day on most days of the week. Stop exercising at least 3 hours before bedtime. Avoid caffeine products, such as coffee, tea, cola, and chocolate. Caffeine can interrupt your sleep and stimulate you. Do not drink alcohol late in the evening   - rOPINIRole (REQUIP) 2 MG tablet; TAKE ONE TABLET BY MOUTH ONCE NIGHTLY  Dispense: 30 tablet; Refill: 2    4. Moderate episode of recurrent major depressive disorder (HCC)  Stable. Continue medication. Patient to call if any concerns or SI before their follow up appointment. If he develops suicidal thoughts with a plan, he is instructed to go to emergency room immediately. Behavioral counseling recommended. - atomoxetine (STRATTERA) 60 MG capsule; Take 1 capsule by mouth daily  Dispense: 30 capsule; Refill: 2    5. Generalized anxiety disorder with panic attacks  A discussion regarding medication was held with the patient. Discussed the dangerous nature of narcotic/benzo medicines, including the risk of respiratory depression, death and addiction.    - LORazepam (ATIVAN) 1 MG tablet; Take 1 tablet by mouth nightly for 30 days. Dispense: 30 tablet; Refill: 2    6. Primary insomnia  Stable, continue current medication. Sleep hygiene reinforced. - traZODone (DESYREL) 150 MG tablet; Take 1 tablet by mouth nightly  Dispense: 30 tablet;  Refill: 11 PDMP Monitoring:    Last PDMP Ayse Chavez as Reviewed:  Review User Review Instant Review Result   LIAN Gacsa 9/23/2022 11:53 AM Reviewed PDMP [1]     Last Controlled Substance Monitoring Documentation      [facility] Office Visit from 9/23/2022 in [address]   Periodic Controlled Substance Monitoring Possible medication side effects, risk of tolerance/dependence & alternative treatments discussed., No signs of potential drug abuse or diversion identified. , Assessed functional status., Obtaining appropriate analgesic effect of treatment. filed at 09/23/2022 1154          Urine Drug Screenings (1 yr)       POCT Rapid Drug Screen  Collected: 8/16/2021  3:00 PM (Edited Result - FINAL)              POCT Rapid Drug Screen  Collected: 7/13/2021  2:00 PM (Final result)              POCT Rapid Drug Screen  Collected: 6/15/2021 12:35 PM (Final result)              POCT Rapid Drug Screen  Collected: 5/12/2020  3:49 PM (Final result)              POCT Rapid Drug Screen  Collected: 1/8/2020  4:34 PM (Final result)              Drug screen, multiple, urine  Collected: 5/7/2014  9:15 AM (Final result)   Narrative:         THRESHOLD CONCENTRATIONS (mg/dL)  AMP,mAMP,TCA          1000  BAR,BZO,BRIONNA,OPI       300. .. Drug Screen, Multiple, Urine  Collected: 9/9/2013  3:20 AM (Final result)   Narrative: Performed @ [facility], [address] [facility], [address] [place] [address]             Drug Screen, Multiple, Urine  Collected: 9/23/2012 12:15 AM (Final result)   Narrative:         THRESHOLD CONCENTRATIONS (mg/dL)  AMP,mAMP,TCA          1000  BAR,BZO,BRIONNA,OPI       300. .. Drug Screen, Multiple, Urine  Collected: 4/27/2012  8:00 PM (Final result)   Narrative:         THRESHOLD CONCENTRATIONS (mg/dL)  AMP,mAMP,TCA          1000  BAR,BZO,BRIONNA,OPI       300. ..              Drug screen, multiple, urine  Collected: 10/5/2011  8:15 PM (Final result)   Narrative:         THRESHOLD CONCENTRATIONS (mg/dL)  AMP,mAMP,TCA          1000  BAR,BZO,BRIONNA,OPI       300. .. Urine Drug Screen  Collected: 7/22/2021  3:33 PM (Final result)              Urine Drug Screen  Collected: 11/30/2015 12:15 AM (Final result)   Narrative:         THRESHOLD CONCENTRATIONS (mg/dL)  AMPHT               1000  BRIONNA,OPIA             300... Urine Drug Screen  Collected: 4/21/2015  7:30 AM (Final result)   Narrative:         THRESHOLD CONCENTRATIONS (mg/dL)  AMPHT               1000  BRIONNA,OPIA             300... Drugs of Abuse 7  Collected: 7/9/2013  3:30 PM (Final result)   Narrative:         THRESHOLD CONCENTRATIONS (mg/dL)  AMPHT               1000  BRIONNA,PPX,OPIA         300. .. Drugs of Abuse 7  Collected: 1/17/2012  3:30 PM (Final result)   Narrative:         THRESHOLD CONCENTRATIONS (mg/dL)  AMPHT               1000  BRIONNA,PPX,OPIA         300. .. Medication Contract and Consent for Opioid Use Documents Filed        No documents found                   The Laure Dick,  was seen with a total time spent of 30 minutes for the visit on this date of service by the E/M provider. The time component had both face to face and non face to face time spent in determining the total time component. Counseling and education regarding diagnosis listed and options regarding those diagnoses were also included in determining the time component.          HANNA Wagner NP

## 2022-09-23 NOTE — LETTER
[facility] [address] [place] [address]  Phone: [phone]  Fax: [phone]    HANNA Billy NP        September 23, 2022     Patient: Vj Tatum   YOB: 1975   Date of Visit: 9/23/2022       To Whom it May Concern:    Brandyn Mirza was seen in my clinic on 9/23/2022. Please excuse her from work 09/21/22 and 09/23/22. She may return to work on 09/26/2022. If you have any questions or concerns, please don't hesitate to call.     Sincerely,         HANNA Billy NP

## 2022-09-25 ENCOUNTER — HOSPITAL ENCOUNTER (EMERGENCY)
Age: 47
Discharge: HOME | End: 2022-09-25
Attending: EMERGENCY MEDICINE
Payer: COMMERCIAL

## 2022-09-25 ENCOUNTER — APPOINTMENT (OUTPATIENT)
Dept: CT IMAGING | Age: 47
End: 2022-09-25
Payer: COMMERCIAL

## 2022-09-25 VITALS
TEMPERATURE: 98.9 F | HEART RATE: 86 BPM | SYSTOLIC BLOOD PRESSURE: 116 MMHG | DIASTOLIC BLOOD PRESSURE: 69 MMHG | BODY MASS INDEX: 19.29 KG/M2 | RESPIRATION RATE: 18 BRPM | WEIGHT: 113 LBS | OXYGEN SATURATION: 97 % | HEIGHT: 64 IN

## 2022-09-25 DIAGNOSIS — R51.9 ACUTE NONINTRACTABLE HEADACHE, UNSPECIFIED HEADACHE TYPE: Primary | ICD-10-CM

## 2022-09-25 LAB
EKG ATRIAL RATE: 61 BPM
EKG DIAGNOSIS: NORMAL
EKG P AXIS: 69 DEGREES
EKG P-R INTERVAL: 140 MS
EKG Q-T INTERVAL: 462 MS
EKG QRS DURATION: 82 MS
EKG QTC CALCULATION (BAZETT): 465 MS
EKG R AXIS: 69 DEGREES
EKG T AXIS: 59 DEGREES
EKG VENTRICULAR RATE: 61 BPM

## 2022-09-25 PROCEDURE — 93005 ELECTROCARDIOGRAM TRACING: CPT | Performed by: EMERGENCY MEDICINE

## 2022-09-25 PROCEDURE — 6360000002 HC RX W HCPCS: Performed by: EMERGENCY MEDICINE

## 2022-09-25 PROCEDURE — 2580000003 HC RX 258: Performed by: EMERGENCY MEDICINE

## 2022-09-25 PROCEDURE — 93010 ELECTROCARDIOGRAM REPORT: CPT | Performed by: INTERNAL MEDICINE

## 2022-09-25 PROCEDURE — 99284 EMERGENCY DEPT VISIT MOD MDM: CPT

## 2022-09-25 PROCEDURE — 96374 THER/PROPH/DIAG INJ IV PUSH: CPT

## 2022-09-25 PROCEDURE — 96375 TX/PRO/DX INJ NEW DRUG ADDON: CPT

## 2022-09-25 PROCEDURE — 70450 CT HEAD/BRAIN W/O DYE: CPT

## 2022-09-25 PROCEDURE — 6370000000 HC RX 637 (ALT 250 FOR IP): Performed by: EMERGENCY MEDICINE

## 2022-09-25 RX ORDER — ORPHENADRINE CITRATE 30 MG/ML
60 INJECTION INTRAMUSCULAR; INTRAVENOUS ONCE
Status: COMPLETED | OUTPATIENT
Start: 2022-09-25 | End: 2022-09-25

## 2022-09-25 RX ORDER — 0.9 % SODIUM CHLORIDE 0.9 %
1000 INTRAVENOUS SOLUTION INTRAVENOUS ONCE
Status: COMPLETED | OUTPATIENT
Start: 2022-09-25 | End: 2022-09-25

## 2022-09-25 RX ORDER — DEXAMETHASONE SODIUM PHOSPHATE 10 MG/ML
10 INJECTION, SOLUTION INTRAMUSCULAR; INTRAVENOUS ONCE
Status: COMPLETED | OUTPATIENT
Start: 2022-09-25 | End: 2022-09-25

## 2022-09-25 RX ORDER — PROMETHAZINE HYDROCHLORIDE 12.5 MG/1
25 TABLET ORAL ONCE
Status: COMPLETED | OUTPATIENT
Start: 2022-09-25 | End: 2022-09-25

## 2022-09-25 RX ADMIN — BUTORPHANOL TARTRATE 1 MG: 2 INJECTION, SOLUTION INTRAMUSCULAR; INTRAVENOUS at 02:44

## 2022-09-25 RX ADMIN — SODIUM CHLORIDE 1000 ML: 9 INJECTION, SOLUTION INTRAVENOUS at 01:43

## 2022-09-25 RX ADMIN — DEXAMETHASONE SODIUM PHOSPHATE 10 MG: 10 INJECTION, SOLUTION INTRAMUSCULAR; INTRAVENOUS at 01:41

## 2022-09-25 RX ADMIN — PROMETHAZINE HYDROCHLORIDE 25 MG: 12.5 TABLET ORAL at 01:41

## 2022-09-25 RX ADMIN — ORPHENADRINE CITRATE 60 MG: 30 INJECTION INTRAMUSCULAR; INTRAVENOUS at 01:41

## 2022-09-25 ASSESSMENT — ENCOUNTER SYMPTOMS
COUGH: 0
NAUSEA: 1
WHEEZING: 0
SHORTNESS OF BREATH: 0
VOMITING: 0
SORE THROAT: 0
DIARRHEA: 0
SINUS PRESSURE: 0
RHINORRHEA: 0
ABDOMINAL PAIN: 0
CONSTIPATION: 0
PHOTOPHOBIA: 1

## 2022-09-25 ASSESSMENT — PAIN SCALES - GENERAL
PAINLEVEL_OUTOF10: 10
PAINLEVEL_OUTOF10: 7

## 2022-09-25 NOTE — LETTER
[facility] Emergency Department  [address] [place] [address]  Phone: [phone]  Fax: [phone]               September 25, 2022    Patient: Jimbo Hilton   YOB: 1975   Date of Visit: 9/25/2022       To Whom It May Concern:    Odessa Lopez was seen and treated in our emergency department on 9/25/2022. She may return to work on 9/27/22.       Sincerely,       Chloé Castle RN         Signature:__________________________________

## 2022-09-25 NOTE — ED PROVIDER NOTES
Emergency Department Encounter    Patient: Altagracia Vail  MRN: [id number]  : 1975  Date of Evaluation: 2022  ED Provider:  Hernandez Javier DO    Triage Chief Complaint:   Migraine (Pt has hx of migraines. Pt reports having trouble getting her home medicine. Pt states she ws here Thursday and it went away but came back Friday evening)    HPI:  Altagracia Vail is a 41 y.o. female past medical history as listed below, including migraines and headaches who presents with a headache. Patient states that her headache started 6 days ago, she was seen in the emergency department, received medications, and states that her headache did improve and she did have relief from her headache for approximately 1-1/2 days. She did follow-up with her primary care doctor as well who told her that if her headache came back, she should go to the emergency department. Patient states that headache came back last night. Is a constant, throbbing, 8 out of 10 pain similar to headaches that she has had in the past.  She does admit to associated nausea, photophobia and blurred vision. She states that she does get blurry visions with her headaches. Her vision is intermittent. Patient states that she was previously on emgality injections for her migraines. When she was on this medication for approximately 9 months, she only had 1-2 headaches per month. However she states that recently her insurance did stop paying for this medication, and ever since then she has had more frequent headaches similar to what she is having today. She denies any fever, chills, chest pain, shortness of breath, palpitations, vomiting, abdominal pain, dysuria, diarrhea. ROS:  Review of Systems   Constitutional:  Negative for chills and fever. HENT:  Negative for congestion, rhinorrhea, sinus pressure and sore throat. Eyes:  Positive for photophobia and visual disturbance.    Respiratory:  Negative for cough, shortness of breath and wheezing. Cardiovascular:  Negative for chest pain and palpitations. Gastrointestinal:  Positive for nausea. Negative for abdominal pain, constipation, diarrhea and vomiting. Genitourinary:  Negative for dysuria, frequency and urgency. Musculoskeletal:  Negative for arthralgias and myalgias. Skin:  Negative for rash. Neurological:  Positive for headaches. Negative for dizziness and syncope. Psychiatric/Behavioral:  Negative for agitation, behavioral problems and confusion. Past Medical History:   Diagnosis Date    ADHD     Arthritis     OSTEO ARTHITIS    Bipolar disorder, unspecified ([facility]     Cholecystenteric fistula     COPD (chronic obstructive pulmonary disease) (HCC)     Degenerative disc disease     Fibromyalgia     Generalized anxiety disorder     H/O 24 hour EKG monitoring 02/16/2017      Sinus tach events , no major arrhythmias , follow up in office as routine     H/O echocardiogram 03/01/2017    EF 55-60% Normal LV structure  and systolic function. H/O exercise stress test 03/01/2017    Normal stress test. Patient has physical deconditioning as she achieved target HR in 2 mins. Recommend to increase PO fluids intake and exercise training. Headache     History of cardiac monitoring 09/21/2020    Normal 30-day event monitor with average heart rate of 63 lowest heart rate of 57 highest heart rate of 82 patient reported episodes of dizziness which did not correlate with any arrhythmia.   There were no episodes of tachycardia uneventful monitor however average heart rate and even the highest heart rate is on the lower side     Osteopenia     Osteoporosis     PTSD (post-traumatic stress disorder)     S/P cholecystectomy 11/22/2011    [facility]     Sjoegren syndrome     Syncope     Tardive dyskinesia     Venous insufficiency of leg 2012    Vertigo      Past Surgical History:   Procedure Laterality Date    CHOLECYSTECTOMY  11/22/2011    laparoscopic COLONOSCOPY      DENTAL SURGERY  8/9    ELBOW ARTHROSCOPY      bilateral elbows    ENDOSCOPY, COLON, DIAGNOSTIC      HYSTERECTOMY (CERVIX STATUS UNKNOWN)      OTHER SURGICAL HISTORY      venous ablation, bilateral legs    TUBAL LIGATION       Family History   Problem Relation Age of Onset    Other Mother         Neurocardiogenic syncope    ADHD Mother     Alcohol Abuse Father     Bipolar Disorder Sister     Anxiety Disorder Sister     Bipolar Disorder Sister     OCD Sister     Heart Surgery Maternal Grandmother     ADHD Daughter     ADHD Daughter     Lupus Paternal Uncle     Other Paternal Uncle         myasthenia gravis     Social History     Socioeconomic History    Marital status: Single     Spouse name: Not on file    Number of children: Not on file    Years of education: Not on file    Highest education level: Not on file   Occupational History    Not on file   Tobacco Use    Smoking status: Every Day     Packs/day: 0.25     Years: 20.00     Pack years: 5.00     Types: Cigarettes    Smokeless tobacco: Never   Vaping Use    Vaping Use: Former    Substances: Always   Substance and Sexual Activity    Alcohol use: No    Drug use: Not Currently     Types: Methamphetamines (Crystal Meth)     Comment: clean for 11 years, past hx of crack use    Sexual activity: Yes     Partners: Male   Other Topics Concern    Not on file   Social History Narrative    Not on file     Social Determinants of Health     Financial Resource Strain: Not on file   Food Insecurity: Not on file   Transportation Needs: Not on file   Physical Activity: Not on file   Stress: Not on file   Social Connections: Not on file   Intimate Partner Violence: Not on file   Housing Stability: Not on file     No current facility-administered medications for this encounter. Current Outpatient Medications   Medication Sig Dispense Refill    pregabalin (LYRICA) 50 MG capsule Take 1 capsule by mouth 3 times daily for 30 days.  90 capsule 2    rOPINIRole (REQUIP) 2 MG tablet TAKE ONE TABLET BY MOUTH ONCE NIGHTLY 30 tablet 2    atomoxetine (STRATTERA) 60 MG capsule Take 1 capsule by mouth daily 30 capsule 2    traZODone (DESYREL) 150 MG tablet Take 1 tablet by mouth nightly 30 tablet 11    LORazepam (ATIVAN) 1 MG tablet Take 1 tablet by mouth nightly for 30 days. 30 tablet 2    amphetamine-dextroamphetamine (ADDERALL XR) 10 MG extended release capsule Take 1 capsule by mouth daily for 30 days. 30 capsule 0    lidocaine (LIDODERM) 5 % Place 1 patch onto the skin daily for 10 days 12 hours on, 12 hours off. 10 patch 0    orphenadrine (NORFLEX) 100 MG extended release tablet Take 1 tablet by mouth 2 times daily for 10 days 20 tablet 0    INGREZZA 80 MG CAPS TAKE 1 CAPSULE BY MOUTH DAILY 30 capsule 11    UBRELVY 50 MG TABS       EMGALITY 120 MG/ML SOAJ       vitamin B-12 (CYANOCOBALAMIN) 1000 MCG tablet Take 1,000 mcg by mouth daily       Allergies   Allergen Reactions    Rozerem [Ramelteon] Other (See Comments)     Increased symptoms of TD    Sinemet [Carbidopa-Levodopa]      Increased TD symptoms    Codeine Itching and Nausea And Vomiting    Imitrex [Sumatriptan] Itching and Nausea And Vomiting     PASSED OUT    Abilify [Aripiprazole] Other (See Comments)     Patient reports symptoms of TD    Amitriptyline     Botox [Onabotulinumtoxina] Other (See Comments)     Tardive dyskinesia    Compazine [Prochlorperazine Maleate] Other (See Comments)    Meclizine     Other Other (See Comments)    Topamax [Topiramate]      Made TD worse    Cephalexin Nausea And Vomiting and Other (See Comments)     ABDOMINAL PAIN FOR 2 WEEKS    Magnesium-Containing Compounds Other (See Comments)     Pt sts \"they called it the mags. I start twitching. \"     Meclizine Hcl Other (See Comments)     \"Makes me stutter and twitch. \"    Nsaids Other (See Comments)     Tardive dyskinisia    Pcn [Penicillins] Nausea And Vomiting and Other (See Comments)     HEADACHE    Reglan [Metoclopramide] Other (See Comments)     Tardive dyskinisia    Toradol [Ketorolac Tromethamine] Other (See Comments)     Tremors    Vistaril [Hydroxyzine Hcl] Other (See Comments)     dyskinsia      Zofran [Ondansetron Hcl] Other (See Comments)     Cramping and burning in stomach       Nursing Notes Reviewed    Physical Exam:  Triage VS:    ED Triage Vitals [09/25/22 0040]   Enc Vitals Group      /69      Heart Rate 86      Resp 18      Temp 98.9 °F (37.2 °C)      Temp Source Oral      SpO2 97 %      Weight 113 lb (51.3 kg)      Height 5' 4\" (1.626 m)      Head Circumference       Peak Flow       Pain Score       Pain Loc       Pain Edu? Excl. in [address]? Physical Exam  Vitals and nursing note reviewed. Constitutional:       General: She is not in acute distress. Appearance: Normal appearance. She is not ill-appearing or toxic-appearing. HENT:      Head: Normocephalic and atraumatic. Nose: Nose normal.      Mouth/Throat:      Mouth: Mucous membranes are moist.   Eyes:      Extraocular Movements: Extraocular movements intact. Conjunctiva/sclera: Conjunctivae normal.      Pupils: Pupils are equal, round, and reactive to light. Cardiovascular:      Rate and Rhythm: Normal rate and regular rhythm. Pulses: Normal pulses. Pulmonary:      Effort: Pulmonary effort is normal. No respiratory distress. Breath sounds: Normal breath sounds. No wheezing, rhonchi or rales. Abdominal:      General: Abdomen is flat. Bowel sounds are normal. There is no distension. Palpations: Abdomen is soft. Tenderness: There is no abdominal tenderness. There is no guarding or rebound. Musculoskeletal:         General: Normal range of motion. Skin:     General: Skin is warm. Capillary Refill: Capillary refill takes less than 2 seconds. Neurological:      Mental Status: She is alert. GCS: GCS eye subscore is 4. GCS verbal subscore is 5. GCS motor subscore is 6.       Cranial Nerves: Cranial nerves 2-12 are intact. Sensory: Sensation is intact. Motor: Motor function is intact. Coordination: Coordination is intact. Gait: Gait is intact. Comments: Patient answers all questions and follows all commands appropriate. Speech is fluid. Finger-to-nose intact. Psychiatric:         Mood and Affect: Mood normal.            I have reviewed and interpreted all of the currently available lab results from this visit (if applicable):  Results for orders placed or performed during the hospital encounter of 09/25/22   EKG 12 Lead   Result Value Ref Range    Ventricular Rate 61 BPM    Atrial Rate 61 BPM    P-R Interval 140 ms    QRS Duration 82 ms    Q-T Interval 462 ms    QTc Calculation (Bazett) 465 ms    P Axis 69 degrees    R Axis 69 degrees    T Axis 59 degrees    Diagnosis       Normal sinus rhythm  Normal ECG  When compared with ECG of 22-JUL-2021 14:39,  QT has lengthened        Radiographs (if obtained):    [] Radiologist's Report Reviewed:  CT HEAD WO CONTRAST   Final Result   No acute intracranial abnormality. Chart review shows recent radiographs:  No results found. MDM:  Patient seen and examined. Given that this is a second visit for patient's headache this week, CT head is obtained, and with no acute intracranial abnormality. Patient is neurologically intact. This is a typical headache for patient with associated constellation of symptoms that are typical for her. This is most likely a migraine headache, as patient does have a history of these. She is not able to get the medication that she had been taking to improve her headaches build, which I do think is contributing to her symptoms. During patient's ED course, she did have brief episode of chest pain following return from CT. EKG was obtained and is unremarkable. Patient given pain medication as well as antiemetics here in the emergency department.   Patient with improvement of symptoms here in the emergency department, and did become headache free. Did discuss imaging and EKG with patient. Patient to follow-up with primary care provider in 1 to 2 days. She does state that she has an established neurologist, and she will follow-up with them as soon as possible. Return precautions are discussed with patient, she does verbalize understanding these. All questions are answered and she is agreeable with plan of care. EKG-normal sinus rhythm, rate 61, , QRS 82, QTc 465, no acute ST elevation. 2:51 AM  Recheck of patient. She is resting comfortably in bed and sitting up in bed. When I asked her how she is feeling, she does give me the thumbs up sign, stating that she is feeling \"much better. \"  She states that her headache and vision have improved. She does feel ready for discharge home. Clinical Impression:  1. Acute nonintractable headache, unspecified headache type      Disposition referral (if applicable):  HANNA Blake NP  [address]  [phone]    Schedule an appointment as soon as possible for a visit in 2 days      [facility]  [address] [facility]  [phone]  Go to   As needed, If symptoms worsen  Disposition medications (if applicable):  New Prescriptions    No medications on file       Comment: Please note this report has been produced using speech recognition software and may contain errors related to that system including errors in grammar, punctuation, and spelling, as well as words and phrases that may be inappropriate. Efforts were made to edit the dictations.        [id number] [place] [address],   09/25/22 1811

## 2022-09-25 NOTE — LETTER
[facility]  [address] [place] [address]  Phone: [phone]  Fax: [phone]               September 25, 2022    Patient: Jena Manrique   YOB: 1975   Date of Visit: 9/25/2022       To Whom It May Concern:    Brendan Soto was seen and treated in our emergency department on 9/25/2022. She may return to work on 9/27/22.       Sincerely,       Dustin Madison RN         Signature:__________________________________

## 2022-09-29 ENCOUNTER — TELEPHONE (OUTPATIENT)
Dept: FAMILY MEDICINE CLINIC | Age: 47
End: 2022-09-29

## 2022-09-29 NOTE — TELEPHONE ENCOUNTER
She should continue to take current dose and we will discuss dose increase at her follow up appointment

## 2022-09-29 NOTE — TELEPHONE ENCOUNTER
Patient states she has started the adderall. She takes at 1:00pm but is noticing by 6-7pm medication is no longer effective.  Wants to know what can be done

## 2022-10-03 ASSESSMENT — ENCOUNTER SYMPTOMS
ABDOMINAL PAIN: 0
VOMITING: 0
WHEEZING: 0
ABDOMINAL DISTENTION: 0
CONSTIPATION: 0
DIARRHEA: 0
NAUSEA: 0
CHEST TIGHTNESS: 0

## 2022-10-12 ENCOUNTER — HOSPITAL ENCOUNTER (EMERGENCY)
Age: 47
Discharge: LEFT BEFORE DISCHARGE | End: 2022-10-12

## 2022-10-12 VITALS
RESPIRATION RATE: 16 BRPM | HEIGHT: 64 IN | DIASTOLIC BLOOD PRESSURE: 50 MMHG | BODY MASS INDEX: 18.78 KG/M2 | OXYGEN SATURATION: 98 % | WEIGHT: 110 LBS | HEART RATE: 74 BPM | SYSTOLIC BLOOD PRESSURE: 93 MMHG | TEMPERATURE: 97.6 F

## 2022-10-12 ASSESSMENT — PAIN SCALES - GENERAL: PAINLEVEL_OUTOF10: 5

## 2022-10-12 ASSESSMENT — PAIN - FUNCTIONAL ASSESSMENT: PAIN_FUNCTIONAL_ASSESSMENT: 0-10

## 2022-10-12 ASSESSMENT — PAIN DESCRIPTION - DESCRIPTORS: DESCRIPTORS: ACHING;CRAMPING

## 2022-10-12 ASSESSMENT — PAIN DESCRIPTION - LOCATION: LOCATION: ABDOMEN

## 2022-10-12 NOTE — ED NOTES
Patient reported to registration she was leaving and choosing not to be seen.       Esteban Hi, RN  10/12/22 1181

## 2022-10-14 ENCOUNTER — TELEMEDICINE (OUTPATIENT)
Dept: FAMILY MEDICINE CLINIC | Age: 47
End: 2022-10-14
Payer: COMMERCIAL

## 2022-10-14 DIAGNOSIS — F90.0 ATTENTION DEFICIT HYPERACTIVITY DISORDER (ADHD), PREDOMINANTLY INATTENTIVE TYPE: Primary | ICD-10-CM

## 2022-10-14 PROCEDURE — 99214 OFFICE O/P EST MOD 30 MIN: CPT | Performed by: NURSE PRACTITIONER

## 2022-10-14 PROCEDURE — G8427 DOCREV CUR MEDS BY ELIG CLIN: HCPCS | Performed by: NURSE PRACTITIONER

## 2022-10-14 RX ORDER — DEXTROAMPHETAMINE SACCHARATE, AMPHETAMINE ASPARTATE MONOHYDRATE, DEXTROAMPHETAMINE SULFATE AND AMPHETAMINE SULFATE 3.75; 3.75; 3.75; 3.75 MG/1; MG/1; MG/1; MG/1
15 CAPSULE, EXTENDED RELEASE ORAL DAILY
Qty: 30 CAPSULE | Refills: 0 | Status: SHIPPED | OUTPATIENT
Start: 2022-10-14 | End: 2022-11-13

## 2022-10-14 ASSESSMENT — ENCOUNTER SYMPTOMS
ABDOMINAL DISTENTION: 0
ABDOMINAL PAIN: 0
CHEST TIGHTNESS: 0
VOMITING: 0
DIARRHEA: 0
CONSTIPATION: 0
WHEEZING: 0
NAUSEA: 0

## 2022-10-14 NOTE — LETTER
[facility] [address] [place] [address]  Phone: [phone]  Fax: [phone]    HANNA Nunn NP        October 14, 2022     Patient: Derik Alejandra   YOB: 1975   Date of Visit: 10/14/2022       To Whom it May Concern:    Ryley Anderson was seen in my clinic on 10/14/2022. Please excuse her from work on 10/12/22, 10/13/22. She may return to work on 10/17/22. If you have any questions or concerns, please don't hesitate to call.     Sincerely,         HANNA Nunn NP

## 2022-10-14 NOTE — PROGRESS NOTES
TELEHEALTH EVALUATION -- Audio/Visual (During UEYOH-88 public health emergency)    HPI:    Deanna Garcia (:  1975) has requested an audio/video evaluation for the following concern(s):    ADD/ADHD:  Current treatment: Adderall XR- 10, which has been effective. Residual symptoms but effects are weraing off half-way through the work day. Patient denies anorexia, nausea, vomiting, abdominal pain, involuntary weight loss, palpitations, insomnia, irritability, anxiety, headache, and tremor. OARRS reviewed; medication refilled 10/14/2022. Review of Systems   Constitutional:  Negative for activity change, appetite change and fatigue. HENT: Negative. Eyes:  Negative for visual disturbance. Respiratory:  Negative for chest tightness and wheezing. Cardiovascular: Negative. Negative for chest pain and palpitations. Gastrointestinal:  Negative for abdominal distention, abdominal pain, constipation, diarrhea, nausea and vomiting. Endocrine: Negative for cold intolerance, heat intolerance, polydipsia, polyphagia and polyuria. Skin: Negative. Neurological:  Positive for headaches (follows with neurology). Negative for dizziness, seizures, syncope, facial asymmetry, speech difficulty, light-headedness and numbness. Psychiatric/Behavioral:  Positive for decreased concentration and dysphoric mood. Negative for agitation, behavioral problems, confusion, hallucinations, self-injury, sleep disturbance and suicidal ideas. The patient is nervous/anxious. The patient is not hyperactive. Symptoms are improving     Prior to Visit Medications    Medication Sig Taking? Authorizing Provider   amphetamine-dextroamphetamine (ADDERALL XR) 15 MG extended release capsule Take 1 capsule by mouth daily for 30 days. Yes Adonis Nashville, APRN - NP   pregabalin (LYRICA) 50 MG capsule Take 1 capsule by mouth 3 times daily for 30 days.  Yes Adonis Nashville, APRN - NP   rOPINIRole (REQUIP) 2 MG tablet TAKE ONE TABLET BY MOUTH ONCE NIGHTLY Yes Marisol Garner Loots, APRN - NP   atomoxetine (STRATTERA) 60 MG capsule Take 1 capsule by mouth daily Yes Leroy Oconnor, APRN - NP   traZODone (DESYREL) 150 MG tablet Take 1 tablet by mouth nightly Yes Marisol Barreraelizabeth Loveots, APRN - NP   LORazepam (ATIVAN) 1 MG tablet Take 1 tablet by mouth nightly for 30 days. Yes Leroy Record, APRN - NP   INGREZZA 80 MG CAPS TAKE 1 CAPSULE BY MOUTH DAILY Yes Kurt Godwin Swank, APRN - NP   UBRELVY 50 MG TABS  Yes Historical Provider, MD   EMGALITY 120 MG/ML SOAJ  Yes Historical Provider, MD   vitamin B-12 (CYANOCOBALAMIN) 1000 MCG tablet Take 1,000 mcg by mouth daily Yes Historical Provider, MD       Social History     Tobacco Use    Smoking status: Every Day     Packs/day: 0.25     Years: 20.00     Pack years: 5.00     Types: Cigarettes    Smokeless tobacco: Never   Vaping Use    Vaping Use: Former    Substances: Always   Substance Use Topics    Alcohol use: No    Drug use: Not Currently     Types: Methamphetamines (Crystal Meth)     Comment: clean for 11 years, past hx of crack use        Allergies   Allergen Reactions    Rozerem [Ramelteon] Other (See Comments)     Increased symptoms of TD    Sinemet [Carbidopa-Levodopa]      Increased TD symptoms    Codeine Itching and Nausea And Vomiting    Imitrex [Sumatriptan] Itching and Nausea And Vomiting     PASSED OUT    Abilify [Aripiprazole] Other (See Comments)     Patient reports symptoms of TD    Amitriptyline     Botox [Onabotulinumtoxina] Other (See Comments)     Tardive dyskinesia    Compazine [Prochlorperazine Maleate] Other (See Comments)    Meclizine     Other Other (See Comments)    Topamax [Topiramate]      Made TD worse    Cephalexin Nausea And Vomiting and Other (See Comments)     ABDOMINAL PAIN FOR 2 WEEKS    Magnesium-Containing Compounds Other (See Comments)     Pt sts \"they called it the mags. I start twitching. \"     Meclizine Hcl Other (See Comments)     \"Makes me stutter and twitch. \" Nsaids Other (See Comments)     Tardive dyskinisia    Pcn [Penicillins] Nausea And Vomiting and Other (See Comments)     HEADACHE    Reglan [Metoclopramide] Other (See Comments)     Tardive dyskinisia    Toradol [Ketorolac Tromethamine] Other (See Comments)     Tremors    Vistaril [Hydroxyzine Hcl] Other (See Comments)     dyskinsia      Zofran [Ondansetron Hcl] Other (See Comments)     Cramping and burning in stomach   ,   Past Medical History:   Diagnosis Date    ADHD     Arthritis     OSTEO ARTHITIS    Bipolar disorder, unspecified ([address]     Cholecystenteric fistula     COPD (chronic obstructive pulmonary disease) ([address]     Degenerative disc disease     Fibromyalgia     Generalized anxiety disorder     H/O 24 hour EKG monitoring 02/16/2017      Sinus tach events , no major arrhythmias , follow up in office as routine     H/O echocardiogram 03/01/2017    EF 55-60% Normal LV structure  and systolic function. H/O exercise stress test 03/01/2017    Normal stress test. Patient has physical deconditioning as she achieved target HR in 2 mins. Recommend to increase PO fluids intake and exercise training. Headache     History of cardiac monitoring 09/21/2020    Normal 30-day event monitor with average heart rate of 63 lowest heart rate of 57 highest heart rate of 82 patient reported episodes of dizziness which did not correlate with any arrhythmia.   There were no episodes of tachycardia uneventful monitor however average heart rate and even the highest heart rate is on the lower side     Osteopenia     Osteoporosis     PTSD (post-traumatic stress disorder)     S/P cholecystectomy 11/22/2011    Schizo-affective schizophrenia ([facility]     Sjoegren syndrome     Syncope     Tardive dyskinesia     Venous insufficiency of leg 2012    Vertigo        PHYSICAL EXAMINATION:  [ INSTRUCTIONS:  \"[x]\" Indicates a positive item  \"[]\" Indicates a negative item  -- DELETE ALL ITEMS NOT EXAMINED]    Vital Signs: Patient unable to obtain VS  -  Constitutional: [x] Appears well-developed and well-nourished [x] No apparent distress      [] Abnormal-   Mental status  [x] Alert and awake  [x] Oriented to person/place/time [x]Able to follow commands      Eyes:  EOM    [x]  Normal  [] Abnormal-  Sclera  []  Normal  [] Abnormal -         Discharge []  None visible  [] Abnormal -    HENT:   [x] Normocephalic, atraumatic. [] Abnormal   [x] Mouth/Throat: Mucous membranes are moist.     External Ears [x] Normal  [] Abnormal-     Neck: [x] No visualized mass     Pulmonary/Chest: [x] Respiratory effort normal.  [x] No visualized signs of difficulty breathing or respiratory distress        [] Abnormal-      Musculoskeletal:   [] Normal gait with no signs of ataxia         [x] Normal range of motion of neck        [] Abnormal-       Neurological:        [x] No Facial Asymmetry (Cranial nerve 7 motor function) (limited exam to video visit)          [x] No gaze palsy        [] Abnormal-         Skin:        [x] No significant exanthematous lesions or discoloration noted on facial skin         [] Abnormal-            Psychiatric:       [x] Normal Affect [x] No Hallucinations        [] Abnormal-       ASSESSMENT/PLAN:  1. Attention deficit hyperactivity disorder (ADHD), predominantly inattentive type  Will increase Adderall XR 15 mg daily to target symptoms of ADHD. Discussed stimulant use, controlled substance policy, side effects, mechanism of action  Monitor closely for medication effectiveness, duration, and side effects of concern. Return in 3 weeks for medication follow up. - amphetamine-dextroamphetamine (ADDERALL XR) 15 MG extended release capsule; Take 1 capsule by mouth daily for 30 days. Dispense: 30 capsule; Refill: 0    Return in about 4 weeks (around 11/11/2022) for ADHD. Minnie Landrum, was evaluated through a synchronous (real-time) audio-video encounter.  The patient (or guardian if applicable) is aware that this is a billable service, which includes applicable co-pays. This Virtual Visit was conducted with patient's (and/or legal guardian's) consent. The visit was conducted pursuant to the emergency declaration under the [address] [place] [address] authority and the Bi[employer] and Obvious General Act. Patient identification was verified, and a caregiver was present when appropriate. The patient was located at Home: [address] [place] [address]. Provider was located at [facility] (Appt Dept): [address] [place] [address]. Total time spent on this encounter:  30 minutes    --HANNA Angeles NP on 10/14/2022 at 11:56 AM    An electronic signature was used to authenticate this note.

## 2022-11-09 ENCOUNTER — HOSPITAL ENCOUNTER (EMERGENCY)
Age: 47
Discharge: HOME | End: 2022-11-09
Attending: EMERGENCY MEDICINE
Payer: COMMERCIAL

## 2022-11-09 ENCOUNTER — APPOINTMENT (OUTPATIENT)
Dept: GENERAL RADIOLOGY | Age: 47
End: 2022-11-09
Payer: COMMERCIAL

## 2022-11-09 VITALS
WEIGHT: 110 LBS | TEMPERATURE: 97.2 F | RESPIRATION RATE: 18 BRPM | SYSTOLIC BLOOD PRESSURE: 105 MMHG | OXYGEN SATURATION: 98 % | HEART RATE: 88 BPM | DIASTOLIC BLOOD PRESSURE: 60 MMHG | BODY MASS INDEX: 18.78 KG/M2 | HEIGHT: 64 IN

## 2022-11-09 DIAGNOSIS — B34.9 VIRAL ILLNESS: Primary | ICD-10-CM

## 2022-11-09 LAB
RAPID INFLUENZA  B AGN: NEGATIVE
RAPID INFLUENZA A AGN: NEGATIVE
SARS-COV-2, NAAT: NOT DETECTED
SOURCE: NORMAL

## 2022-11-09 PROCEDURE — 94664 DEMO&/EVAL PT USE INHALER: CPT

## 2022-11-09 PROCEDURE — 6370000000 HC RX 637 (ALT 250 FOR IP): Performed by: EMERGENCY MEDICINE

## 2022-11-09 PROCEDURE — 87804 INFLUENZA ASSAY W/OPTIC: CPT

## 2022-11-09 PROCEDURE — 94760 N-INVAS EAR/PLS OXIMETRY 1: CPT

## 2022-11-09 PROCEDURE — 71045 X-RAY EXAM CHEST 1 VIEW: CPT

## 2022-11-09 PROCEDURE — 94640 AIRWAY INHALATION TREATMENT: CPT

## 2022-11-09 PROCEDURE — 99284 EMERGENCY DEPT VISIT MOD MDM: CPT

## 2022-11-09 PROCEDURE — 87635 SARS-COV-2 COVID-19 AMP PRB: CPT

## 2022-11-09 RX ORDER — GUAIFENESIN 600 MG/1
600 TABLET, EXTENDED RELEASE ORAL 2 TIMES DAILY
Qty: 30 TABLET | Refills: 0 | Status: SHIPPED | OUTPATIENT
Start: 2022-11-09 | End: 2022-11-24

## 2022-11-09 RX ORDER — METHOCARBAMOL 750 MG/1
750 TABLET, FILM COATED ORAL ONCE
Status: COMPLETED | OUTPATIENT
Start: 2022-11-09 | End: 2022-11-09

## 2022-11-09 RX ORDER — ALBUTEROL SULFATE 90 UG/1
2 AEROSOL, METERED RESPIRATORY (INHALATION) ONCE
Status: COMPLETED | OUTPATIENT
Start: 2022-11-09 | End: 2022-11-09

## 2022-11-09 RX ADMIN — METHOCARBAMOL 750 MG: 750 TABLET ORAL at 20:13

## 2022-11-09 RX ADMIN — ALBUTEROL SULFATE 2 PUFF: 108 INHALANT RESPIRATORY (INHALATION) at 20:13

## 2022-11-09 ASSESSMENT — PAIN DESCRIPTION - LOCATION: LOCATION: GENERALIZED

## 2022-11-09 ASSESSMENT — PAIN SCALES - GENERAL
PAINLEVEL_OUTOF10: 5
PAINLEVEL_OUTOF10: 6

## 2022-11-09 ASSESSMENT — ENCOUNTER SYMPTOMS
COUGH: 1
RHINORRHEA: 0
BACK PAIN: 0
EYE DISCHARGE: 0
SHORTNESS OF BREATH: 0
SORE THROAT: 0
ABDOMINAL PAIN: 0
EYE PAIN: 0
NAUSEA: 0

## 2022-11-09 ASSESSMENT — PAIN DESCRIPTION - DESCRIPTORS: DESCRIPTORS: ACHING

## 2022-11-09 ASSESSMENT — PAIN - FUNCTIONAL ASSESSMENT: PAIN_FUNCTIONAL_ASSESSMENT: 0-10

## 2022-11-09 NOTE — Clinical Note
Rach Hairston was seen and treated in our emergency department on 11/9/2022. She may return to work on 11/12/2022. If you have any questions or concerns, please don't hesitate to call.       Bandar Tavera MD

## 2022-11-09 NOTE — Clinical Note
Michaela Rothman was seen and treated in our emergency department on 11/9/2022. She may return to work on 11/12/2022. If you have any questions or concerns, please don't hesitate to call.       Vance Guy MD

## 2022-11-10 NOTE — ED PROVIDER NOTES
[address]      Pt Name: Yohana Tomlin  MRN: [id number]  Roselyn 1975  Date of evaluation: 11/9/2022  Provider: Merle Chaudhry MD    CHIEF COMPLAINT       Chief Complaint   Patient presents with    Generalized Body Aches     C/o body aches, productive cough, sore throat, nasal drainage since wakening up this morning. Patient states temp 100.2 this morning but has not taken since. Patient has had DayQuil and Ibuprofen. HISTORY OF PRESENT ILLNESS      Mirlande Vasquez is a 41 y.o. female who presents to the emergency department  for   Chief Complaint   Patient presents with    Generalized Body Aches     C/o body aches, productive cough, sore throat, nasal drainage since wakening up this morning. Patient states temp 100.2 this morning but has not taken since. Patient has had DayQuil and Ibuprofen. 30-year-old female presents with myalgias and productive cough. She does endorse multiple sick contacts at work. She is a current everyday tobacco user. Denies history of chronic lung disease oxygen. Her asthma. She denies any fevers. Does endorse some diffuse myalgias. No abdominal pain. No nausea or vomiting. She presents with no audible stridor or wheezing. No signs of respiratory distress. Nursing Notes, Triage Notes & Vital Signs were reviewed. REVIEW OF SYSTEMS    (2-9 systems for level 4, 10 or more for level 5)     Review of Systems   Constitutional:  Positive for chills. Negative for fever. HENT:  Negative for congestion, rhinorrhea and sore throat. Eyes:  Negative for pain and discharge. Respiratory:  Positive for cough. Negative for shortness of breath. Cardiovascular:  Negative for chest pain and palpitations. Gastrointestinal:  Negative for abdominal pain and nausea. Endocrine: Negative for polydipsia and polyuria. Genitourinary:  Negative for dysuria and flank pain.    Musculoskeletal: Positive for myalgias. Negative for back pain and neck pain. Skin:  Negative for pallor and wound. Neurological:  Negative for dizziness, facial asymmetry, light-headedness, numbness and headaches. Psychiatric/Behavioral:  Negative for confusion. Except as noted above the remainder of the review of systems was reviewed and negative. PAST MEDICAL HISTORY     Past Medical History:   Diagnosis Date    ADHD     Arthritis     OSTEO ARTHITIS    Bipolar disorder, unspecified ([facility] [address]     Cholecystenteric fistula     COPD (chronic obstructive pulmonary disease) ([facility] [address]     Degenerative disc disease     Fibromyalgia     Generalized anxiety disorder     H/O 24 hour EKG monitoring 02/16/2017      Sinus tach events , no major arrhythmias , follow up in office as routine     H/O echocardiogram 03/01/2017    EF 55-60% Normal LV structure  and systolic function. H/O exercise stress test 03/01/2017    Normal stress test. Patient has physical deconditioning as she achieved target HR in 2 mins. Recommend to increase PO fluids intake and exercise training. Headache     History of cardiac monitoring 09/21/2020    Normal 30-day event monitor with average heart rate of 63 lowest heart rate of 57 highest heart rate of 82 patient reported episodes of dizziness which did not correlate with any arrhythmia. There were no episodes of tachycardia uneventful monitor however average heart rate and even the highest heart rate is on the lower side     Osteopenia     Osteoporosis     PTSD (post-traumatic stress disorder)     S/P cholecystectomy 11/22/2011    Schizo-affective schizophrenia ([facility] [address]     Sjoegren syndrome     Syncope     Tardive dyskinesia     Venous insufficiency of leg 2012    Vertigo        Prior to Admission medications    Medication Sig Start Date End Date Taking?  Authorizing Provider   guaiFENesin (MUCINEX) 600 MG extended release tablet Take 1 tablet by mouth 2 times daily for 15 days 11/9/22 11/24/22 Yes Ashlee Bar MD   amphetamine-dextroamphetamine (ADDERALL XR) 15 MG extended release capsule Take 1 capsule by mouth daily for 30 days. 10/14/22 11/13/22  Danielle Flank, APRN - NP   pregabalin (LYRICA) 50 MG capsule Take 1 capsule by mouth 3 times daily for 30 days.  9/23/22 11/9/22  Danielle Flank, APRN - NP   rOPINIRole (REQUIP) 2 MG tablet TAKE ONE TABLET BY MOUTH ONCE NIGHTLY 9/23/22   Danielle Flank, APRN - NP   atomoxetine (STRATTERA) 60 MG capsule Take 1 capsule by mouth daily 9/23/22   Danielle Flank, APRN - NP   traZODone (DESYREL) 150 MG tablet Take 1 tablet by mouth nightly 9/23/22 11/9/22  Danielle Flank, APRN - NP   INGREZZA 80 MG CAPS TAKE 1 CAPSULE BY MOUTH DAILY 8/25/22   Danielle Flank, APRN - NP   UBRELVY 50 MG TABS  4/4/22   Historical Provider, MD   EMGALITY 120 MG/ML SOAJ  3/23/22   Historical Provider, MD   vitamin B-12 (CYANOCOBALAMIN) 1000 MCG tablet Take 1,000 mcg by mouth daily    Historical Provider, MD        Patient Active Problem List   Diagnosis    History of colon polyps    Orthostatic hypotension    Tobacco abuse    Auditory hallucination    Mood swings    Visual hallucinations    Restless legs    History of migraine    Primary insomnia    Fibromyalgia    Tardive dyskinesia    Moderate episode of recurrent major depressive disorder (HCC)    Nausea    Medication reaction    [id number] poor metabolizer ([address]    Closed displaced fracture of shaft of left clavicle    Borderline personality disorder ([address]    Intractable migraine without aura and with status migrainosus    Attention deficit hyperactivity disorder (ADHD), predominantly inattentive type    Generalized anxiety disorder with panic attacks    Acute gastroenteritis         SURGICAL HISTORY       Past Surgical History:   Procedure Laterality Date    CHOLECYSTECTOMY  11/22/2011    laparoscopic    COLONOSCOPY      DENTAL SURGERY  8/9    ELBOW ARTHROSCOPY      bilateral elbows    ENDOSCOPY, COLON, DIAGNOSTIC HYSTERECTOMY (CERVIX STATUS UNKNOWN)      OTHER SURGICAL HISTORY      venous ablation, bilateral legs    TUBAL LIGATION           CURRENT MEDICATIONS       Discharge Medication List as of 11/9/2022  9:08 PM        CONTINUE these medications which have NOT CHANGED    Details   amphetamine-dextroamphetamine (ADDERALL XR) 15 MG extended release capsule Take 1 capsule by mouth daily for 30 days. , Disp-30 capsule, R-0Normal      pregabalin (LYRICA) 50 MG capsule Take 1 capsule by mouth 3 times daily for 30 days. , Disp-90 capsule, R-2Normal      rOPINIRole (REQUIP) 2 MG tablet TAKE ONE TABLET BY MOUTH ONCE NIGHTLY, Disp-30 tablet, R-2Normal      atomoxetine (STRATTERA) 60 MG capsule Take 1 capsule by mouth daily, Disp-30 capsule, R-2Normal      traZODone (DESYREL) 150 MG tablet Take 1 tablet by mouth nightly, Disp-30 tablet, R-11Normal      INGREZZA 80 MG CAPS TAKE 1 CAPSULE BY MOUTH DAILY, Disp-30 capsule, R-11Maximum Refills ReachedNormal      UBRELVY 50 MG TABS DAWHistorical Med      EMGALITY 120 MG/ML SOAJ DAWHistorical Med      vitamin B-12 (CYANOCOBALAMIN) 1000 MCG tablet Take 1,000 mcg by mouth dailyHistorical Med             ALLERGIES     Rozerem [ramelteon], Sinemet [carbidopa-levodopa], Codeine, Imitrex [sumatriptan], Abilify [aripiprazole], Amitriptyline, Botox [onabotulinumtoxina], Compazine [prochlorperazine maleate], Meclizine, Other, Topamax [topiramate], Cephalexin, Magnesium-containing compounds, Meclizine hcl, Nsaids, Pcn [penicillins], Reglan [metoclopramide], Toradol [ketorolac tromethamine], Vistaril [hydroxyzine hcl], and Zofran [ondansetron hcl]    FAMILY HISTORY       Family History   Problem Relation Age of Onset    Other Mother         Neurocardiogenic syncope    ADHD Mother     Alcohol Abuse Father     Bipolar Disorder Sister     Anxiety Disorder Sister     Bipolar Disorder Sister     OCD Sister     Heart Surgery Maternal Grandmother     ADHD Daughter     ADHD Daughter     Lupus Paternal Uncle Other Paternal Uncle         myasthenia gravis          SOCIAL HISTORY       Social History     Socioeconomic History    Marital status: Single     Spouse name: None    Number of children: None    Years of education: None    Highest education level: None   Tobacco Use    Smoking status: Every Day     Packs/day: 0.25     Years: 20.00     Pack years: 5.00     Types: Cigarettes    Smokeless tobacco: Never   Vaping Use    Vaping Use: Former    Substances: Always   Substance and Sexual Activity    Alcohol use: No    Drug use: Not Currently     Types: Methamphetamines (Crystal Meth)     Comment: clean for 11 years, past hx of crack use    Sexual activity: Yes     Partners: Male       SCREENINGS               PHYSICAL EXAM    (up to 7 for level 4, 8 or more for level 5)     ED Triage Vitals [11/09/22 1832]   BP Temp Temp Source Heart Rate Resp SpO2 Height Weight   (!) 104/55 97.2 °F (36.2 °C) Oral 90 18 98 % 5' 4\" (1.626 m) 110 lb (49.9 kg)       Physical Exam  Vitals reviewed. Constitutional:       Appearance: She is not ill-appearing or toxic-appearing. HENT:      Head: Normocephalic and atraumatic. Nose: No congestion or rhinorrhea. Mouth/Throat:      Mouth: Mucous membranes are moist.      Pharynx: No oropharyngeal exudate or posterior oropharyngeal erythema. Eyes:      General:         Right eye: No discharge. Left eye: No discharge. Extraocular Movements: Extraocular movements intact. Pupils: Pupils are equal, round, and reactive to light. Cardiovascular:      Rate and Rhythm: Normal rate. Heart sounds: No friction rub. No gallop. Pulmonary:      Effort: Pulmonary effort is normal.   Abdominal:      Palpations: Abdomen is soft. Tenderness: There is no abdominal tenderness. There is no guarding. Musculoskeletal:         General: No swelling or tenderness. Normal range of motion. Cervical back: Normal range of motion and neck supple. No tenderness.    Skin: General: Skin is warm. Capillary Refill: Capillary refill takes less than 2 seconds. Findings: No erythema or lesion. Neurological:      General: No focal deficit present. Mental Status: She is alert. DIAGNOSTIC RESULTS     Labs Reviewed   COVID-19, RAPID   RAPID INFLUENZA A/B ANTIGENS          RADIOLOGY:     Non-plain film images such as CT, Ultrasound and MRI are read by the radiologist. Plain radiographic images are visualized and preliminarily interpreted by the emergency physician. Interpretation per the Radiologist below, if available at the time of this note:    XR CHEST PORTABLE   Final Result   No acute cardiopulmonary abnormality. ED BEDSIDE ULTRASOUND:   Performed by ED Physician Agusto Painter MD       LABS:  Labs Reviewed   COVID-19, RAPID   RAPID INFLUENZA A/B ANTIGENS       All other labs were within normal range or not returned as of this dictation. EMERGENCY DEPARTMENT COURSE and DIFFERENTIAL DIAGNOSIS/MDM:   Vitals:    Vitals:    11/09/22 1832 11/09/22 2017 11/09/22 2100   BP: (!) 104/55  105/60   Pulse: 90  88   Resp: 18     Temp: 97.2 °F (36.2 °C)     TempSrc: Oral     SpO2: 98% 98% 98%   Weight: 110 lb (49.9 kg)     Height: 5' 4\" (1.626 m)             MDM  Number of Diagnoses or Management Options  Viral illness  Diagnosis management comments: 61-year-old female presents with myalgias and productive cough. She has multiple sick contacts at work. She is a current everyday tobacco user. Denies any asthma or COPD. She denies any fevers. She is not having any vomiting or diarrhea. She presents with unremarkable vital signs. She is afebrile. No tachycardia. Respirations are within normal limits. Her oxygen saturations are in the high 90s on room air. Her abdomen is soft and nonperitoneal.  There are no signs of respite distress  Reported productive cough, chest x-ray is obtained and is nonacute. Also obtaining COVID and flu testing. These were both negative. She was treated with bronchodilators in the emergency department she was also treated with Robaxin. We discussed that etiology of her symptoms seems consistent with a viral infection. She will continue symptomatic measures at home. She will follow-up outpatient. She is agreeable with plan of care. She is discharged ambulatory in stable condition with return precautions        -  Patient seen and evaluated in the emergency department. -  Triage and nursing notes reviewed and incorporated. -  Old chart records reviewed and incorporated. -  Work-up included:  See above  -  Results discussed with patient. CONSULTS:  None    PROCEDURES:  None performed unless otherwise noted below     Procedures        FINAL IMPRESSION      1. Viral illness          DISPOSITION/PLAN   DISPOSITION Decision To Discharge 11/09/2022 09:02:34 PM      PATIENT REFERRED TO:  HANNA Alaniz NP  [address] [place], [place] [address] [facility]  [phone]    Schedule an appointment as soon as possible for a visit in 1 week  As needed    DISCHARGE MEDICATIONS:  Discharge Medication List as of 11/9/2022  9:08 PM        START taking these medications    Details   guaiFENesin (MUCINEX) 600 MG extended release tablet Take 1 tablet by mouth 2 times daily for 15 days, Disp-30 tablet, R-0Normal             ED Provider Disposition Time  DISPOSITION Decision To Discharge 11/09/2022 09:02:34 PM      Appropriate personal protective equipment was worn during the patient's evaluation. These included surgical, eye protection, surgical mask or in 95 respirator and gloves. The patient was also placed in a surgical mask for the prevention of possible spread of respiratory viral illnesses. The Patient was instructed to read the package inserts with any medication that was prescribed. Major potential reactions and medication interactions were discussed.   The Patient understands that there are numerous possible adverse reactions not covered. The patient was also instructed to arrange follow-up with his or her primary care provider for review of any pending labwork or incidental findings on any radiology results that were obtained. All efforts were made to discuss any incidental findings that require further monitoring. Controlled Substances Monitoring:     RX Monitoring 9/23/2022   Periodic Controlled Substance Monitoring Possible medication side effects, risk of tolerance/dependence & alternative treatments discussed. ;No signs of potential drug abuse or diversion identified. ;Assessed functional status. ;Obtaining appropriate analgesic effect of treatment.        (Please note that portions of this note were completed with a voice recognition program.  Efforts were made to edit the dictations but occasionally words are mis-transcribed.)    Bandar Tavera MD (electronically signed)  Attending Emergency Physician            Bandar Tavera MD  11/09/22 2089

## 2022-11-10 NOTE — DISCHARGE INSTRUCTIONS
Your COVID test and flu test today were negative. Your chest x-ray is overall nonacute. Your symptoms seem consistent with a viral respiratory infection. Keep her self well-hydrated. Please follow-up with your primary care physician for monitoring and resolution of your symptoms. If you develop any worsening or concerning symptoms, please seek immediate medical evaluation.

## 2022-11-11 ENCOUNTER — TELEMEDICINE (OUTPATIENT)
Dept: FAMILY MEDICINE CLINIC | Age: 47
End: 2022-11-11
Payer: COMMERCIAL

## 2022-11-11 DIAGNOSIS — F90.0 ATTENTION DEFICIT HYPERACTIVITY DISORDER (ADHD), PREDOMINANTLY INATTENTIVE TYPE: Primary | ICD-10-CM

## 2022-11-11 DIAGNOSIS — J06.9 VIRAL URI: ICD-10-CM

## 2022-11-11 PROCEDURE — G8427 DOCREV CUR MEDS BY ELIG CLIN: HCPCS | Performed by: NURSE PRACTITIONER

## 2022-11-11 PROCEDURE — 99214 OFFICE O/P EST MOD 30 MIN: CPT | Performed by: NURSE PRACTITIONER

## 2022-11-11 RX ORDER — DEXTROAMPHETAMINE SACCHARATE, AMPHETAMINE ASPARTATE MONOHYDRATE, DEXTROAMPHETAMINE SULFATE AND AMPHETAMINE SULFATE 3.75; 3.75; 3.75; 3.75 MG/1; MG/1; MG/1; MG/1
15 CAPSULE, EXTENDED RELEASE ORAL DAILY
Qty: 30 CAPSULE | Refills: 0 | Status: SHIPPED | OUTPATIENT
Start: 2022-11-11 | End: 2022-12-11

## 2022-11-11 ASSESSMENT — ENCOUNTER SYMPTOMS
SINUS PAIN: 0
ABDOMINAL PAIN: 0
SORE THROAT: 1
CONSTIPATION: 0
COUGH: 1
DIARRHEA: 0
NAUSEA: 0
VOMITING: 0
ABDOMINAL DISTENTION: 0
TROUBLE SWALLOWING: 0
WHEEZING: 0
RHINORRHEA: 1
SINUS PRESSURE: 0
SHORTNESS OF BREATH: 0
CHEST TIGHTNESS: 0

## 2022-11-11 NOTE — PROGRESS NOTES
2022    TELEHEALTH EVALUATION -- Audio/Visual (During SROEK-89 public health emergency)    HPI:    Derik Alejandra (:  1975) has requested an audio/video evaluation for the following concern(s):    URI  C/o fatigue, body aches, productive cough, sore throat, nasal drainage. Symptoms started Tuesday evening and are not getting any better. Patient states temp 101.3 Wednesday and Thursday. Patient has had Ibuprofen. Symptoms started Tuesday evening and are not getting any better. She was been tested for COVID and Flu both tests were negative. Anxiety/Depression/Borderline Personality Disorder/ADHD  Current treatment: Strattera-60 for ADHD, STEPHANIA and MDD. She is prescribed Ativan 0.5 mg TID for STEPHANIA. OARRS reviewed. Ativan last filled 10/13/2022, Lyrica 10/13/22 and Adderall XR 10/14/22. Overall she feels that the medications are working. She denies visual  and auditory hallucinations, delusions and illusions. Pt denies current suicidal ideation, plan and intent. Pt  denies current homicidal ideation, plan and intent. Review of Systems   Constitutional:  Positive for appetite change and fatigue. Negative for activity change. HENT:  Positive for congestion, ear pain, postnasal drip, rhinorrhea and sore throat. Negative for sinus pressure, sinus pain, sneezing, tinnitus and trouble swallowing. Eyes:  Negative for visual disturbance. Respiratory:  Positive for cough. Negative for chest tightness, shortness of breath and wheezing. Cardiovascular: Negative. Negative for chest pain and palpitations. Gastrointestinal:  Negative for abdominal distention, abdominal pain, constipation, diarrhea, nausea and vomiting. Endocrine: Negative for cold intolerance, heat intolerance, polydipsia, polyphagia and polyuria. Musculoskeletal:  Positive for myalgias. Skin: Negative. Neurological:  Positive for headaches (follows with neurology).  Negative for dizziness, seizures, syncope, facial asymmetry, speech difficulty, light-headedness and numbness. Psychiatric/Behavioral:  Positive for decreased concentration and dysphoric mood. Negative for agitation, behavioral problems, confusion, hallucinations, self-injury, sleep disturbance and suicidal ideas. The patient is nervous/anxious. The patient is not hyperactive. Symptoms are improving     Prior to Visit Medications    Medication Sig Taking? Authorizing Provider   amphetamine-dextroamphetamine (ADDERALL XR) 15 MG extended release capsule Take 1 capsule by mouth daily for 30 days. Yes HANNA Goncalves NP   guaiFENesin (MUCINEX) 600 MG extended release tablet Take 1 tablet by mouth 2 times daily for 15 days Yes Agusto Painter MD   pregabalin (LYRICA) 50 MG capsule Take 1 capsule by mouth 3 times daily for 30 days.  Yes HANNA Goncalves NP   rOPINIRole (REQUIP) 2 MG tablet TAKE ONE TABLET BY MOUTH ONCE NIGHTLY Yes HANNA Allen NP   atomoxetine (STRATTERA) 60 MG capsule Take 1 capsule by mouth daily Yes HANNA Goncalves NP   traZODone (DESYREL) 150 MG tablet Take 1 tablet by mouth nightly Yes HANNA Wang NP   INGREZZA 80 MG CAPS TAKE 1 CAPSULE BY MOUTH DAILY Yes HANNA Wang NP   UBRELVY 50 MG TABS  Yes Historical Provider, MD   EMGALITY 120 MG/ML SOAJ  Yes Historical Provider, MD   vitamin B-12 (CYANOCOBALAMIN) 1000 MCG tablet Take 1,000 mcg by mouth daily Yes Historical Provider, MD       Social History     Tobacco Use    Smoking status: Every Day     Packs/day: 0.25     Years: 20.00     Pack years: 5.00     Types: Cigarettes    Smokeless tobacco: Never   Vaping Use    Vaping Use: Former    Substances: Always   Substance Use Topics    Alcohol use: No    Drug use: Not Currently     Types: Methamphetamines (Crystal Meth)     Comment: clean for 11 years, past hx of crack use        Allergies   Allergen Reactions    Rozerem [Ramelteon] Other (See Comments)     Increased symptoms of TD    Sinemet [Carbidopa-Levodopa]      Increased TD symptoms    Codeine Itching and Nausea And Vomiting    Imitrex [Sumatriptan] Itching and Nausea And Vomiting     PASSED OUT    Abilify [Aripiprazole] Other (See Comments)     Patient reports symptoms of TD    Amitriptyline     Botox [Onabotulinumtoxina] Other (See Comments)     Tardive dyskinesia    Compazine [Prochlorperazine Maleate] Other (See Comments)    Meclizine     Other Other (See Comments)    Topamax [Topiramate]      Made TD worse    Cephalexin Nausea And Vomiting and Other (See Comments)     ABDOMINAL PAIN FOR 2 WEEKS    Magnesium-Containing Compounds Other (See Comments)     Pt sts \"they called it the mags. I start twitching. \"     Meclizine Hcl Other (See Comments)     \"Makes me stutter and twitch. \"    Nsaids Other (See Comments)     Tardive dyskinisia    Pcn [Penicillins] Nausea And Vomiting and Other (See Comments)     HEADACHE    Reglan [Metoclopramide] Other (See Comments)     Tardive dyskinisia    Toradol [Ketorolac Tromethamine] Other (See Comments)     Tremors    Vistaril [Hydroxyzine Hcl] Other (See Comments)     dyskinsia      Zofran [Ondansetron Hcl] Other (See Comments)     Cramping and burning in stomach   ,   Past Medical History:   Diagnosis Date    ADHD     Arthritis     OSTEO ARTHITIS    Bipolar disorder, unspecified ([address]     Cholecystenteric fistula     COPD (chronic obstructive pulmonary disease) [address] [address]     Degenerative disc disease     Fibromyalgia     Generalized anxiety disorder     H/O 24 hour EKG monitoring 02/16/2017      Sinus tach events , no major arrhythmias , follow up in office as routine     H/O echocardiogram 03/01/2017    EF 55-60% Normal LV structure  and systolic function. H/O exercise stress test 03/01/2017    Normal stress test. Patient has physical deconditioning as she achieved target HR in 2 mins. Recommend to increase PO fluids intake and exercise training.      Headache     History of cardiac monitoring 09/21/2020    Normal 30-day event monitor with average heart rate of 63 lowest heart rate of 57 highest heart rate of 82 patient reported episodes of dizziness which did not correlate with any arrhythmia. There were no episodes of tachycardia uneventful monitor however average heart rate and even the highest heart rate is on the lower side     Osteopenia     Osteoporosis     PTSD (post-traumatic stress disorder)     S/P cholecystectomy 11/22/2011    Schizo-affective schizophrenia ([facility] [address]     Sjoegren syndrome     Syncope     Tardive dyskinesia     Venous insufficiency of leg 2012    Vertigo        PHYSICAL EXAMINATION:  [ INSTRUCTIONS:  \"[x]\" Indicates a positive item  \"[]\" Indicates a negative item  -- DELETE ALL ITEMS NOT EXAMINED]  Vital Signs: Pt unable to obtain VS.      Constitutional: [] Appears well-developed and well-nourished [x] No apparent distress      [x] Abnormal- Appears ill  Mental status  [x] Alert and awake  [x] Oriented to person/place/time [x]Able to follow commands      Eyes:  EOM    [x]  Normal  [] Abnormal-  Sclera  [x]  Normal  [] Abnormal -         Discharge [x]  None visible  [] Abnormal -    HENT:   [x] Normocephalic, atraumatic.   [] Abnormal   [x] Mouth/Throat: Mucous membranes are moist.     External Ears [] Normal  [] Abnormal-     Neck: [x] No visualized mass     Pulmonary/Chest: [x] Respiratory effort normal.  [x] No visualized signs of difficulty breathing or respiratory distress        [] Abnormal-      Musculoskeletal:   [] Normal gait with no signs of ataxia         [x] Normal range of motion of neck        [] Abnormal-       Neurological:        [] No Facial Asymmetry (Cranial nerve 7 motor function) (limited exam to video visit)          [x] No gaze palsy        [] Abnormal-         Skin:        [x] No significant exanthematous lesions or discoloration noted on facial skin         [] Abnormal-            Psychiatric:       [x] Normal Affect [x] No Hallucinations        [] Abnormal- Other pertinent observable physical exam findings-     ASSESSMENT/PLAN:  1. Attention deficit hyperactivity disorder (ADHD), predominantly inattentive type  Stable, medication refilled. - amphetamine-dextroamphetamine (ADDERALL XR) 15 MG extended release capsule; Take 1 capsule by mouth daily for 30 days. Dispense: 30 capsule; Refill: 0    2. Viral URI  This most likely is a viral infection and an antibiotic will not help you get over it. Increase fluid intake, drinking clear liquids (water, juice, tea, soda) will help keep your nasal drainage and mucus thin. Rest as much as you can, this will help your body to heal and fight off infection. Take Tylenol or ibuprofen for fevers and an over the counter cough medicine to control the cough. If your symptoms worsen or you are not starting to feel better by your 8th day of symptoms, call the office for reevaluation. PDMP Monitoring:    Last PDMP Kandice carreno Reviewed:  Review User Review Instant Review Result   LIAN Buck 10/14/2022 11:37 AM Reviewed PDMP [1]     Last Controlled Substance Monitoring Documentation      Flowsheet Row Telemedicine from 11/11/2022 in [address] [place] [address]   Periodic Controlled Substance Monitoring Possible medication side effects, risk of tolerance/dependence & alternative treatments discussed., No signs of potential drug abuse or diversion identified. , Assessed functional status., Obtaining appropriate analgesic effect of treatment. filed at 11/11/2022 1131             Return for ADHD, Pain Med Refill, Anxiety. Mally Soto, was evaluated through a synchronous (real-time) audio-video encounter. The patient (or guardian if applicable) is aware that this is a billable service, which includes applicable co-pays. This Virtual Visit was conducted with patient's (and/or legal guardian's) consent.  The visit was conducted pursuant to the emergency declaration under the [address] Emergencies Act, [facility] waiver authority and the Coronavirus Preparedness and Response Supplemental Appropriations Act. Patient identification was verified, and a caregiver was present when appropriate. The patient was located at Home: [address] [place] [address]. Provider was located at [facility] (Appt Dept): [facility] [address]. Total time spent on this encounter: 30    --HANNA Wagner NP on 11/11/2022 at 11:27 AM    An electronic signature was used to authenticate this note.

## 2023-01-17 ENCOUNTER — TELEPHONE (OUTPATIENT)
Dept: CARDIOLOGY CLINIC | Age: 48
End: 2023-01-17

## 2023-01-19 ENCOUNTER — TELEPHONE (OUTPATIENT)
Dept: FAMILY MEDICINE CLINIC | Age: 48
End: 2023-01-19

## 2023-01-19 DIAGNOSIS — G25.81 RESTLESS LEGS: ICD-10-CM

## 2023-01-19 RX ORDER — ROPINIROLE 2 MG/1
TABLET, FILM COATED ORAL
Qty: 30 TABLET | Refills: 1 | Status: SHIPPED | OUTPATIENT
Start: 2023-01-19

## 2023-01-19 NOTE — TELEPHONE ENCOUNTER
Patient had to reschedule appt for tomorrow. Scheduled for 2/16/2023.   Patient wants to know if you will send in a script for requip and ativan

## 2023-02-02 ENCOUNTER — OFFICE VISIT (OUTPATIENT)
Dept: CARDIOLOGY CLINIC | Age: 48
End: 2023-02-02
Payer: COMMERCIAL

## 2023-02-02 VITALS
WEIGHT: 99 LBS | DIASTOLIC BLOOD PRESSURE: 60 MMHG | BODY MASS INDEX: 16.9 KG/M2 | HEIGHT: 64 IN | SYSTOLIC BLOOD PRESSURE: 100 MMHG | HEART RATE: 64 BPM

## 2023-02-02 DIAGNOSIS — G24.01 TARDIVE DYSKINESIA: ICD-10-CM

## 2023-02-02 DIAGNOSIS — Z72.0 TOBACCO ABUSE: ICD-10-CM

## 2023-02-02 DIAGNOSIS — F90.0 ATTENTION DEFICIT HYPERACTIVITY DISORDER (ADHD), PREDOMINANTLY INATTENTIVE TYPE: ICD-10-CM

## 2023-02-02 DIAGNOSIS — F60.3 BORDERLINE PERSONALITY DISORDER (HCC): ICD-10-CM

## 2023-02-02 DIAGNOSIS — I95.1 ORTHOSTATIC HYPOTENSION: ICD-10-CM

## 2023-02-02 DIAGNOSIS — M79.7 FIBROMYALGIA: ICD-10-CM

## 2023-02-02 DIAGNOSIS — R00.2 PALPITATIONS: Primary | ICD-10-CM

## 2023-02-02 PROCEDURE — G8427 DOCREV CUR MEDS BY ELIG CLIN: HCPCS | Performed by: INTERNAL MEDICINE

## 2023-02-02 PROCEDURE — G8419 CALC BMI OUT NRM PARAM NOF/U: HCPCS | Performed by: INTERNAL MEDICINE

## 2023-02-02 PROCEDURE — G8484 FLU IMMUNIZE NO ADMIN: HCPCS | Performed by: INTERNAL MEDICINE

## 2023-02-02 PROCEDURE — 99244 OFF/OP CNSLTJ NEW/EST MOD 40: CPT | Performed by: INTERNAL MEDICINE

## 2023-02-02 PROCEDURE — 93000 ELECTROCARDIOGRAM COMPLETE: CPT | Performed by: INTERNAL MEDICINE

## 2023-02-02 RX ORDER — MIDODRINE HYDROCHLORIDE 5 MG/1
TABLET ORAL
COMMUNITY
Start: 2023-01-17

## 2023-02-02 RX ORDER — FLUDROCORTISONE ACETATE 0.1 MG/1
0.1 TABLET ORAL DAILY
Qty: 90 TABLET | Refills: 3 | Status: SHIPPED | OUTPATIENT
Start: 2023-02-02 | End: 2023-03-04

## 2023-02-02 NOTE — PROGRESS NOTES
CARDIOLOGY  NOTE  Chief Complaint: Dizziness and syncope    HPI:   Padmini Henry is a 41y.o. year old who has history as noted below. She feels dizzy when she stands up ,multiple episodes pf passing out . She did not tolerate midodrine in the past but using it now 5 mg bid . She feels that it made her sicker in the past she is having very more symptoms now. Daughter was diagnosed of neurogenic syncope. She also reports having palpitations heart racing when she stands up.his compression stocking made it worse and does not want to wear them  In office today bp drops from 924 systolic to 90 and she gets dizzy although she is taking midodrine 5 mg tid   Continues to be bradycardic, 30 day monitor showed low hr in 2020  . She has been on multiple psychiatry meds says she has had low blood pressure all her life. She has had lef tvein ablation but her legs are feeling heavy again. Her mother has h/o recurrent syncope. Current Outpatient Medications   Medication Sig Dispense Refill    midodrine (PROAMATINE) 5 MG tablet       fludrocortisone (FLORINEF) 0.1 MG tablet Take 1 tablet by mouth daily 90 tablet 3    rOPINIRole (REQUIP) 2 MG tablet TAKE ONE TABLET BY MOUTH ONCE NIGHTLY 30 tablet 1    traZODone (DESYREL) 150 MG tablet Take 1 tablet by mouth nightly 30 tablet 11    INGREZZA 80 MG CAPS TAKE 1 CAPSULE BY MOUTH DAILY 30 capsule 11    amphetamine-dextroamphetamine (ADDERALL XR) 15 MG extended release capsule Take 1 capsule by mouth daily for 30 days. 30 capsule 0    pregabalin (LYRICA) 50 MG capsule Take 1 capsule by mouth 3 times daily for 30 days.  90 capsule 2    atomoxetine (STRATTERA) 60 MG capsule Take 1 capsule by mouth daily (Patient not taking: Reported on 2/2/2023) 30 capsule 2    UBRELVY 50 MG TABS  (Patient not taking: Reported on 2/2/2023)      EMGALITY 120 MG/ML SOAJ  (Patient not taking: Reported on 2/2/2023)      vitamin B-12 (CYANOCOBALAMIN) 1000 MCG tablet Take 1,000 mcg by mouth daily (Patient not taking: Reported on 2/2/2023)       No current facility-administered medications for this visit.       Allergies:   Rozerem [ramelteon], Sinemet [carbidopa-levodopa], Codeine, Imitrex [sumatriptan], Abilify [aripiprazole], Amitriptyline, Botox [onabotulinumtoxina], Compazine [prochlorperazine maleate], Meclizine, Other, Topamax [topiramate], Cephalexin, Magnesium-containing compounds, Meclizine hcl, Nsaids, Pcn [penicillins], Reglan [metoclopramide], Toradol [ketorolac tromethamine], Vistaril [hydroxyzine hcl], and Zofran [ondansetron hcl]    Patient History:  Past Medical History:   Diagnosis Date    ADHD     Arthritis     OSTEO ARTHITIS    Bipolar disorder, unspecified ([facility])     Cholecystenteric fistula     COPD (chronic obstructive pulmonary disease) ([facility])     Degenerative disc disease     Fibromyalgia     Generalized anxiety disorder     H/O 24 hour EKG monitoring 02/16/2017      Sinus tach events , no major arrhythmias , follow up in office as routine     H/O echocardiogram 03/01/2017    EF 55-60% Normal LV structure  and systolic function.     H/O exercise stress test 03/01/2017    Normal stress test. Patient has physical deconditioning as she achieved target HR in 2 mins. Recommend to increase PO fluids intake and exercise training.     Headache     History of cardiac monitoring 09/21/2020    Normal 30-day event monitor with average heart rate of 63 lowest heart rate of 57 highest heart rate of 82 patient reported episodes of dizziness which did not correlate with any arrhythmia.  There were no episodes of tachycardia uneventful monitor however average heart rate and even the highest heart rate is on the lower side     Osteopenia     Osteoporosis     PTSD (post-traumatic stress disorder)     S/P cholecystectomy 11/22/2011    Schizo-affective schizophrenia ([facility])     Sjoegren syndrome     Syncope     Tardive dyskinesia     Venous insufficiency of leg 2012    Vertigo   Past Surgical History:   Procedure Laterality Date    CHOLECYSTECTOMY  11/22/2011    laparoscopic    COLONOSCOPY      DENTAL SURGERY  8/9    ELBOW ARTHROSCOPY      bilateral elbows    ENDOSCOPY, COLON, DIAGNOSTIC      HYSTERECTOMY (CERVIX STATUS UNKNOWN)      OTHER SURGICAL HISTORY      venous ablation, bilateral legs    TUBAL LIGATION       Family History   Problem Relation Age of Onset    Other Mother         Neurocardiogenic syncope    ADHD Mother     Alcohol Abuse Father     Bipolar Disorder Sister     Anxiety Disorder Sister     Bipolar Disorder Sister     OCD Sister     Heart Surgery Maternal Grandmother     ADHD Daughter     ADHD Daughter     Lupus Paternal Uncle     Other Paternal Uncle         myasthenia gravis     Social History     Tobacco Use    Smoking status: Every Day     Packs/day: 0.25     Years: 20.00     Pack years: 5.00     Types: Cigarettes    Smokeless tobacco: Never   Substance Use Topics    Alcohol use: No        Review of Systems:   Constitutional: No Fever or Weight Loss   Eyes: No Decreased Vision  ENT: No Headaches, Hearing Loss or Vertigo  Cardiovascular: as per note above   Respiratory: No cough or wheezing and as per note above.    Gastrointestinal: No abdominal pain, appetite loss, blood in stools, constipation, diarrhea or heartburn  Genitourinary: No dysuria, trouble voiding, or hematuria  Musculoskeletal:  None  Integumentary: No rash or pruritis  Neurological: No TIA or stroke symptoms  Psychiatric: No anxiety or depression  Endocrine: No malaise, fatigue or temperature intolerance  Hematologic/Lymphatic: No bleeding problems, blood clots or swollen lymph nodes  Allergic/Immunologic: No nasal congestion or hives    Objective:      Physical Exam:  /60   Pulse 64   Ht 5' 4\" (1.626 m)   Wt 99 lb (44.9 kg)   [facility] 11/18/2004   BMI 16.99 kg/m²   Wt Readings from Last 3 Encounters:   02/02/23 99 lb (44.9 kg)   11/09/22 110 lb (49.9 kg)   10/12/22 110 lb (49.9 kg)     Body mass index is 16.99 kg/m². Vitals:    02/02/23 1325   BP: 100/60   Pulse: 64        General Appearance:  No distress, conversant  Constitutional:  Well developed, Well nourished, No acute distress, Non-toxic appearance. HENT:  Normocephalic, Atraumatic, Bilateral external ears normal, Oropharynx moist, No oral exudates, Nose normal. Neck- Normal range of motion, No tenderness, Supple, No stridor,no apical-carotid delay  Eyes:  PERRL, EOMI, Conjunctiva normal, No discharge. Respiratory:  Normal breath sounds, No respiratory distress, No wheezing, No chest tenderness. ,no use of accessory muscles,   Cardiovascular: (PMI) apex non displaced,no lifts no thrills,S1 and S2 audible, No added heart sounds, No signs of ankle edema, or volume overload, No evidence of JVD  GI:  Bowel sounds normal, Soft, No tenderness, No masses, No gross visceromegaly   :  No costovertebral angle tenderness   Musculoskeletal:  No edema, no tenderness, no deformities. Back- no tenderness  Integument:  Well hydrated, no rash   Lymphatic:  No lymphadenopathy noted   Neurologic:  Alert & oriented x 3, CN 2-12 normal, normal motor function, normal sensory function, no focal deficits noted   Psychiatric:  Speech and behavior appropriate       Medical decision making and Data review:  DATA:  Lab Results   Component Value Date    TROPONINT <0.010 01/09/2020     BNP:  No results found for: PROBNP  PT/INR:  No results found for: PTINR  No results found for: LABA1C  Lab Results   Component Value Date    CHOL 355 (H) 03/23/2011    TRIG 656 03/29/2017    HDL 39 (L) 03/23/2011    LDLDIRECT 149 (H) 03/23/2011     Lab Results   Component Value Date    ALT 20 06/15/2022    AST 23 06/15/2022      Echo   Summary   Ejection fraction is visually estimated at 55-60%. No significant valvular abnormalities. Normal LV structure and systolic function.    Stress treadmill:   Summary   Normal stress test, patient has phsical deconditioning as she achieved target heart rate in 2 mins and felt dizzy and felt like passing, will   recommend to increase PO fluids intake and exercise training, avoid cofee   and soda      All labs, medications and tests reviewed by myself including data and history from outside source , patient and available family . Assessment & Plan:      1. Palpitations    2. Tardive dyskinesia    3. Tobacco abuse    4. Orthostatic hypotension    5. Fibromyalgia    6. Borderline personality disorder ([facility] [address]    7. Attention deficit hyperactivity disorder (ADHD), predominantly inattentive type         No problem-specific Assessment & Plan notes found for this encounter. Orthostatic hypotension  She says she feels dizzy when she stands up SBP dropped from 90 to 78 on standing up. She refuses to wear compression stockings says they feel tight  Using midodrine 5 mg tid . Start florinef 0.4 mg and wear compression stockings ,     Bradycardia  Get holter , check tsh       Heart palpitations  No PVC or PAC on holter nin 2017  . No arrhythmias on Holter. she did not get TSH checked echo shows no wall motion abnormality or valvular disease   Check tsh  Place 48 hr monitor to see for bradycardia       Tobacco abuse  She is trying to cut down and debating vaping. I have encouraged her to quit      Dyslipidemia :  Mirlande will get labwork., check fasting lipid, she did not get labwork , need to repeat lipids    Counseled extensively and medication compliance urged. Various goals were discussed and questions answered. Continue current medications. Appropriate prescriptions are addressed and refills ordered. Questions answered and patient verbalizes understanding. Call for any problems, questions, or concerns. Continue all other medications of all above medical condition listed as is. No follow-ups on file.

## 2023-02-05 DIAGNOSIS — M79.7 FIBROMYALGIA: ICD-10-CM

## 2023-02-05 DIAGNOSIS — F33.1 MODERATE EPISODE OF RECURRENT MAJOR DEPRESSIVE DISORDER (HCC): ICD-10-CM

## 2023-02-05 DIAGNOSIS — F90.0 ATTENTION DEFICIT HYPERACTIVITY DISORDER (ADHD), PREDOMINANTLY INATTENTIVE TYPE: ICD-10-CM

## 2023-02-06 RX ORDER — PREGABALIN 50 MG/1
50 CAPSULE ORAL 3 TIMES DAILY
Qty: 30 CAPSULE | Refills: 0 | Status: SHIPPED | OUTPATIENT
Start: 2023-02-06 | End: 2023-02-16 | Stop reason: SDUPTHER

## 2023-02-06 RX ORDER — ATOMOXETINE 60 MG/1
CAPSULE ORAL
Qty: 30 CAPSULE | Refills: 2 | Status: SHIPPED | OUTPATIENT
Start: 2023-02-06 | End: 2023-02-16 | Stop reason: SDUPTHER

## 2023-02-08 ENCOUNTER — HOSPITAL ENCOUNTER (OUTPATIENT)
Age: 48
Discharge: HOME | End: 2023-02-08
Payer: COMMERCIAL

## 2023-02-08 LAB — TSH SERPL DL<=0.005 MIU/L-ACNC: 1.29 UIU/ML (ref 0.27–4.2)

## 2023-02-08 PROCEDURE — 36415 COLL VENOUS BLD VENIPUNCTURE: CPT

## 2023-02-08 PROCEDURE — 84443 ASSAY THYROID STIM HORMONE: CPT

## 2023-02-16 ENCOUNTER — OFFICE VISIT (OUTPATIENT)
Dept: FAMILY MEDICINE CLINIC | Age: 48
End: 2023-02-16
Payer: COMMERCIAL

## 2023-02-16 VITALS
RESPIRATION RATE: 16 BRPM | BODY MASS INDEX: 16.79 KG/M2 | TEMPERATURE: 98.4 F | SYSTOLIC BLOOD PRESSURE: 107 MMHG | WEIGHT: 97.8 LBS | DIASTOLIC BLOOD PRESSURE: 71 MMHG | OXYGEN SATURATION: 98 % | HEART RATE: 89 BPM

## 2023-02-16 DIAGNOSIS — F41.1 GENERALIZED ANXIETY DISORDER WITH PANIC ATTACKS: ICD-10-CM

## 2023-02-16 DIAGNOSIS — Z51.81 ENCOUNTER FOR MEDICATION MONITORING: ICD-10-CM

## 2023-02-16 DIAGNOSIS — F41.0 GENERALIZED ANXIETY DISORDER WITH PANIC ATTACKS: ICD-10-CM

## 2023-02-16 DIAGNOSIS — M79.7 FIBROMYALGIA: ICD-10-CM

## 2023-02-16 DIAGNOSIS — F33.1 MODERATE EPISODE OF RECURRENT MAJOR DEPRESSIVE DISORDER (HCC): Primary | ICD-10-CM

## 2023-02-16 DIAGNOSIS — R35.0 FREQUENT URINATION: ICD-10-CM

## 2023-02-16 DIAGNOSIS — F90.0 ATTENTION DEFICIT HYPERACTIVITY DISORDER (ADHD), PREDOMINANTLY INATTENTIVE TYPE: ICD-10-CM

## 2023-02-16 DIAGNOSIS — G25.81 RESTLESS LEGS: ICD-10-CM

## 2023-02-16 LAB
ALCOHOL URINE: NORMAL
AMPHETAMINE SCREEN, URINE: NEGATIVE
BARBITURATE SCREEN, URINE: NEGATIVE
BENZODIAZEPINE SCREEN, URINE: NEGATIVE
BILIRUBIN, POC: NEGATIVE
BLOOD URINE, POC: NEGATIVE
BUPRENORPHINE URINE: NEGATIVE
CLARITY, POC: NORMAL
COCAINE METABOLITE SCREEN URINE: NEGATIVE
COLOR, POC: NORMAL
FENTANYL SCREEN, URINE: NEGATIVE
GABAPENTIN SCREEN, URINE: NEGATIVE
GLUCOSE URINE, POC: NEGATIVE
KETONES, POC: NEGATIVE
LEUKOCYTE EST, POC: NORMAL
MDMA URINE: NEGATIVE
METHADONE SCREEN, URINE: NEGATIVE
METHAMPHETAMINE, URINE: NEGATIVE
NITRITE, POC: NEGATIVE
OPIATE SCREEN URINE: NEGATIVE
OXYCODONE SCREEN URINE: NEGATIVE
PH, POC: 6
PHENCYCLIDINE SCREEN URINE: NEGATIVE
PROPOXYPHENE SCREEN, URINE: NEGATIVE
PROTEIN, POC: NEGATIVE
SPECIFIC GRAVITY, POC: 1.01
SYNTHETIC CANNABINOIDS(K2) SCREEN, URINE: NEGATIVE
THC SCREEN, URINE: NEGATIVE
TRAMADOL SCREEN URINE: NEGATIVE
TRICYCLIC ANTIDEPRESSANTS, UR: NEGATIVE
UROBILINOGEN, POC: 0.2

## 2023-02-16 PROCEDURE — G8484 FLU IMMUNIZE NO ADMIN: HCPCS | Performed by: NURSE PRACTITIONER

## 2023-02-16 PROCEDURE — G8419 CALC BMI OUT NRM PARAM NOF/U: HCPCS | Performed by: NURSE PRACTITIONER

## 2023-02-16 PROCEDURE — 80305 DRUG TEST PRSMV DIR OPT OBS: CPT | Performed by: NURSE PRACTITIONER

## 2023-02-16 PROCEDURE — 4004F PT TOBACCO SCREEN RCVD TLK: CPT | Performed by: NURSE PRACTITIONER

## 2023-02-16 PROCEDURE — G8427 DOCREV CUR MEDS BY ELIG CLIN: HCPCS | Performed by: NURSE PRACTITIONER

## 2023-02-16 PROCEDURE — 99215 OFFICE O/P EST HI 40 MIN: CPT | Performed by: NURSE PRACTITIONER

## 2023-02-16 PROCEDURE — 81002 URINALYSIS NONAUTO W/O SCOPE: CPT | Performed by: NURSE PRACTITIONER

## 2023-02-16 RX ORDER — ROPINIROLE 2 MG/1
TABLET, FILM COATED ORAL
Qty: 30 TABLET | Refills: 1 | Status: SHIPPED | OUTPATIENT
Start: 2023-02-16

## 2023-02-16 RX ORDER — ATOMOXETINE 60 MG/1
CAPSULE ORAL
Qty: 30 CAPSULE | Refills: 5 | Status: SHIPPED | OUTPATIENT
Start: 2023-02-16

## 2023-02-16 RX ORDER — PREGABALIN 50 MG/1
50 CAPSULE ORAL 3 TIMES DAILY
Qty: 30 CAPSULE | Refills: 0 | Status: SHIPPED | OUTPATIENT
Start: 2023-02-16 | End: 2023-02-26

## 2023-02-16 RX ORDER — LORAZEPAM 0.5 MG/1
0.5 TABLET ORAL 2 TIMES DAILY
Qty: 60 TABLET | Refills: 0 | Status: SHIPPED | OUTPATIENT
Start: 2023-02-16 | End: 2023-03-18

## 2023-02-16 ASSESSMENT — ANXIETY QUESTIONNAIRES
3. WORRYING TOO MUCH ABOUT DIFFERENT THINGS: 3
GAD7 TOTAL SCORE: 21
5. BEING SO RESTLESS THAT IT IS HARD TO SIT STILL: 3
IF YOU CHECKED OFF ANY PROBLEMS ON THIS QUESTIONNAIRE, HOW DIFFICULT HAVE THESE PROBLEMS MADE IT FOR YOU TO DO YOUR WORK, TAKE CARE OF THINGS AT HOME, OR GET ALONG WITH OTHER PEOPLE: EXTREMELY DIFFICULT
2. NOT BEING ABLE TO STOP OR CONTROL WORRYING: 3
6. BECOMING EASILY ANNOYED OR IRRITABLE: 3
7. FEELING AFRAID AS IF SOMETHING AWFUL MIGHT HAPPEN: 3
1. FEELING NERVOUS, ANXIOUS, OR ON EDGE: 3
4. TROUBLE RELAXING: 3

## 2023-02-16 ASSESSMENT — PATIENT HEALTH QUESTIONNAIRE - PHQ9
8. MOVING OR SPEAKING SO SLOWLY THAT OTHER PEOPLE COULD HAVE NOTICED. OR THE OPPOSITE, BEING SO FIGETY OR RESTLESS THAT YOU HAVE BEEN MOVING AROUND A LOT MORE THAN USUAL: 3
7. TROUBLE CONCENTRATING ON THINGS, SUCH AS READING THE NEWSPAPER OR WATCHING TELEVISION: 3
2. FEELING DOWN, DEPRESSED OR HOPELESS: 3
SUM OF ALL RESPONSES TO PHQ QUESTIONS 1-9: 17
SUM OF ALL RESPONSES TO PHQ QUESTIONS 1-9: 17
9. THOUGHTS THAT YOU WOULD BE BETTER OFF DEAD, OR OF HURTING YOURSELF: 0
6. FEELING BAD ABOUT YOURSELF - OR THAT YOU ARE A FAILURE OR HAVE LET YOURSELF OR YOUR FAMILY DOWN: 3
10. IF YOU CHECKED OFF ANY PROBLEMS, HOW DIFFICULT HAVE THESE PROBLEMS MADE IT FOR YOU TO DO YOUR WORK, TAKE CARE OF THINGS AT HOME, OR GET ALONG WITH OTHER PEOPLE: 2
5. POOR APPETITE OR OVEREATING: 0
SUM OF ALL RESPONSES TO PHQ QUESTIONS 1-9: 17
3. TROUBLE FALLING OR STAYING ASLEEP: 3
4. FEELING TIRED OR HAVING LITTLE ENERGY: 1
SUM OF ALL RESPONSES TO PHQ9 QUESTIONS 1 & 2: 4
SUM OF ALL RESPONSES TO PHQ QUESTIONS 1-9: 17
1. LITTLE INTEREST OR PLEASURE IN DOING THINGS: 1

## 2023-02-16 NOTE — PROGRESS NOTES
Subjective:      Chief Complaint   Patient presents with    Medication Check     Wants to discuss increasing ativan, doesn't think the strattera is enough, has been manic       HPI:  Kyung Montero is a 41 y.o. female who presents today for medication follow up. BPD/STEPHANIA/MDD/Insomnia  Mirlande is currently prescribed endorses the following depressive symptoms: feelings of being down, depressed or hopelessness. , loss of interest in usual activities. , decreased appetite, feelings of guilt, worthlessness or loss of self confidence, problems concentrating, agitation, psychomotor agitation, fatigue, and sleep difficulties. Her appetite has recently started to improve. The following anxiety symptoms are present: excessive worry, agitation, fearfulness, labile mood, adrenergic symptoms, and episodes of panic. Per [facility] she last filled Ativan on 12/23/22. She no showed and then had to reschedule an appointment. She has been out of ativan for almost 6 weeks. She reports that her anxiety is so severe that she \"feels manic. \"    She denies visual  and auditory hallucinations, delusions, illusions, and paranoia. Pt   denies current suicidal ideation, plan and intent. Pt  denies current homicidal ideation, plan and intent. ADHD  She is prescribed Strattera 60 mg daily and Adderall XR 15 mg daily. She stopped taking Adderall due to intermittent episodes of hypotension. She states that neurology recently diagnosed her with POTS. Cognitive Impairment  She notes worsening short term memory impairment and is following with [facility] neurology. Cognitive testing has been ordered. Fibromyalgia/RLS  Symptoms are well controlled with current medication. Pt is requesting medication refills. Urinary Frequency  She has noted urinary frequency over the last several weeks. She denies any other urinary symptoms.       Past Medical History:   Diagnosis Date    ADHD     Arthritis     OSTEO ARTHITIS    Bipolar disorder, unspecified ([facility] [address]     Cholecystenteric fistula     COPD (chronic obstructive pulmonary disease) (HCC)     Degenerative disc disease     Fibromyalgia     Generalized anxiety disorder     H/O 24 hour EKG monitoring 02/16/2017      Sinus tach events , no major arrhythmias , follow up in office as routine     H/O echocardiogram 03/01/2017    EF 55-60% Normal LV structure  and systolic function. H/O exercise stress test 03/01/2017    Normal stress test. Patient has physical deconditioning as she achieved target HR in 2 mins. Recommend to increase PO fluids intake and exercise training. Headache     History of cardiac monitoring 09/21/2020    Normal 30-day event monitor with average heart rate of 63 lowest heart rate of 57 highest heart rate of 82 patient reported episodes of dizziness which did not correlate with any arrhythmia. There were no episodes of tachycardia uneventful monitor however average heart rate and even the highest heart rate is on the lower side     Osteopenia     Osteoporosis     PTSD (post-traumatic stress disorder)     S/P cholecystectomy 11/22/2011    Schizo[facility]affective [facility]     Sjoegren syndrome     Syncope     Tardive dyskinesia     Venous insufficiency of leg 2012    Vertigo         Social History     Tobacco Use    Smoking status: Every Day     Packs/day: 0.25     Years: 20.00     Pack years: 5.00     Types: Cigarettes    Smokeless tobacco: Never   Substance Use Topics    Alcohol use: No        Review of Systems   Constitutional:  Negative for activity change, appetite change and fatigue. HENT: Negative. Eyes:  Negative for visual disturbance. Respiratory:  Negative for chest tightness and wheezing. Cardiovascular: Negative. Negative for chest pain and palpitations. Gastrointestinal:  Negative for abdominal distention, abdominal pain, constipation, diarrhea, nausea and vomiting.    Endocrine: Negative for cold intolerance, heat intolerance, polydipsia, polyphagia and polyuria. Genitourinary:  Positive for urgency. Negative for decreased urine volume, difficulty urinating, dysuria, enuresis, flank pain, frequency and hematuria. Musculoskeletal:  Positive for myalgias. Negative for arthralgias. Skin: Negative. Neurological:  Positive for headaches (follows with neurology). Negative for dizziness, seizures, syncope, facial asymmetry, speech difficulty, light-headedness and numbness. Pt experiencing short term memory loss     Psychiatric/Behavioral:  Positive for agitation, decreased concentration, dysphoric mood, hallucinations and sleep disturbance. Negative for behavioral problems, confusion, self-injury and suicidal ideas. The patient is nervous/anxious and is hyperactive. Objective:      /71 (Site: Right Upper Arm, Position: Sitting, Cuff Size: Small Adult)   Pulse 89   Temp 98.4 °F (36.9 °C) (Temporal)   Resp 16   Wt 97 lb 12.8 oz (44.4 kg)   LMP 11/18/2004   SpO2 98%   BMI 16.79 kg/m²      Physical Exam  Vitals reviewed. Constitutional:       General: She is not in acute distress. Appearance: Normal appearance. Eyes:      Conjunctiva/sclera: Conjunctivae normal.      Pupils: Pupils are equal, round, and reactive to light. Cardiovascular:      Rate and Rhythm: Normal rate and regular rhythm. Heart sounds: Normal heart sounds. Pulmonary:      Effort: Pulmonary effort is normal.      Breath sounds: Normal breath sounds. Musculoskeletal:         General: Normal range of motion. Cervical back: Normal range of motion and neck supple. No tenderness. Right lower leg: No edema. Left lower leg: No edema. Skin:     General: Skin is warm and dry. Neurological:      General: No focal deficit present. Mental Status: She is alert and oriented to person, place, and time. Deep Tendon Reflexes: Reflexes are normal and symmetric.  Reflexes normal.   Psychiatric:         Attention and Perception: Perception normal. She is inattentive. She does not perceive auditory or visual hallucinations. Mood and Affect: Mood is anxious and depressed. Affect is tearful. Speech: Speech normal. Speech is not rapid and pressured. Behavior: Behavior is agitated and hyperactive. Behavior is cooperative. Thought Content: Thought content is not paranoid. Cognition and Memory: Cognition normal.         Judgment: Judgment is impulsive. Judgment is not inappropriate. Assessment / Plan:      1. Moderate episode of recurrent major depressive disorder (HCC)  Continue current medication. Patient to call if any concerns or SI before their follow up appointment. If she develops suicidal thoughts with a plan, she is instructed to go to emergency room immediately. Behavioral counseling recommended. - atomoxetine (STRATTERA) 60 MG capsule; TAKE ONE CAPSULE BY MOUTH DAILY  Dispense: 30 capsule; Refill: 5    2. Generalized anxiety disorder with panic attacks  Will refill Ativan. Pt counseled on the importance of taking medication as scheduled and following up as scheduled. Medication will be discontinued if patient is non-compliant with follow up appointments. Behavioral counseling recommended. - LORazepam (ATIVAN) 0.5 MG tablet; Take 1 tablet by mouth 2 times daily for 30 days. Max Daily Amount: 1 mg  Dispense: 60 tablet; Refill: 0    3. Attention deficit hyperactivity disorder (ADHD), predominantly inattentive type  Continue current medication.   - atomoxetine (STRATTERA) 60 MG capsule; TAKE ONE CAPSULE BY MOUTH DAILY  Dispense: 30 capsule; Refill: 5    4. Encounter for medication monitoring  - POCT Rapid Drug Screen    5. Fibromyalgia  Medication refilled. - pregabalin (LYRICA) 50 MG capsule; Take 1 capsule by mouth 3 times daily for 10 days. Max Daily Amount: 150 mg  Dispense: 30 capsule; Refill: 0    6. Restless legs  Medication refilled.    - rOPINIRole (REQUIP) 2 MG tablet; TAKE ONE TABLET BY MOUTH ONCE NIGHTLY  Dispense: 30 tablet; Refill: 1    7. Frequent urination  UA is negative. - POCT Urinalysis no Micro     PDMP Monitoring:    Last PDMP Clark as Reviewed:  Review User Review Instant Review Result   LIAN Storey 2/16/2023  3:19 PM Reviewed PDMP [1]     Last Controlled Substance Monitoring Documentation      [address] from 11/11/2022 in [facility]   Periodic Controlled Substance Monitoring Possible medication side effects, risk of tolerance/dependence & alternative treatments discussed., No signs of potential drug abuse or diversion identified. , Assessed functional status., Obtaining appropriate analgesic effect of treatment. filed at 11/11/2022 1131          Urine Drug Screenings (1 yr)       POCT Rapid Drug Screen  Collected: 2/16/2023  4:51 PM (Final result)              POCT Rapid Drug Screen  Collected: 8/16/2021  3:00 PM (Edited Result - FINAL)              POCT Rapid Drug Screen  Collected: 7/13/2021  2:00 PM (Final result)              POCT Rapid Drug Screen  Collected: 6/15/2021 12:35 PM (Final result)              POCT Rapid Drug Screen  Collected: 5/12/2020  3:49 PM (Final result)              Drug screen, multiple, urine  Collected: 5/7/2014  9:15 AM (Final result)   Narrative:         THRESHOLD CONCENTRATIONS (mg/dL)  AMP,mAMP,TCA          1000  BAR,BZO,BRIONNA,OPI       300. .. Drug Screen, Multiple, Urine  Collected: 9/9/2013  3:20 AM (Final result)   Narrative: Performed @ [facility], [address] [facility], [address] [place] [address]             Drug Screen, Multiple, Urine  Collected: 9/23/2012 12:15 AM (Final result)   Narrative:         THRESHOLD CONCENTRATIONS (mg/dL)  AMP,mAMP,TCA          1000  BAR,BZO,BRIONNA,OPI       300. ..              Drug Screen, Multiple, Urine  Collected: 4/27/2012  8:00 PM (Final result)   Narrative:         THRESHOLD CONCENTRATIONS (mg/dL)  AMP,mAMP,TCA 1000  BAR,BZO,BRIONNA,OPI       300. .. Drug screen, multiple, urine  Collected: 10/5/2011  8:15 PM (Final result)   Narrative:         THRESHOLD CONCENTRATIONS (mg/dL)  AMP,mAMP,TCA          1000  BAR,BZO,BRIONNA,OPI       300. .. Urine Drug Screen  Collected: 7/22/2021  3:33 PM (Final result)              Urine Drug Screen  Collected: 11/30/2015 12:15 AM (Final result)   Narrative:         THRESHOLD CONCENTRATIONS (mg/dL)  AMPHT               1000  BRIONNA,OPIA             300... Urine Drug Screen  Collected: 4/21/2015  7:30 AM (Final result)   Narrative:         THRESHOLD CONCENTRATIONS (mg/dL)  AMPHT               1000  BRIONNA,OPIA             300... Drugs of Abuse 7  Collected: 7/9/2013  3:30 PM (Final result)   Narrative:         THRESHOLD CONCENTRATIONS (mg/dL)  AMPHT               1000  BRIONNA,PPX,OPIA         300. .. Drugs of Abuse 7  Collected: 1/17/2012  3:30 PM (Final result)   Narrative:         THRESHOLD CONCENTRATIONS (mg/dL)  AMPHT               1000  BRIONNA,PPX,OPIA         300. .. Medication Contract and Consent for Opioid Use Documents Filed        No documents found                  The Winifred Velasquez,  was seen with a total time spent of 45 minutes for the visit on this date of service by the E/M provider. The time component had both face to face and non face to face time spent in determining the total time component. Counseling and education regarding diagnosis listed and options regarding those diagnoses were also included in determining the time component.          HANNA Vera - TIP

## 2023-03-06 ASSESSMENT — ENCOUNTER SYMPTOMS
ABDOMINAL PAIN: 0
ABDOMINAL DISTENTION: 0
VOMITING: 0
CONSTIPATION: 0
NAUSEA: 0
WHEEZING: 0
CHEST TIGHTNESS: 0
DIARRHEA: 0

## 2023-03-08 ENCOUNTER — TELEPHONE (OUTPATIENT)
Dept: OTHER | Age: 48
End: 2023-03-08

## 2023-03-08 NOTE — TELEPHONE ENCOUNTER
Patient was referred to the CP program by [facility]. States patient is id having a rough time. I was given patient's mother's phone number because patient doesn't have a working phone. I reached out to patient's mother Abril Valladares. She was aware of my program from [facility]. Cee is not sure Mirlande would agree to a home visit. States Jaziel Larry is just going through a rough patch\". I don't want to go to the home unannounced and cause more anxiety. Cee stated she would try to get a hold of her or her daughter. I advised I would keep in touch with her for a while. Will continue to follow.

## 2023-03-21 ENCOUNTER — OFFICE VISIT (OUTPATIENT)
Dept: FAMILY MEDICINE CLINIC | Age: 48
End: 2023-03-21
Payer: COMMERCIAL

## 2023-03-21 DIAGNOSIS — F41.0 GENERALIZED ANXIETY DISORDER WITH PANIC ATTACKS: ICD-10-CM

## 2023-03-21 DIAGNOSIS — F33.1 MODERATE EPISODE OF RECURRENT MAJOR DEPRESSIVE DISORDER (HCC): ICD-10-CM

## 2023-03-21 DIAGNOSIS — R63.4 WEIGHT LOSS, UNINTENTIONAL: Primary | ICD-10-CM

## 2023-03-21 DIAGNOSIS — F51.01 PRIMARY INSOMNIA: ICD-10-CM

## 2023-03-21 DIAGNOSIS — M79.7 FIBROMYALGIA: ICD-10-CM

## 2023-03-21 DIAGNOSIS — F90.0 ATTENTION DEFICIT HYPERACTIVITY DISORDER (ADHD), PREDOMINANTLY INATTENTIVE TYPE: ICD-10-CM

## 2023-03-21 DIAGNOSIS — G25.81 RESTLESS LEGS: ICD-10-CM

## 2023-03-21 DIAGNOSIS — F41.1 GENERALIZED ANXIETY DISORDER WITH PANIC ATTACKS: ICD-10-CM

## 2023-03-21 PROCEDURE — 99214 OFFICE O/P EST MOD 30 MIN: CPT | Performed by: NURSE PRACTITIONER

## 2023-03-21 PROCEDURE — G8427 DOCREV CUR MEDS BY ELIG CLIN: HCPCS | Performed by: NURSE PRACTITIONER

## 2023-03-21 RX ORDER — FLUDROCORTISONE ACETATE 0.1 MG/1
0.4 TABLET ORAL DAILY
COMMUNITY

## 2023-03-21 RX ORDER — PREGABALIN 50 MG/1
50 CAPSULE ORAL 3 TIMES DAILY
Qty: 90 CAPSULE | Refills: 2 | Status: SHIPPED | OUTPATIENT
Start: 2023-03-21 | End: 2023-04-20

## 2023-03-21 RX ORDER — ATOMOXETINE 80 MG/1
80 CAPSULE ORAL DAILY
Qty: 30 CAPSULE | Refills: 3 | Status: SHIPPED | OUTPATIENT
Start: 2023-03-21

## 2023-03-21 RX ORDER — LORAZEPAM 0.5 MG/1
0.5 TABLET ORAL 2 TIMES DAILY
Qty: 60 TABLET | Refills: 1 | Status: SHIPPED | OUTPATIENT
Start: 2023-03-21 | End: 2023-04-20

## 2023-03-21 RX ORDER — TRAZODONE HYDROCHLORIDE 100 MG/1
200 TABLET ORAL NIGHTLY
Qty: 60 TABLET | Refills: 1 | Status: SHIPPED | OUTPATIENT
Start: 2023-03-21 | End: 2023-04-20

## 2023-03-21 RX ORDER — ROPINIROLE 2 MG/1
2 TABLET, FILM COATED ORAL NIGHTLY
Qty: 30 TABLET | Refills: 11 | Status: SHIPPED | OUTPATIENT
Start: 2023-03-21 | End: 2023-04-20

## 2023-03-21 NOTE — PROGRESS NOTES
at Home: [address] [place] [address]  Provider was located at [facility] (Appt Dept): [address] [facility],  [address]    Total time spent on this encounter:  30 minutes    --HANNA Dalton NP on 3/28/2023 at 8:37 AM    An electronic signature was used to authenticate this note.

## 2023-03-28 ASSESSMENT — ENCOUNTER SYMPTOMS
CHEST TIGHTNESS: 0
COUGH: 0
SHORTNESS OF BREATH: 0
WHEEZING: 0

## 2023-04-07 ENCOUNTER — TELEPHONE (OUTPATIENT)
Dept: OTHER | Age: 48
End: 2023-04-07

## 2023-04-07 NOTE — TELEPHONE ENCOUNTER
No contact with the family. Last spoke with patient's mother who advised she would try to talk with patient and get back with me. Have had no calls. Mother had advised patient may not be agreeable to a home visit. Did not attempt to contact the patient per advise from mom. Will remove her from the program. Should any needs arise another file will be opened.

## 2023-06-11 DIAGNOSIS — F41.1 GENERALIZED ANXIETY DISORDER WITH PANIC ATTACKS: ICD-10-CM

## 2023-06-11 DIAGNOSIS — F90.0 ATTENTION DEFICIT HYPERACTIVITY DISORDER (ADHD), PREDOMINANTLY INATTENTIVE TYPE: ICD-10-CM

## 2023-06-11 DIAGNOSIS — F41.0 GENERALIZED ANXIETY DISORDER WITH PANIC ATTACKS: ICD-10-CM

## 2023-06-11 DIAGNOSIS — M79.7 FIBROMYALGIA: ICD-10-CM

## 2023-06-11 DIAGNOSIS — G25.81 RESTLESS LEGS: ICD-10-CM

## 2023-06-11 DIAGNOSIS — F51.01 PRIMARY INSOMNIA: ICD-10-CM

## 2023-06-11 DIAGNOSIS — F33.1 MODERATE EPISODE OF RECURRENT MAJOR DEPRESSIVE DISORDER (HCC): ICD-10-CM

## 2023-06-11 RX ORDER — ROPINIROLE 2 MG/1
2 TABLET, FILM COATED ORAL NIGHTLY
Qty: 30 TABLET | Refills: 11 | Status: CANCELLED | OUTPATIENT
Start: 2023-06-11 | End: 2023-07-11

## 2023-06-12 RX ORDER — TRAZODONE HYDROCHLORIDE 100 MG/1
200 TABLET ORAL NIGHTLY
Qty: 60 TABLET | Refills: 1 | Status: SHIPPED | OUTPATIENT
Start: 2023-06-12 | End: 2023-07-12

## 2023-06-12 RX ORDER — PREGABALIN 50 MG/1
50 CAPSULE ORAL 3 TIMES DAILY
Qty: 90 CAPSULE | Refills: 1 | Status: SHIPPED | OUTPATIENT
Start: 2023-06-12 | End: 2023-07-12

## 2023-06-12 RX ORDER — ATOMOXETINE 80 MG/1
80 CAPSULE ORAL DAILY
Qty: 30 CAPSULE | Refills: 1 | Status: SHIPPED | OUTPATIENT
Start: 2023-06-12

## 2023-06-12 NOTE — TELEPHONE ENCOUNTER
I tried to call the number on file but did not get an answer. We need to schedule Mirlande for a follow up appointment in 6-8 weeks.

## 2023-06-26 NOTE — TELEPHONE ENCOUNTER
I called the patient but no answer and no option for voicemail. I also sent her a [facility] message last week letting her know she needs a follow up appointment for her medication refills.

## 2023-07-24 ENCOUNTER — TELEPHONE (OUTPATIENT)
Dept: FAMILY MEDICINE CLINIC | Age: 48
End: 2023-07-24

## 2023-07-24 PROBLEM — K52.9 ACUTE GASTROENTERITIS: Status: RESOLVED | Noted: 2022-07-22 | Resolved: 2023-07-24

## 2023-07-24 PROBLEM — J06.9 VIRAL URI: Status: RESOLVED | Noted: 2020-01-08 | Resolved: 2023-07-24

## 2023-07-24 PROBLEM — T50.905A MEDICATION REACTION: Status: RESOLVED | Noted: 2020-06-16 | Resolved: 2023-07-24

## 2023-07-24 PROBLEM — R44.0 AUDITORY HALLUCINATION: Status: RESOLVED | Noted: 2019-04-05 | Resolved: 2023-07-24

## 2023-07-24 PROBLEM — R11.0 NAUSEA: Status: RESOLVED | Noted: 2020-06-16 | Resolved: 2023-07-24

## 2023-07-24 PROBLEM — S42.022A CLOSED DISPLACED FRACTURE OF SHAFT OF LEFT CLAVICLE: Status: RESOLVED | Noted: 2021-09-19 | Resolved: 2023-07-24

## 2023-07-24 PROBLEM — R44.1 VISUAL HALLUCINATIONS: Status: RESOLVED | Noted: 2019-04-05 | Resolved: 2023-07-24

## 2023-07-24 NOTE — TELEPHONE ENCOUNTER
She called in and wanted our fax number to fax papers regarding her electric being shut off. She gave me her new phone number and said she would be calling back soon to schedule. She doesn't have a car at the moment.

## 2023-08-04 NOTE — ED PROVIDER NOTES
Left vm advising higher dose sent    Triage Chief Complaint:   Other (Patient has multiple complaints - \"I just cant tell you everything. I dont feel right and I havent felt right for awhile. \" **See triage note.)    CONNIE:  Tong Ambriz is a 39 y.o. female that presents with a multitude of complaints. Patient initially saying \"I just do not feel good and I had it for a while\". Patient details over a year of struggles with headaches, dizziness, intermittent stuttering speech, muscle twitching, scattered areas of numbness, nausea, frequent diarrhea, intermittently passing out and generalized weakness. Patient reports that she has had extensive work-up and is working closely with her primary care provider, Payton THOMPSON. Patient reports that she actually underwent MRI imaging of her head just  this week and reports that the result was normal.  Patient has been referred to neurology for further evaluation but she has not yet seen them. Patient reports \"I just want to feel better\". Patient reports no new symptoms today just persistent/constant symptoms. Patient is very frustrated regarding her symptoms. Patient is seeking relief at this time.     ROS:  General:  No fevers, no chills, + generalized weakness  Eyes:  No recent vison changes, no discharge  ENT:  No sore throat, no nasal congestion, no hearing changes  Cardiovascular:  No chest pain, no palpitations  Respiratory:  No shortness of breath, no cough, no wheezing  Gastrointestinal:  No pain, + nausea, no vomiting, + diarrhea  Musculoskeletal:  + muscle pain/twitching, no joint pain  Skin:  No rash, no pruritis, no easy bruising  Neurologic:  + speech problems (stuttering), + headache, + extremity numbness, no extremity tingling, no extremity weakness  Psychiatric:  + anxiety  Genitourinary:  No dysuria, no hematuria, + frequency  Endocrine:  No unexpected weight gain, no unexpected weight loss  Extremities:  no edema, no pain    Past Medical History:   Diagnosis Date    ADHD  Arthritis     OSTEO ARTHITIS    Bipolar disorder, unspecified ([address]     Cholecystenteric fistula     COPD (chronic obstructive pulmonary disease) (HCC)     Degenerative disc disease     Fibromyalgia     Generalized anxiety disorder     H/O 24 hour EKG monitoring 02/16/2017      Sinus tach events , no major arrhythmias , follow up in office as routine     H/O echocardiogram 03/01/2017    EF 55-60% Normal LV structure  and systolic function.  H/O exercise stress test 03/01/2017    Normal stress test. Patient has physical deconditioning as she achieved target HR in 2 mins. Recommend to increase PO fluids intake and exercise training.  Headache     History of cardiac monitoring 09/21/2020    Normal 30-day event monitor with average heart rate of 63 lowest heart rate of 57 highest heart rate of 82 patient reported episodes of dizziness which did not correlate with any arrhythmia.   There were no episodes of tachycardia uneventful monitor however average heart rate and even the highest heart rate is on the lower side     Osteopenia     Osteoporosis     PTSD (post-traumatic stress disorder)     S/P cholecystectomy 11/22/2011    Schizo-affective schizophrenia ([address]     Sjoegren syndrome     Syncope     Tardive dyskinesia     Venous insufficiency of leg 2012    Vertigo      Past Surgical History:   Procedure Laterality Date    CHOLECYSTECTOMY  11/22/2011    laparoscopic    COLONOSCOPY      DENTAL SURGERY  8/9    ELBOW ARTHROSCOPY      bilateral elbows    ENDOSCOPY, COLON, DIAGNOSTIC      HYSTERECTOMY      OTHER SURGICAL HISTORY      venous ablation, bilateral legs    TUBAL LIGATION       Family History   Problem Relation Age of Onset    Other Mother         Neurocardiogenic syncope    ADHD Mother     Alcohol Abuse Father     Bipolar Disorder Sister     Anxiety Disorder Sister     Bipolar Disorder Sister     OCD Sister     Heart Surgery Maternal Lalitha Conn ADHD Daughter    [facility] ADHD Daughter     Lupus Paternal Uncle     Other Paternal Uncle         myasthenia gravis     Social History     Socioeconomic History    Marital status: Single     Spouse name: Not on file    Number of children: Not on file    Years of education: Not on file    Highest education level: Not on file   Occupational History    Not on file   Tobacco Use    Smoking status: Current Every Day Smoker     Packs/day: 0.50     Years: 20.00     Pack years: 10.00     Types: Cigarettes    Smokeless tobacco: Never Used   Vaping Use    Vaping Use: Former    Substances: Always   Substance and Sexual Activity    Alcohol use: No    Drug use: Not Currently     Types: Methamphetamines     Comment: clean for 11 years, past hx of crack use    Sexual activity: Yes     Partners: Male   Other Topics Concern    Not on file   Social History Narrative    Not on file     Social Determinants of Health     Financial Resource Strain: Low Risk     Difficulty of Paying Living Expenses: Not hard at all   Food Insecurity: Food Insecurity Present    Worried About [facility] in the Last Year: Sometimes true    Ursula of Food in the Last Year: Sometimes true   Transportation Needs:     Lack of Transportation (Medical):  Lack of Transportation (Non-Medical):    Physical Activity:     Days of Exercise per Week:     Minutes of Exercise per Session:    Stress:     Feeling of Stress :    Social Connections:     Frequency of Communication with Friends and Family:     Frequency of Social Gatherings with Friends and Family:     Attends Anabaptism Services:     Active Member of Clubs or Organizations:     Attends Club or Organization Meetings:     Marital Status:    Intimate Partner Violence:     Fear of Current or Ex-Partner:     Emotionally Abused:     Physically Abused:     Sexually Abused:      No current facility-administered medications for this encounter.      Current Outpatient Medications   Medication Sig Dispense Refill    rOPINIRole (REQUIP) 2 MG tablet TAKE ONE TABLET BY MOUTH ONCE NIGHTLY 30 tablet 1    LORazepam (ATIVAN) 0.5 MG tablet Take 1 tablet by mouth 2 times daily for 30 days. 60 tablet 0    pregabalin (LYRICA) 50 MG capsule Take 1 capsule by mouth every evening for 90 days. 90 capsule 0    vitamin B-12 (CYANOCOBALAMIN) 1000 MCG tablet Take 1,000 mcg by mouth daily      traZODone (DESYREL) 50 MG tablet Take 1 tablet by mouth nightly 30 tablet 5    INGREZZA 40 MG CAPS TAKE 1 CAPSULE BY MOUTH DAILY 30 capsule 11     Allergies   Allergen Reactions    Rozerem [Ramelteon] Other (See Comments)     Increased symptoms of TD    Codeine Itching and Nausea And Vomiting    Imitrex [Sumatriptan] Itching and Nausea And Vomiting     PASSED OUT    Abilify [Aripiprazole] Other (See Comments)     Patient reports symptoms of TD    Amitriptyline     Botox [Onabotulinumtoxina] Other (See Comments)     Tardive dyskinesia    Compazine [Prochlorperazine Maleate] Other (See Comments)    Lamictal [Lamotrigine]      Constant movement    Meclizine     Topamax [Topiramate]      Made TD worse    Cephalexin Nausea And Vomiting and Other (See Comments)     ABDOMINAL PAIN FOR 2 WEEKS    Magnesium-Containing Compounds Other (See Comments)     Pt sts \"they called it the mags. I start twitching. \"     Meclizine Hcl Other (See Comments)     \"Makes me stutter and twitch. \"    Nsaids Other (See Comments)     Tardive dyskinisia    Pcn [Penicillins] Nausea And Vomiting and Other (See Comments)     HEADACHE    Reglan [Metoclopramide] Other (See Comments)     Tardive dyskinisia    Toradol [Ketorolac Tromethamine] Other (See Comments)     Tremors    Vistaril [Hydroxyzine Hcl] Other (See Comments)     dyskinsia      Zofran [Ondansetron Hcl] Other (See Comments)     Cramping and burning in stomach       Nursing Notes Reviewed    Physical Exam:  ED Triage Vitals [07/22/21 1347]   Enc Vitals Group      /63      Pulse 58 Resp 14      Temp 98.4 °F (36.9 °C)      Temp Source Oral      SpO2 99 %      Weight 113 lb (51.3 kg)      Height 5' 4\" (1.626 m)      Head Circumference       Peak Flow       Pain Score       Pain Loc       Pain Edu? Excl. in [address]? My pulse ox interpretation is - normal    General appearance:  No acute distress. Appears nontoxic. Patient does appear older than stated age. Skin:  Warm. Dry. There is no diaphoresis. No rash exposed skin. Eye:  Extraocular movements intact. Pupils are equal round react to light. Ears, nose, mouth and throat:  Oral mucosa moist   Neck:  Trachea midline. No meningismus or rigidity. Extremity:  No swelling. Normal ROM     Heart:  Regular rate and rhythm, normal S1 & S2, no extra heart sounds. Perfusion:  Intact. Respiratory:  Lungs clear to auscultation bilaterally. Respirations nonlabored. Speaking clearly in full sentences. No respiratory distress. Abdominal:  Normal bowel sounds. Soft. Nontender. Non distended. Back:  No CVA tenderness to palpation     Neurological:  Alert and oriented times 3. No focal neuro deficits. Sensation intact to light touch to distal upper/lower extremities (objectively despite patient reporting patches of subjective numbness); 5/5 and symmetric shrug, , HF, KF/KE, and dorsi/plantar flexion; symmetric brow raise and nasolabial folds, tongue midline; FTN and LUCIEN intact; 2+ and symmetric reflexes to bilateral upper/lower extremities; ambulates with steady gait; no dysarthria or aphasia             Psychiatric: Anxious. [place] thoughts. Odd affect. Intermittently tearful.     I have reviewed and interpreted all of the currently available lab results from this visit (if applicable):  Results for orders placed or performed during the hospital encounter of 07/22/21   CBC auto diff   Result Value Ref Range    WBC 8.2 4.0 - 10.5 K/CU MM    RBC 4.46 4.2 - 5.4 M/CU MM    Hemoglobin 13.8 12.5 - 16.0 GM/DL    Hematocrit 41.5 37 - 47 %    MCV 93.0 78 - 100 FL    MCH 30.9 27 - 31 PG    MCHC 33.3 32.0 - 36.0 %    RDW 12.6 11.7 - 14.9 %    Platelets 434 082 - 539 K/CU MM    MPV 11.4 (H) 7.5 - 11.1 FL    Differential Type AUTOMATED DIFFERENTIAL     Segs Relative 60.2 36 - 66 %    Lymphocytes % 30.9 24 - 44 %    Monocytes % 6.5 (H) 0 - 4 %    Eosinophils % 1.3 0 - 3 %    Basophils % 0.9 0 - 1 %    Segs Absolute 4.9 K/CU MM    Lymphocytes Absolute 2.5 K/CU MM    Monocytes Absolute 0.5 K/CU MM    Eosinophils Absolute 0.1 K/CU MM    Basophils Absolute 0.1 K/CU MM    Immature Neutrophil % 0.2 0 - 0.43 %    Total Immature Neutrophil 0.02 K/CU MM   Comprehensive Metabolic Panel w/ Reflex to MG   Result Value Ref Range    Sodium 142 135 - 145 MMOL/L    Potassium 3.7 3.5 - 5.1 MMOL/L    Chloride 106 99 - 110 mMol/L    CO2 29 21 - 32 MMOL/L    BUN 9 6 - 23 MG/DL    CREATININE 0.9 0.6 - 1.1 MG/DL    Glucose 95 70 - 99 MG/DL    Calcium 9.6 8.3 - 10.6 MG/DL    Albumin 4.6 3.4 - 5.0 GM/DL    Total Protein 7.0 6.4 - 8.2 GM/DL    Total Bilirubin 0.4 0.0 - 1.0 MG/DL    ALT 12 10 - 40 U/L    AST 21 15 - 37 IU/L    Alkaline Phosphatase 86 40 - 129 IU/L    GFR Non-African American >60 >60 mL/min/1.73m2    GFR African American >60 >60 mL/min/1.73m2    Anion Gap 7 4 - 16   Urinalysis (Lab)   Result Value Ref Range    Color, UA YELLOW YELLOW    Clarity, UA CLEAR CLEAR    Glucose, Urine NEGATIVE NEGATIVE MG/DL    Bilirubin Urine NEGATIVE NEGATIVE MG/DL    Ketones, Urine NEGATIVE NEGATIVE MG/DL    Specific Gravity, UA 1.010 1.001 - 1.035    Blood, Urine TRACE (A) NEGATIVE    pH, Urine 7.0 5.0 - 8.0    Protein, UA NEGATIVE NEGATIVE MG/DL    Urobilinogen, Urine 0.2 0.2 - 1.0 MG/DL    Nitrite Urine, Quantitative NEGATIVE NEGATIVE    Leukocyte Esterase, Urine NEGATIVE NEGATIVE    RBC, UA 1 0 - 6 /HPF    WBC, UA 1 0 - 5 /HPF    Epithelial Cells, UA 1 /HPF    Cast Type NO CAST FORMS SEEN NO CAST FORMS SEEN /HPF    Bacteria, UA RARE (A) NEGATIVE /HPF    Crystal Type NEGATIVE NEGATIVE /HPF   Urine Drug Screen   Result Value Ref Range    Cannabinoid Scrn, Ur NEGATIVE NEGATIVE    Amphetamines NEGATIVE NEGATIVE    Cocaine Metabolite NEGATIVE NEGATIVE    Benzodiazepine Screen, Urine NEGATIVE NEGATIVE    Barbiturate Screen, Ur NEGATIVE NEGATIVE    Opiates, Urine NEGATIVE NEGATIVE    Phencyclidine, Urine NEGATIVE NEGATIVE    Oxycodone NEGATIVE NEGATIVE   EKG 12 Lead   Result Value Ref Range    Ventricular Rate 62 BPM    Atrial Rate 62 BPM    P-R Interval 146 ms    QRS Duration 86 ms    Q-T Interval 410 ms    QTc Calculation (Bazett) 416 ms    P Axis 82 degrees    R Axis 81 degrees    T Axis 60 degrees    Diagnosis       Normal sinus rhythm  Normal ECG  When compared with ECG of 05-FEB-2021 12:14,  T wave inversion no longer evident in Inferior leads  T wave inversion no longer evident in Lateral leads        Radiographs (if obtained):  [] The following radiograph was interpreted by myself in the absence of a radiologist:   [x] Radiologist's Report Reviewed:  XR CHEST PORTABLE   Final Result   No acute process. EKG (if obtained): (All EKG's are interpreted by myself in the absence of a cardiologist)  12 lead EKG per my interpretation:  Normal Sinus Rhythm at 62  Axis is   Normal  QTc is  within an acceptable range  There is no specific T wave changes appreciated. There is no specific ST wave changes appreciated. No STEMI    Prior EKG to compare with was available and no clinically significant change over morphology compared to prior.       Chart review shows recent radiographs:  MRI BRAIN W WO CONTRAST    Result Date: 7/21/2021  EXAMINATION: MRI OF THE BRAIN WITHOUT AND WITH CONTRAST  7/21/2021 10:29 am TECHNIQUE: Multiplanar multisequence MRI of the head/brain was performed without and with the administration of intravenous contrast. COMPARISON: 03/23/2011 HISTORY: ORDERING SYSTEM PROVIDED HISTORY: Intractable chronic migraine without aura and with status migrainosus

## 2023-08-14 DIAGNOSIS — G24.01 TARDIVE DYSKINESIA: ICD-10-CM

## 2023-08-14 RX ORDER — VALBENAZINE 80 MG/1
CAPSULE ORAL
Qty: 30 CAPSULE | Refills: 11 | Status: SHIPPED | OUTPATIENT
Start: 2023-08-14

## 2023-11-21 NOTE — PROGRESS NOTES
Genella Pallas  1975  45 y.o.  female    SUBJECTIVE:    Chief Complaint   Patient presents with    Migraine     everyday for the last 3 months    Insomnia     Started about a month ago, Pt stated shes lay to get 4 hrs of sleep a night     Leg Pain     Left leg doesn't quick twiching, pt states that its her muscle and doesn't stop moving.  Congestion     Pt states that her chest is congested, her head feels heavy, pt was sent home from work today. HPI  Migraines-past hx of migraines/has had botox injections. Was doing very well overall but hreadaches returned approx two months ago. Same in presentation/location as past migraines. Pt states headache occurs all over head and radiates down neck. Associated nausea/photophobia. OTC meds not helpful. Insomnia-chronic, has failed elavil/seroquel/trazodone. FMS-chronic, used lyrica in past, would like to resume at low dose    Tardive dyskinesia-chronic, pt reports left leg continuously \"twitches\" due to this. States TD caused by previous botox injection for migraines. URI-sudden onset yesterday-cough/congestion/body aches/headache. Mild nausea. Insomnia-chronic, trouble falling and staying asleep. Current Outpatient Medications on File Prior to Visit   Medication Sig Dispense Refill    rOPINIRole (REQUIP) 1 MG tablet Take 1 tablet by mouth nightly 30 tablet 5     No current facility-administered medications on file prior to visit. Review of PMH, PSH, Family Hx, Allergies and updates made as needed.     PHQ Scores 1/8/2020 11/11/2019 4/4/2019   PHQ2 Score 0 0 6   PHQ9 Score 0 0 20     Interpretation of Total Score Depression Severity: 1-4 = Minimal depression, 5-9 = Mild depression, 10-14 = Moderate depression, 15-19 = Moderately severe depression, 20-27 = Severe depression      Allergies   Allergen Reactions    Codeine Itching and Nausea And Vomiting    Imitrex [Sumatriptan] Itching and Nausea And Vomiting     PASSED OUT    Patient is calling in need to speak to someone in the office about her medication making her feel really tired and not great and wants to know what she can do. Please kindly assist.    Amitriptyline     Botox [Onabotulinumtoxina] Other (See Comments)     Tardive dyskinesia    Compazine [Prochlorperazine Maleate] Other (See Comments)    Meclizine     Cephalexin Nausea And Vomiting and Other (See Comments)     ABDOMINAL PAIN FOR 2 WEEKS    Magnesium-Containing Compounds Other (See Comments)     Pt sts \"they called it the mags. I start twitching. \"     Meclizine Hcl Other (See Comments)     \"Makes me stutter and twitch. \"    Nsaids Other (See Comments)     Tardive dyskinisia    Pcn [Penicillins] Nausea And Vomiting and Other (See Comments)     HEADACHE    Reglan [Metoclopramide] Other (See Comments)     Tardive dyskinisia    Toradol [Ketorolac Tromethamine] Other (See Comments)     Tremors    Vistaril [Hydroxyzine Hcl] Other (See Comments)     dyskinsia      Zofran [Ondansetron Hcl] Other (See Comments)     Cramping and burning in stomach       Past Medical History:   Diagnosis Date    Arthritis     OSTEO ARTHITIS    Cholecystenteric fistula     COPD [facility] disease) [facility]     Degenerative disc disease     Fibromyalgia     H/O 24 hour EKG monitoring 02/16/2017      Sinus tach events , no major arrhythmias , follow up in office as routine     H/O echocardiogram 03/01/2017    EF 55-60% Normal LV structure  and systolic function.  H/O exercise stress test 03/01/2017    Normal stress test. Patient has physical deconditioning as she achieved target HR in 2 mins. Recommend to increase PO fluids intake and exercise training.      Osteopenia     Osteoporosis     PTSD (post-traumatic stress disorder)     S/P cholecystectomy 11/22/11    Schizo-affective schizophrenia ([facility]     Sjoegren syndrome     Syncope     Thyroid disease     Venous insufficiency of leg 2012    Vertigo        Past Surgical History:   Procedure Laterality Date    CHOLECYSTECTOMY  11/22/2011    laparoscopic    COLONOSCOPY      DENTAL SURGERY  8/9    ELBOW ARTHROSCOPY      bilateral pt states she was mostly nauseated after taking (no itching), after shared decision making utilized, pt will try  Refer to neurology as during discussion of treatment options, pt states she is either allergic to or failed all suggested meds, failed previous life style changes         Relevant Medications    ketorolac (TORADOL) injection 30 mg (Completed)    valproate (DEPACON) 500 mg in dextrose 5 % 100 mL IVPB (Completed)    butorphanol (STADOL) injection 2 mg (Completed)    morphine (PF) injection 4 mg (Completed)    butorphanol (STADOL) injection 1 mg (Completed)    ketorolac (TORADOL) injection 30 mg (Completed)    HYDROcodone-acetaminophen (NORCO) 5-325 MG per tablet 1 tablet (Completed)    ketorolac (TORADOL) injection 60 mg (Completed)    HYDROcodone-acetaminophen (NORCO) 5-325 MG per tablet 2 tablet (Completed)    nalbuphine (NUBAIN) injection 10 mg (Completed)    valproate (DEPACON) 1,000 mg in dextrose 5 % 100 mL IVPB (Completed)    valproate (DEPACON) 1,000 mg in dextrose 5 % 100 mL IVPB (Completed)    acetaminophen (TYLENOL) tablet 650 mg (Completed)    valproate (DEPACON) 1,000 mg in dextrose 5 % 100 mL IVPB (Completed)    acetaminophen (TYLENOL) tablet 650 mg (Completed)    morphine (PF) injection 4 mg (Completed)    oxyCODONE-acetaminophen (PERCOCET) 5-325 MG per tablet 2 tablet (Completed)    oxyCODONE-acetaminophen (PERCOCET) 5-325 MG per tablet 2 tablet (Completed)    acetaminophen (TYLENOL) tablet 1,000 mg (Completed)    nalbuphine (NUBAIN) injection 10 mg (Completed)    nalbuphine (NUBAIN) injection 10 mg (Completed)    acetaminophen (TYLENOL) tablet 1,000 mg (Completed)    nalbuphine (NUBAIN) injection 10 mg (Completed)    butalbital-acetaminophen-caffeine (FIORICET, ESGIC) per tablet 1 tablet (Completed)    pregabalin (LYRICA) capsule 300 mg (Completed)    acetaminophen (TYLENOL) tablet 1,000 mg (Completed)    butalbital-acetaminophen-caffeine (FIORICET, ESGIC) per tablet 2 tablet (Completed)

## 2024-01-08 ENCOUNTER — TELEPHONE (OUTPATIENT)
Dept: FAMILY MEDICINE CLINIC | Age: 49
End: 2024-01-08

## 2024-01-08 NOTE — TELEPHONE ENCOUNTER
The patient called and was very upset. She moved and her landlord and his  are requiring her to have an updated [employer] letter because it has been over a year since the last letter and it is .

## 2024-01-09 NOTE — TELEPHONE ENCOUNTER
Patient called asking about letter I informed patient if Marisol Arnett's response. Patient was not happy and asked what am I suppose to do again I informed patient of Marisol Arnett's response. Patient states great I am going to be homeless what I am suppose to do and then she hung up.

## 2024-01-09 NOTE — TELEPHONE ENCOUNTER
Patient has not been seen since 03/2023.  I am no longer writing [employer] letters.  She can check online to see if there is a counseling service that will provide the letter for her.